# Patient Record
Sex: FEMALE | Race: WHITE | NOT HISPANIC OR LATINO | Employment: OTHER | URBAN - METROPOLITAN AREA
[De-identification: names, ages, dates, MRNs, and addresses within clinical notes are randomized per-mention and may not be internally consistent; named-entity substitution may affect disease eponyms.]

---

## 2017-01-17 ENCOUNTER — TELEPHONE (OUTPATIENT)
Dept: AUDIOLOGY | Facility: CLINIC | Age: 82
End: 2017-01-17

## 2017-01-17 NOTE — TELEPHONE ENCOUNTER
The patient's daughter brought her hearing aids to the [facility] on 1/17/17 for hearing aid services.  She requested both hearing aids and earmolds be cleaned.  This was done today and the  filters were replaced as well.  Both hearing aids were found to be functioning well.  She will return to the clinic as needed.

## 2017-01-25 ENCOUNTER — OFFICE VISIT (OUTPATIENT)
Dept: INTERNAL MEDICINE | Facility: CLINIC | Age: 82
End: 2017-01-25

## 2017-01-25 VITALS
BODY MASS INDEX: 29.85 KG/M2 | DIASTOLIC BLOOD PRESSURE: 72 MMHG | SYSTOLIC BLOOD PRESSURE: 142 MMHG | WEIGHT: 174 LBS | HEART RATE: 73 BPM

## 2017-01-25 DIAGNOSIS — E78.5 HYPERLIPIDEMIA, UNSPECIFIED HYPERLIPIDEMIA TYPE: ICD-10-CM

## 2017-01-25 DIAGNOSIS — M50.30 DEGENERATIVE DISC DISEASE, CERVICAL: ICD-10-CM

## 2017-01-25 DIAGNOSIS — E03.4 HYPOTHYROIDISM DUE TO ACQUIRED ATROPHY OF THYROID: ICD-10-CM

## 2017-01-25 DIAGNOSIS — R10.13 ABDOMINAL PAIN, EPIGASTRIC: ICD-10-CM

## 2017-01-25 DIAGNOSIS — M25.511 PAIN IN JOINT OF RIGHT SHOULDER: ICD-10-CM

## 2017-01-25 DIAGNOSIS — I10 ESSENTIAL HYPERTENSION, BENIGN: Primary | ICD-10-CM

## 2017-01-25 DIAGNOSIS — M54.12 CERVICAL RADICULOPATHY: ICD-10-CM

## 2017-01-25 LAB — TSH SERPL DL<=0.005 MIU/L-ACNC: 2.87 MU/L (ref 0.4–4)

## 2017-01-25 RX ORDER — LEVOTHYROXINE SODIUM 25 UG/1
25 TABLET ORAL DAILY
Qty: 90 TABLET | Refills: 3 | Status: SHIPPED | OUTPATIENT
Start: 2017-01-25 | End: 2018-02-06

## 2017-01-25 RX ORDER — HYDROCHLOROTHIAZIDE 25 MG/1
25 TABLET ORAL DAILY
Qty: 90 TABLET | Refills: 3 | Status: SHIPPED | OUTPATIENT
Start: 2017-01-25 | End: 2018-01-31

## 2017-01-25 RX ORDER — GABAPENTIN 300 MG/1
300 CAPSULE ORAL 2 TIMES DAILY
Qty: 180 CAPSULE | Refills: 1 | Status: SHIPPED | OUTPATIENT
Start: 2017-01-25 | End: 2017-10-10

## 2017-01-25 RX ORDER — ATORVASTATIN CALCIUM 10 MG/1
10 TABLET, FILM COATED ORAL DAILY
Qty: 90 TABLET | Refills: 3 | Status: SHIPPED | OUTPATIENT
Start: 2017-01-25 | End: 2018-03-01

## 2017-01-25 ASSESSMENT — PAIN SCALES - GENERAL: PAINLEVEL: NO PAIN (0)

## 2017-01-25 NOTE — PROGRESS NOTES
HPI:   Azeb Torres is a 89 year old year old female presents today for followup. She was seen in the emergency room in November for a brief period of confusion.  In the emergency room, she was evaluated by the stroke team and underwent MRI imaging.  This was unremarkable.  She was felt not to be having a stroke.  Her daughter says the confusion cleared up while in the emergency room, and this was thought to likely be toxic metabolic in nature.  Since then, she has been feeling good and has not had any repeat episodes.  She continues to live with her adult daughter in the community.  She does note continued pain in the left buttock, right where she sits on the bone there.  It only bothers her if she is sitting in certain positions or on certain surfaces.  It is most bothersome when she is in the car, but most of the time it does not bother her.  It does not hurt if she is lying down, and she does not report that it is hurting her today sitting on the exam room chair.  She first brought this to my attention back in June, and x-rays of the pelvis and hips revealed no fractures or other concerning lesions.      She tells me that her blood pressures at home when she checks them are usually in the 130s.  She and her daughter have not been very consistent in checking it recently.  Her blood pressure was high in the emergency room in November.  On initial reading today, it was high with a systolic of 177, but on repeat, it did come down to 142.  She denies any new neurologic complaints, any chest pain, shortness of breath, cough, wheeze, abdominal pain, fevers, chills, weight changes.           ASSESSMENT/PLAN: 1.  A 89-year-old woman living independently in the community with her daughter here for recheck.  Generally, she is doing well.  An episode in November of altered mental status was short-lived and has not recurred.   2. Benign essential hypertension.  Blood pressure is a little above target, but given her age, I am  not enthusiastic about adding another medication at this point, particularly since she tells me her blood pressures at home have been normal.  I have asked her daughter to check her blood pressure a couple times a week and report back to me those readings.  If she is consistently under 140 systolic, I would not make any changes.   3.  Hypothyroidism, on low-dose levothyroxine.  Due for a TSH today.   4.  She has persistent neck pain and right shoulder pain.  She is known to have degenerative disk disease in the cervical spine and degenerative changes in her right shoulder.  In the past, physical therapy has been helpful, and she has asked me to refer her back for that.  She also uses Neurontin for some neuropathic-type pain in her right leg after a stroke many years ago, and we will refill that today.   5.  Hyperlipidemia.  Atorvastatin refilled today.   6.  She has had epigastric discomfort in the past that responded to the use of omeprazole.  This was refilled today.   7.  She has a history of breast cancer treated with no evidence of disease recurrence.  We have now elected to discontinue breast cancer screening due to her age.   8.  Follow up with me in 3 months or sooner as needed.           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)     PSVT (paroxysmal supraventricular tachycardia) (H)     Cerebral infarction (H)     Homonymous hemianopia     Pain in joint, shoulder region     Pain in joint, lower leg     Hyperlipidemia     Hypothyroidism     Tricuspid regurgitation     Pulmonary artery hypertension (H)     Rosacea     Inflamed seborrheic keratosis     Dermatitis, seborrheic     Pseudophakia of both eyes     Nonexudative senile macular degeneration of retina     Asteroid hyalosis of right eye     Chronic angle-closure glaucoma     Dizziness of  unknown cause     Vertigo     Hypothyroidism due to acquired atrophy of thyroid     Cervical radiculopathy     Chronic right shoulder pain       Current Outpatient Prescriptions   Medication Sig Dispense Refill     hydrochlorothiazide (HYDRODIURIL) 25 MG tablet TAKE ONE TABLET BY MOUTH EVERY DAY 90 tablet 1     atorvastatin (LIPITOR) 10 MG tablet TAKE ONE TABLET BY MOUTH EVERY DAY 90 tablet 0     aspirin-dipyridamole (AGGRENOX)  MG per 12 hr capsule Take 1 capsule by mouth 2 times daily 180 capsule 3     gabapentin (NEURONTIN) 300 MG capsule Take 1 capsule (300 mg) by mouth 2 times daily 180 capsule 1     levothyroxine (SYNTHROID, LEVOTHROID) 25 MCG tablet Take 1 tablet (25 mcg) by mouth daily 90 tablet 3     omeprazole (PRILOSEC) 20 MG capsule Take 1 capsule (20 mg) by mouth daily 90 capsule 3     Multiple Vitamins-Minerals (PRESERVISION AREDS 2 PO) Take by mouth daily       ondansetron (ZOFRAN ODT) 4 MG disintegrating tablet Take 1 tablet (4 mg) by mouth every 8 hours as needed for nausea 30 tablet 1     meclizine (ANTIVERT) 12.5 MG tablet Take 1 tablet (12.5 mg) by mouth 4 times daily as needed for dizziness 30 tablet 0     traMADol (ULTRAM) 50 MG tablet Take 1 tablet (50 mg) by mouth nightly as needed for moderate pain 28 tablet 1     latanoprost (XALATAN) 0.005 % ophthalmic solution Place 1 drop into both eyes At Bedtime 1 Bottle 11     dorzolamide-timolol (COSOPT) 2-0.5 % ophthalmic solution Place 1 drop into both eyes 2 times daily 1 Bottle 11     ketoconazole (NIZORAL) 2 % shampoo Shampoo 1-2 times a week as needed for rash. 120 mL 1     aspirin 81 MG tablet Take 1 tablet by mouth daily.       acetaminophen (TYLENOL) 500 MG tablet Take 2 tablets by mouth 2 times daily.       cholecalciferol (VITAMIN D) 1000 UNIT tablet Take 1 tablet by mouth daily.           ROS:    Constitutional: no fevers, chill   Cardiovascular: no chest pain, palpitations  Respiratory: no dyspnea, cough, shortness of breath or  wheezing   GI: no nausea, vomiting, diarrhea, no abdominal pain   Musculoskeletal: no edema       PHYSICAL EXAM:   /79 mmHg  Pulse 73  Wt 78.926 kg (174 lb)   Wt Readings from Last 1 Encounters:   01/25/17 78.926 kg (174 lb)     /72 mmHg  Pulse 73  Wt 78.926 kg (174 lb)    Constitutional: no distress, comfortable, pleasant    Cardiovascular: regular rate and rhythm, normal S1 and S2, no murmurs, rubs or gallops  Respiratory: clear to auscultation, no wheezes or crackles, normal breath sounds   Musculoskeletal:  no edema   Some limitation of ROM of neck. Strength 5/5 UE throughout; DTRS 2+ at biceps.      Lilia Flores MD

## 2017-01-25 NOTE — NURSING NOTE
Chief Complaint   Patient presents with     Recheck Medication     Pt is here to recheck her medications.      Araceli Starks LPN January 25, 2017 9:18 AM

## 2017-01-25 NOTE — MR AVS SNAPSHOT
After Visit Summary   1/25/2017    Azeb Torres    MRN: [id number]           Patient Information     Date Of Birth          4/3/1925        Visit Information        Provider Department      1/25/2017 9:25 AM Lilia Flores MD [facility]        Today's Diagnoses     Essential hypertension, benign    -  1     Hypothyroidism due to acquired atrophy of thyroid         Cervical radiculopathy         Pain in joint of right shoulder         Degenerative disc disease, cervical         Hyperlipidemia, unspecified hyperlipidemia type         Abdominal pain, epigastric           Care Instructions    Primary Care Center Medication Refill Request Information:  * Please contact your pharmacy regarding ANY request for medication refills.  ** [facility] Prescription Fax = [phone]  * Please allow 3 business days for routine medication refills.  * Please allow 5 business days for controlled substance medication refills.     Primary Care Center Test Result notification information:  *You will be notified with in 7-10 days of your appointment day regarding the results of your test.  If you are on MyChart you will be notified as soon as the provider has reviewed the results and signed off on them.    Please schedule the following appointments:  Physical Therapy [phone] (4th Floor [facility])   Primary Care Center [phone] (4th [facility])           Follow-ups after your visit        Additional Services     PHYSICAL THERAPY REFERRAL       *This therapy referral will be filtered to a centralized scheduling office at [facility] and the patient will receive a call to schedule an appointment at a [place] location most convenient for them. *     [facility] provides Physical Therapy evaluation and treatment and many specialty services across the [place] system.  If requesting a specialty program, please choose from the list below.    If you have  not heard from the scheduling office within 2 business days, please call [phone] for all locations, with the exception of [place], please call [phone].  Treatment: Evaluation & Treatment  Special Instructions/Modalities: None  Special Programs: None    Please be aware that coverage of these services is subject to the terms and limitations of your health insurance plan.  Call member services at your health plan with any benefit or coverage questions.      **Note to Provider:  If you are referring outside of [facility] for the therapy appointment, please list the name of the location in the  special instructions  above, print the referral and give to the patient to schedule the appointment.                  Follow-up notes from your care team     Return in about 3 months (around 4/25/2017).      Your next 10 appointments already scheduled     Jan 25, 2017 10:15 AM   LAB with [facility] ([facility])    [address] [place] [id number] [phone]           Patient must bring picture ID.  Patient should be prepared to give a urine specimen  Please do not eat 10-12 hours before your appointment if you are coming in fasting for labs on lipids, cholesterol, or glucose (sugar).  Pregnant women should follow their Care Team instructions. Water with medications is okay. Do not drink coffee or other fluids.   If you have concerns about taking  your medications, please ask at office or if scheduling via [employer], send a message by clicking on Secure Messaging, Message Your Care Team.              Future tests that were ordered for you today     Open Future Orders        Priority Expected Expires Ordered    TSH with free T4 reflex Routine  1/25/2018 1/25/2017            Who to contact     Please call your clinic at [phone] to:    Ask questions about your health    Make or cancel appointments    Discuss your medicines    Learn about your test  results    Speak to your doctor   If you have compliments or concerns about an experience at your clinic, or if you wish to file a complaint, please contact [facility] Patient Relations at [phone] or email us at [email or web address]         Additional Information About Your Visit        [employer] Information     [employer] gives you secure access to your electronic health record. If you see a primary care provider, you can also send messages to your care team and make appointments. If you have questions, please call your primary care clinic.  If you do not have a primary care provider, please call [phone] and they will assist you.      [employer] is an electronic gateway that provides easy, online access to your medical records. With [employer] Photo, you can request a clinic appointment, read your test results, renew a prescription or communicate with your care team.     To access your existing account, please contact your [facility] or call [phone] for assistance.        Care EveryWhere ID     This is your Care EveryWhere ID. This could be used by other organizations to access your [place] medical records  [id number]        Your Vitals Were     Pulse                   73            Blood Pressure from Last 3 Encounters:   01/25/17 142/72   11/05/16 172/87   10/25/16 157/90    Weight from Last 3 Encounters:   01/25/17 78.926 kg (174 lb)   11/05/16 78.954 kg (174 lb 1 oz)   10/25/16 77.701 kg (171 lb 4.8 oz)              We Performed the Following     PHYSICAL THERAPY REFERRAL          Today's Medication Changes          These changes are accurate as of: 1/25/17  9:51 AM.  If you have any questions, ask your nurse or doctor.               These medicines have changed or have updated prescriptions.        Dose/Directions    atorvastatin 10 MG tablet   Commonly known as:  LIPITOR   This may have changed:  See the new instructions.   Used for:   Hyperlipidemia, unspecified hyperlipidemia type   Changed by:  Lilia Flores MD        Dose:  10 mg   Take 1 tablet (10 mg) by mouth daily   Quantity:  90 tablet   Refills:  3       hydrochlorothiazide 25 MG tablet   Commonly known as:  HYDRODIURIL   This may have changed:  See the new instructions.   Used for:  Hyperlipidemia, unspecified hyperlipidemia type, Essential hypertension, benign   Changed by:  Lilia Flores MD        Dose:  25 mg   Take 1 tablet (25 mg) by mouth daily   Quantity:  90 tablet   Refills:  3            Where to get your medicines      These medications were sent to [place], [place] - [address]  [address], [facility] [address]    Hours:  TRANSPLANT PHONE NUMBER [phone] Phone:  [phone]    - atorvastatin 10 MG tablet  - gabapentin 300 MG capsule  - hydrochlorothiazide 25 MG tablet  - levothyroxine 25 MCG tablet  - omeprazole 20 MG CR capsule             Primary Care Provider Office Phone # Fax #    Lilia Flores [phone]        [facility] [address] [place] [address] [address]  [facility] [address]        Thank you!     Thank you for choosing [facility]  for your care. Our goal is always to provide you with excellent care. Hearing back from our patients is one way we can continue to improve our services. Please take a few minutes to complete the written survey that you may receive in the mail after your visit with us. Thank you!             Your Updated Medication List - Protect others around you: Learn how to safely use, store and throw away your medicines at www.disposemymeds.org.          This list is accurate as of: 1/25/17  9:51 AM.  Always use your most recent med list.                   Brand Name Dispense Instructions for use    aspirin 81 MG tablet      Take 1 tablet by mouth daily.       aspirin-dipyridamole  MG per 12 hr capsule    AGGRENOX    180 capsule     Take 1 capsule by mouth 2 times daily       atorvastatin 10 MG tablet    LIPITOR    90 tablet    Take 1 tablet (10 mg) by mouth daily       cholecalciferol 1000 UNIT tablet    vitamin D     Take 1 tablet by mouth daily.       dorzolamide-timolol 2-0.5 % ophthalmic solution    COSOPT    1 Bottle    Place 1 drop into both eyes 2 times daily       gabapentin 300 MG capsule    NEURONTIN    180 capsule    Take 1 capsule (300 mg) by mouth 2 times daily       hydrochlorothiazide 25 MG tablet    HYDRODIURIL    90 tablet    Take 1 tablet (25 mg) by mouth daily       ketoconazole 2 % shampoo    NIZORAL    120 mL    Shampoo 1-2 times a week as needed for rash.       latanoprost 0.005 % ophthalmic solution    XALATAN    1 Bottle    Place 1 drop into both eyes At Bedtime       levothyroxine 25 MCG tablet    SYNTHROID/LEVOTHROID    90 tablet    Take 1 tablet (25 mcg) by mouth daily       meclizine 12.5 MG tablet    ANTIVERT    30 tablet    Take 1 tablet (12.5 mg) by mouth 4 times daily as needed for dizziness       omeprazole 20 MG CR capsule    priLOSEC    90 capsule    Take 1 capsule (20 mg) by mouth daily       ondansetron 4 MG ODT tab    ZOFRAN ODT    30 tablet    Take 1 tablet (4 mg) by mouth every 8 hours as needed for nausea       PRESERVISION AREDS 2 PO      Take by mouth daily       traMADol 50 MG tablet    ULTRAM    28 tablet    Take 1 tablet (50 mg) by mouth nightly as needed for moderate pain       TYLENOL 500 MG tablet   Generic drug:  acetaminophen      Take 2 tablets by mouth 2 times daily.

## 2017-01-25 NOTE — PATIENT INSTRUCTIONS
Primary Care Center Medication Refill Request Information:  * Please contact your pharmacy regarding ANY request for medication refills.  ** [facility] Prescription Fax = [phone]  * Please allow 3 business days for routine medication refills.  * Please allow 5 business days for controlled substance medication refills.     Primary Care Center Test Result notification information:  *You will be notified with in 7-10 days of your appointment day regarding the results of your test.  If you are on MyChart you will be notified as soon as the provider has reviewed the results and signed off on them.    Please schedule the following appointments:  Physical Therapy [phone] (4th Floor [facility] Building)   Primary Care Center [phone] (4th Floor [facility] Building)

## 2017-02-16 DIAGNOSIS — R11.0 NAUSEA: ICD-10-CM

## 2017-02-17 RX ORDER — ONDANSETRON 4 MG/1
4 TABLET, ORALLY DISINTEGRATING ORAL EVERY 8 HOURS PRN
Qty: 30 TABLET | Refills: 1 | Status: SHIPPED | OUTPATIENT
Start: 2017-02-17 | End: 2018-02-12

## 2017-02-17 NOTE — TELEPHONE ENCOUNTER
ondansetron (ZOFRAN-ODT) 4 MG ODT tab      Last Written Prescription Date:  11-11-15  Last Fill Quantity: 30,   # refills: 1  Last Office Visit : 1-25-17  Future Office visit:  none    Kathleen M Doege RN

## 2017-02-21 ENCOUNTER — TELEPHONE (OUTPATIENT)
Dept: AUDIOLOGY | Facility: CLINIC | Age: 82
End: 2017-02-21

## 2017-02-21 NOTE — TELEPHONE ENCOUNTER
Azeb Torres's daughter brought her hearing aids to the [facility] on 2/21/17.  She requested they be cleaned.  Both hearing aids and earmolds were cleaned and the  filters were replaced bilaterally.  Both devices were found to be functioning normally.

## 2017-02-28 ENCOUNTER — HOSPITAL ENCOUNTER (OUTPATIENT)
Dept: PHYSICAL THERAPY | Facility: CLINIC | Age: 82
Setting detail: THERAPIES SERIES
End: 2017-02-28
Attending: INTERNAL MEDICINE
Payer: MEDICARE

## 2017-02-28 PROCEDURE — 97110 THERAPEUTIC EXERCISES: CPT | Mod: GP | Performed by: PHYSICAL THERAPIST

## 2017-02-28 PROCEDURE — G8978 MOBILITY CURRENT STATUS: HCPCS | Mod: GP,CK | Performed by: PHYSICAL THERAPIST

## 2017-02-28 PROCEDURE — 97530 THERAPEUTIC ACTIVITIES: CPT | Mod: GP | Performed by: PHYSICAL THERAPIST

## 2017-02-28 PROCEDURE — 40000185 ZZHC STATISTIC PT OUTPT VISIT: Performed by: PHYSICAL THERAPIST

## 2017-02-28 PROCEDURE — G8979 MOBILITY GOAL STATUS: HCPCS | Mod: GP,CJ | Performed by: PHYSICAL THERAPIST

## 2017-02-28 PROCEDURE — 97161 PT EVAL LOW COMPLEX 20 MIN: CPT | Mod: GP | Performed by: PHYSICAL THERAPIST

## 2017-02-28 NOTE — PROGRESS NOTES
[facility]        OUTPATIENT PHYSICAL THERAPY FUNCTIONAL EVALUATION  PLAN OF TREATMENT FOR OUTPATIENT REHABILITATION  (COMPLETE FOR INITIAL CLAIMS ONLY)  Patient's Last Name, First Name, M.I.  YOB: 1925  Azeb Torres     Provider's Name   [facility]   Medical Record No.  [id number]     Start of Care Date:  02/28/17   Onset Date:   1/25/17   Type:     _X__PT   ____OT  ____SLP Medical Diagnosis:   Cervical radiculopathy, pain R shoulder, cervical DDD     PT Diagnosis:  pain L leg affecting gait,sitting in car etc Visits from SOC:  1                              __________________________________________________________________________________  Plan of Treatment/Functional Goals:  balance training, gait training, manual therapy, strengthening, stretching, ROM     Ultrasound     GOALS  pain  pt to demo/verbalize 2-3 ways to manage her L hip/leg pain  05/28/17    HEP  pt to be indep w 2-4 HEP exer to manage her L hip pain.  05/28/17    gait speed  25 ft walk to improve to 10 sec or less for safer community ambulation in busy areas.  05/28/17           Therapy Frequency:  other (see comments)   Predicted Duration of Therapy Intervention:  up to 8x in 90 days    Mena Medrano, PT                                    I CERTIFY THE NEED FOR THESE SERVICES FURNISHED UNDER        THIS PLAN OF TREATMENT AND WHILE UNDER MY CARE     (Physician co-signature of this document indicates review and certification of the therapy plan).                Certification Date From:    2/28/17  Certification Date To:   5/28/17    Referring Provider:  Lilia Flores     Initial Assessment  See Epic Evaluation- Start of Care Date: 02/28/17

## 2017-03-01 ENCOUNTER — HOSPITAL ENCOUNTER (OUTPATIENT)
Dept: PHYSICAL THERAPY | Facility: CLINIC | Age: 82
Setting detail: THERAPIES SERIES
End: 2017-03-01
Attending: INTERNAL MEDICINE
Payer: MEDICARE

## 2017-03-01 PROCEDURE — 40000185 ZZHC STATISTIC PT OUTPT VISIT: Performed by: PHYSICAL THERAPIST

## 2017-03-01 PROCEDURE — 97535 SELF CARE MNGMENT TRAINING: CPT | Mod: GP | Performed by: PHYSICAL THERAPIST

## 2017-03-01 PROCEDURE — 97140 MANUAL THERAPY 1/> REGIONS: CPT | Mod: GP | Performed by: PHYSICAL THERAPIST

## 2017-03-01 PROCEDURE — 97110 THERAPEUTIC EXERCISES: CPT | Mod: GP | Performed by: PHYSICAL THERAPIST

## 2017-03-01 NOTE — IP AVS SNAPSHOT
MRN:[id number]                      After Visit Summary   3/1/2017    Azeb Torres    MRN: [id number]           Visit Information        Provider Department      3/1/2017 11:00 AM Lilia Mack, PT [facility] [place], Physical Therapy - Outpatient        Your next 10 appointments already scheduled     Mar 15, 2017 11:00 AM CDT   Treatment 45 with Mena Medrano, PT   [facility] [place], Physical Therapy - Outpatient ([facility])    [address]  [place] [address]   [phone]                Further instructions from your care team       Home Instructions for pain relief from your piriformis pain:    -avoid sitting on hard surfaces    -use gel ice pack for pain relief as needed    -sit on tennis ball placed in your tender spot for 20-60 seconds.     -do the attached stretch gently    MyChart Information     [employer] gives you secure access to your electronic health record. If you see a primary care provider, you can also send messages to your care team and make appointments. If you have questions, please call your primary care clinic.  If you do not have a primary care provider, please call [phone] and they will assist you.        Care EveryWhere ID     This is your Care EveryWhere ID. This could be used by other organizations to access your [place] medical records  [id number]

## 2017-03-01 NOTE — DISCHARGE INSTRUCTIONS
Home Instructions for pain relief from your piriformis pain:    -avoid sitting on hard surfaces    -use gel ice pack for pain relief as needed    -sit on tennis ball placed in your tender spot for 20-60 seconds.     -do the attached stretch gently

## 2017-04-04 NOTE — ADDENDUM NOTE
Encounter addended by: Lilia Mack PT on: 4/4/2017  8:55 AM<BR>     Actions taken: Flowsheet accepted

## 2017-04-27 ENCOUNTER — OFFICE VISIT (OUTPATIENT)
Dept: OPHTHALMOLOGY | Facility: CLINIC | Age: 82
End: 2017-04-27
Attending: OPHTHALMOLOGY
Payer: MEDICARE

## 2017-04-27 DIAGNOSIS — H35.3190 NONEXUDATIVE SENILE MACULAR DEGENERATION OF RETINA: ICD-10-CM

## 2017-04-27 DIAGNOSIS — H43.21 ASTEROID HYALOSIS OF RIGHT EYE: ICD-10-CM

## 2017-04-27 DIAGNOSIS — Z96.1 PSEUDOPHAKIA OF BOTH EYES: ICD-10-CM

## 2017-04-27 DIAGNOSIS — H40.2233 CHRONIC ANGLE-CLOSURE GLAUCOMA OF BOTH EYES, SEVERE STAGE: Primary | ICD-10-CM

## 2017-04-27 PROCEDURE — 92083 EXTENDED VISUAL FIELD XM: CPT | Mod: ZF | Performed by: OPHTHALMOLOGY

## 2017-04-27 PROCEDURE — 99214 OFFICE O/P EST MOD 30 MIN: CPT | Mod: ZF

## 2017-04-27 PROCEDURE — 92020 GONIOSCOPY: CPT | Mod: 59,ZF | Performed by: OPHTHALMOLOGY

## 2017-04-27 PROCEDURE — 92015 DETERMINE REFRACTIVE STATE: CPT | Mod: GY,ZF

## 2017-04-27 PROCEDURE — 92133 CPTRZD OPH DX IMG PST SGM ON: CPT | Mod: ZF | Performed by: OPHTHALMOLOGY

## 2017-04-27 ASSESSMENT — VISUAL ACUITY
OD_CC: 20/60-1
OD_PH_CC: 20/40-2
OS_CC: 20/50-2
METHOD: SNELLEN - LINEAR
CORRECTION_TYPE: GLASSES

## 2017-04-27 ASSESSMENT — CONF VISUAL FIELD
OS_INFERIOR_NASAL_RESTRICTION: 1
OD_SUPERIOR_TEMPORAL_RESTRICTION: 1
OS_SUPERIOR_NASAL_RESTRICTION: 1
OD_INFERIOR_NASAL_RESTRICTION: 3
OS_INFERIOR_TEMPORAL_RESTRICTION: 3
OD_INFERIOR_TEMPORAL_RESTRICTION: 1
OS_SUPERIOR_TEMPORAL_RESTRICTION: 3
OD_SUPERIOR_NASAL_RESTRICTION: 3

## 2017-04-27 ASSESSMENT — EXTERNAL EXAM - LEFT EYE: OS_EXAM: NORMAL

## 2017-04-27 ASSESSMENT — PACHYMETRY
OS_CT(UM): 563
OD_CT(UM): 601

## 2017-04-27 ASSESSMENT — REFRACTION_WEARINGRX
OS_AXIS: 174
OD_AXIS: 153
OS_CYLINDER: +0.25
OS_SPHERE: -1.75
OD_CYLINDER: +1.00
SPECS_TYPE: SVL
OD_SPHERE: -2.25

## 2017-04-27 ASSESSMENT — TONOMETRY
IOP_METHOD: APPLANATION
OD_IOP_MMHG: 12
OS_IOP_MMHG: 16
OS_IOP_MMHG: 14
IOP_METHOD: TONOPEN
OD_IOP_MMHG: 14

## 2017-04-27 ASSESSMENT — SLIT LAMP EXAM - LIDS: COMMENTS: DERMATOCHALASIS

## 2017-04-27 ASSESSMENT — CUP TO DISC RATIO
OS_RATIO: 0.8
OD_RATIO: 0.6

## 2017-04-27 ASSESSMENT — GONIOSCOPY
OD_INFERIOR: CLOSED
OD_SUPERIOR: CLOSED
OD_NASAL: CLOSED
OD_TEMPORAL: CLOSED
OS_TEMPORAL: CLOSED
OS_INFERIOR: CLOSED
OS_NASAL: CLOSED
OS_SUPERIOR: CLOSED

## 2017-04-27 ASSESSMENT — REFRACTION_MANIFEST
OD_AXIS: 163
OS_SPHERE: -2.50
OS_AXIS: 165
OD_CYLINDER: +1.75
OS_CYLINDER: +1.25
OD_SPHERE: -2.50
OS_ADD: +2.75
OD_ADD: +2.75

## 2017-04-27 ASSESSMENT — EXTERNAL EXAM - RIGHT EYE: OD_EXAM: NORMAL

## 2017-04-27 NOTE — MR AVS SNAPSHOT
After Visit Summary   4/27/2017    Azeb Torres    MRN: [id number]           Patient Information     Date Of Birth          4/3/1925        Visit Information        Provider Department      4/27/2017 9:30 AM Alejandrina Faria MD [facility]        Today's Diagnoses     Chronic angle-closure glaucoma of both eyes, severe stage    -  1    Asteroid hyalosis of right eye        Nonexudative senile macular degeneration of retina        Pseudophakia of both eyes          Care Instructions    Patient will continue on Cosopt (Timolol/Dorzolamide) which is a blue top drop 2x/day (12 hours apart) in both eyes and Latanoprost which is a teal top drop at bedtime in both eyes.  Patient will return to clinic in 8-12 months with repeat visual field test, dilated eye exam, and OCT with RNFL analysis.  Patient will also follow up with Evelina Bacon for low vision evaluation and start on eye vitamins.              Follow-ups after your visit        Additional Services     OCCUPATIONAL THERAPY REFERRAL       Low Vision Referral to Evelina Bacon                  Follow-up notes from your care team     Return 8-12 months with repeat visual field test, dilated eye exam, and OCT with RNFL analysis and Evelina Nazario.      Your next 10 appointments already scheduled     Jun 29, 2017  9:30 AM CDT   Programmable Hearing Aid Check with Kylie Downey   [facility] ([facility] [place])    [address] [place] [id number] [phone]            Sep 06, 2017 10:25 AM CDT   (Arrive by 10:10 AM)   Return Visit with Lilia Flores MD   [facility] ([facility] [place])    [address] [place] [id number] [phone]              Who to contact     Please call your clinic at [phone] to:    Ask questions about your health    Make or cancel appointments    Discuss your medicines    Learn about your test results    Speak to  your doctor   If you have compliments or concerns about an experience at your clinic, or if you wish to file a complaint, please contact [facility] Patient Relations at [phone] or email us at [email or web address]         Additional Information About Your Visit        MyChart Information     [employer] gives you secure access to your electronic health record. If you see a primary care provider, you can also send messages to your care team and make appointments. If you have questions, please call your primary care clinic.  If you do not have a primary care provider, please call [phone] and they will assist you.      [employer] is an electronic gateway that provides easy, online access to your medical records. With [employer], you can request a clinic appointment, read your test results, renew a prescription or communicate with your care team.     To access your existing account, please contact your [facility] or call [phone] for assistance.        Care EveryWhere ID     This is your Care EveryWhere ID. This could be used by other organizations to access your [facility] medical records  [id number]         Blood Pressure from Last 3 Encounters:   05/26/17 124/66   04/28/17 141/66   01/25/17 142/72    Weight from Last 3 Encounters:   04/28/17 79.1 kg (174 lb 6.4 oz)   01/25/17 78.9 kg (174 lb)   11/05/16 79 kg (174 lb 1 oz)              We Performed the Following     GONIOSCOPY     OCCUPATIONAL THERAPY REFERRAL     OCT Optic Nerve RNFL Spectralis OU (both eyes)     OVF 24-2 Dynamic OU     Pachymetry OU (both eyes)        Primary Care Provider Office Phone # Fax #    Lilia Flores [phone]        [facility] [address] [facility] [address]  [facility] [address]        Equal Access to Services     KENDRA FLORES AH: Ora Grady, reji hopson, jese de la torre. So  [facility] [phone].    ATENCIÓN: Si kathleen vora, tiene a johnson disposición servicios gratuitos de asistencia lingüística. Megan valentine [phone].    We comply with applicable federal civil rights laws and [place] laws. We do not discriminate on the basis of race, color, national origin, age, disability sex, sexual orientation or gender identity.            Thank you!     Thank you for choosing [facility]  for your care. Our goal is always to provide you with excellent care. Hearing back from our patients is one way we can continue to improve our services. Please take a few minutes to complete the written survey that you may receive in the mail after your visit with us. Thank you!             Your Updated Medication List - Protect others around you: Learn how to safely use, store and throw away your medicines at www.disposemymeds.org.          This list is accurate as of: 4/27/17 11:59 PM.  Always use your most recent med list.                   Brand Name Dispense Instructions for use Diagnosis    aspirin 81 MG tablet      Take 1 tablet by mouth daily.        aspirin-dipyridamole  MG per 12 hr capsule    AGGRENOX    180 capsule    Take 1 capsule by mouth 2 times daily    Cerebrovascular disease       atorvastatin 10 MG tablet    LIPITOR    90 tablet    Take 1 tablet (10 mg) by mouth daily    Hyperlipidemia, unspecified hyperlipidemia type       cholecalciferol 1000 UNIT tablet    vitamin D     Take 1 tablet by mouth daily.        gabapentin 300 MG capsule    NEURONTIN    180 capsule    Take 1 capsule (300 mg) by mouth 2 times daily    Degenerative disc disease, cervical       hydrochlorothiazide 25 MG tablet    HYDRODIURIL    90 tablet    Take 1 tablet (25 mg) by mouth daily    Hyperlipidemia, unspecified hyperlipidemia type, Essential hypertension, benign       ketoconazole 2 % shampoo    NIZORAL    120 mL    Shampoo 1-2 times a week as needed for rash.    Seborrheic dermatitis       levothyroxine 25 MCG tablet     SYNTHROID/LEVOTHROID    90 tablet    Take 1 tablet (25 mcg) by mouth daily    Hypothyroidism due to acquired atrophy of thyroid       meclizine 12.5 MG tablet    ANTIVERT    30 tablet    Take 1 tablet (12.5 mg) by mouth 4 times daily as needed for dizziness        omeprazole 20 MG CR capsule    priLOSEC    90 capsule    Take 1 capsule (20 mg) by mouth daily    Abdominal pain, epigastric       ondansetron 4 MG ODT tab    ZOFRAN-ODT    30 tablet    Place 1 tablet (4 mg) under the tongue every 8 hours as needed for nausea    Nausea       PRESERVISION AREDS 2 PO      Take by mouth daily        traMADol 50 MG tablet    ULTRAM    28 tablet    Take 1 tablet (50 mg) by mouth nightly as needed for moderate pain    Cervicalgia       TYLENOL 500 MG tablet   Generic drug:  acetaminophen      Take 2 tablets by mouth 2 times daily.

## 2017-04-27 NOTE — NURSING NOTE
Chief Complaints and History of Present Illnesses   Patient presents with     Angle Closure Glaucoma Evaluation     HPI    Affected eye(s):  Both   Symptoms:     Blurred vision   No difficulty with reading   No floaters   No flashes         Do you have eye pain now?:  No      Comments:  Per pt last eye check was 6 mo ago. Unsure what clinic or the doctor's name. Distance [facility] blurry but near [facility] seems good. Using 2 different drops, one is tid and the other is qhs. Unsure what these are or top colors.   Millie BURGESS April 27, 2017 9:40 AM

## 2017-04-27 NOTE — PROGRESS NOTES
1)CACG OS>>OD -- s/p Trab OU -- K pachy: 601/563   Tmax: 50 per patient (teens on Tx per EPIC)    HVF:OD:Right HH and OS:Central island       CDR: 0.7/0.9   HRT/OCT: Mod RNFL thinning      FHX of Glc:  None    Gonio:  Closed     Intolerant to: None    Asthma/COPD:No, on topical BB   Steroid Use: Yes, injections    Kidney Stones: None    Sulfa Allergy:  None    IOP targets: Teens -- IOP good   2)PCIOL OU   3)NNVAMD  4)H/O CHRPE OD  5)H/O CVA -- Right HH  -- Head CT/CTA done 11/16  6)Asteroid Hyalosis OD    Patient will continue on Cosopt (Timolol/Dorzolamide) which is a blue top drop 2x/day (12 hours apart) in both eyes and Latanoprost which is a teal top drop at bedtime in both eyes.  Patient will return to clinic in 8-12 months with repeat visual field test, dilated eye exam, and OCT with RNFL analysis.  Patient will also follow up with Evelina Bacon for low vision evaluation and start on eye vitamins.        Resident Note (Please Follow Final Note Above)  S: Last appointment 4/2015 with Dr. Wilson. Has been following elsewhere.   O: IOP 14/16  Visual Field 24-2 4/27/2017  OD: right homonymous hemianopia  OS: right homonymous hemianopia, severe constriction/central island remaining MD -25 from -23  A/P:   CACG- visual field stable, IOP low teens  NNVAMD- consider AREDs vitamins  Myopia with Astig- update glasses    Attending Physician Attestation:  Complete documentation of historical and exam elements from today's encounter can be found in the full encounter summary report (not reduplicated in this progress note). I personally obtained the chief complaint(s) and history of present illness.  I confirmed and edited as necessary the review of systems, past medical/surgical history, family history, social history, and examination findings as documented by others; and I examined the patient myself. I personally reviewed the relevant tests, images, and reports as documented above. I formulated and edited as necessary the  assessment and plan and discussed the findings and management plan with the patient and family.  - Alejandrina Faria MD

## 2017-04-28 ENCOUNTER — OFFICE VISIT (OUTPATIENT)
Dept: INTERNAL MEDICINE | Facility: CLINIC | Age: 82
End: 2017-04-28

## 2017-04-28 VITALS
BODY MASS INDEX: 29.94 KG/M2 | HEART RATE: 80 BPM | RESPIRATION RATE: 18 BRPM | WEIGHT: 174.4 LBS | DIASTOLIC BLOOD PRESSURE: 66 MMHG | SYSTOLIC BLOOD PRESSURE: 141 MMHG

## 2017-04-28 DIAGNOSIS — M25.511 CHRONIC RIGHT SHOULDER PAIN: ICD-10-CM

## 2017-04-28 DIAGNOSIS — I10 ESSENTIAL HYPERTENSION, BENIGN: ICD-10-CM

## 2017-04-28 DIAGNOSIS — G89.29 CHRONIC RIGHT SHOULDER PAIN: ICD-10-CM

## 2017-04-28 DIAGNOSIS — M54.12 CERVICAL RADICULOPATHY: Primary | ICD-10-CM

## 2017-04-28 ASSESSMENT — ENCOUNTER SYMPTOMS
ABDOMINAL PAIN: 0
TINGLING: 1
NUMBNESS: 1
ARTHRALGIAS: 0
FEVER: 0
FATIGUE: 0
JOINT SWELLING: 0

## 2017-04-28 ASSESSMENT — PAIN SCALES - GENERAL: PAINLEVEL: NO PAIN (0)

## 2017-04-28 NOTE — PATIENT INSTRUCTIONS
[facility] Medication Refill Request Information:  * Please contact your pharmacy regarding ANY request for medication refills.  ** [facility] Prescription Fax = [phone]  * Please allow 3 business days for routine medication refills.  * Please allow 5 business days for controlled substance medication refills.     [facility] Test Result notification information:  *You will be notified with in 7-10 days of your appointment day regarding the results of your test.  If you are on MyChart you will be notified as soon as the provider has reviewed the results and signed off on them.    [facility] [phone]     MRI Screening Tool    Does the patient have any metals in their body? Yes. Patient had two hip replacement.  Is the patient claustrophobic? no  Does the patient need sedation? no  Is the patient able to transport themself to a table? Yes but would like assistance, patient can get light headed.

## 2017-04-28 NOTE — PROGRESS NOTES
HPI:       Azeb Torres is a 89 year old female with a PMH of HTN, CVA, and breast cancer who presents for follow up of concern(s) listed below:    R Shoulder pain and numbness  Patient has been experiencing some pain start in her R neck and progresses to hands. It has started many years ago and has worsened in the past 3 months ago. Points to lateral aspect of proximal humerus as being the most sore spot. The pain disturbs her mainly during sleep and as she just wakes up from sleep. It forces her to sleeps on her left side instead. Also mentions numbness and tingling in all five fingers. She has had a cortisone shot a couple years ago and has found it helpful, it was for the similar concern as today. Has not done any PT for her shoulder.   Current meds: She is taking her gabapentin 300mg bid, Tylenol 500mg bid, and aspirin 81mg, but has not taken her Tramadol prn.     Benign Essential HTN  Last blood pressure measured at home SBP ~140. Does not take blood pressure often and is dependent on relatives for measurement. Also mentions feeling dizziness when waking up.  Today's BP is 154/84 and repeat was 141/66.  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)     PSVT (paroxysmal supraventricular tachycardia) (H)     Cerebral infarction (H)     Homonymous hemianopia     Pain in joint, shoulder region     Pain in joint, lower leg     Hyperlipidemia     Hypothyroidism     Tricuspid regurgitation     Pulmonary artery hypertension (H)     Rosacea     Inflamed seborrheic keratosis     Dermatitis, seborrheic     Pseudophakia of both eyes     Nonexudative senile macular degeneration of retina     Asteroid hyalosis of right eye     Chronic angle-closure glaucoma     Dizziness of unknown cause     Vertigo      Hypothyroidism due to acquired atrophy of thyroid     Cervical radiculopathy     Chronic right shoulder pain       Current Outpatient Prescriptions   Medication Sig Dispense Refill     ondansetron (ZOFRAN-ODT) 4 MG ODT tab Place 1 tablet (4 mg) under the tongue every 8 hours as needed for nausea 30 tablet 1     atorvastatin (LIPITOR) 10 MG tablet Take 1 tablet (10 mg) by mouth daily 90 tablet 3     gabapentin (NEURONTIN) 300 MG capsule Take 1 capsule (300 mg) by mouth 2 times daily 180 capsule 1     omeprazole (PRILOSEC) 20 MG CR capsule Take 1 capsule (20 mg) by mouth daily 90 capsule 3     levothyroxine (SYNTHROID/LEVOTHROID) 25 MCG tablet Take 1 tablet (25 mcg) by mouth daily 90 tablet 3     hydrochlorothiazide (HYDRODIURIL) 25 MG tablet Take 1 tablet (25 mg) by mouth daily 90 tablet 3     aspirin-dipyridamole (AGGRENOX)  MG per 12 hr capsule Take 1 capsule by mouth 2 times daily 180 capsule 3     Multiple Vitamins-Minerals (PRESERVISION AREDS 2 PO) Take by mouth daily       meclizine (ANTIVERT) 12.5 MG tablet Take 1 tablet (12.5 mg) by mouth 4 times daily as needed for dizziness 30 tablet 0     traMADol (ULTRAM) 50 MG tablet Take 1 tablet (50 mg) by mouth nightly as needed for moderate pain 28 tablet 1     latanoprost (XALATAN) 0.005 % ophthalmic solution Place 1 drop into both eyes At Bedtime 1 Bottle 11     dorzolamide-timolol (COSOPT) 2-0.5 % ophthalmic solution Place 1 drop into both eyes 2 times daily 1 Bottle 11     ketoconazole (NIZORAL) 2 % shampoo Shampoo 1-2 times a week as needed for rash. 120 mL 1     aspirin 81 MG tablet Take 1 tablet by mouth daily.       acetaminophen (TYLENOL) 500 MG tablet Take 2 tablets by mouth 2 times daily.       cholecalciferol (VITAMIN D) 1000 UNIT tablet Take 1 tablet by mouth daily.         Past Surgical History:   Procedure Laterality Date     CATARACT IOL, RT/LT       glaucoma laser[       HIP SURGERY       HYSTERECTOMY           Social History     Social History      Marital status:      Spouse name: N/A     Number of children: N/A     Years of education: N/A     Occupational History     Not on file.     Social History Main Topics     Smoking status: Never Smoker     Smokeless tobacco: Never Used     Alcohol use No     Drug use: No     Sexual activity: No     Other Topics Concern     Not on file     Social History Narrative      No Known Allergies    No results found for this or any previous visit (from the past 24 hour(s)).         Review of Systems:     Review of Systems     Constitutional:  Negative for fever and fatigue.   Gastrointestinal:  Negative for abdominal pain.   Musculoskeletal:  Negative for joint swelling and arthralgias.   Skin:  Negative for rash.   Neurological:  Positive for tingling and numbness.   - See HPI         Physical Exam:   /84 (BP Location: Right arm, Patient Position: Chair, Cuff Size: Adult Regular)  Pulse 80  Resp 18  Wt 79.1 kg (174 lb 6.4 oz)  Breastfeeding? No  BMI 29.94 kg/m2  General: elderly overweight  woman sitting comfortably in her chair  Respiratory: CTA b/l in all lung fields, normal respiratory effort  Cardio: RRR, normal S1 and S2, no murmurs appreciated, no peripheral edema, distal pulses are strong and equal b/l  MSK: slight limited ROM in both arms in flexion and extension, 5/5 strength with internal and external rotation, arm flexion and extension, and abduction; negative empty can test, slight or negative Neer test for impingement, no arthralgias, no TTP on spinal processes, no paraspinal tenderness  Neuro: A&Ox3, CN III-XII grossly intact, reflexes are 2+ b/l, gait is slow and shuffled with minimal arm swings  Skin: warm and well perfused extremities  Psych: normal affect    *Repeat BP: 141/66         Labs and Imaging:     Reviewed.         Assessment and Plan:     1. R shoulder pain and numbness  Describes worsening symptoms of radiating numbness and tingling as well as shoulder pain during  nighttime. Seems most consistent with one or two main diagnoses including: Cervical impingement syndrome and Subacromial bursitis.  Other less likely differential diagnoses to include: osteoarthritis of shoulder, adhesive capsulitis, and rotator cuff tear.  -Imaging: Shoulder X-ray and Cervical MRI w/o contrast  -Plan is to for patient to take an extra tablet of Tylenol and if pain persists to take 0.5-1.0 tablet of Tramadol before sleep.  -Consider PT for shoulder.    2. Benign Essential HTN  Patient complains of dizziness so hesitant to change treatment. Plan to continue current HCTZ regiment as usual. Initial /84. Repeat BP was 141/66. Dizziness has been intermittent issue chronically; has been seen in [facility], previous CNS imaging negative as recently as 11/2016.    3. Health maintenance/ future labs  Plan for Lipid panel and for patient to bring in BP device in 4 weeks follow-up.    Options for treatment and follow-up care were reviewed with the patient and/or guardian. Azeb THERESA Torres and/or guardian engaged in the decision making process and verbalized understanding of the options discussed and agreed with the final plan.    Lilia Flores MD    This note was scribed by Rey Amato, 3rd year medical student, for attending physician Dr. Flores.    The medical student acted as scribe and the encounter documented above was completely performed by myself.  Supervising Doctor Lilia Flores

## 2017-04-28 NOTE — MR AVS SNAPSHOT
After Visit Summary   4/28/2017    Azeb Torres    MRN: [id number]           Patient Information     Date Of Birth          4/3/1925        Visit Information        Provider Department      4/28/2017 10:00 AM Lilia Flores MD [facility]        Today's Diagnoses     Cervical radiculopathy    -  1    Chronic right shoulder pain        Essential hypertension, benign          Care Instructions    Primary Care Center Medication Refill Request Information:  * Please contact your pharmacy regarding ANY request for medication refills.  ** [facility] Prescription Fax = [phone]  * Please allow 3 business days for routine medication refills.  * Please allow 5 business days for controlled substance medication refills.     Primary Care Center Test Result notification information:  *You will be notified with in 7-10 days of your appointment day regarding the results of your test.  If you are on MyChart you will be notified as soon as the provider has reviewed the results and signed off on them.    [facility] [phone]     MRI Screening Tool    Does the patient have any metals in their body? Yes. Patient had two hip replacement.  Is the patient claustrophobic? no  Does the patient need sedation? no  Is the patient able to transport themself to a table? Yes but would like assistance, patient can get light headed.            Follow-ups after your visit        Follow-up notes from your care team     Return in about 4 weeks (around 5/26/2017).      Your next 10 appointments already scheduled     Apr 28, 2017 11:25 AM CDT   XR SHOULDER RIGHT 2 VIEWS with [email or web address]   [facility] ([facility])    [address] [address] [phone]           Please bring a list of your current medicines to your exam. (Include vitamins, minerals and over-thecounter medicines.) Leave your valuables at home.  Tell your doctor if there is  a chance you may be pregnant.  You do not need to do anything special for this exam.            Apr 30, 2017  8:30 AM CDT   (Arrive by 8:15 AM)   MR CERVICAL SPINE W/O CONTRAST with [id number]   [facility] ([facility])    [address] [address] [phone]           Take your medicines as usual, unless your doctor tells you not to. Bring a list of your current medicines to your exam (including vitamins, minerals and over-the-counter drugs). Also bring the results of similar scans you may have had.  Please remove any body piercings and hair extensions before you arrive.  Follow your doctor s orders. If you do not, we may have to postpone your exam.  You will not have contrast for this exam. You do not need to do anything special to prepare.  The MRI machine uses a strong magnet. Please wear clothes without metal (snaps, zippers). A sweatsuit works well, or we may give you a hospital gown.   **IMPORTANT** THE INSTRUCTIONS BELOW ARE ONLY FOR THOSE PATIENTS WHO HAVE BEEN TOLD THEY WILL RECEIVE SEDATION OR GENERAL ANESTHESIA DURING THEIR MRI PROCEDURE:  IF YOU WILL RECEIVE SEDATION (take medicine to help you relax during your exam):   You must get the medicine from your doctor before you arrive. Bring the medicine to the exam. Do not take it at home.   Arrive one hour early. Bring someone who can take you home after the test. Your medicine will make you sleepy. After the exam, you may not drive, take a bus or take a taxi by yourself.   No eating 8 hours before your exam. You may have clear liquids up until 4 hours before your exam. (Clear liquids include water, clear tea, black coffee and fruit juice without pulp.)  IF YOU WILL RECEIVE ANESTHESIA (be asleep for your exam):   Arrive 1 1/2 hours early. Bring someone who can take you home after the test. You may not drive, take a bus or take a taxi by yourself.   No eating 8 hours before your exam.  You may have clear liquids up until 4 hours before your exam. (Clear liquids include water, clear tea, black coffee and fruit juice without pulp.)   You will spend four to five hours in the recovery room.  Please call the Imaging Department at your exam site with any questions.            May 26, 2017  9:30 AM CDT   (Arrive by 9:15 AM)   Return Visit with Lilia Flores MD   [facility] ([facility] [place])    [address] [facility]  [address] [facility] [address] [phone]              Future tests that were ordered for you today     Open Future Orders        Priority Expected Expires Ordered    XR Shoulder Right 2 Views Routine 4/28/2017 4/28/2018 4/28/2017    MRI Cervical Spine w/o Contrast Routine  4/28/2018 4/28/2017            Who to contact     Please call your clinic at [phone] to:    Ask questions about your health    Make or cancel appointments    Discuss your medicines    Learn about your test results    Speak to your doctor   If you have compliments or concerns about an experience at your clinic, or if you wish to file a complaint, please contact [facility] Patient Relations at [phone] or email us at [email or web address]         Additional Information About Your Visit        [employer] Information     [employer] gives you secure access to your electronic health record. If you see a primary care provider, you can also send messages to your care team and make appointments. If you have questions, please call your primary care clinic.  If you do not have a primary care provider, please call [phone] and they will assist you.      [employer] is an electronic gateway that provides easy, online access to your medical records. With [employer], you can request a clinic appointment, read your test results, renew a prescription or communicate with your care team.     To access your existing account, please contact your [place]  [facility] or call [phone] for assistance.        Care EveryWhere ID     This is your Care EveryWhere ID. This could be used by other organizations to access your [place] medical records  [id number]        Your Vitals Were     Pulse Respirations Breastfeeding? BMI (Body Mass Index)          80 18 No 29.94 kg/m2         Blood Pressure from Last 3 Encounters:   04/28/17 141/66   01/25/17 142/72   11/05/16 172/87    Weight from Last 3 Encounters:   04/28/17 79.1 kg (174 lb 6.4 oz)   01/25/17 78.9 kg (174 lb)   11/05/16 79 kg (174 lb 1 oz)               Primary Care Provider Office Phone # Fax #    Lilia Flores [phone]        [facility]ANS [address] [place] [address] [address]  [facility] [address]        Thank you!     Thank you for choosing [facility]  for your care. Our goal is always to provide you with excellent care. Hearing back from our patients is one way we can continue to improve our services. Please take a few minutes to complete the written survey that you may receive in the mail after your visit with us. Thank you!             Your Updated Medication List - Protect others around you: Learn how to safely use, store and throw away your medicines at www.disposemymeds.org.          This list is accurate as of: 4/28/17 11:19 AM.  Always use your most recent med list.                   Brand Name Dispense Instructions for use    aspirin 81 MG tablet      Take 1 tablet by mouth daily.       aspirin-dipyridamole  MG per 12 hr capsule    AGGRENOX    180 capsule    Take 1 capsule by mouth 2 times daily       atorvastatin 10 MG tablet    LIPITOR    90 tablet    Take 1 tablet (10 mg) by mouth daily       cholecalciferol 1000 UNIT tablet    vitamin D     Take 1 tablet by mouth daily.       dorzolamide-timolol 2-0.5 % ophthalmic solution    COSOPT    1 Bottle    Place 1 drop into both eyes 2 times daily       gabapentin 300 MG capsule    NEURONTIN    180  capsule    Take 1 capsule (300 mg) by mouth 2 times daily       hydrochlorothiazide 25 MG tablet    HYDRODIURIL    90 tablet    Take 1 tablet (25 mg) by mouth daily       ketoconazole 2 % shampoo    NIZORAL    120 mL    Shampoo 1-2 times a week as needed for rash.       latanoprost 0.005 % ophthalmic solution    XALATAN    1 Bottle    Place 1 drop into both eyes At Bedtime       levothyroxine 25 MCG tablet    SYNTHROID/LEVOTHROID    90 tablet    Take 1 tablet (25 mcg) by mouth daily       meclizine 12.5 MG tablet    ANTIVERT    30 tablet    Take 1 tablet (12.5 mg) by mouth 4 times daily as needed for dizziness       omeprazole 20 MG CR capsule    priLOSEC    90 capsule    Take 1 capsule (20 mg) by mouth daily       ondansetron 4 MG ODT tab    ZOFRAN-ODT    30 tablet    Place 1 tablet (4 mg) under the tongue every 8 hours as needed for nausea       PRESERVISION AREDS 2 PO      Take by mouth daily       traMADol 50 MG tablet    ULTRAM    28 tablet    Take 1 tablet (50 mg) by mouth nightly as needed for moderate pain       TYLENOL 500 MG tablet   Generic drug:  acetaminophen      Take 2 tablets by mouth 2 times daily.

## 2017-04-28 NOTE — NURSING NOTE
Chief Complaint   Patient presents with     Hypertension     Here for follow up and BP check     Khris Engle CMA at 10:07 AM on 4/28/2017

## 2017-05-10 ENCOUNTER — TELEPHONE (OUTPATIENT)
Dept: INTERNAL MEDICINE | Facility: CLINIC | Age: 82
End: 2017-05-10

## 2017-05-10 NOTE — TELEPHONE ENCOUNTER
Called to discuss xray and MRI report. Has right sided shoudler pain and numbness in right hand if sleeps on right side - resolves when she changes position, does not bother her much at all during day. 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 We discussed options, includign EMG NCS with goal to see if candidate for surgery, PT, Sports medicine referral - given that she is managing sx for now, she opts for observation for now. Will let me know if sx worsen or chagne.l

## 2017-05-26 ENCOUNTER — OFFICE VISIT (OUTPATIENT)
Dept: INTERNAL MEDICINE | Facility: CLINIC | Age: 82
End: 2017-05-26

## 2017-05-26 VITALS
HEART RATE: 70 BPM | SYSTOLIC BLOOD PRESSURE: 124 MMHG | OXYGEN SATURATION: 98 % | RESPIRATION RATE: 16 BRPM | DIASTOLIC BLOOD PRESSURE: 66 MMHG

## 2017-05-26 DIAGNOSIS — R20.2 NUMBNESS AND TINGLING IN RIGHT HAND: Primary | ICD-10-CM

## 2017-05-26 DIAGNOSIS — I10 ESSENTIAL HYPERTENSION, BENIGN: ICD-10-CM

## 2017-05-26 DIAGNOSIS — R20.0 NUMBNESS AND TINGLING IN RIGHT HAND: Primary | ICD-10-CM

## 2017-05-26 DIAGNOSIS — E78.5 HYPERLIPIDEMIA, UNSPECIFIED HYPERLIPIDEMIA TYPE: ICD-10-CM

## 2017-05-26 LAB
CHOLEST SERPL-MCNC: 138 MG/DL
HDLC SERPL-MCNC: 68 MG/DL
LDLC SERPL CALC-MCNC: 48 MG/DL
NONHDLC SERPL-MCNC: 70 MG/DL
TRIGL SERPL-MCNC: 113 MG/DL

## 2017-05-26 ASSESSMENT — PAIN SCALES - GENERAL: PAINLEVEL: NO PAIN (0)

## 2017-05-26 NOTE — PATIENT INSTRUCTIONS
[facility] Medication Refill Request Information:  * Please contact your pharmacy regarding ANY request for medication refills.  ** [facility] Prescription Fax = [phone]  * Please allow 3 business days for routine medication refills.  * Please allow 5 business days for controlled substance medication refills.     [facility] Test Result notification information:  *You will be notified with in 7-10 days of your appointment day regarding the results of your test.  If you are on MyChart you will be notified as soon as the provider has reviewed the results and signed off on them.    [facility] [phone] ( follow up with Dr. Flores in 3 months)

## 2017-05-26 NOTE — MR AVS SNAPSHOT
After Visit Summary   5/26/2017    Azeb Torres    MRN: [id number]           Patient Information     Date Of Birth          4/3/1925        Visit Information        Provider Department      5/26/2017 9:30 AM Lilia Flores MD [facility]        Today's Diagnoses     Numbness and tingling in right hand    -  1    Essential hypertension, benign        Hyperlipidemia, unspecified hyperlipidemia type          Care Instructions    Primary Care Center Medication Refill Request Information:  * Please contact your pharmacy regarding ANY request for medication refills.  ** [facility] Prescription Fax = [phone]  * Please allow 3 business days for routine medication refills.  * Please allow 5 business days for controlled substance medication refills.     Primary Care Center Test Result notification information:  *You will be notified with in 7-10 days of your appointment day regarding the results of your test.  If you are on MyChart you will be notified as soon as the provider has reviewed the results and signed off on them.    Primary Care Center [phone] ( follow up with Dr. Flores in 3 months)            Follow-ups after your visit        Follow-up notes from your care team     Return in about 3 months (around 8/26/2017).      Your next 10 appointments already scheduled     May 26, 2017 10:45 AM CDT   LAB with  LAB   [facility] ([facility] [place])    [address] [place] [address] [phone]           Patient must bring picture ID.  Patient should be prepared to give a urine specimen  Please do not eat 10-12 hours before your appointment if you are coming in fasting for labs on lipids, cholesterol, or glucose (sugar).  Pregnant women should follow their Care Team instructions. Water with medications is okay. Do not drink coffee or other fluids.   If you have concerns about taking  your medications, please ask at office or if scheduling  via [employer], send a message by clicking on Secure Messaging, Message Your Care Team.              Who to contact     Please call your clinic at [phone] to:    Ask questions about your health    Make or cancel appointments    Discuss your medicines    Learn about your test results    Speak to your doctor   If you have compliments or concerns about an experience at your clinic, or if you wish to file a complaint, please contact [facility] Patient Relations at [phone] or email us at [email or web address]         Additional Information About Your Visit        [employer] Information     [employer] gives you secure access to your electronic health record. If you see a primary care provider, you can also send messages to your care team and make appointments. If you have questions, please call your primary care clinic.  If you do not have a primary care provider, please call [phone] and they will assist you.      [employer] is an electronic gateway that provides easy, online access to your medical records. With [employer], you can request a clinic appointment, read your test results, renew a prescription or communicate with your care team.     To access your existing account, please contact your [facility] or call [phone] for assistance.        Care EveryWhere ID     This is your Care EveryWhere ID. This could be used by other organizations to access your [place] medical records  [id number]        Your Vitals Were     Pulse Respirations Pulse Oximetry Breastfeeding?          70 16 98% No         Blood Pressure from Last 3 Encounters:   05/26/17 163/81   04/28/17 141/66   01/25/17 142/72    Weight from Last 3 Encounters:   04/28/17 79.1 kg (174 lb 6.4 oz)   01/25/17 78.9 kg (174 lb)   11/05/16 79 kg (174 lb 1 oz)              We Performed the Following     Lipid Profile          Today's Medication Changes          These changes are accurate as of:  5/26/17 10:03 AM.  If you have any questions, ask your nurse or doctor.               Start taking these medicines.        Dose/Directions    order for DME   Used for:  Numbness and tingling in right hand   Started by:  Lilia Flores MD        Right wrist splint, use at night   Quantity:  1 Units   Refills:  0            Where to get your medicines      Some of these will need a paper prescription and others can be bought over the counter.  Ask your nurse if you have questions.     Bring a paper prescription for each of these medications     order for DME                Primary Care Provider Office Phone # Fax #    Lilia Flores [phone]        [facility] [address] [facility] [address]  [facility] [address]        Thank you!     Thank you for choosing [facility]  for your care. Our goal is always to provide you with excellent care. Hearing back from our patients is one way we can continue to improve our services. Please take a few minutes to complete the written survey that you may receive in the mail after your visit with us. Thank you!             Your Updated Medication List - Protect others around you: Learn how to safely use, store and throw away your medicines at www.disposemymeds.org.          This list is accurate as of: 5/26/17 10:03 AM.  Always use your most recent med list.                   Brand Name Dispense Instructions for use    aspirin 81 MG tablet      Take 1 tablet by mouth daily.       aspirin-dipyridamole  MG per 12 hr capsule    AGGRENOX    180 capsule    Take 1 capsule by mouth 2 times daily       atorvastatin 10 MG tablet    LIPITOR    90 tablet    Take 1 tablet (10 mg) by mouth daily       cholecalciferol 1000 UNIT tablet    vitamin D     Take 1 tablet by mouth daily.       dorzolamide-timolol 2-0.5 % ophthalmic solution    COSOPT    1 Bottle    Place 1 drop into both eyes 2 times daily       gabapentin 300 MG capsule    NEURONTIN    180  capsule    Take 1 capsule (300 mg) by mouth 2 times daily       hydrochlorothiazide 25 MG tablet    HYDRODIURIL    90 tablet    Take 1 tablet (25 mg) by mouth daily       ketoconazole 2 % shampoo    NIZORAL    120 mL    Shampoo 1-2 times a week as needed for rash.       latanoprost 0.005 % ophthalmic solution    XALATAN    1 Bottle    Place 1 drop into both eyes At Bedtime       levothyroxine 25 MCG tablet    SYNTHROID/LEVOTHROID    90 tablet    Take 1 tablet (25 mcg) by mouth daily       meclizine 12.5 MG tablet    ANTIVERT    30 tablet    Take 1 tablet (12.5 mg) by mouth 4 times daily as needed for dizziness       omeprazole 20 MG CR capsule    priLOSEC    90 capsule    Take 1 capsule (20 mg) by mouth daily       ondansetron 4 MG ODT tab    ZOFRAN-ODT    30 tablet    Place 1 tablet (4 mg) under the tongue every 8 hours as needed for nausea       order for DME     1 Units    Right wrist splint, use at night       PRESERVISION AREDS 2 PO      Take by mouth daily       traMADol 50 MG tablet    ULTRAM    28 tablet    Take 1 tablet (50 mg) by mouth nightly as needed for moderate pain       TYLENOL 500 MG tablet   Generic drug:  acetaminophen      Take 2 tablets by mouth 2 times daily.

## 2017-05-26 NOTE — PROGRESS NOTES
HPI:   Azeb Torres is a 89 year old female presents today for followup.    She is here to follow up on her MRI of the neck as well as her blood pressure.  She says that she has been doing about the same since I last saw her.  She continues to have right hand tingling and pain that wake her up at night.  She gets up and moves around a little bit, and it goes away, but it does disrupt her sleep some.  It is not really bothersome during the day.  She also gets some right shoulder pain when she lies on her right side, again only at night.  Recent MRI of the cervical spine did reveal significant degenerative joint disease and degenerative disk disease; in particular, she had severe foraminal narrowing on the right side at C4-C5.  Other than this, she is also here to follow up on her blood pressure; it had been elevated in the clinic at the last visit.  She says at home she usually gets readings in the 120s systolic, maybe low 130s.  She gets occasional dizziness and has otherwise been feeling well.  No chest pain, shortness of breath or palpitations, no falls since I last saw her.  She has some neck discomfort.       ASSESSMENT/PLAN:   ASSESSMENT AND PLAN:   1.  Right numbness, tingling and pain in the hands, particularly at night.  This could represent radiculopathy, but also I wonder about carpal tunnel syndrome.  The most severe findings in the cervical spine did not correlate with symptoms in her hand.  I am going to go ahead and have her do a trial of a wrist splint for carpal tunnel syndrome to see if that is helpful.  Certainly, since her symptoms are not bothersome during the day, she says she would not be at all interested in any surgical intervention on her neck, and I think that is certainly reasonable given her advanced age.     2.  Benign essential hypertension.  Blood pressure when I rechecked it was 136/70.  We then used her home blood pressure cuff, and it reduced it at 125/72.  I do think she has a  component of white-coat hypertension given that her initial blood pressure was systolic 163.  Her blood pressure cuff at home appears to register a little bit low, but the reading that I obtained is certainly well within target given her age, and we will make no changes in her blood pressure medications.   3.  Other medical problems are stable.  I have asked her to follow up with me in 3 months to reassess her response to the wrist splint or sooner if things get worse or she has new symptoms.           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)     PSVT (paroxysmal supraventricular tachycardia) (H)     Cerebral infarction (H)     Homonymous hemianopia     Pain in joint, shoulder region     Pain in joint, lower leg     Hyperlipidemia     Hypothyroidism     Tricuspid regurgitation     Pulmonary artery hypertension (H)     Rosacea     Inflamed seborrheic keratosis     Dermatitis, seborrheic     Pseudophakia of both eyes     Nonexudative senile macular degeneration of retina     Asteroid hyalosis of right eye     Chronic angle-closure glaucoma     Dizziness of unknown cause     Vertigo     Hypothyroidism due to acquired atrophy of thyroid     Cervical radiculopathy     Chronic right shoulder pain       Current Outpatient Prescriptions   Medication Sig Dispense Refill     ondansetron (ZOFRAN-ODT) 4 MG ODT tab Place 1 tablet (4 mg) under the tongue every 8 hours as needed for nausea 30 tablet 1     atorvastatin (LIPITOR) 10 MG tablet Take 1 tablet (10 mg) by mouth daily 90 tablet 3     gabapentin (NEURONTIN) 300 MG capsule Take 1 capsule (300 mg) by mouth 2 times daily 180 capsule 1     omeprazole (PRILOSEC) 20 MG CR capsule Take 1 capsule (20 mg) by mouth daily 90 capsule 3     levothyroxine (SYNTHROID/LEVOTHROID) 25 MCG tablet Take 1 tablet (25 mcg) by mouth  daily 90 tablet 3     hydrochlorothiazide (HYDRODIURIL) 25 MG tablet Take 1 tablet (25 mg) by mouth daily 90 tablet 3     aspirin-dipyridamole (AGGRENOX)  MG per 12 hr capsule Take 1 capsule by mouth 2 times daily 180 capsule 3     Multiple Vitamins-Minerals (PRESERVISION AREDS 2 PO) Take by mouth daily       meclizine (ANTIVERT) 12.5 MG tablet Take 1 tablet (12.5 mg) by mouth 4 times daily as needed for dizziness 30 tablet 0     traMADol (ULTRAM) 50 MG tablet Take 1 tablet (50 mg) by mouth nightly as needed for moderate pain 28 tablet 1     latanoprost (XALATAN) 0.005 % ophthalmic solution Place 1 drop into both eyes At Bedtime 1 Bottle 11     dorzolamide-timolol (COSOPT) 2-0.5 % ophthalmic solution Place 1 drop into both eyes 2 times daily 1 Bottle 11     ketoconazole (NIZORAL) 2 % shampoo Shampoo 1-2 times a week as needed for rash. 120 mL 1     aspirin 81 MG tablet Take 1 tablet by mouth daily.       acetaminophen (TYLENOL) 500 MG tablet Take 2 tablets by mouth 2 times daily.       cholecalciferol (VITAMIN D) 1000 UNIT tablet Take 1 tablet by mouth daily.           ROS:    Constitutional: no fevers, chill   Cardiovascular: no chest pain, palpitations  Respiratory: no dyspnea, cough, shortness of breath or wheezing   GI: no nausea, vomiting, diarrhea, no abdominal pain   Musculoskeletal: no edema       PHYSICAL EXAM:   /81  Pulse 70  Resp 16  SpO2 98%  Breastfeeding? No   Wt Readings from Last 1 Encounters:   04/28/17 79.1 kg (174 lb 6.4 oz)       Constitutional: no distress, comfortable, pleasant    Cardiovascular: regular rate and rhythm, normal S1 and S2, no murmurs, rubs or gallops  Respiratory: clear to auscultation, no wheezes or crackles, normal breath sounds   Musculoskeletal:  no edema   DTRS at 2+ at biceps bilaterally. Strength is 5/5 UE throughout,  strength is good. Negative Tinels's and Phalen's sign      Lilia Flores MD

## 2017-05-26 NOTE — NURSING NOTE
Chief Complaint   Patient presents with     Results     Patient is here to follow up on MRI results.      Lilly Rascon LPN at 9:23 AM on 5/26/2017.

## 2017-06-08 DIAGNOSIS — H40.2233 CHRONIC ANGLE-CLOSURE GLAUCOMA OF BOTH EYES, SEVERE STAGE: Primary | ICD-10-CM

## 2017-06-08 RX ORDER — LATANOPROST 50 UG/ML
1 SOLUTION/ DROPS OPHTHALMIC AT BEDTIME
Qty: 1 BOTTLE | Refills: 11 | Status: SHIPPED | OUTPATIENT
Start: 2017-06-08 | End: 2018-06-22

## 2017-06-08 RX ORDER — DORZOLAMIDE HYDROCHLORIDE AND TIMOLOL MALEATE 20; 5 MG/ML; MG/ML
1 SOLUTION/ DROPS OPHTHALMIC 2 TIMES DAILY
Qty: 1 BOTTLE | Refills: 11 | Status: SHIPPED | OUTPATIENT
Start: 2017-06-08 | End: 2018-06-22

## 2017-06-08 NOTE — TELEPHONE ENCOUNTER
Rx's latanoprost/cosopt sent to [facility] pharmacy per request 6-8-17  Jerome Ramirez RN 4:06 PM 06/08/17

## 2017-06-19 ENCOUNTER — TELEPHONE (OUTPATIENT)
Dept: AUDIOLOGY | Facility: CLINIC | Age: 82
End: 2017-06-19

## 2017-06-19 NOTE — TELEPHONE ENCOUNTER
Azeb Torres's daughter brought her hearing aids to the [facility] on 6/19/17 to be cleaned and checked.  Both  filters were replaced and the hearing aids and earmolds were cleaned.  The right side  retention sleeve came out of the earmold and needed to be glued back in place.  Both hearing aids were found to be functioning well.  Azeb's daughter indicated she may schedule her for an appointment with her audiologist as she doesn't feel she's been hearing well lately.

## 2017-06-29 ENCOUNTER — OFFICE VISIT (OUTPATIENT)
Dept: AUDIOLOGY | Facility: CLINIC | Age: 82
End: 2017-06-29

## 2017-06-29 DIAGNOSIS — H90.3 SENSORY HEARING LOSS, BILATERAL: Primary | ICD-10-CM

## 2017-06-29 NOTE — MR AVS SNAPSHOT
After Visit Summary   6/29/2017    Azeb Torres    MRN: [id number]           Patient Information     Date Of Birth          4/3/1925        Visit Information        Provider Department      6/29/2017 9:30 AM Amina Ramirez AuD [facility] Audiology        Today's Diagnoses     Sensory hearing loss, bilateral    -  1       Follow-ups after your visit        Your next 10 appointments already scheduled     Sep 06, 2017 10:25 AM CDT   (Arrive by 10:10 AM)   Return Visit with Lilia Flores MD   [facility] ([facility])    [address] [place] [id number] [phone]              Who to contact     Please call your clinic at [phone] to:    Ask questions about your health    Make or cancel appointments    Discuss your medicines    Learn about your test results    Speak to your doctor   If you have compliments or concerns about an experience at your clinic, or if you wish to file a complaint, please contact [facility] Patient Relations at [phone] or email us at [email or web address]         Additional Information About Your Visit        MyChart Information     [employer] gives you secure access to your electronic health record. If you see a primary care provider, you can also send messages to your care team and make appointments. If you have questions, please call your primary care clinic.  If you do not have a primary care provider, please call [phone] and they will assist you.      [employer] is an electronic gateway that provides easy, online access to your medical records. With [employer], you can request a clinic appointment, read your test results, renew a prescription or communicate with your care team.     To access your existing account, please contact your [facility] or call [phone] for assistance.        Care EveryWhere ID     This is your Care  EveryWhere ID. This could be used by other organizations to access your [place] medical records  [id number]         Blood Pressure from Last 3 Encounters:   05/26/17 124/66   04/28/17 141/66   01/25/17 142/72    Weight from Last 3 Encounters:   04/28/17 79.1 kg (174 lb 6.4 oz)   01/25/17 78.9 kg (174 lb)   11/05/16 79 kg (174 lb 1 oz)              We Performed the Following     Hearing Aid Check, Binaural (11638)        Primary Care Provider Office Phone # Fax #    Lilia Flores [phone]        PHYSICIANS [address] [facility] [address]  [facility] [address]        Equal Access to Services     KENDRA FLORES : Hadii aad ku hadasho Soomaali, waaxda luqadaha, qaybta kaalmada adeegyada, waxay idiin hayalexa galvan. So [facility] [phone].    ATENCIÓN: Si habla español, tiene a johnson disposición servicios gratuitos de asistencia lingüística. [facility] [phone].    We comply with applicable federal civil rights laws and [place] laws. We do not discriminate on the basis of race, color, national origin, age, disability sex, sexual orientation or gender identity.            Thank you!     Thank you for choosing [facility]  for your care. Our goal is always to provide you with excellent care. Hearing back from our patients is one way we can continue to improve our services. Please take a few minutes to complete the written survey that you may receive in the mail after your visit with us. Thank you!             Your Updated Medication List - Protect others around you: Learn how to safely use, store and throw away your medicines at www.disposemymeds.org.          This list is accurate as of: 6/29/17  1:39 PM.  Always use your most recent med list.                   Brand Name Dispense Instructions for use Diagnosis    aspirin 81 MG tablet      Take 1 tablet by mouth daily.        aspirin-dipyridamole  MG per 12 hr capsule    AGGRENOX    180 capsule    Take 1 capsule by mouth 2  times daily    Cerebrovascular disease       atorvastatin 10 MG tablet    LIPITOR    90 tablet    Take 1 tablet (10 mg) by mouth daily    Hyperlipidemia, unspecified hyperlipidemia type       cholecalciferol 1000 UNIT tablet    vitamin D     Take 1 tablet by mouth daily.        dorzolamide-timolol 2-0.5 % ophthalmic solution    COSOPT    1 Bottle    Place 1 drop into both eyes 2 times daily    Chronic angle-closure glaucoma of both eyes, severe stage       gabapentin 300 MG capsule    NEURONTIN    180 capsule    Take 1 capsule (300 mg) by mouth 2 times daily    Degenerative disc disease, cervical       hydrochlorothiazide 25 MG tablet    HYDRODIURIL    90 tablet    Take 1 tablet (25 mg) by mouth daily    Hyperlipidemia, unspecified hyperlipidemia type, Essential hypertension, benign       ketoconazole 2 % shampoo    NIZORAL    120 mL    Shampoo 1-2 times a week as needed for rash.    Seborrheic dermatitis       latanoprost 0.005 % ophthalmic solution    XALATAN    1 Bottle    Place 1 drop into both eyes At Bedtime    Chronic angle-closure glaucoma of both eyes, severe stage       levothyroxine 25 MCG tablet    SYNTHROID/LEVOTHROID    90 tablet    Take 1 tablet (25 mcg) by mouth daily    Hypothyroidism due to acquired atrophy of thyroid       meclizine 12.5 MG tablet    ANTIVERT    30 tablet    Take 1 tablet (12.5 mg) by mouth 4 times daily as needed for dizziness        omeprazole 20 MG CR capsule    priLOSEC    90 capsule    Take 1 capsule (20 mg) by mouth daily    Abdominal pain, epigastric       ondansetron 4 MG ODT tab    ZOFRAN-ODT    30 tablet    Place 1 tablet (4 mg) under the tongue every 8 hours as needed for nausea    Nausea       order for DME     1 Units    Right wrist splint, use at night    Numbness and tingling in right hand       PRESERVISION AREDS 2 PO      Take by mouth daily        traMADol 50 MG tablet    ULTRAM    28 tablet    Take 1 tablet (50 mg) by mouth nightly as needed for moderate pain     Cervicalgia       TYLENOL 500 MG tablet   Generic drug:  acetaminophen      Take 2 tablets by mouth 2 times daily.

## 2017-06-29 NOTE — PROGRESS NOTES
AUDIOLOGY REPORT    BACKGROUND INFORMATION: Azeb Torres was seen in Audiology at the [facility] on 6/29/2017 for follow-up.  The patient has been seen previously in this clinic 6/10/2015 and results revealed mild sloping to moderately severe sensorineural hearing loss bilaterally for which the patient utilizes binaural hearing amplification with the most upgraded set fit 8/2011.      TEST RESULTS AND PROCEDURES: The patient reports that she is not hearing well with the left device and that it will not fit properly in her ear. Otoscopy revealed impacted cerumen for the left ear and so this was removed with wire-loop lighting in clinic today. The hearing aids were deep-cleaned and assessed using electroacoustic analysis and found to be functioning well. The patient reports good fit, volume and sound quality after the cleaning and does not prefer adjustments at this time.     SUMMARY AND RECOMMENDATIONS: A hearing aid check was performed today. The patient will return as needed or at least every 4-6 months for cleaning and assessment of hearing aid.  Please call this clinic with questions regarding today s visit.    Rica Murphy.  Licensed Audiologist  [place] #[id number]

## 2017-08-29 ENCOUNTER — TELEPHONE (OUTPATIENT)
Dept: AUDIOLOGY | Facility: CLINIC | Age: 82
End: 2017-08-29

## 2017-08-29 NOTE — TELEPHONE ENCOUNTER
Azeb Torres's daughter brought her hearing aids to the [facility] on 8/29/17 for servicing.  Both hearing aids and earmolds were cleaned and the  filters were replaced.  Both devices were found to be functioning well.  She will return to the clinic for hearing aid services as needed.

## 2017-08-30 ENCOUNTER — OFFICE VISIT (OUTPATIENT)
Dept: INTERNAL MEDICINE | Facility: CLINIC | Age: 82
End: 2017-08-30

## 2017-08-30 VITALS
BODY MASS INDEX: 30.04 KG/M2 | WEIGHT: 175 LBS | RESPIRATION RATE: 18 BRPM | DIASTOLIC BLOOD PRESSURE: 58 MMHG | SYSTOLIC BLOOD PRESSURE: 140 MMHG | HEART RATE: 64 BPM | OXYGEN SATURATION: 98 %

## 2017-08-30 DIAGNOSIS — M54.12 CERVICAL RADICULOPATHY: ICD-10-CM

## 2017-08-30 DIAGNOSIS — I10 ESSENTIAL HYPERTENSION, BENIGN: Primary | ICD-10-CM

## 2017-08-30 DIAGNOSIS — R20.0 NUMBNESS AND TINGLING IN RIGHT HAND: ICD-10-CM

## 2017-08-30 DIAGNOSIS — E87.1 HYPONATREMIA: ICD-10-CM

## 2017-08-30 DIAGNOSIS — R20.2 NUMBNESS AND TINGLING IN RIGHT HAND: ICD-10-CM

## 2017-08-30 LAB
ANION GAP SERPL CALCULATED.3IONS-SCNC: 7 MMOL/L (ref 3–14)
BUN SERPL-MCNC: 18 MG/DL (ref 7–30)
CALCIUM SERPL-MCNC: 9.6 MG/DL (ref 8.5–10.1)
CHLORIDE SERPL-SCNC: 96 MMOL/L (ref 94–109)
CO2 SERPL-SCNC: 28 MMOL/L (ref 20–32)
CREAT SERPL-MCNC: 0.91 MG/DL (ref 0.52–1.04)
GFR SERPL CREATININE-BSD FRML MDRD: 58 ML/MIN/1.7M2
GLUCOSE SERPL-MCNC: 92 MG/DL (ref 70–99)
POTASSIUM SERPL-SCNC: 3.6 MMOL/L (ref 3.4–5.3)
SODIUM SERPL-SCNC: 132 MMOL/L (ref 133–144)

## 2017-08-30 ASSESSMENT — PAIN SCALES - GENERAL: PAINLEVEL: NO PAIN (0)

## 2017-08-30 NOTE — PATIENT INSTRUCTIONS
[facility] Medication Refill Request Information:  * Please contact your pharmacy regarding ANY request for medication refills.  ** [facility] Prescription Fax = [phone]  * Please allow 3 business days for routine medication refills.  * Please allow 5 business days for controlled substance medication refills.     [facility] Test Result notification information:  *You will be notified with in 7-10 days of your appointment day regarding the results of your test.  If you are on MyChart you will be notified as soon as the provider has reviewed the results and signed off on them.    [facility] [place] [phone]     Followup in three months with one of these providers:  Dr. Evelina Underwood    Neurology [phone] (3rd Floor [facility])

## 2017-08-30 NOTE — Clinical Note
Can you call her and let  Her know that her sodium was a little low; likely from the HCTZ. I would like to recheck this next week to make sure not lower -likely of no concern. I have placed order and she can come in for a lab appt only some day next week. Thanks

## 2017-08-30 NOTE — PROGRESS NOTES
HPI:   Azeb Torres is a 89 year old  female presents today for followup. She is accompanied by her daughter. She continues to live independently in the community. Today she complains of worsening right arm and hand pain with some numbness in her hand. This primarily occurs at night when she is lying on her right side and is better with she gets into a certain position of her shoulder. At her last visit I had her try a wrist splint  In case some of her hand symptoms were related to carpal tunnel syndrome. She is not sure that this helped at all. She is known to have advanced cervical spine disease but does not complain of very much neck pain in general at the present time. She is known to have degenerative disk disease in the cervical spine and degenerative changes in her right shoulder.  In the past, physical therapy has been helpful.   She also uses Neurontin for some neuropathic-type pain in her right leg after a stroke many years ago.    Her blood pressures at home have generally been doing well; she describes blood pressure 135/75 this morning at home. Today her initial blood pressure was elevated at 160 systolic but on repeat when I took it it was 140 systolic.    She notes some right knee pain that has been bothering her the last week. She's had no trauma to the area that she knows of.    She had an episode of altered mental status in November that was short-lived and resolved. It has not recurred, evaluation was negative at that time.Sshe is on thyroxine for hypothyroidism. She is also on atorvastatin for hyperlipidemia. She had a history of breast cancer but has been without evidence of disease recurrence and we have elected to discontinue screening due to her age.      ASSESSMENT/PLAN: #1 benign essential hypertension readings at home generally are normal. On recheck today her blood pressure was systolic 140. Given her age I'm not going to make any changes to her medications. We'll check a BMP on her  diuretic.   #2 persistent complaints of hand and arm pain and numbness in the setting of known advanced cervical spine disease. In light of her advancee age I have been attempting to avoid evaluation that might lead to surgical intervention but given her persistent symptoms I'm going to refer her for EMG nerve conduction study to assess for cervical radiculopathy. If this is identified and found to be significant might need to consider intervention.   #3 hypothyroidism on levothyroxine with TSH at target   #4 hyperlipidemia on atorvastatin   # 5 health care maintenance is up-to-date   #6 follow up in 3 months with new provider or sooner as needed. I will be in touch with results of EMG once available.    Addendum: BMP with sodium low at 132. Possibly related to HCTZ, although has always been normal in the past and she has been on this medication longstanding. Will plan to recheck BMP in a week.      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)     PSVT (paroxysmal supraventricular tachycardia) (H)     Cerebral infarction (H)     Homonymous hemianopia     Pain in joint, shoulder region     Pain in joint, lower leg     Hyperlipidemia     Hypothyroidism     Tricuspid regurgitation     Pulmonary artery hypertension (H)     Rosacea     Inflamed seborrheic keratosis     Dermatitis, seborrheic     Pseudophakia of both eyes     Nonexudative senile macular degeneration of retina     Asteroid hyalosis of right eye     Chronic angle-closure glaucoma     Dizziness of unknown cause     Vertigo     Hypothyroidism due to acquired atrophy of thyroid     Cervical radiculopathy     Chronic right shoulder pain       Current Outpatient Prescriptions   Medication Sig Dispense Refill     dorzolamide-timolol (COSOPT) 2-0.5 % ophthalmic solution Place 1 drop into both eyes 2 times  daily 1 Bottle 11     latanoprost (XALATAN) 0.005 % ophthalmic solution Place 1 drop into both eyes At Bedtime 1 Bottle 11     order for DME Right wrist splint, use at night 1 Units 0     ondansetron (ZOFRAN-ODT) 4 MG ODT tab Place 1 tablet (4 mg) under the tongue every 8 hours as needed for nausea 30 tablet 1     atorvastatin (LIPITOR) 10 MG tablet Take 1 tablet (10 mg) by mouth daily 90 tablet 3     gabapentin (NEURONTIN) 300 MG capsule Take 1 capsule (300 mg) by mouth 2 times daily 180 capsule 1     omeprazole (PRILOSEC) 20 MG CR capsule Take 1 capsule (20 mg) by mouth daily 90 capsule 3     levothyroxine (SYNTHROID/LEVOTHROID) 25 MCG tablet Take 1 tablet (25 mcg) by mouth daily 90 tablet 3     hydrochlorothiazide (HYDRODIURIL) 25 MG tablet Take 1 tablet (25 mg) by mouth daily 90 tablet 3     aspirin-dipyridamole (AGGRENOX)  MG per 12 hr capsule Take 1 capsule by mouth 2 times daily 180 capsule 3     Multiple Vitamins-Minerals (PRESERVISION AREDS 2 PO) Take by mouth daily       meclizine (ANTIVERT) 12.5 MG tablet Take 1 tablet (12.5 mg) by mouth 4 times daily as needed for dizziness 30 tablet 0     traMADol (ULTRAM) 50 MG tablet Take 1 tablet (50 mg) by mouth nightly as needed for moderate pain 28 tablet 1     ketoconazole (NIZORAL) 2 % shampoo Shampoo 1-2 times a week as needed for rash. 120 mL 1     aspirin 81 MG tablet Take 1 tablet by mouth daily.       acetaminophen (TYLENOL) 500 MG tablet Take 2 tablets by mouth 2 times daily.       cholecalciferol (VITAMIN D) 1000 UNIT tablet Take 1 tablet by mouth daily.           ROS:    Constitutional: no fevers, chill   Cardiovascular: no chest pain, palpitations  Respiratory: no dyspnea, cough, shortness of breath or wheezing   GI: no nausea, vomiting, diarrhea, no abdominal pain   Musculoskeletal: no edema       PHYSICAL EXAM:   /72  Pulse 64  Resp 18  Wt 79.4 kg (175 lb)  SpO2 98%  Breastfeeding? No  BMI 30.04 kg/m2   Wt Readings from Last 1  Encounters:   08/30/17 79.4 kg (175 lb)       Constitutional: no distress, comfortable, pleasant    Cardiovascular: regular rate and rhythm, normal S1 and S2, no murmurs, rubs or gallops  Respiratory: clear to auscultation, no wheezes or crackles, normal breath sounds   Musculoskeletal:  no edema       Lilia Flores MD

## 2017-09-06 DIAGNOSIS — E87.1 HYPONATREMIA: ICD-10-CM

## 2017-09-06 LAB
ANION GAP SERPL CALCULATED.3IONS-SCNC: 5 MMOL/L (ref 3–14)
BUN SERPL-MCNC: 20 MG/DL (ref 7–30)
CALCIUM SERPL-MCNC: 9.6 MG/DL (ref 8.5–10.1)
CHLORIDE SERPL-SCNC: 100 MMOL/L (ref 94–109)
CO2 SERPL-SCNC: 29 MMOL/L (ref 20–32)
CREAT SERPL-MCNC: 0.87 MG/DL (ref 0.52–1.04)
GFR SERPL CREATININE-BSD FRML MDRD: 61 ML/MIN/1.7M2
GLUCOSE SERPL-MCNC: 100 MG/DL (ref 70–99)
POTASSIUM SERPL-SCNC: 4.4 MMOL/L (ref 3.4–5.3)
SODIUM SERPL-SCNC: 134 MMOL/L (ref 133–144)

## 2017-09-06 NOTE — PROGRESS NOTES
Spoke with D[place], discussed the test results and the new order for the re-peat lab draw, verbalize understanding.  Lia Regalado RN

## 2017-09-15 DIAGNOSIS — I67.9 CEREBROVASCULAR DISEASE: ICD-10-CM

## 2017-09-15 RX ORDER — ASPIRIN AND DIPYRIDAMOLE 25; 200 MG/1; MG/1
1 CAPSULE, EXTENDED RELEASE ORAL 2 TIMES DAILY
Qty: 180 CAPSULE | Refills: 3 | Status: SHIPPED | OUTPATIENT
Start: 2017-09-15 | End: 2018-10-22

## 2017-10-04 ENCOUNTER — OFFICE VISIT (OUTPATIENT)
Dept: NEUROLOGY | Facility: CLINIC | Age: 82
End: 2017-10-04

## 2017-10-04 DIAGNOSIS — G56.03 BILATERAL CARPAL TUNNEL SYNDROME: Primary | ICD-10-CM

## 2017-10-04 DIAGNOSIS — R20.2 NUMBNESS AND TINGLING IN RIGHT HAND: ICD-10-CM

## 2017-10-04 DIAGNOSIS — R20.0 NUMBNESS AND TINGLING IN RIGHT HAND: ICD-10-CM

## 2017-10-04 DIAGNOSIS — M54.12 CERVICAL RADICULOPATHY: ICD-10-CM

## 2017-10-04 NOTE — PROGRESS NOTES
[facility]  Electrodiagnostic Laboratory    Nerve Conduction & EMG Report          Patient: Azeb Torres YOB: 1925  Patient ID: [id number] Age: 89 Years 6 Months  Gender: Female        History & Examination:  89 year old woman with pain and numbness in right hand and arm. Also reports neck pain. Eval for focal neuropathy vs radiculopathy.     Techniques: Motor and sensory conduction studies were done with surface recording electrodes. EMG was done with a concentric needle electrode.     Results:  Nerve conduction studies:  1. Bilateral median-D2 sensory responses are absent.   2. Right radial and ulnar-D5 sensory responses are normal.   3. Right median-APB motor response shows severely prolonged distal latency, normal amplitude, and normal CV in the forearm.   4. Left median-APB motor response shows severely prolonged distal latency, moderately reduced amplitude, and mildly slow CV in the forearm.   5. Bilateral median lumbrical - ulnar interossei latencies are prolonged.     Needle EM. Fibrillation potentials and positive sharp waves were seen in the right APB muscle.  2. Large amplitude and/or long duration motor unit potentials (MUP) were seen in the right EIP, FDI, PT, and APB muscles.   3. Rapidly firing MUPs with reduced recruitment were seen in the right APB muscle.     Interpretation:  This is an abnormal study. 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     Clinical correlation is recommended.     Jaziel Weiss MD  Department of Neurology            Sensory NCS      Nerve / Sites Rec. Site Onset Peak NP Amp Ref. PP Amp Dist Zack Ref. Temp     ms ms  V  V  V cm  m/s m/s  C   R MEDIAN - Dig II Anti      Wrist Dig II NR NR NR 10.0 NR 14 NR 48.0 31.2   L MEDIAN - Dig II Anti      Wrist Dig II NR NR NR 10.0 NR 14 NR 48.0 28.7   R ULNAR - Dig V Anti      Wrist Dig V 2.34 3.18 12.6 8.0 30.6 12.5 53.3 48.0 31.5   R RADIAL - Snuff      Forearm Snuff 1.88 2.45 26.9 15.0 38.9 12.5 66.7 48.0 32.5       Motor NCS      Nerve / Sites Rec. Site Lat Ref. Amp Ref. Rel Amp Dist Zack Ref. Dur. Area Temp.     ms ms mV mV % cm m/s m/s ms %  C   R MEDIAN - APB      Wrist APB 7.92 4.40 6.5 5.0 100 8   7.34 100 32.5      Elbow APB 12.08  6.5  100 20 48.0 48.0 7.76 97 32.2   L MEDIAN - APB      Wrist APB 9.01 4.40 2.2 5.0 100 8   10.10 100 28.8      Elbow APB 14.11  1.9  86 22 43.1 48.0 14.74 91.5    R ULNAR - ADM      Wrist ADM 2.81 3.50 9.3 5.0 100 8   6.09 100 32.4      B.Elbow ADM 6.46  8.9  95 20 54.9 48.0 6.30 95.9 32.5      A.Elbow ADM 8.02  8.1  86.3 9 57.6 48.0 6.25 87.8 32.5   R MEDIAN - II Lumb      Median II Lumb 8.75  0.9  100 10   7.29 100 31.6      Ulnar Palm Int 2.92  2.9  336 10   5.99 242 31.5   L MEDIAN - II Lumb      Median II Lumb 7.08  0.9  100 10   8.75 100 29.3      Ulnar Palm Int 3.33  1.6  175 10   7.86 155 29.4       F  Wave      Nerve Min F Lat Max F Lat Mean FLat Temp.    ms ms ms  C   R ULNAR 26.51 29.11 27.50 32.6       EMG Summary Table     Spontaneous MUAP Recruitment    IA Fib Fasc H.F. Amp Dur. PPP Pattern   R. DELTOID Normal None None None N N None Normal   R. BICEPS Normal None None None N N None Normal   R. TRICEPS Normal None None None N N None Normal   R. PRON TERES Normal None None None 1+ 1+ None Normal   R. FIRST D INTEROSS Normal None None None 2+ 1+ None Normal   R. ABD POLL BREVIS Normal None None None 2+ 2+ None Mild Reduced   R. EXT INDICIS Normal None None None 2+ 1+ None Normal

## 2017-10-04 NOTE — MR AVS SNAPSHOT
After Visit Summary   10/4/2017    Azeb Torres    MRN: [id number]           Patient Information     Date Of Birth          4/3/1925        Visit Information        Provider Department      10/4/2017 3:00 PM Jaziel Weiss MD [facility]        Today's Diagnoses     Bilateral carpal tunnel syndrome    -  1    Numbness and tingling in right hand        Cervical radiculopathy           Follow-ups after your visit        Your next 10 appointments already scheduled     Oct 18, 2017 10:55 AM CDT   (Arrive by 10:40 AM)   Return Visit with Lilia Flores MD   [facility] ([facility])    [address]  4th [address]  [facility] [id number] [phone]            Dec 12, 2017  9:30 AM CST   VISUAL FIELD with [facility] VISUAL FIELD   Eye Clinic ([facility])    [address]  [address]  [address] [address]  [facility] [address]   [phone]            Dec 12, 2017 10:00 AM CST   RETURN GLAUCOMA with Alejandrina Faria MD   Eye Clinic ([facility])    [address]  [address]  [address] [address]  [facility] [address]   [phone]            Jan 08, 2018  9:30 AM CST   (Arrive by 9:15 AM)   RE-ESTABLISH NEW with Evelina Can MD   [facility] ([facility])    [address]  4th Floor  [facility] [id number] [phone]              Who to contact     Please call your clinic at [phone] to:    Ask questions about your health    Make or cancel appointments    Discuss your medicines    Learn about your test results    Speak to your doctor   If you have compliments or concerns about an experience at your clinic, or if you wish to file a complaint, please contact [facility] Patient Relations at [phone] or email us at [email or web address]         Additional Information About Your Visit        Khris  Information     [employer] gives you secure access to your electronic health record. If you see a primary care provider, you can also send messages to your care team and make appointments. If you have questions, please call your primary care clinic.  If you do not have a primary care provider, please call [phone] and they will assist you.      [employer] is an electronic gateway that provides easy, online access to your medical records. With [employer], you can request a clinic appointment, read your test results, renew a prescription or communicate with your care team.     To access your existing account, please contact your [facility] or call [phone] for assistance.        Care EveryWhere ID     This is your Care EveryWhere ID. This could be used by other organizations to access your [facility] medical records  [id number]         Blood Pressure from Last 3 Encounters:   08/30/17 140/58   05/26/17 124/66   04/28/17 141/66    Weight from Last 3 Encounters:   08/30/17 79.4 kg (175 lb)   04/28/17 79.1 kg (174 lb 6.4 oz)   01/25/17 78.9 kg (174 lb)              We Performed the Following     EMG     HC NCS MOTOR W OR W/O F-WAVE, 9 OR 10     HC NEEDLE EMG EA EXTREMTY W/PARASPINAL AREA COMPLETE        Primary Care Provider Office Phone # Fax #    Lilia Jyoti Flores [phone]       [address] [place] [address] [address] [place] [address]        Equal Access to Services     KENDRA FLORES : Hadii aad ku brooklynno Sojeanne, waaxda luqadaha, qaybta kaalmada javedyada, waxbrian janay galvan. So [facility] [phone].    ATENCIÓN: Si habla español, tiene a johnson disposición servicios gratuitos de asistencia lingüística. Llame al [phone].    We comply with applicable federal civil rights laws and [place] laws. We do not discriminate on the basis of race, color, national origin, age, disability, sex, sexual orientation, or gender identity.            Thank you!     Thank  you for choosing [facility]  for your care. Our goal is always to provide you with excellent care. Hearing back from our patients is one way we can continue to improve our services. Please take a few minutes to complete the written survey that you may receive in the mail after your visit with us. Thank you!             Your Updated Medication List - Protect others around you: Learn how to safely use, store and throw away your medicines at www.disposemymeds.org.          This list is accurate as of: 10/4/17  4:38 PM.  Always use your most recent med list.                   Brand Name Dispense Instructions for use Diagnosis    aspirin 81 MG tablet      Take 1 tablet by mouth daily.        aspirin-dipyridamole  MG per 12 hr capsule    AGGRENOX    180 capsule    Take 1 capsule by mouth 2 times daily    Cerebrovascular disease       atorvastatin 10 MG tablet    LIPITOR    90 tablet    Take 1 tablet (10 mg) by mouth daily    Hyperlipidemia, unspecified hyperlipidemia type       cholecalciferol 1000 UNIT tablet    vitamin D     Take 1 tablet by mouth daily.        dorzolamide-timolol 2-0.5 % ophthalmic solution    COSOPT    1 Bottle    Place 1 drop into both eyes 2 times daily    Chronic angle-closure glaucoma of both eyes, severe stage       gabapentin 300 MG capsule    NEURONTIN    180 capsule    Take 1 capsule (300 mg) by mouth 2 times daily    Degenerative disc disease, cervical       hydrochlorothiazide 25 MG tablet    HYDRODIURIL    90 tablet    Take 1 tablet (25 mg) by mouth daily    Hyperlipidemia, unspecified hyperlipidemia type, Essential hypertension, benign       ketoconazole 2 % shampoo    NIZORAL    120 mL    Shampoo 1-2 times a week as needed for rash.    Seborrheic dermatitis       latanoprost 0.005 % ophthalmic solution    XALATAN    1 Bottle    Place 1 drop into both eyes At Bedtime    Chronic angle-closure glaucoma of both eyes, severe stage       levothyroxine 25 MCG tablet     SYNTHROID/LEVOTHROID    90 tablet    Take 1 tablet (25 mcg) by mouth daily    Hypothyroidism due to acquired atrophy of thyroid       meclizine 12.5 MG tablet    ANTIVERT    30 tablet    Take 1 tablet (12.5 mg) by mouth 4 times daily as needed for dizziness        omeprazole 20 MG CR capsule    priLOSEC    90 capsule    Take 1 capsule (20 mg) by mouth daily    Abdominal pain, epigastric       ondansetron 4 MG ODT tab    ZOFRAN-ODT    30 tablet    Place 1 tablet (4 mg) under the tongue every 8 hours as needed for nausea    Nausea       order for DME     1 Units    Right wrist splint, use at night    Numbness and tingling in right hand       PRESERVISION AREDS 2 PO      Take by mouth daily        traMADol 50 MG tablet    ULTRAM    28 tablet    Take 1 tablet (50 mg) by mouth nightly as needed for moderate pain    Cervicalgia       TYLENOL 500 MG tablet   Generic drug:  acetaminophen      Take 2 tablets by mouth 2 times daily.

## 2017-10-04 NOTE — LETTER
10/4/2017       RE: Azeb Torres  [address]  [place] [address]     Dear Colleague,    Thank you for referring your patient, Azeb Torres, to the [facility] at [facility]. Please see a copy of my visit note below.        [facility]  Electrodiagnostic Laboratory    Nerve Conduction & EMG Report          Patient: Azeb Torres YOB: 1925  Patient ID: [id number] Age: 89 Years 6 Months  Gender: Female        History & Examination:  89 year old woman with pain and numbness in right hand and arm. Also reports neck pain. Eval for focal neuropathy vs radiculopathy.     Techniques: Motor and sensory conduction studies were done with surface recording electrodes. EMG was done with a concentric needle electrode.     Results:  Nerve conduction studies:  1. Bilateral median-D2 sensory responses are absent.   2. Right radial and ulnar-D5 sensory responses are normal.   3. Right median-APB motor response shows severely prolonged distal latency, normal amplitude, and normal CV in the forearm.   4. Left median-APB motor response shows severely prolonged distal latency, moderately reduced amplitude, and mildly slow CV in the forearm.   5. Bilateral median lumbrical - ulnar interossei latencies are prolonged.     Needle EM. Fibrillation potentials and positive sharp waves were seen in the right APB muscle.  2. Large amplitude and/or long duration motor unit potentials (MUP) were seen in the right EIP, FDI, PT, and APB muscles.   3. Rapidly firing MUPs with reduced recruitment were seen in the right APB muscle.     Interpretation:  This is an abnormal study. 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     Clinical correlation is recommended.     Jaziel Weiss MD  Department of Neurology            Sensory NCS      Nerve / Sites Rec. Site Onset Peak NP Amp Ref. PP Amp Dist Zack Ref. Temp     ms ms  V  V  V cm m/s m/s  C   R MEDIAN - Dig II Anti      Wrist Dig II NR NR NR 10.0 NR 14 NR 48.0 31.2   L MEDIAN - Dig II Anti      Wrist Dig II NR NR NR 10.0 NR 14 NR 48.0 28.7   R ULNAR - Dig V Anti      Wrist Dig V 2.34 3.18 12.6 8.0 30.6 12.5 53.3 48.0 31.5   R RADIAL - Snuff      Forearm Snuff 1.88 2.45 26.9 15.0 38.9 12.5 66.7 48.0 32.5       Motor NCS      Nerve / Sites Rec. Site Lat Ref. Amp Ref. Rel Amp Dist Zack Ref. Dur. Area Temp.     ms ms mV mV % cm m/s m/s ms %  C   R MEDIAN - APB      Wrist APB 7.92 4.40 6.5 5.0 100 8   7.34 100 32.5      Elbow APB 12.08  6.5  100 20 48.0 48.0 7.76 97 32.2   L MEDIAN - APB      Wrist APB 9.01 4.40 2.2 5.0 100 8   10.10 100 28.8      Elbow APB 14.11  1.9  86 22 43.1 48.0 14.74 91.5    R ULNAR - ADM      Wrist ADM 2.81 3.50 9.3 5.0 100 8   6.09 100 32.4      B.Elbow ADM 6.46  8.9  95 20 54.9 48.0 6.30 95.9 32.5      A.Elbow ADM 8.02  8.1  86.3 9 57.6 48.0 6.25 87.8 32.5   R MEDIAN - II Lumb      Median II Lumb 8.75  0.9  100 10   7.29 100 31.6      Ulnar Palm Int 2.92  2.9  336 10   5.99 242 31.5   L MEDIAN - II Lumb      Median II Lumb 7.08  0.9  100 10   8.75 100 29.3      Ulnar Palm Int 3.33  1.6  175 10   7.86 155 29.4       F  Wave      Nerve Min F Lat Max F Lat Mean FLat Temp.    ms ms ms  C   R ULNAR 26.51 29.11 27.50 32.6       EMG Summary Table     Spontaneous MUAP Recruitment    IA Fib Fasc H.F. Amp Dur. PPP Pattern   R. DELTOID Normal None None None N N None Normal   R. BICEPS Normal None None None N N None Normal   R. TRICEPS Normal None None None N N None Normal   R. PRON TERES Normal None None None 1+ 1+ None Normal   R. FIRST D INTEROSS Normal None None None 2+ 1+ None Normal   R. ABD POLL BREVIS  Normal None None None 2+ 2+ None Mild Reduced   R. EXT INDICIS Normal None None None 2+ 1+ None Normal                                Again, thank you for allowing me to participate in the care of your patient.      Sincerely,    Jaziel Weiss MD

## 2017-10-09 ENCOUNTER — MYC MEDICAL ADVICE (OUTPATIENT)
Dept: INTERNAL MEDICINE | Facility: CLINIC | Age: 82
End: 2017-10-09

## 2017-10-09 DIAGNOSIS — G56.03 BILATERAL CARPAL TUNNEL SYNDROME: Primary | ICD-10-CM

## 2017-10-10 DIAGNOSIS — M50.30 DEGENERATIVE DISC DISEASE, CERVICAL: ICD-10-CM

## 2017-10-10 NOTE — NURSING NOTE
Chief Complaint   Patient presents with     Medication Follow-up     Patient is here to follow up on medication.      Lilly Rascon LPN at 10:53 AM on 8/30/2017.     done

## 2017-10-11 ENCOUNTER — MYC MEDICAL ADVICE (OUTPATIENT)
Dept: INTERNAL MEDICINE | Facility: CLINIC | Age: 82
End: 2017-10-11

## 2017-10-11 DIAGNOSIS — G56.00 CARPAL TUNNEL SYNDROME: Primary | ICD-10-CM

## 2017-10-13 RX ORDER — GABAPENTIN 300 MG/1
300 CAPSULE ORAL 2 TIMES DAILY
Qty: 180 CAPSULE | Refills: 2 | Status: SHIPPED | OUTPATIENT
Start: 2017-10-13 | End: 2017-10-18

## 2017-10-13 NOTE — TELEPHONE ENCOUNTER
gabapentin       Last Written Prescription Date:  1/25/17  Last Fill Quantity: 180,   # refills: 1  Last Office Visit : 8/30/17  Future Office visit:  10/18/17

## 2017-10-16 ENCOUNTER — THERAPY VISIT (OUTPATIENT)
Dept: OCCUPATIONAL THERAPY | Facility: CLINIC | Age: 82
End: 2017-10-16
Payer: MEDICARE

## 2017-10-16 DIAGNOSIS — M79.641 HAND PAIN, RIGHT: Primary | ICD-10-CM

## 2017-10-16 PROCEDURE — 29125 APPL SHORT ARM SPLINT STATIC: CPT | Mod: GO | Performed by: OCCUPATIONAL THERAPIST

## 2017-10-16 PROCEDURE — 97165 OT EVAL LOW COMPLEX 30 MIN: CPT | Mod: GO | Performed by: OCCUPATIONAL THERAPIST

## 2017-10-16 PROCEDURE — G8985 CARRY GOAL STATUS: HCPCS | Mod: GO | Performed by: OCCUPATIONAL THERAPIST

## 2017-10-16 PROCEDURE — G8984 CARRY CURRENT STATUS: HCPCS | Mod: GO | Performed by: OCCUPATIONAL THERAPIST

## 2017-10-16 PROCEDURE — 97110 THERAPEUTIC EXERCISES: CPT | Mod: GO | Performed by: OCCUPATIONAL THERAPIST

## 2017-10-16 NOTE — PROGRESS NOTES
Hand Therapy Initial Evaluation  Current Date:  10/16/2017    Subjective:  Azeb Torres is a 89 year old R hand dominant female.    Diagnosis: Carpal Tunnel Syndrome  DOI: 7/16/17, about three months ago    Patient reports symptoms of pain and numbness of the R hand and wrist which occurred due to Unsure, but notices it most at night. Since onset symptoms are gradually getting worse. Special tests:  EMG - positive for CTS.  Previous treatment: OTC wrist brace. General health as reported by patient is good.  Pertinent medical history includes: osteoarthritis, cancer, high blood pressure, stroke, pain at rest/night.  Medical allergies: none.  Surgical history: cancer: not specified, orthopedic: Hips.  Medication history: pain, high blood pressure.    Occupational Profile Information:  Current occupation is Retired  Currently not working due to pt is retired  Prior functional level:  no limitations with wrist pain  Barriers include:none  Mobility: Ambulates with aid of rolling walker  Leisure activities/hobbies: crossword puzzles, reading,  and tasks around the home. Pt has stopped reading because hand pain makes it difficult to hold a book.    Upper Extremity Functional Index Score:  SCORE:   Column Totals: /80: 51   (A lower score indicates greater disability.)      Objective:  Sensation:  Pt reports that usually she has numbness in her middle finger, especially at the tip. Numbness and tingling wake her up at night, and pain is more prominent than paresthesias.    Pain Level Report: On scale 0-10/10  Date 10/16/2017    side R    Overall 7/10    At Rest 7/10    With Activity 7/10      Primary Report: location and description  Date 10/16/2017    Side R    Location Hand, dorsal long finger     Radiation None    Pain Quality Feels like pressure    Frequency Constant    Duration Night is worse    Exacerbated by  Lifting, gripping, twisting, pinching, pulling    Relieved by Rest in supported position    Progression since  onset Gradually worse      Range of Motion Wrist AROM (PROM):  Date 10/16/2017 10/16/2017   Side R L   Ext 64 65   Flex 60 57   UD 18 25   RD 11 11     Special Tests:  Date 10/16/2017    Side R    Phalens -    Tinels at CT +    Tinels at Pronator +    Median Nerve ULTT ~25%    Paresthesias Frequent numbness in long finger      STRENGTH: (Measured in pounds, pain scale 0-10/10)    Date 10/16/2017        Trials Left Right Left Right Left Right Left Right Left Right Left Right   1 27 25             2 25 27             3 23 27             Avg 25 26             Pain  6/10               3 Point Pinch  Date 10/16/2017        Trials Left Right Left Right Left Right Left Right Left Right Left Right   1 8 5             2 6 4             3 6 5             Avg 7 5             Pain  4/10               Lateral Pinch  Date 10/16/2017        Trials Left Right Left Right Left Right Left Right Left Right Left Right   1 8 8             2 9 4             3 8 6             Avg 8 6             Pain  4/10               Assessment:  Patient presents with symptoms consistent with diagnosis R Carpal Tunnel Syndrome,  with conservative intervention.     Patient's limitations or Problem List includes:  Pain, Parathesias of the median nerve distribution, Decreased ROM/motion, Weakness, Decreased  and Tightness in musculature of the right wrist, hand and long finger which interferes with the patient's ability to perform Self Care Tasks (eating), Sleep Patterns, Recreational Activities and Household Chores as compared to previous level of function.    Rehab Potential:  Excellent - Return to full activity, no limitations    Patient will benefit from skilled Occupational Therapy to increase ROM, flexibility,  strength and forearm strength and decrease pain and parathesias to return to previous activity level and resume normal daily tasks and to reach their rehab potential.    Barriers to Learning:  No barrier    Communication Issues:   Patient appears to be able to clearly communicate and understand verbal and written communication and follow directions correctly.    Assessment of Occupational Performance:  3-5 Performance Deficits  Identified Performance Deficits: health management and maintenance, home establishment and management, meal preparation and cleanup, sleep and leisure activities      Clinical Decision Making (Complexity): Low complexity    Treatment Explanation:  The following has been discussed with the patient:    RX ordered/plan of care  Anticipated outcomes  Possible risks and side effects    P: Frequency:  1 X week, once daily  Duration:  for 6 weeks    Treatment Plan:     Therapeutic Exercise: Tendon Gliding ex, blocking  Neuromuscular Techniques: Median nerve glides both active and passive (in the clinic)  Manual Techniques:, Myofascial release of the forearm extensors and flexors, wrist mobilization techniques  Orthosis:  Wrist in netural orthosis for night wear.    Home Program:   Exercise: AROM of fingers and wrist using Tendon gliding techniques and blocking, Median nerve glides.   Orthosis: Static orthosis and Forearm based orthosis Wrist in neutral orthosis at night.    Activity: Avoid activities that exacerbate symptoms in the median nerve.  Avoid prolonged flexion or extension of the wrist.    Discharge Plan:    Achieve all LTG.  Independent in home treatment program.  Reach maximal therapeutic benefit.    Next Visit:  Adjust splint if needed  MFR  Nerve gliding

## 2017-10-16 NOTE — LETTER
DEPARTMENT OF HEALTH AND HUMAN SERVICES  CENTERS FOR MEDICARE & MEDICAID SERVICES    PLAN/UPDATED PLAN OF PROGRESS FOR OUTPATIENT REHABILITATION    PATIENTS NAME:  Azeb Torres     : 4/3/1925    PROVIDER NUMBER:  [id number]    [facility]:  [id number]     PROVIDER NAME: [facility]    MEDICAL RECORD NUMBER: [id number]     START OF CARE DATE:    SOC Date: 10/16/17     TYPE:  OT    PRIMARY/TREATMENT DIAGNOSIS: (Pertinent Medical Diagnosis)  Hand pain, right    VISITS FROM START OF CARE:  Rxs Used: 1     Hand Therapy Initial Evaluation    Current Date:  10/16/2017  Diagnosis: Carpal Tunnel Syndrome  DOI: 17, about three months ago    Subjective:  Azeb Torres is a 89 year old R hand dominant female.  Patient reports symptoms of pain and numbness of the R hand and wrist which occurred due to Unsure, but notices it most at night. Since onset symptoms are gradually getting worse. Special tests:  EMG - positive for CTS.  Previous treatment: OTC wrist brace. General health as reported by patient is good.  Pertinent medical history includes: osteoarthritis, cancer, high blood pressure, stroke, pain at rest/night.  Medical allergies: none.  Surgical history: cancer: not specified, orthopedic: Hips.  Medication history: pain, high blood pressure.    Occupational Profile Information:  Current occupation is Retired  Currently not working due to pt is retired  Prior functional level:  no limitations with wrist pain  Barriers include:none  Mobility: Ambulates with aid of rolling walker  Leisure activities/hobbies: crossword puzzles, reading,  and tasks around the home. Pt has stopped reading because hand pain makes it difficult to hold a book.    Upper Extremity Functional Index Score:  SCORE:   Column Totals: /80: 51   (A lower score indicates greater disability.)  PATIENTS NAME:  Azeb Torres     Objective:  Sensation:  Pt reports that usually she has numbness in her middle finger, especially at the tip. Numbness and  tingling wake her up at night, and pain is more prominent than paresthesias.    Pain Level Report: On scale 0-10/10  Date 10/16/2017    side R    Overall 7/10    At Rest 7/10    With Activity 7/10      Primary Report: location and description  Date 10/16/2017    Side R    Location Hand, dorsal long finger     Radiation None    Pain Quality Feels like pressure    Frequency Constant    Duration Night is worse    Exacerbated by  Lifting, gripping, twisting, pinching, pulling    Relieved by Rest in supported position    Progression since onset Gradually worse      Range of Motion Wrist AROM (PROM):  Date 10/16/2017 10/16/2017   Side R L   Ext 64 65   Flex 60 57   UD 18 25   RD 11 11     Special Tests:  Date 10/16/2017    Side R    Phalens -    Tinels at CT +    Tinels at Pronator +    Median Nerve ULTT ~25%    Paresthesias Frequent numbness in long finger          PATIENTS NAME:  Azeb Torres     STRENGTH: (Measured in pounds, pain scale 0-10/1)      Date 10/16/2017        Trials Left Right Left Right Left Right Left Right Left Right Left Right   1 27 25             2 25 27             3 23 27             Avg 25 26             Pain  6/10               3 Point Pinch  Date 10/16/2017        Trials Left Right Left Right Left Right Left Right Left Right Left Right   1 8 5             2 6 4             3 6 5             Avg 7 5             Pain  4/10               Lateral Pinch  Date 10/16/2017        Trials Left Right Left Right Left Right Left Right Left Right Left Right   1 8 8             2 9 4             3 8 6             Avg 8 6             Pain  4/10               Assessment:  Patient presents with symptoms consistent with diagnosis R Carpal Tunnel Syndrome,  with conservative intervention.     Patient's limitations or Problem List includes:  Pain, Parathesias of the median nerve distribution, Decreased ROM/motion, Weakness, Decreased  and Tightness in musculature of the right wrist, hand and long finger which  interferes with the patient's ability to perform Self Care Tasks (eating), Sleep Patterns, Recreational Activities and Household Chores as compared to previous level of function.    Rehab Potential:  Excellent - Return to full activity, no limitations    Patient will benefit from skilled Occupational Therapy to increase ROM, flexibility,  strength and forearm strength and decrease pain and parathesias to return to previous activity level and resume normal daily tasks and to reach their rehab potential.    Barriers to Learning:  No barrier    PATIENTS NAME:  Azeb Torres     Communication Issues:  Patient appears to be able to clearly communicate and understand verbal and written communication and follow directions correctly.    Assessment of Occupational Performance:  3-5 Performance Deficits  Identified Performance Deficits: health management and maintenance, home establishment and management, meal preparation and cleanup, sleep and leisure activities      Clinical Decision Making (Complexity): Low complexity    Treatment Explanation:  The following has been discussed with the patient:    RX ordered/plan of care  Anticipated outcomes  Possible risks and side effects    P: Frequency:  1 X week, once daily  Duration:  for 6 weeks    Treatment Plan:     Therapeutic Exercise: Tendon Gliding ex, blocking  Neuromuscular Techniques: Median nerve glides both active and passive (in the clinic)  Manual Techniques:, Myofascial release of the forearm extensors and flexors, wrist mobilization techniques  Orthosis:  Wrist in netural orthosis for night wear.    Home Program:   Exercise: AROM of fingers and wrist using Tendon gliding techniques and blocking, Median nerve glides.   Orthosis: Static orthosis and Forearm based orthosis Wrist in neutral orthosis at night.    Activity: Avoid activities that exacerbate symptoms in the median nerve.  Avoid prolonged flexion or extension of the wrist.    Discharge Plan:    Achieve all  "LTG.  Independent in home treatment program.  Reach maximal therapeutic benefit.    Next Visit:  Adjust splint if needed  MFR  Nerve gliding    Caregiver Signature/Credentials ______________________________ Date ________       Treating Provider: JACKELYN Estrada/THERESA    I have reviewed and certified the need for these services and plan of treatment while under my care.        PHYSICIAN'S SIGNATURE:   _________________________________________  Date___________    Lilia Flores MD      PATIENTS NAME:  Azeb Torres     Certification period: Beginning of Cert date period: 10/16/17 End of Cert period date: 01/13/18     Functional Level Progress Report: Please see attached \"Goal Flow sheet for Functional level.\"    ___X_____ Continue Services or       ________ DC Services                Service dates: SOC Date: 10/16/17  to present                                                                     "

## 2017-10-16 NOTE — MR AVS SNAPSHOT
After Visit Summary   10/16/2017    Azeb Torres    MRN: [id number]           Patient Information     Date Of Birth          4/3/1925        Visit Information        Provider Department      10/16/2017 1:00 PM Janis Crowell OT  [facility]        Today's Diagnoses     Hand pain, right    -  1       Follow-ups after your visit        Your next 10 appointments already scheduled     Oct 18, 2017 10:55 AM CDT   (Arrive by 10:40 AM)   Return Visit with Lilia Flores MD   [facility]    [address] [place] [address] [phone]            Oct 24, 2017 12:30 PM CDT   SHERIF Hand with Donna Marshall OT   [facility] ([facility])    [address] [place] [address] [phone]            Oct 31, 2017 10:00 AM CDT   SHERIF Hand with Donna Marshall OT   [facility] ([facility])    [address] [place] [address] [phone]            Dec 12, 2017  9:30 AM CST   VISUAL FIELD with [facility] EYE VISUAL FIELD   Eye Clinic ([facility])    [address]  [address]  [facility] [address]   [phone]            Dec 12, 2017 10:00 AM CST   RETURN GLAUCOMA with Alejandrina Faria MD   [facility] Clinic ([facility])    [address]  [address] [facility]  [address] Clin [address]  [facility] [address]   [phone]            Dec 14, 2017  9:10 AM CST   (Arrive by 8:55 AM)   NEW HAND with Dipti Zhang MD   [facility] ([facility])    [address] [place] [address] [phone]            Jan 08, 2018  9:30 AM CST   (Arrive by 9:15 AM)   RE-ESTABLISH NEW with Evelina Can MD   [facility] ([facility])    [address] [place] [address]  Street [address]  4th Floor  [facility] [address] [phone]              Who to contact     If you have questions or need follow up information about today's clinic visit or your schedule please contact  [facility] directly at [phone].  Normal or non-critical lab and imaging results will be communicated to you by MyChart, letter or phone within 4 business days after the clinic has received the results. If you do not hear from us within 7 days, please contact the clinic through NextGxDXhart or phone. If you have a critical or abnormal lab result, we will notify you by phone as soon as possible.  Submit refill requests through Mempile or call your pharmacy and they will forward the refill request to us. Please allow 3 business days for your refill to be completed.          Additional Information About Your Visit        NextGxDXhart Information     Mempile gives you secure access to your electronic health record. If you see a primary care provider, you can also send messages to your care team and make appointments. If you have questions, please call your primary care clinic.  If you do not have a primary care provider, please call [phone] and they will assist you.        Care EveryWhere ID     This is your Care EveryWhere ID. This could be used by other organizations to access your [place] medical records  [id number]         Blood Pressure from Last 3 Encounters:   08/30/17 140/58   05/26/17 124/66   04/28/17 141/66    Weight from Last 3 Encounters:   08/30/17 79.4 kg (175 lb)   04/28/17 79.1 kg (174 lb 6.4 oz)   01/25/17 78.9 kg (174 lb)              We Performed the Following     APPLY SHORT ARM SPLINT STATIC     HC OT EVAL, LOW COMPLEXITY     SHERIF CERT REPORT     THERAPEUTIC EXERCISES        Primary Care Provider Office Phone # Fax #    Lilia Flores [phone]       [address] [place] [address] 741  [facility] [address]        Equal Access to Services     KENDRA FLORES AH: Ora garcia  mary jo Grady, wakayleenda luedinsonadaha, qaybta kamathew alvarado, jese miguelin hayaan moirachristiane derekari ladianesilvestre mandy. So [facility] [phone].    ATENCIÓN: Si brittonla diony, tiene a johnson disposición servicios gratuitos de asistencia lingüística. Megan al [phone].    We comply with applicable federal civil rights laws and [place] laws. We do not discriminate on the basis of race, color, national origin, age, disability, sex, sexual orientation, or gender identity.            Thank you!     Thank you for choosing [facility]  for your care. Our goal is always to provide you with excellent care. Hearing back from our patients is one way we can continue to improve our services. Please take a few minutes to complete the written survey that you may receive in the mail after your visit with us. Thank you!             Your Updated Medication List - Protect others around you: Learn how to safely use, store and throw away your medicines at www.disposemymeds.org.          This list is accurate as of: 10/16/17  2:50 PM.  Always use your most recent med list.                   Brand Name Dispense Instructions for use Diagnosis    aspirin 81 MG tablet      Take 1 tablet by mouth daily.        aspirin-dipyridamole  MG per 12 hr capsule    AGGRENOX    180 capsule    Take 1 capsule by mouth 2 times daily    Cerebrovascular disease       atorvastatin 10 MG tablet    LIPITOR    90 tablet    Take 1 tablet (10 mg) by mouth daily    Hyperlipidemia, unspecified hyperlipidemia type       cholecalciferol 1000 UNIT tablet    vitamin D     Take 1 tablet by mouth daily.        dorzolamide-timolol 2-0.5 % ophthalmic solution    COSOPT    1 Bottle    Place 1 drop into both eyes 2 times daily    Chronic angle-closure glaucoma of both eyes, severe stage       gabapentin 300 MG capsule    NEURONTIN    180 capsule    Take 1 capsule (300 mg) by mouth 2 times daily    Degenerative disc disease, cervical       hydrochlorothiazide 25 MG tablet     HYDRODIURIL    90 tablet    Take 1 tablet (25 mg) by mouth daily    Hyperlipidemia, unspecified hyperlipidemia type, Essential hypertension, benign       ketoconazole 2 % shampoo    NIZORAL    120 mL    Shampoo 1-2 times a week as needed for rash.    Seborrheic dermatitis       latanoprost 0.005 % ophthalmic solution    XALATAN    1 Bottle    Place 1 drop into both eyes At Bedtime    Chronic angle-closure glaucoma of both eyes, severe stage       levothyroxine 25 MCG tablet    SYNTHROID/LEVOTHROID    90 tablet    Take 1 tablet (25 mcg) by mouth daily    Hypothyroidism due to acquired atrophy of thyroid       meclizine 12.5 MG tablet    ANTIVERT    30 tablet    Take 1 tablet (12.5 mg) by mouth 4 times daily as needed for dizziness        omeprazole 20 MG CR capsule    priLOSEC    90 capsule    Take 1 capsule (20 mg) by mouth daily    Abdominal pain, epigastric       ondansetron 4 MG ODT tab    ZOFRAN-ODT    30 tablet    Place 1 tablet (4 mg) under the tongue every 8 hours as needed for nausea    Nausea       order for DME     1 Units    Right wrist splint, use at night    Numbness and tingling in right hand       PRESERVISION AREDS 2 PO      Take by mouth daily        traMADol 50 MG tablet    ULTRAM    28 tablet    Take 1 tablet (50 mg) by mouth nightly as needed for moderate pain    Cervicalgia       TYLENOL 500 MG tablet   Generic drug:  acetaminophen      Take 2 tablets by mouth 2 times daily.

## 2017-10-18 ENCOUNTER — OFFICE VISIT (OUTPATIENT)
Dept: INTERNAL MEDICINE | Facility: CLINIC | Age: 82
End: 2017-10-18

## 2017-10-18 VITALS — HEART RATE: 76 BPM | DIASTOLIC BLOOD PRESSURE: 84 MMHG | RESPIRATION RATE: 16 BRPM | SYSTOLIC BLOOD PRESSURE: 134 MMHG

## 2017-10-18 DIAGNOSIS — C50.912 MALIGNANT NEOPLASM OF LEFT BREAST IN FEMALE, ESTROGEN RECEPTOR POSITIVE, UNSPECIFIED SITE OF BREAST (H): ICD-10-CM

## 2017-10-18 DIAGNOSIS — M50.30 DEGENERATIVE DISC DISEASE, CERVICAL: ICD-10-CM

## 2017-10-18 DIAGNOSIS — G56.03 BILATERAL CARPAL TUNNEL SYNDROME: Primary | ICD-10-CM

## 2017-10-18 DIAGNOSIS — Z23 NEED FOR PROPHYLACTIC VACCINATION AND INOCULATION AGAINST INFLUENZA: ICD-10-CM

## 2017-10-18 DIAGNOSIS — E03.4 HYPOTHYROIDISM DUE TO ACQUIRED ATROPHY OF THYROID: ICD-10-CM

## 2017-10-18 DIAGNOSIS — I10 ESSENTIAL HYPERTENSION, BENIGN: ICD-10-CM

## 2017-10-18 DIAGNOSIS — Z17.0 MALIGNANT NEOPLASM OF LEFT BREAST IN FEMALE, ESTROGEN RECEPTOR POSITIVE, UNSPECIFIED SITE OF BREAST (H): ICD-10-CM

## 2017-10-18 RX ORDER — GABAPENTIN 300 MG/1
300 CAPSULE ORAL 3 TIMES DAILY
Qty: 180 CAPSULE | Refills: 1 | Status: SHIPPED | OUTPATIENT
Start: 2017-10-18 | End: 2018-05-03

## 2017-10-18 ASSESSMENT — PAIN SCALES - GENERAL: PAINLEVEL: NO PAIN (0)

## 2017-10-18 NOTE — PATIENT INSTRUCTIONS
[facility]: [phone]     [facility] Medication Refill Request Information:  * Please contact your pharmacy regarding ANY request for medication refills.  ** [facility] Prescription Fax = [phone]  * Please allow 3 business days for routine medication refills.  * Please allow 5 business days for controlled substance medication refills.     [facility] Test Result notification information:  *You will be notified with in 7-10 days of your appointment day regarding the results of your test.  If you are on MyChart you will be notified as soon as the provider has reviewed the results and signed off on them.      Keep appointment with Dr. Can on January 8, 2018

## 2017-10-18 NOTE — NURSING NOTE
"Injectable Influenza Immunization Documentation    1.  Has the patient received the information for the injectable influenza vaccine? YES     2. Is the patient 6 months of age or older? YES     3. Does the patient have any of the following contraindications?         Severe allergy to eggs? No     Severe allergic reaction to previous influenza vaccines? No   Severe allergy to latex? No       History of Guillain-[place] syndrome? No     Currently have a temperature greater than 100.4F? No        4.  Severely egg allergic patients should have flu vaccine eligibility assessed by an MD, RN, or pharmacist, and those who received flu vaccine should be observed for 15 min by an MD, RN, Pharmacist, Medical Technician, or member of clinic staff.\": YES    5. Latex-allergic patients should be given latex-free influenza vaccine Yes. Please reference the Vaccine latex table to determine if your clinic s product is latex-containing.       Administered Influenza Fluzone High Dose (see Immunizations in Chart Review). Patient tolerated well.        Khris Engle CMA at 11:47 AM on 10/18/2017         "

## 2017-10-18 NOTE — NURSING NOTE
Chief Complaint   Patient presents with     Results     patient states she is here for a EMG      WINSTON IRVING at 10:50 AM on 10/18/2017.

## 2017-10-18 NOTE — MR AVS SNAPSHOT
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  ** [facility] Prescription Fax = [phone]  * Please allow 3 business days for routine medication refills.  * Please allow 5 business days for controlled substance medication refills.     Primary Care Center Test Result notification information:  *You will be notified with in 7-10 days of your appointment day regarding the results of your test.  If you are on MyChart you will be notified as soon as the provider has reviewed the results and signed off on them.      Keep appointment with Dr. Can on January 8, 2018          Follow-ups after your visit        Follow-up notes from your care team     Return in about 3 months (around 1/18/2018).      Your next 10 appointments already scheduled     Oct 24, 2017 12:30 PM MOIZT   SHERIF Hand with Donna Marshall OT   [facility] ([facility])    [address] [place] [address]   [phone]            Oct 31, 2017 10:00 AM TAMERA GORMAN Hand with Donna Marshall OT   [facility] ([facility])    [address] [place] [address]  Floor  [facility] [address]   [phone]            Dec 12, 2017  9:30 AM CST   VISUAL FIELD with [facility] EYE VISUAL FIELD   Eye Clinic ([facility])    [address]  [address] [facility]  9th Fl Clin 9a  [facility] [address]   [phone]            Dec 12, 2017 10:00 AM CST   RETURN GLAUCOMA with Alejandrina Faria MD   [facility] Clinic ([facility])    [address]  [address] [facility]  9th Fl Clin 9a  [facility] [address]   [phone]            Dec 14, 2017  9:10 AM CST   (Arrive by 8:55 AM)   NEW HAND with Dipti Zhang MD   [facility] ([facility])    [address] [place] [address]  4th [facility] [address]   [phone]            Jan 08, 2018  9:30 AM CST   (Arrive by 9:15 AM)   RE-ESTABLISH NEW with Evelina Can MD   [facility] ([facility])    [address] [place] [address] [phone]              Who to contact     Please call your clinic at [phone] to:    Ask questions about your health    Make or cancel appointments    Discuss your medicines    Learn about your test results    Speak to your doctor   If you have compliments or concerns about an experience at your clinic, or if you wish to file a complaint, please contact [facility] Patient Relations at [phone] or email us at [email or web address]         Additional Information About Your Visit        NeuroTronikhart Information     [employer] gives you secure access to your electronic health record. If you see a primary care provider, you can also send messages to your care team and make appointments. If you have questions, please call your primary care clinic.  If you do not have a primary care provider, please call [phone] and they will assist you.      Position[employer] is an electronic gateway that provides easy, online access to your medical  records. With [facility], you can request a clinic appointment, read your test results, renew a prescription or communicate with your care team.     To access your existing account, please contact your [facility] or call [phone] for assistance.        Care EveryWhere ID     This is your Care EveryWhere ID. This could be used by other organizations to access your [place] medical records  [id number]        Your Vitals Were     Pulse Respirations                76 16           Blood Pressure from Last 3 Encounters:   10/18/17 134/84   08/30/17 140/58   05/26/17 124/66    Weight from Last 3 Encounters:   08/30/17 79.4 kg (175 lb)   04/28/17 79.1 kg (174 lb 6.4 oz)   01/25/17 78.9 kg (174 lb)              We Performed the Following     FLU VACCINE, INCREASED ANTIGEN, PRESV FREE, AGE 65+ [[address]]          Today's Medication Changes          These changes are accurate as of: 10/18/17 11:40 AM.  If you have any questions, ask your nurse or doctor.               These medicines have changed or have updated prescriptions.        Dose/Directions    gabapentin 300 MG capsule   Commonly known as:  NEURONTIN   This may have changed:  when to take this   Used for:  Degenerative disc disease, cervical   Changed by:  Lilia Flores MD        Dose:  300 mg   Take 1 capsule (300 mg) by mouth 3 times daily   Quantity:  180 capsule   Refills:  1            Where to get your medicines      These medications were sent to [facility] [place], [place] - [address] [facility] [address]  [address] [facility] [address], [place] [id number]    Hours:  TRANSPLANT PHONE NUMBER [phone] Phone:  [phone]     gabapentin 300 MG capsule                Primary Care Provider Office Phone # Fax #    Lilia Flores [phone]       [address] [place] [address] 741  [facility] [address]        Equal Access to Services     KENDRA FLORES AH: reji Salgado  emiliana nicolle jessicajese condon ah. So [facility] [phone].    ATENCIÓN: Si kathleen vora, tiene a johnson disposición servicios gratuitos de asistencia lingüística. Megan al [phone].    We comply with applicable federal civil rights laws and [place] laws. We do not discriminate on the basis of race, color, national origin, age, disability, sex, sexual orientation, or gender identity.            Thank you!     Thank you for choosing [facility]  for your care. Our goal is always to provide you with excellent care. Hearing back from our patients is one way we can continue to improve our services. Please take a few minutes to complete the written survey that you may receive in the mail after your visit with us. Thank you!             Your Updated Medication List - Protect others around you: Learn how to safely use, store and throw away your medicines at www.disposemymeds.org.          This list is accurate as of: 10/18/17 11:40 AM.  Always use your most recent med list.                   Brand Name Dispense Instructions for use Diagnosis    aspirin 81 MG tablet      Take 1 tablet by mouth daily.        aspirin-dipyridamole  MG per 12 hr capsule    AGGRENOX    180 capsule    Take 1 capsule by mouth 2 times daily    Cerebrovascular disease       atorvastatin 10 MG tablet    LIPITOR    90 tablet    Take 1 tablet (10 mg) by mouth daily    Hyperlipidemia, unspecified hyperlipidemia type       cholecalciferol 1000 UNIT tablet    vitamin D     Take 1 tablet by mouth daily.        dorzolamide-timolol 2-0.5 % ophthalmic solution    COSOPT    1 Bottle    Place 1 drop into both eyes 2 times daily    Chronic angle-closure glaucoma of both eyes, severe stage       gabapentin 300 MG capsule    NEURONTIN    180 capsule    Take 1 capsule (300 mg) by mouth 3 times daily    Degenerative disc disease, cervical       hydrochlorothiazide 25 MG tablet    HYDRODIURIL    90  tablet    Take 1 tablet (25 mg) by mouth daily    Hyperlipidemia, unspecified hyperlipidemia type, Essential hypertension, benign       ketoconazole 2 % shampoo    NIZORAL    120 mL    Shampoo 1-2 times a week as needed for rash.    Seborrheic dermatitis       latanoprost 0.005 % ophthalmic solution    XALATAN    1 Bottle    Place 1 drop into both eyes At Bedtime    Chronic angle-closure glaucoma of both eyes, severe stage       levothyroxine 25 MCG tablet    SYNTHROID/LEVOTHROID    90 tablet    Take 1 tablet (25 mcg) by mouth daily    Hypothyroidism due to acquired atrophy of thyroid       meclizine 12.5 MG tablet    ANTIVERT    30 tablet    Take 1 tablet (12.5 mg) by mouth 4 times daily as needed for dizziness        omeprazole 20 MG CR capsule    priLOSEC    90 capsule    Take 1 capsule (20 mg) by mouth daily    Abdominal pain, epigastric       ondansetron 4 MG ODT tab    ZOFRAN-ODT    30 tablet    Place 1 tablet (4 mg) under the tongue every 8 hours as needed for nausea    Nausea       order for DME     1 Units    Right wrist splint, use at night    Numbness and tingling in right hand       PRESERVISION AREDS 2 PO      Take by mouth daily        TYLENOL 500 MG tablet   Generic drug:  acetaminophen      Take 2 tablets by mouth 2 times daily.

## 2017-10-18 NOTE — PROGRESS NOTES
HPI:   Azeb Torres is a 89 year old female presents today for followup. Since her last visit she had EMG nerve conduction study which confirmed severe bilateral total tunnel syndrome. She is now in wrist splints and seen in hand therapy. She has an appointment coming up with hand orthopedics for repeat next for consideration of surgical intervention. Her pain is particularly bad in her right hand and she has numbness in both hands. Other than this she reports she surely been doing well. She does have some chronic neck pain and is known to have degenerative disc and degenerative arthritis in the cervical spine.      ASSESSMENT/PLAN:   #1 bilateral carpal tunnel syndrome treatment as in history of present illness. We'll await hand surgery and may benefit from surgical intervention.   #2 benign essential hypertension adequately controlled on her present regimen   #3 hypothyroidism on levothyroxin with TSH at target   #4 health care maintenance she is due for a flu shot which we will give her today   #5 history of rest cancer in 2005 she underwent lumpectomy and sentinel lymph node biopsy this was a ER positive tumor she was treated with adjuvant radiation and then a 5 year course of Arimidex which was completed in 2011. She's had no evidence of disease recurrence   #6 many years ago she underwent hip replacement and had a CVA as a perioperative complication. For this she is remained on aspirin and Aggrenox. She has status post bilateral hip replacements.  #7 inches a remote history of a hysterectomy in the mid 1980s for apparently a low-grade endometrial cancer.   #8 she has follow-up arranged with Dr. Pamella Wang for primary care. She lives with her daughter Lake in the community.    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)     PSVT (paroxysmal  supraventricular tachycardia) (H)     Cerebral infarction (H)     Homonymous hemianopia     Pain in joint, shoulder region     Pain in joint, lower leg     Hyperlipidemia     Hypothyroidism     Tricuspid regurgitation     Pulmonary artery hypertension     Rosacea     Inflamed seborrheic keratosis     Dermatitis, seborrheic     Pseudophakia of both eyes     Nonexudative senile macular degeneration of retina     Asteroid hyalosis of right eye     Chronic angle-closure glaucoma     Dizziness of unknown cause     Vertigo     Hypothyroidism due to acquired atrophy of thyroid     Cervical radiculopathy     Chronic right shoulder pain     Hand pain, right     Past Surgical History:   Procedure Laterality Date     CATARACT IOL, RT/LT       glaucoma laser[       HIP SURGERY      s/p bilateral hip replacements     HYSTERECTOMY  1985    low grade endometrial cancer       Current Outpatient Prescriptions   Medication Sig Dispense Refill     gabapentin (NEURONTIN) 300 MG capsule Take 1 capsule (300 mg) by mouth 2 times daily 180 capsule 2     aspirin-dipyridamole (AGGRENOX)  MG per 12 hr capsule Take 1 capsule by mouth 2 times daily 180 capsule 3     dorzolamide-timolol (COSOPT) 2-0.5 % ophthalmic solution Place 1 drop into both eyes 2 times daily 1 Bottle 11     latanoprost (XALATAN) 0.005 % ophthalmic solution Place 1 drop into both eyes At Bedtime 1 Bottle 11     order for DME Right wrist splint, use at night 1 Units 0     ondansetron (ZOFRAN-ODT) 4 MG ODT tab Place 1 tablet (4 mg) under the tongue every 8 hours as needed for nausea 30 tablet 1     atorvastatin (LIPITOR) 10 MG tablet Take 1 tablet (10 mg) by mouth daily 90 tablet 3     omeprazole (PRILOSEC) 20 MG CR capsule Take 1 capsule (20 mg) by mouth daily 90 capsule 3     levothyroxine (SYNTHROID/LEVOTHROID) 25 MCG tablet Take 1 tablet (25 mcg) by mouth daily 90 tablet 3     hydrochlorothiazide (HYDRODIURIL) 25 MG tablet Take 1 tablet (25 mg) by mouth daily 90  tablet 3     Multiple Vitamins-Minerals (PRESERVISION AREDS 2 PO) Take by mouth daily       meclizine (ANTIVERT) 12.5 MG tablet Take 1 tablet (12.5 mg) by mouth 4 times daily as needed for dizziness 30 tablet 0     traMADol (ULTRAM) 50 MG tablet Take 1 tablet (50 mg) by mouth nightly as needed for moderate pain 28 tablet 1     ketoconazole (NIZORAL) 2 % shampoo Shampoo 1-2 times a week as needed for rash. 120 mL 1     aspirin 81 MG tablet Take 1 tablet by mouth daily.       acetaminophen (TYLENOL) 500 MG tablet Take 2 tablets by mouth 2 times daily.       cholecalciferol (VITAMIN D) 1000 UNIT tablet Take 1 tablet by mouth daily.           ROS:    Constitutional: no fevers, chill   Cardiovascular: no chest pain, palpitations  Respiratory: no dyspnea, cough, shortness of breath or wheezing   GI: no nausea, vomiting, diarrhea, no abdominal pain   Musculoskeletal: no edema       PHYSICAL EXAM:   /84  Pulse 76  Resp 16   Wt Readings from Last 1 Encounters:   08/30/17 79.4 kg (175 lb)       Constitutional: no distress, comfortable, pleasant    Cardiovascular: regular rate and rhythm, normal S1 and S2, no murmurs, rubs or gallops  Respiratory: clear to auscultation, no wheezes or crackles, normal breath sounds   Musculoskeletal:  no edema       Lilia Flores MD

## 2017-10-24 ENCOUNTER — THERAPY VISIT (OUTPATIENT)
Dept: OCCUPATIONAL THERAPY | Facility: CLINIC | Age: 82
End: 2017-10-24
Payer: MEDICARE

## 2017-10-24 DIAGNOSIS — M79.641 HAND PAIN, RIGHT: ICD-10-CM

## 2017-10-24 PROCEDURE — 97140 MANUAL THERAPY 1/> REGIONS: CPT | Mod: GO | Performed by: OCCUPATIONAL THERAPIST

## 2017-10-24 PROCEDURE — 97112 NEUROMUSCULAR REEDUCATION: CPT | Mod: GO | Performed by: OCCUPATIONAL THERAPIST

## 2017-10-26 ENCOUNTER — TELEPHONE (OUTPATIENT)
Dept: AUDIOLOGY | Facility: CLINIC | Age: 82
End: 2017-10-26

## 2017-10-26 NOTE — TELEPHONE ENCOUNTER
Azeb Torres's daughter brought her hearing aids to the [facility] on 10/26/17 to be cleaned.  The hearing aids and earmolds were cleaned and the  filters were replaced.  The right earmold was noted to be broken.  Azeb's family will contact me if they would like to have a new earmold made from the scan on file at [facility].  Her daughter, Barbi, was advised that the cost would be $80.00.

## 2017-10-31 ENCOUNTER — THERAPY VISIT (OUTPATIENT)
Dept: OCCUPATIONAL THERAPY | Facility: CLINIC | Age: 82
End: 2017-10-31
Payer: MEDICARE

## 2017-10-31 DIAGNOSIS — M79.641 HAND PAIN, RIGHT: ICD-10-CM

## 2017-10-31 PROCEDURE — 29125 APPL SHORT ARM SPLINT STATIC: CPT | Mod: GO | Performed by: OCCUPATIONAL THERAPIST

## 2017-10-31 PROCEDURE — 97110 THERAPEUTIC EXERCISES: CPT | Mod: GO | Performed by: OCCUPATIONAL THERAPIST

## 2017-10-31 NOTE — MR AVS SNAPSHOT
After Visit Summary   10/31/2017    Azeb Torres    MRN: [id number]           Patient Information     Date Of Birth          4/3/1925        Visit Information        Provider Department      10/31/2017 10:00 AM Donna Marshall OT [facility] Hand Therapy        Today's Diagnoses     Hand pain, right           Follow-ups after your visit        Your next 10 appointments already scheduled     Nov 08, 2017  9:45 AM CST   (Arrive by 9:30 AM)   NEW HAND with Patrick Rivera MD   [facility] Orthopaedic Clinic ([facility] [place])    [address]  [address] [facility] [id number] [phone]            Dec 12, 2017  9:30 AM CST   VISUAL FIELD with [facility] EYE VISUAL FIELD   Eye Clinic ([facility])    [address]  [address] [place]  [address] [address]  [facility] [address]   [phone]            Dec 12, 2017 10:00 AM CST   RETURN GLAUCOMA with Alejandrina Faria MD   Eye Clinic ([facility])    [address]  [address] [address] [place]  [address] [address]  [facility] [address]   [phone]            Jan 08, 2018  9:30 AM CST   (Arrive by 9:15 AM)   RE-ESTABLISH NEW with Evelina Can MD   [facility] Primary Care Clinic ([facility] [place])    [address] [place] [id number] [phone]              Who to contact     If you have questions or need follow up information about today's clinic visit or your schedule please contact [facility] directly at [phone].  Normal or non-critical lab and imaging results will be communicated to you by MyChart, letter or phone within 4 business days after the clinic has received the results. If you do not hear from us within 7 days, please contact the clinic through MyChart or phone. If you have a critical or abnormal lab result, we will notify you by phone as soon as possible.  Submit refill requests through [place] ([facility]) or call your pharmacy and they  will forward the refill request to us. Please allow 3 business days for your refill to be completed.          Additional Information About Your Visit        [employer] Information     [employer] gives you secure access to your electronic health record. If you see a primary care provider, you can also send messages to your care team and make appointments. If you have questions, please call your primary care clinic.  If you do not have a primary care provider, please call [phone] and they will assist you.        Care EveryWhere ID     This is your Care EveryWhere ID. This could be used by other organizations to access your [facility] medical records  [id number]         Blood Pressure from Last 3 Encounters:   10/18/17 134/84   08/30/17 140/58   05/26/17 124/66    Weight from Last 3 Encounters:   08/30/17 79.4 kg (175 lb)   04/28/17 79.1 kg (174 lb 6.4 oz)   01/25/17 78.9 kg (174 lb)              We Performed the Following     APPLY SHORT ARM SPLINT STATIC     THERAPEUTIC EXERCISES        Primary Care Provider Office Phone # Fax #    Lilia Flores [phone]       [address] [place] [address]  [facility] [address]        Equal Access to Services     KENDRA FLORES : Hadii buddy overtono Sojeanne, waaxda luqadaha, qaybta kaalmada adeegyada, jese galvan. So [facility] [phone].    ATENCIÓN: Si habla español, tiene a johnson disposición servicios gratuitos de asistencia lingüística. Llame al [phone].    We comply with applicable federal civil rights laws and [place] laws. We do not discriminate on the basis of race, color, national origin, age, disability, sex, sexual orientation, or gender identity.            Thank you!     Thank you for choosing [facility]  for your care. Our goal is always to provide you with excellent care. Hearing back from our patients is one way we can continue to improve our services. Please take a few minutes to complete the written survey  that you may receive in the mail after your visit with us. Thank you!             Your Updated Medication List - Protect others around you: Learn how to safely use, store and throw away your medicines at www.disposemymeds.org.          This list is accurate as of: 10/31/17 11:57 AM.  Always use your most recent med list.                   Brand Name Dispense Instructions for use Diagnosis    aspirin 81 MG tablet      Take 1 tablet by mouth daily.        aspirin-dipyridamole  MG per 12 hr capsule    AGGRENOX    180 capsule    Take 1 capsule by mouth 2 times daily    Cerebrovascular disease       atorvastatin 10 MG tablet    LIPITOR    90 tablet    Take 1 tablet (10 mg) by mouth daily    Hyperlipidemia, unspecified hyperlipidemia type       cholecalciferol 1000 UNIT tablet    vitamin D3     Take 1 tablet by mouth daily.        dorzolamide-timolol 2-0.5 % ophthalmic solution    COSOPT    1 Bottle    Place 1 drop into both eyes 2 times daily    Chronic angle-closure glaucoma of both eyes, severe stage       gabapentin 300 MG capsule    NEURONTIN    180 capsule    Take 1 capsule (300 mg) by mouth 3 times daily    Degenerative disc disease, cervical       hydrochlorothiazide 25 MG tablet    HYDRODIURIL    90 tablet    Take 1 tablet (25 mg) by mouth daily    Hyperlipidemia, unspecified hyperlipidemia type, Essential hypertension, benign       ketoconazole 2 % shampoo    NIZORAL    120 mL    Shampoo 1-2 times a week as needed for rash.    Seborrheic dermatitis       latanoprost 0.005 % ophthalmic solution    XALATAN    1 Bottle    Place 1 drop into both eyes At Bedtime    Chronic angle-closure glaucoma of both eyes, severe stage       levothyroxine 25 MCG tablet    SYNTHROID/LEVOTHROID    90 tablet    Take 1 tablet (25 mcg) by mouth daily    Hypothyroidism due to acquired atrophy of thyroid       meclizine 12.5 MG tablet    ANTIVERT    30 tablet    Take 1 tablet (12.5 mg) by mouth 4 times daily as needed for dizziness         omeprazole 20 MG CR capsule    priLOSEC    90 capsule    Take 1 capsule (20 mg) by mouth daily    Abdominal pain, epigastric       ondansetron 4 MG ODT tab    ZOFRAN-ODT    30 tablet    Place 1 tablet (4 mg) under the tongue every 8 hours as needed for nausea    Nausea       order for DME     1 Units    Right wrist splint, use at night    Numbness and tingling in right hand       PRESERVISION AREDS 2 PO      Take by mouth daily        TYLENOL 500 MG tablet   Generic drug:  acetaminophen      Take 2 tablets by mouth 2 times daily.

## 2017-11-01 ENCOUNTER — PRE VISIT (OUTPATIENT)
Dept: ORTHOPEDICS | Facility: CLINIC | Age: 82
End: 2017-11-01

## 2017-11-01 ENCOUNTER — OFFICE VISIT (OUTPATIENT)
Dept: ORTHOPEDICS | Facility: CLINIC | Age: 82
End: 2017-11-01

## 2017-11-01 VITALS — HEIGHT: 63 IN | BODY MASS INDEX: 31.18 KG/M2 | WEIGHT: 176 LBS

## 2017-11-01 DIAGNOSIS — G56.01 CARPAL TUNNEL SYNDROME OF RIGHT WRIST: Primary | ICD-10-CM

## 2017-11-01 DIAGNOSIS — G56.03 BILATERAL CARPAL TUNNEL SYNDROME: Primary | ICD-10-CM

## 2017-11-01 DIAGNOSIS — G56.03 BILATERAL CARPAL TUNNEL SYNDROME: ICD-10-CM

## 2017-11-01 NOTE — LETTER
11/1/2017       RE: Azeb Torres  [address]  [place] [address]     Dear Colleague,    Thank you for referring your patient, Azeb Torres, to the [facility] ORTHOP[facility] at [facility]. Please see a copy of my visit note below.    Orthopaedic Surgery Clinic H&P    Date of Service: 11/01/17    Chief Complaint: R>L carpal tunnel syndrome, and DJD in bilateral PIP/DIP joints, and R thumb CMC joint.    History of Present Illness:  Pt is a RHD 89 year old female, w h/o CVA, presenting with b/l hand numbness in a median n distribution, and diffuse hand soreness.  Has previous EMG which shows severe bilateral median neuropathy (10/4/17).  Does have known cervical spondylosis, but no previous neck surgeries.  Reports that the numbness in her fingers has continued for several years, getting worse.  Uses a night splint with some relief.  Also reports the pain/ache has worsened in the past several months.  Pain is constant throughout the day, not limited to activities. It wakes her up at night. It is difficult for her to localize but she feels it is worse in her fingers. She says it is a dull, constant pain.    Has never had injections or other interventions.    Past Medical History:  Past Medical History:   Diagnosis Date     Arthritis      Chronic angle-closure glaucoma      Hypertension      Left homonymous hemianopsia      Pulmonary artery hypertension 4/24/2013    Noted on echo. Mild-moderate. Moderate TR     Tricuspid regurgitation 4/24/2013       Past Surgical History:  Past Surgical History:   Procedure Laterality Date     CATARACT IOL, RT/LT       glaucoma laser[       HIP SURGERY      s/p bilateral hip replacements     HYSTERECTOMY  1985    low grade endometrial cancer       Medications:  Current Outpatient Prescriptions   Medication Sig Dispense Refill     gabapentin (NEURONTIN) 300 MG capsule Take 1 capsule (300 mg) by mouth 3 times daily 180 capsule 1      aspirin-dipyridamole (AGGRENOX)  MG per 12 hr capsule Take 1 capsule by mouth 2 times daily 180 capsule 3     dorzolamide-timolol (COSOPT) 2-0.5 % ophthalmic solution Place 1 drop into both eyes 2 times daily 1 Bottle 11     latanoprost (XALATAN) 0.005 % ophthalmic solution Place 1 drop into both eyes At Bedtime 1 Bottle 11     order for DME Right wrist splint, use at night 1 Units 0     ondansetron (ZOFRAN-ODT) 4 MG ODT tab Place 1 tablet (4 mg) under the tongue every 8 hours as needed for nausea 30 tablet 1     atorvastatin (LIPITOR) 10 MG tablet Take 1 tablet (10 mg) by mouth daily 90 tablet 3     omeprazole (PRILOSEC) 20 MG CR capsule Take 1 capsule (20 mg) by mouth daily 90 capsule 3     levothyroxine (SYNTHROID/LEVOTHROID) 25 MCG tablet Take 1 tablet (25 mcg) by mouth daily 90 tablet 3     hydrochlorothiazide (HYDRODIURIL) 25 MG tablet Take 1 tablet (25 mg) by mouth daily 90 tablet 3     Multiple Vitamins-Minerals (PRESERVISION AREDS 2 PO) Take by mouth daily       meclizine (ANTIVERT) 12.5 MG tablet Take 1 tablet (12.5 mg) by mouth 4 times daily as needed for dizziness 30 tablet 0     ketoconazole (NIZORAL) 2 % shampoo Shampoo 1-2 times a week as needed for rash. 120 mL 1     aspirin 81 MG tablet Take 1 tablet by mouth daily.       acetaminophen (TYLENOL) 500 MG tablet Take 2 tablets by mouth 2 times daily.       cholecalciferol (VITAMIN D) 1000 UNIT tablet Take 1 tablet by mouth daily.         Allergies:   No Known Allergies    Social History:  retired  Social History     Social History     Marital status:      Spouse name: N/A     Number of children: N/A     Years of education: N/A     Occupational History     Not on file.     Social History Main Topics     Smoking status: Never Smoker     Smokeless tobacco: Never Used     Alcohol use No     Drug use: No     Sexual activity: No     Other Topics Concern     Not on file     Social History Narrative         Family History:  No family history on  "file.  Negative for blood clots, bleeding disorders or anesthesia related complications.    Review of Systems:  14 point review of systems is reviewed and is available below.    Physical Exam:  Ht 1.6 m (5' 3\")  Wt 79.8 kg (176 lb)  BMI 31.18 kg/m2  A&O, NAD  HEENT: NCAT, EOMI  Neuro: CN II-XII grossly intact  Neck: Full AROM without difficulty  Resp: equal and nonlabored  CV: RRR  Negative b/l spurling.  Extremities:  RUE:   Moderate thenar atrophy, mild deformity at all joints.     Moderate paresthesias in the median n, not elsewhere.  Mildly positive phalen's test, negative tinel/compression test.     No pain/paresthesia at cubital tunnel   Fires interossei, EPL (thumbs up), FDP (a-o-k) 5/5.  Strong thumb opposition.    Painful directly over thumb CMC.  K[place]ji 9.  Pain with adduction and extension tests.  Crepitance but no pain with CMC grind.    Painful to palpation along all PIP/DIP joints.  nontender to MCP's.   All fingers wwp, radial pulse 2+    LLE:   Milder thenar atrophy, mild deformity at all joints.     Moderate paresthesias in the median n, not elsewhere.  Negative phalen/tinel/compression test.     No pain/paresthesia at cubital tunnel   Fires interossei, EPL (thumbs up), FDP (a-o-k) 5/5.  Strong thumb opposition.    Non-painful directly over thumb CMC.  Kapandji 9.  No pain with adduction and extension tests.  No pain/crepitus with thumb CMC grind.    Painful to palpation along all PIP/DIP joints.  nontender to MCP's.   All fingers wwp, radial pulse 2+      EMG 10/14/17: 1) A severe right-sided median neuropathy at the wrist (e.g., carpal tunnel syndrome)  2) A very severe left-sided median neuropathy at the wrist (e.g., carpal tunnel syndrome)  3) Chronic/remote cervical radiculopathy affecting the right C8, T1 > C7 nerve roots without evidence of a recent or active nerve root injury.          Imaging:  XR demonstrates diffuse DJD throughout PIP/DIP joints, CMC arthritis " bilaterally.    Assessment:  Pt is a RHD 89 year old female, with the followin) EMG-proven bilateral CTS.  2) R thumb CMC arthritis, mildly symptomatic.  3) PIP/DIP diffuse DJD in all fingers    Plan:  Offered injection vs CTR - patient prefers R CTR.      Discussed that the primary indication for CTR is prevention of progression of carpal tunnel syndrome.  No guarantee that it will improve pain or sensation.      Discussed with staff, Dr. Rivera.    Jayce Sauceda MD  PGY-4, Orthopaedic Surgery  [phone]    I, Patrick Rivera, saw and evaluated this patient with the resident and agree with the resident s findings and plan of care as documented in the resident s note. In brief, this is a 89-year-old woman with bilateral severe carpal tunnel syndrome on EMG presenting with diminished sensation in the bilateral hands and chronic hand pain which has recently become increasingly severe. X-rays show diffuse degenerative changes in the hands. She also has a history of cervical spondylosis which may be contributing to her sensory changes. I discussed with patient that as her sensory loss and pain are multifactorial, the response she will get to a carpal tunnel release is somewhat unpredictable. Surgery should prevent the progression of her carpal tunnel syndrome symptoms. However, it will not reverse her sensory loss or muscle atrophy. She understands that older patients may have a less favorable response to carpal tunnel release than their younger counterparts. She also understands that it is not clear to me that her pain is due to carpal tunnel syndrome rather than her arthritis and that if it is due to her arthritis a carpal tunnel release will not help. I offered her a carpal tunnel injection to better assess to what extent a carpal tunnel release may be helpful. She declined this. I discussed the risks of surgery including  But not limited to infection, bleeding, damage to nerves, vessels, or other nearby  structures, pillar pain, stiffness, wound healing problems, scarring and pain that is not improved or that even may get worse. She expressed understanding and wishes to proceed with an open carpal tunnel release for the right wrist. Informed consent was obtained. My office will contact her to schedule surgery. This will be done under local only anesthesia. I would like her to stop her aggrenox before surgery if possible and so will see her primary care doctor to discuss whether this is safe.         Again, thank you for allowing me to participate in the care of your patient.      Sincerely,    Patrick Rivera MD

## 2017-11-01 NOTE — MR AVS SNAPSHOT
After Visit Summary   11/1/2017    Azeb Torres    MRN: [id number]           Patient Information     Date Of Birth          4/3/1925        Visit Information        Provider Department      11/1/2017 10:45 AM Patrick Rivera MD [facility]        Today's Diagnoses     Carpal tunnel syndrome of right wrist    -  1    Bilateral carpal tunnel syndrome           Follow-ups after your visit        Your next 10 appointments already scheduled     Dec 12, 2017  9:30 AM CST   VISUAL FIELD with [facility] EYE VISUAL FIELD   Eye Clinic ([facility])    [address]  [address]   [facility] [address] [place] [address]   [phone]            Dec 12, 2017 10:00 AM CST   RETURN GLAUCOMA with Alejandrina Faria MD   Eye Clinic ([facility])    [address]  [address]   [facility] [address] [place] [address]   [phone]            Jan 08, 2018  9:30 AM CST   (Arrive by 9:15 AM)   RE-ESTABLISH NEW with Evelina Can MD   [facility] ([facility])    [address] [facility]  [address] [facility] [address] [phone]              Who to contact     Please call your clinic at [phone] to:    Ask questions about your health    Make or cancel appointments    Discuss your medicines    Learn about your test results    Speak to your doctor   If you have compliments or concerns about an experience at your clinic, or if you wish to file a complaint, please contact [facility] Patient Relations at [phone] or email us at [email or web address]         Additional Information About Your Visit        MyChart Information     [employer] gives you secure access to your electronic health record. If you see a primary care provider, you can also send messages to your care team and make appointments. If you have questions, please call your primary care clinic.  If you do not  "have a primary care provider, please call [phone] and they will assist you.      [employer] is an electronic gateway that provides easy, online access to your medical records. With [employer], you can request a clinic appointment, read your test results, renew a prescription or communicate with your care team.     To access your existing account, please contact your [facility] or call [phone] for assistance.        Care EveryWhere ID     This is your Care EveryWhere ID. This could be used by other organizations to access your [facility] medical records  [id number]        Your Vitals Were     Height BMI (Body Mass Index)                1.6 m (5' 3\") 31.18 kg/m2           Blood Pressure from Last 3 Encounters:   10/18/17 134/84   08/30/17 140/58   05/26/17 124/66    Weight from Last 3 Encounters:   11/01/17 79.8 kg (176 lb)   08/30/17 79.4 kg (175 lb)   04/28/17 79.1 kg (174 lb 6.4 oz)              We Performed the Following     Samia-Operative Worksheet (Hand)        Primary Care Provider    None Specified       No primary provider on file.        Equal Access to Services     [facility] Center[facility] : Hadabdifatah Grady, reji hopson, nicolle alvarado, jese veliz . So [facility] [phone].    ATENCIÓN: Si habla español, tiene a johnson disposición servicios gratuitos de asistencia lingüística. Llame al [phone].    We comply with applicable federal civil rights laws and [place] laws. We do not discriminate on the basis of race, color, national origin, age, disability, sex, sexual orientation, or gender identity.            Thank you!     Thank you for choosing [facility]  for your care. Our goal is always to provide you with excellent care. Hearing back from our patients is one way we can continue to improve our services. Please take a few minutes to complete the written survey that you may receive in the mail after your visit " with us. Thank you!             Your Updated Medication List - Protect others around you: Learn how to safely use, store and throw away your medicines at www.disposemymeds.org.          This list is accurate as of: 11/1/17  5:09 PM.  Always use your most recent med list.                   Brand Name Dispense Instructions for use Diagnosis    aspirin 81 MG tablet      Take 1 tablet by mouth daily.        aspirin-dipyridamole  MG per 12 hr capsule    AGGRENOX    180 capsule    Take 1 capsule by mouth 2 times daily    Cerebrovascular disease       atorvastatin 10 MG tablet    LIPITOR    90 tablet    Take 1 tablet (10 mg) by mouth daily    Hyperlipidemia, unspecified hyperlipidemia type       cholecalciferol 1000 UNIT tablet    vitamin D3     Take 1 tablet by mouth daily.        dorzolamide-timolol 2-0.5 % ophthalmic solution    COSOPT    1 Bottle    Place 1 drop into both eyes 2 times daily    Chronic angle-closure glaucoma of both eyes, severe stage       gabapentin 300 MG capsule    NEURONTIN    180 capsule    Take 1 capsule (300 mg) by mouth 3 times daily    Degenerative disc disease, cervical       hydrochlorothiazide 25 MG tablet    HYDRODIURIL    90 tablet    Take 1 tablet (25 mg) by mouth daily    Hyperlipidemia, unspecified hyperlipidemia type, Essential hypertension, benign       ketoconazole 2 % shampoo    NIZORAL    120 mL    Shampoo 1-2 times a week as needed for rash.    Seborrheic dermatitis       latanoprost 0.005 % ophthalmic solution    XALATAN    1 Bottle    Place 1 drop into both eyes At Bedtime    Chronic angle-closure glaucoma of both eyes, severe stage       levothyroxine 25 MCG tablet    SYNTHROID/LEVOTHROID    90 tablet    Take 1 tablet (25 mcg) by mouth daily    Hypothyroidism due to acquired atrophy of thyroid       meclizine 12.5 MG tablet    ANTIVERT    30 tablet    Take 1 tablet (12.5 mg) by mouth 4 times daily as needed for dizziness        omeprazole 20 MG CR capsule    priLOSEC     90 capsule    Take 1 capsule (20 mg) by mouth daily    Abdominal pain, epigastric       ondansetron 4 MG ODT tab    ZOFRAN-ODT    30 tablet    Place 1 tablet (4 mg) under the tongue every 8 hours as needed for nausea    Nausea       order for DME     1 Units    Right wrist splint, use at night    Numbness and tingling in right hand       PRESERVISION AREDS 2 PO      Take by mouth daily        TYLENOL 500 MG tablet   Generic drug:  acetaminophen      Take 2 tablets by mouth 2 times daily.

## 2017-11-01 NOTE — PROGRESS NOTES
Orthopaedic Surgery Clinic H&P    Date of Service: 11/01/17    Chief Complaint: R>L carpal tunnel syndrome, and DJD in bilateral PIP/DIP joints, and R thumb CMC joint.    History of Present Illness:  Pt is a RHD 89 year old female, w h/o CVA, presenting with b/l hand numbness in a median n distribution, and diffuse hand soreness.  Has previous EMG which shows severe bilateral median neuropathy (10/4/17).  Does have known cervical spondylosis, but no previous neck surgeries.  Reports that the numbness in her fingers has continued for several years, getting worse.  Uses a night splint with some relief.  Also reports the pain/ache has worsened in the past several months.  Pain is constant throughout the day, not limited to activities. It wakes her up at night. It is difficult for her to localize but she feels it is worse in her fingers. She says it is a dull, constant pain.    Has never had injections or other interventions.    Past Medical History:  Past Medical History:   Diagnosis Date     Arthritis      Chronic angle-closure glaucoma      Hypertension      Left homonymous hemianopsia      Pulmonary artery hypertension 4/24/2013    Noted on echo. Mild-moderate. Moderate TR     Tricuspid regurgitation 4/24/2013       Past Surgical History:  Past Surgical History:   Procedure Laterality Date     CATARACT IOL, RT/LT       glaucoma laser[       HIP SURGERY      s/p bilateral hip replacements     HYSTERECTOMY  1985    low grade endometrial cancer       Medications:  Current Outpatient Prescriptions   Medication Sig Dispense Refill     gabapentin (NEURONTIN) 300 MG capsule Take 1 capsule (300 mg) by mouth 3 times daily 180 capsule 1     aspirin-dipyridamole (AGGRENOX)  MG per 12 hr capsule Take 1 capsule by mouth 2 times daily 180 capsule 3     dorzolamide-timolol (COSOPT) 2-0.5 % ophthalmic solution Place 1 drop into both eyes 2 times daily 1 Bottle 11     latanoprost (XALATAN) 0.005 % ophthalmic solution Place 1  "drop into both eyes At Bedtime 1 Bottle 11     order for DME Right wrist splint, use at night 1 Units 0     ondansetron (ZOFRAN-ODT) 4 MG ODT tab Place 1 tablet (4 mg) under the tongue every 8 hours as needed for nausea 30 tablet 1     atorvastatin (LIPITOR) 10 MG tablet Take 1 tablet (10 mg) by mouth daily 90 tablet 3     omeprazole (PRILOSEC) 20 MG CR capsule Take 1 capsule (20 mg) by mouth daily 90 capsule 3     levothyroxine (SYNTHROID/LEVOTHROID) 25 MCG tablet Take 1 tablet (25 mcg) by mouth daily 90 tablet 3     hydrochlorothiazide (HYDRODIURIL) 25 MG tablet Take 1 tablet (25 mg) by mouth daily 90 tablet 3     Multiple Vitamins-Minerals (PRESERVISION AREDS 2 PO) Take by mouth daily       meclizine (ANTIVERT) 12.5 MG tablet Take 1 tablet (12.5 mg) by mouth 4 times daily as needed for dizziness 30 tablet 0     ketoconazole (NIZORAL) 2 % shampoo Shampoo 1-2 times a week as needed for rash. 120 mL 1     aspirin 81 MG tablet Take 1 tablet by mouth daily.       acetaminophen (TYLENOL) 500 MG tablet Take 2 tablets by mouth 2 times daily.       cholecalciferol (VITAMIN D) 1000 UNIT tablet Take 1 tablet by mouth daily.         Allergies:   No Known Allergies    Social History:  retired  Social History     Social History     Marital status:      Spouse name: N/A     Number of children: N/A     Years of education: N/A     Occupational History     Not on file.     Social History Main Topics     Smoking status: Never Smoker     Smokeless tobacco: Never Used     Alcohol use No     Drug use: No     Sexual activity: No     Other Topics Concern     Not on file     Social History Narrative         Family History:  No family history on file.  Negative for blood clots, bleeding disorders or anesthesia related complications.    Review of Systems:  14 point review of systems is reviewed and is available below.    Physical Exam:  Ht 1.6 m (5' 3\")  Wt 79.8 kg (176 lb)  BMI 31.18 kg/m2  A&O, NAD  HEENT: NCAT, EOMI  Neuro: CN " II-XII grossly intact  Neck: Full AROM without difficulty  Resp: equal and nonlabored  CV: RRR  Negative b/l spurling.  Extremities:  RUE:   Moderate thenar atrophy, mild deformity at all joints.     Moderate paresthesias in the median n, not elsewhere.  Mildly positive phalen's test, negative tinel/compression test.     No pain/paresthesia at cubital tunnel   Fires interossei, EPL (thumbs up), FDP (a-o-k) 5/5.  Strong thumb opposition.    Painful directly over thumb CMC.  Kapandji 9.  Pain with adduction and extension tests.  Crepitance but no pain with CMC grind.    Painful to palpation along all PIP/DIP joints.  nontender to MCP's.   All fingers wwp, radial pulse 2+    LLE:   Milder thenar atrophy, mild deformity at all joints.     Moderate paresthesias in the median n, not elsewhere.  Negative phalen/tinel/compression test.     No pain/paresthesia at cubital tunnel   Fires interossei, EPL (thumbs up), FDP (a-o-k) 5/5.  Strong thumb opposition.    Non-painful directly over thumb CMC.  Kapandji 9.  No pain with adduction and extension tests.  No pain/crepitus with thumb CMC grind.    Painful to palpation along all PIP/DIP joints.  nontender to MCP's.   All fingers wwp, radial pulse 2+      EMG 10/14/17: 1) A severe right-sided median neuropathy at the wrist (e.g., carpal tunnel syndrome)  2) A very severe left-sided median neuropathy at the wrist (e.g., carpal tunnel syndrome)  3) Chronic/remote cervical radiculopathy affecting the right C8, T1 > C7 nerve roots without evidence of a recent or active nerve root injury.          Imaging:  XR demonstrates diffuse DJD throughout PIP/DIP joints, CMC arthritis bilaterally.    Assessment:  Pt is a RHD 89 year old female, with the followin) EMG-proven bilateral CTS.  2) R thumb CMC arthritis, mildly symptomatic.  3) PIP/DIP diffuse DJD in all fingers    Plan:  Offered injection vs CTR - patient prefers R CTR.      Discussed that the primary indication for CTR is  prevention of progression of carpal tunnel syndrome.  No guarantee that it will improve pain or sensation.      Discussed with staff, Dr. Rivera.    Jayce Sauceda MD  PGY-4, Orthopaedic Surgery  [phone]    I, Patrick Rivera, saw and evaluated this patient with the resident and agree with the resident s findings and plan of care as documented in the resident s note. In brief, this is a 89-year-old woman with bilateral severe carpal tunnel syndrome on EMG presenting with diminished sensation in the bilateral hands and chronic hand pain which has recently become increasingly severe. X-rays show diffuse degenerative changes in the hands. She also has a history of cervical spondylosis which may be contributing to her sensory changes. I discussed with patient that as her sensory loss and pain are multifactorial, the response she will get to a carpal tunnel release is somewhat unpredictable. Surgery should prevent the progression of her carpal tunnel syndrome symptoms. However, it will not reverse her sensory loss or muscle atrophy. She understands that older patients may have a less favorable response to carpal tunnel release than their younger counterparts. She also understands that it is not clear to me that her pain is due to carpal tunnel syndrome rather than her arthritis and that if it is due to her arthritis a carpal tunnel release will not help. I offered her a carpal tunnel injection to better assess to what extent a carpal tunnel release may be helpful. She declined this. I discussed the risks of surgery including  But not limited to infection, bleeding, damage to nerves, vessels, or other nearby structures, pillar pain, stiffness, wound healing problems, scarring and pain that is not improved or that even may get worse. She expressed understanding and wishes to proceed with an open carpal tunnel release for the right wrist. Informed consent was obtained. My office will contact her to schedule surgery.  This will be done under local only anesthesia. I would like her to stop her aggrenox before surgery if possible and so will see her primary care doctor to discuss whether this is safe.         Answers for HPI/ROS submitted by the patient on 11/1/2017   General Symptoms: No  Skin Symptoms: No  HENT Symptoms: No  EYE SYMPTOMS: No  HEART SYMPTOMS: No  LUNG SYMPTOMS: No  INTESTINAL SYMPTOMS: No  URINARY SYMPTOMS: No  GYNECOLOGIC SYMPTOMS: No  BREAST SYMPTOMS: No  SKELETAL SYMPTOMS: No  BLOOD SYMPTOMS: No  NERVOUS SYSTEM SYMPTOMS: No  MENTAL HEALTH SYMPTOMS: No

## 2017-11-01 NOTE — NURSING NOTE
"Reason For Visit:   Chief Complaint   Patient presents with     Consult     B/l carpal tunnel syndrome       Primary MD: Lilia Flores  Ref. MD:     ?  No    Age: 89 year old    Occupation Retired  Currently working? No.  Work status?  Retired.  Date of injury: NA    Smoker: No  Request smoking cessation information: No      Ht 1.6 m (5' 3\")  Wt 79.8 kg (176 lb)  BMI 31.18 kg/m2        Hand Dominance Evaluation  Hand Dominance: Right      force  R hand  level 1 force: 9.072 kg (20 lb)  R hand  level 3 force: 13.6 kg (30 lb)  R hand  level 5 force: 9.072 kg (20 lb)  L hand   level 1 force: 13.6 kg (30 lb)  L hand  level 3 force: 13.6 kg (30 lb)  L hand  level 5 force: 9.072 kg (20 lb)    QuickDASH Assessment  Quick[employer] Main 11/1/2017   1.Open a tight or new jar. Moderate difficulty   2. Do heavy household chores (e.g., wash walls, floors) Unable to answer   3. Carry a shopping bag or briefcase. Moderate difficulty   4. Wash your back. Unable to answer   5. Use a knife to cut food. Moderate difficulty   6. Recreational activities in which you take some force or impact through your arm, shoulder or hand (e.g., golf, hammering, tennis, etc.). Unable to answer   7. During the past week, to what extent has your arm, shoulder or hand problem interfered with your normal social activities with family, friends, neighbours or groups? Quite a bit   8. During the past week, were you limited in your work or other regular daily activities as a result of your arm, shoulder or hand problem? Unable to answer          No Known Allergies    Chelsy Rayo ATC      "

## 2017-11-02 ENCOUNTER — TELEPHONE (OUTPATIENT)
Dept: ORTHOPEDICS | Facility: CLINIC | Age: 82
End: 2017-11-02

## 2017-11-03 ENCOUNTER — DOCUMENTATION ONLY (OUTPATIENT)
Dept: ORTHOPEDICS | Facility: CLINIC | Age: 82
End: 2017-11-03

## 2017-11-03 NOTE — PROGRESS NOTES
Teaching Flowsheet   Relevant Diagnosis: Right carpal tunnel syndrome  Teaching Topic: Right open carpal tunnel     Person(s) involved in teaching:   Patient and Daughter     Motivation Level:  Asks Questions: Yes  Eager to Learn: Yes  Cooperative: Yes  Receptive (willing/able to accept information): Yes  Any cultural factors/Holiness beliefs that may influence understanding or compliance? No       Patient and family demonstrates understanding of the following:  Reason for the appointment, diagnosis and treatment plan: Yes  Knowledge of proper use of medications and conditions for which they are ordered (with special attention to potential side effects or drug interactions): Yes  Which situations necessitate calling provider and whom to contact: Yes       Teaching Concerns Addressed:   Patient is on Aggrenox for a previous CVA. PCP was contacted for recommendations on holding this medication.   Patient and family did not have any other concerns. The procedure will be done with local only. History and physical is documented in the 11/1/17 visit by Dr. Rivera.      Proper use and care of  (medical equip, care aids, etc.): NA  Nutritional needs and diet plan: Yes  Pain management techniques: Yes  Wound Care: Yes  How and/when to access community resources: Yes     Instructional Materials Used/Given: Pre-operative teaching packet and anti-bacterial soap.      Time spent with patient: 15 minutes.

## 2017-11-06 PROBLEM — M79.641 HAND PAIN, RIGHT: Status: RESOLVED | Noted: 2017-10-16 | Resolved: 2017-11-06

## 2017-11-07 ENCOUNTER — TELEPHONE (OUTPATIENT)
Dept: ORTHOPEDICS | Facility: CLINIC | Age: 82
End: 2017-11-07

## 2017-11-07 NOTE — PROGRESS NOTES
Discharge Summary - Hand Therapy    Patient did not return to therapy.  Going to have CTR.    D/C from hand therapy.

## 2017-11-07 NOTE — TELEPHONE ENCOUNTER
Several calls made to patient and daughter regarding surgery and her medication Aggrenox. Dr. Can was consulted to give recommendations with regard to holding/continuing the medication Aggrenox.   She recommends: If aspirin is contraindicated for this procedure, I would recommend stopping the aggrenox for 5-7 days.  Otherwise, if bleeding risk is low, would recommend she continue on it.   Dr. Rivera states patient can continue the medication.  A message was left for the patient and her daughter Lake with this information and a phone number to call for questions.

## 2017-11-08 ENCOUNTER — OFFICE VISIT (OUTPATIENT)
Dept: SURGERY | Facility: CLINIC | Age: 82
End: 2017-11-08

## 2017-11-08 ENCOUNTER — TELEPHONE (OUTPATIENT)
Dept: ORTHOPEDICS | Facility: CLINIC | Age: 82
End: 2017-11-08

## 2017-11-08 ENCOUNTER — ALLIED HEALTH/NURSE VISIT (OUTPATIENT)
Dept: SURGERY | Facility: CLINIC | Age: 82
End: 2017-11-08

## 2017-11-08 VITALS
HEART RATE: 79 BPM | HEIGHT: 63 IN | RESPIRATION RATE: 18 BRPM | DIASTOLIC BLOOD PRESSURE: 79 MMHG | BODY MASS INDEX: 31.18 KG/M2 | WEIGHT: 176 LBS | TEMPERATURE: 97.5 F | OXYGEN SATURATION: 99 % | SYSTOLIC BLOOD PRESSURE: 154 MMHG

## 2017-11-08 DIAGNOSIS — Z01.818 PRE-OP EXAMINATION: Primary | ICD-10-CM

## 2017-11-08 DIAGNOSIS — Z98.890 S/P CARPAL TUNNEL RELEASE: ICD-10-CM

## 2017-11-08 NOTE — PATIENT INSTRUCTIONS
Preparing for Your Surgery      Name:  Azeb Torres   MRN:  [id number]   :  4/3/1925   Today's Date:  2017     Arriving for surgery:  Surgery date:  17  Arrival time:  06:30 a.m.  Please come to:     [facility]  [address] [place] [id number]     Parking is available in front of the [facility] building from 5:30AM to 8:00PM.  -  Proceed to the 5th floor to check into the Ambulatory Surgery Center.              >> There will be patient concierges on the 1st and 5th floor, for assistance or an escort, if you would like.              >> Please call [phone] with any questions.    What can I eat or drink?  -  You may have solid food or milk products until 8 hours prior to your surgery  midnight  -  You may have water, apple juice or 7up/Sprite until 2 hours prior to your surgery  06:00 a.m.    Which medicines can I take?  Please stop aspirin last dose is , and last dose of Aggrenox is   -  Do NOT take these medications in the morning, the day of surgery:  Hydrochlorathiazide    -  Please take these medications the day of surgery:     Gabapentin, Eye drops, Prilosec, Levothyroxine, Tylenol, zofran, meclizine as needed    How do I prepare myself?  -  Take two showers: one the night before surgery; and one the morning of surgery.         Use Scrubcare or Hibiclens to wash from neck down.  You may use your own shampoo and conditioner. No other hair products.   -  Do NOT use lotion, powder, deodorant, or antiperspirant the day of your surgery.  -  Do NOT wear any makeup, fingernail polish or jewelry.  --Do not bring your own medications to the hospital.  -  Bring your ID and insurance card.    Questions or Concerns:  If you have questions or concerns, please call the  Preoperative Assessment Center, Monday-Friday 7AM-7PM:  [phone]

## 2017-11-08 NOTE — TELEPHONE ENCOUNTER
Azeb is scheduled for right carpal tunnel release on 11/14/2017. After consulting with Dr. Can (primary care), the patient does not need to hold her aggrenox pre-operatively.   Spoke to Azeb today to inform her that she should continue to take the aggrenox, but stop the baby aspirin before surgery.   She verbalized understanding.

## 2017-11-08 NOTE — MR AVS SNAPSHOT
After Visit Summary   2017    Azeb Torres    MRN: [id number]           Patient Information     Date Of Birth          4/3/1925        Visit Information        Provider Department      2017 9:30 AM Rn, [facility]        Care Instructions    Preparing for Your Surgery      Name:  Azeb Torres   MRN:  [id number]   :  4/3/1925   Today's Date:  2017     Arriving for surgery:  Surgery date:  17  Arrival time:  08:00 a.m.  Please come to:     [facility]  [address] [place] [id number]     Parking is available in front of the [facility] building from 5:30AM to 8:00PM.  -  Proceed to the 5th floor to check into the Ambulatory Surgery Center.              >> There will be patient concierges on the 1st and 5th floor, for assistance or an escort, if you would like.              >> Please call [phone] with any questions.    What can I eat or drink?  -  You may have solid food or milk products until 8 hours prior to your surgery  midnight  -  You may have water, apple juice or 7up/Sprite until 2 hours prior to your surgery  06:00 a.m.    Which medicines can I take?  Please stop aspirin last dose is , and last dose of Aggrenox is   -  Do NOT take these medications in the morning, the day of surgery:  Hydrochlorathiazide    -  Please take these medications the day of surgery:     Gabapentin, Eye drops, Prilosec, Levothyroxine, Tylenol, zofran, meclizine as needed    How do I prepare myself?  -  Take two showers: one the night before surgery; and one the morning of surgery.         Use Scrubcare or Hibiclens to wash from neck down.  You may use your own shampoo and conditioner. No other hair products.   -  Do NOT use lotion, powder, deodorant, or antiperspirant the day of your surgery.  -  Do NOT wear any makeup, fingernail polish or jewelry.  --Do not bring your own medications to the  [facility].  -  Bring your ID and insurance card.    Questions or Concerns:  If you have questions or concerns, please call the  Preoperative Assessment Center, Monday-Friday 7AM-7PM:  [phone]                    Follow-ups after your visit        Your next 10 appointments already scheduled     Nov 08, 2017 10:10 AM CST   (Arrive by 9:55 AM)   PAC Anesthesia Consult with  Pac Anesthesiologist   [facility] ([facility])    [address] [place] [address]  [address] [facility] [id number] [phone]            Nov 08, 2017 10:30 AM CST   LAB with  LAB   [facility] Lab ([facility])    [address] [place] [address]  1st [facility] [id number] [phone]           Please do not eat 10-12 hours before your appointment if you are coming in fasting for labs on lipids, cholesterol, or glucose (sugar). This does not apply to pregnant women. Water, hot tea and black coffee (with nothing added) are okay. Do not drink other fluids, diet soda or chew gum.            Nov 14, 2017   Procedure with Patrick Rivera MD   [facility] ([facility])    [address] [place] [address]  5th [facility] [id number] [phone]           Located in the Clinics and Surgery Center at [address] [place] [address].   parking is very convenient and highly recommended.  is a $6 flat rate fee.  Both  and self parkers should enter the main arrival plaza from [facility]; parking attendants will direct you based on your parking preference.            Nov 29, 2017  3:15 PM CST   (Arrive by 3:00 PM)   POST-OP HAND with Patrick Rivera MD   [facility] ([facility])    [address] [place] [address]  4th [facility] [address] [phone]            Dec 12, 2017  9:30 AM CST   VISUAL FIELD with [facility] EYE VISUAL FIELD   Eye Clinic ([facility])    [address]  [address]  [address] Clin [address]a  [facility] [address]   [phone]            Dec 12, 2017 10:00 AM CST   RETURN GLAUCOMA with Alejandrina Faria MD   Eye Clinic ([facility])    [address]  [address]  [address] Clin [address]a  [facility] [address]   [phone]            Jan 08, 2018  9:30 AM CST   (Arrive by 9:15 AM)   RE-ESTABLISH NEW with Evelina Can MD   [facility] ([facility])    [address]  4th Floor  [facility] [id number] [phone]              Who to contact     Please call your clinic at [phone] to:    Ask questions about your health    Make or cancel appointments    Discuss your medicines    Learn about your test results    Speak to your doctor   If you have compliments or concerns about an experience at your clinic, or if you wish to file a complaint, please contact [facility] Patient Relations at [phone] or email us at [email or web address]         Additional Information About Your Visit        [employer] Information     [employer] gives you secure access to your electronic health record. If you see a primary care provider, you can also send messages to your care team and make appointments. If you have questions, please call your primary care clinic.  If you do not have a primary care provider, please call [phone] and they will assist you.      [employer] is an electronic gateway that provides easy, online access to your medical records. With [employer], you can request a clinic appointment, read your test results, renew a prescription or communicate with your care team.     To access your existing account, please contact your [facility] Clinic or call [phone] for assistance.        Care EveryWhere ID     This is your Care EveryWhere ID. This could be used by other organizations to access your  [facility] medical records  [id number]         Blood Pressure from Last 3 Encounters:   11/08/17 154/79   10/18/17 134/84   08/30/17 140/58    Weight from Last 3 Encounters:   11/08/17 79.8 kg (176 lb)   11/01/17 79.8 kg (176 lb)   08/30/17 79.4 kg (175 lb)              Today, you had the following     No orders found for display         Today's Medication Changes          These changes are accurate as of: 11/8/17  9:38 AM.  If you have any questions, ask your nurse or doctor.               These medicines have changed or have updated prescriptions.        Dose/Directions    atorvastatin 10 MG tablet   Commonly known as:  LIPITOR   This may have changed:  when to take this   Used for:  Hyperlipidemia, unspecified hyperlipidemia type        Dose:  10 mg   Take 1 tablet (10 mg) by mouth daily   Quantity:  90 tablet   Refills:  3       hydrochlorothiazide 25 MG tablet   Commonly known as:  HYDRODIURIL   This may have changed:  when to take this   Used for:  Hyperlipidemia, unspecified hyperlipidemia type, Essential hypertension, benign        Dose:  25 mg   Take 1 tablet (25 mg) by mouth daily   Quantity:  90 tablet   Refills:  3       levothyroxine 25 MCG tablet   Commonly known as:  SYNTHROID/LEVOTHROID   This may have changed:  when to take this   Used for:  Hypothyroidism due to acquired atrophy of thyroid        Dose:  25 mcg   Take 1 tablet (25 mcg) by mouth daily   Quantity:  90 tablet   Refills:  3       omeprazole 20 MG CR capsule   Commonly known as:  priLOSEC   This may have changed:  when to take this   Used for:  Abdominal pain, epigastric        Dose:  20 mg   Take 1 capsule (20 mg) by mouth daily   Quantity:  90 capsule   Refills:  3                Primary Care Provider    None Specified       No primary provider on file.        Equal Access to Services     [facility] MARK AH: reji Salgado, jese de la torre. So [facility]  [phone].    ATENCIÓN: Si kathleen vora, tiene a johnson disposición servicios gratuitos de asistencia lingüística. Megan valentine [phone].    We comply with applicable federal civil rights laws and [place] laws. We do not discriminate on the basis of race, color, national origin, age, disability, sex, sexual orientation, or gender identity.            Thank you!     Thank you for choosing [facility]  for your care. Our goal is always to provide you with excellent care. Hearing back from our patients is one way we can continue to improve our services. Please take a few minutes to complete the written survey that you may receive in the mail after your visit with us. Thank you!             Your Updated Medication List - Protect others around you: Learn how to safely use, store and throw away your medicines at www.disposemymeds.org.          This list is accurate as of: 11/8/17  9:38 AM.  Always use your most recent med list.                   Brand Name Dispense Instructions for use Diagnosis    aspirin 81 MG tablet      Take 1 tablet by mouth every evening        aspirin-dipyridamole  MG per 12 hr capsule    AGGRENOX    180 capsule    Take 1 capsule by mouth 2 times daily    Cerebrovascular disease       atorvastatin 10 MG tablet    LIPITOR    90 tablet    Take 1 tablet (10 mg) by mouth daily    Hyperlipidemia, unspecified hyperlipidemia type       cholecalciferol 1000 UNIT tablet    vitamin D3     Take 1 tablet by mouth every morning        dorzolamide-timolol 2-0.5 % ophthalmic solution    COSOPT    1 Bottle    Place 1 drop into both eyes 2 times daily    Chronic angle-closure glaucoma of both eyes, severe stage       gabapentin 300 MG capsule    NEURONTIN    180 capsule    Take 1 capsule (300 mg) by mouth 3 times daily    Degenerative disc disease, cervical       hydrochlorothiazide 25 MG tablet    HYDRODIURIL    90 tablet    Take 1 tablet (25 mg) by mouth daily     Hyperlipidemia, unspecified hyperlipidemia type, Essential hypertension, benign       latanoprost 0.005 % ophthalmic solution    XALATAN    1 Bottle    Place 1 drop into both eyes At Bedtime    Chronic angle-closure glaucoma of both eyes, severe stage       levothyroxine 25 MCG tablet    SYNTHROID/LEVOTHROID    90 tablet    Take 1 tablet (25 mcg) by mouth daily    Hypothyroidism due to acquired atrophy of thyroid       meclizine 12.5 MG tablet    ANTIVERT    30 tablet    Take 1 tablet (12.5 mg) by mouth 4 times daily as needed for dizziness        omeprazole 20 MG CR capsule    priLOSEC    90 capsule    Take 1 capsule (20 mg) by mouth daily    Abdominal pain, epigastric       ondansetron 4 MG ODT tab    ZOFRAN-ODT    30 tablet    Place 1 tablet (4 mg) under the tongue every 8 hours as needed for nausea    Nausea       order for DME     1 Units    Right wrist splint, use at night    Numbness and tingling in right hand       PRESERVISION AREDS 2 PO      Take by mouth every evening        TYLENOL 500 MG tablet   Generic drug:  acetaminophen      Take 2 tablets by mouth 2 times daily.

## 2017-11-08 NOTE — OR NURSING
Patient age 89.  ASC  Guidelines less than age 80.  Called Merari Castle RN manager.  She verify's patient is appropriate for ASC.

## 2017-11-09 LAB — INTERPRETATION ECG - MUSE: NORMAL

## 2017-11-14 ENCOUNTER — HOSPITAL ENCOUNTER (OUTPATIENT)
Facility: AMBULATORY SURGERY CENTER | Age: 82
End: 2017-11-14
Attending: ORTHOPAEDIC SURGERY

## 2017-11-14 ENCOUNTER — SURGERY (OUTPATIENT)
Age: 82
End: 2017-11-14

## 2017-11-14 VITALS
TEMPERATURE: 98 F | BODY MASS INDEX: 31.18 KG/M2 | HEART RATE: 76 BPM | DIASTOLIC BLOOD PRESSURE: 77 MMHG | WEIGHT: 176 LBS | SYSTOLIC BLOOD PRESSURE: 129 MMHG | OXYGEN SATURATION: 95 % | HEIGHT: 63 IN | RESPIRATION RATE: 16 BRPM

## 2017-11-14 DIAGNOSIS — G56.01 RIGHT CARPAL TUNNEL SYNDROME: Primary | ICD-10-CM

## 2017-11-14 RX ORDER — LIDOCAINE HYDROCHLORIDE AND EPINEPHRINE 10; 10 MG/ML; UG/ML
20 INJECTION, SOLUTION INFILTRATION; PERINEURAL ONCE
Status: DISCONTINUED | OUTPATIENT
Start: 2017-11-14 | End: 2017-11-15 | Stop reason: HOSPADM

## 2017-11-14 RX ORDER — HYDROCODONE BITARTRATE AND ACETAMINOPHEN 5; 325 MG/1; MG/1
1 TABLET ORAL EVERY 4 HOURS PRN
Qty: 10 TABLET | Refills: 0 | Status: SHIPPED | OUTPATIENT
Start: 2017-11-14 | End: 2018-01-08

## 2017-11-14 NOTE — IP AVS SNAPSHOT
[facility]    [address] [place] [address]    Phone:  [phone]    Fax:  [phone]                                       After Visit Summary   11/14/2017    Azeb Torres    MRN: [id number]           After Visit Summary Signature Page     I have received my discharge instructions, and my questions have been answered. I have discussed any challenges I see with this plan with the nurse or doctor.    ..........................................................................................................................................  Patient/Patient Representative Signature      ..........................................................................................................................................  Patient Representative Print Name and Relationship to Patient    ..................................................               ................................................  Date                                            Time    ..........................................................................................................................................  Reviewed by Signature/Title    ...................................................              ..............................................  Date                                                            Time

## 2017-11-14 NOTE — DISCHARGE INSTRUCTIONS
Instructions for after your surgery    Leave your dressing on and keep it dry. Do not remove it or get it wet.     Keep your operative arm elevated as much as possible. This will help with pain and swelling.     You may use your arm for gentle day-to-day activities such as brushing teeth, combing hair, and eating. Do not use it to lift anything heavier than the weight of a coffee cup.     You will have a follow-up appointment scheduled with Dr. Rivera or Magi. If you do not know when this appointment is, please call Dr. Rivera's office to find out.     Call Dr. Rivera's office if you experience any of the following:   Fevers, chills, increasing wound drainage, pain that is not controlled with the medications you have been prescribing, swelling that is not controlled with elevation, problems with your dressing, or any other questions or concerns.             [facility]  Home Care Following Your Procedure  Call a doctor if you have signs of infection (fever, growing tenderness at the surgery site, a large amount of drainage or bleeding, severe pain, foul-smelling drainage, redness, swelling).  Your doctor is:  Dr. Patrick Rivera, Orthopaedics [phone]                                    Or dial [phone] and ask for the resident on call for:  Orthopaedics  For emergency care, call the:  [facility]: [phone] (TTY for hearing impaired: [phone])

## 2017-11-14 NOTE — IP AVS SNAPSHOT
MRN:[id number]                      After Visit Summary   11/14/2017    Azeb Torres    MRN: [id number]           Thank you!     Thank you for choosing [facility] for your care. Our goal is always to provide you with excellent care. Hearing back from our patients is one way we can continue to improve our services. Please take a few minutes to complete the written survey that you may receive in the mail after you visit with us. Thank you!        Patient Information     Date Of Birth          4/3/1925        About your hospital stay     You were admitted on:  November 14, 2017 You last received care in the:  [facility]    You were discharged on:  November 14, 2017       Who to Call     For medical emergencies, please call 911.  For non-urgent questions about your medical care, please call your primary care provider or clinic, None  For questions related to your surgery, please call your surgery clinic        Attending Provider     Provider Patrick Lundy MD Orthopedics       Primary Care Provider    None Specified      Your next 10 appointments already scheduled     Nov 29, 2017  3:15 PM CST   (Arrive by 3:00 PM)   POST-OP HAND with Patrick Rivera MD   [facility] ([facility])    [address]  4th Floor  [facility] [address]   [phone]            Dec 12, 2017  9:30 AM CST   [facility] E[facility] VISUAL FIELD   Eye Clinic ([facility])    [facility]  [address] [place] [id number]   [phone]            Dec 12, 2017 10:00 AM CST   RETURN GLAUCOMA with Alejandrina Faria MD   Eye Clinic ([facility])    [facility]  [address] [place] [id number]   [phone]            Jan 08, 2018  9:30 AM CST   (Arrive by 9:15 AM)   RE-ESTABLISH NEW with Evelina Can MD   [facility]  Clinic ([facility])    [address]  [address]th [facility] [address] [phone]              Further instructions from your care team       Instructions for after your surgery    Leave your dressing on and keep it dry. Do not remove it or get it wet.     Keep your operative arm elevated as much as possible. This will help with pain and swelling.     You may use your arm for gentle day-to-day activities such as brushing teeth, combing hair, and eating. Do not use it to lift anything heavier than the weight of a coffee cup.     You will have a follow-up appointment scheduled with Dr. Rivera or Magi. If you do not know when this appointment is, please call Dr. Rivera's office to find out.     Call Dr. Rivera's office if you experience any of the following:   Fevers, chills, increasing wound drainage, pain that is not controlled with the medications you have been prescribing, swelling that is not controlled with elevation, problems with your dressing, or any other questions or concerns.             [facility]  Home Care Following Your Procedure  Call a doctor if you have signs of infection (fever, growing tenderness at the surgery site, a large amount of drainage or bleeding, severe pain, foul-smelling drainage, redness, swelling).  Your doctor is:  Dr. Patrick Rivera, Orthopaedics [phone]                                    Or dial [phone] and ask for the resident on call for:  Orthopaedics  For emergency care, call the:  [facility]: [phone] (TTY for hearing impaired: [phone])                  Pending Results     No orders found from 11/12/2017 to 11/15/2017.            Admission Information     Date & Time Provider Department Dept. Phone    11/14/2017 Patrick Rivera MD [facility] [phone]      Your Vitals Were     Blood Pressure Pulse Temperature Respirations Height Weight     "137/75 76 97.5  F (36.4  C) (Oral) 16 1.6 m (5' 2.99\") 79.8 kg (176 lb)    Pulse Oximetry BMI (Body Mass Index)                98% 31.18 kg/m2          [employer] Information     [employer] gives you secure access to your electronic health record. If you see a primary care provider, you can also send messages to your care team and make appointments. If you have questions, please call your primary care clinic.  If you do not have a primary care provider, please call [phone] and they will assist you.      [employer] is an electronic gateway that provides easy, online access to your medical records. With [employer], you can request a clinic appointment, read your test results, renew a prescription or communicate with your care team.     To access your existing account, please contact your [facility] or call [phone] for assistance.        Care EveryWhere ID     This is your Care EveryWhere ID. This could be used by other organizations to access your [place] medical records  [id number]        Equal Access to Services     KENDRA FLORES AH: Hadii buddy overtono Sojeanne, waaxda luqadaha, qaybta kaalmacheikh adequynh, jese veliz . So [facility] [phone].    ATENCIÓN: Si habla español, tiene a johnson disposición servicios gratuitos de asistencia lingüística. Llame al [phone].    We comply with applicable federal civil rights laws and [place] laws. We do not discriminate on the basis of race, color, national origin, age, disability, sex, sexual orientation, or gender identity.               Review of your medicines      UNREVIEWED medicines. Ask your doctor about these medicines        Dose / Directions    aspirin 81 MG tablet        Dose:  1 tablet   Take 1 tablet by mouth every evening   Refills:  0       aspirin-dipyridamole  MG per 12 hr capsule   Commonly known as:  AGGRENOX   Used for:  Cerebrovascular disease        Dose:  1 capsule   Take 1 capsule by " mouth 2 times daily   Quantity:  180 capsule   Refills:  3       atorvastatin 10 MG tablet   Commonly known as:  LIPITOR   Used for:  Hyperlipidemia, unspecified hyperlipidemia type        Dose:  10 mg   Take 1 tablet (10 mg) by mouth daily   Quantity:  90 tablet   Refills:  3       cholecalciferol 1000 UNIT tablet   Commonly known as:  vitamin D3        Dose:  1 tablet   Take 1 tablet by mouth every morning   Refills:  0       dorzolamide-timolol 2-0.5 % ophthalmic solution   Commonly known as:  COSOPT   Used for:  Chronic angle-closure glaucoma of both eyes, severe stage        Dose:  1 drop   Place 1 drop into both eyes 2 times daily   Quantity:  1 Bottle   Refills:  11       gabapentin 300 MG capsule   Commonly known as:  NEURONTIN   Used for:  Degenerative disc disease, cervical        Dose:  300 mg   Take 1 capsule (300 mg) by mouth 3 times daily   Quantity:  180 capsule   Refills:  1       hydrochlorothiazide 25 MG tablet   Commonly known as:  HYDRODIURIL   Used for:  Hyperlipidemia, unspecified hyperlipidemia type, Essential hypertension, benign        Dose:  25 mg   Take 1 tablet (25 mg) by mouth daily   Quantity:  90 tablet   Refills:  3       latanoprost 0.005 % ophthalmic solution   Commonly known as:  XALATAN   Used for:  Chronic angle-closure glaucoma of both eyes, severe stage        Dose:  1 drop   Place 1 drop into both eyes At Bedtime   Quantity:  1 Bottle   Refills:  11       levothyroxine 25 MCG tablet   Commonly known as:  SYNTHROID/LEVOTHROID   Used for:  Hypothyroidism due to acquired atrophy of thyroid        Dose:  25 mcg   Take 1 tablet (25 mcg) by mouth daily   Quantity:  90 tablet   Refills:  3       meclizine 12.5 MG tablet   Commonly known as:  ANTIVERT        Dose:  12.5 mg   Take 1 tablet (12.5 mg) by mouth 4 times daily as needed for dizziness   Quantity:  30 tablet   Refills:  0       omeprazole 20 MG CR capsule   Commonly known as:  priLOSEC   Used for:  Abdominal pain, epigastric         Dose:  20 mg   Take 1 capsule (20 mg) by mouth daily   Quantity:  90 capsule   Refills:  3       ondansetron 4 MG ODT tab   Commonly known as:  ZOFRAN-ODT   Used for:  Nausea        Dose:  4 mg   Place 1 tablet (4 mg) under the tongue every 8 hours as needed for nausea   Quantity:  30 tablet   Refills:  1       TYLENOL 500 MG tablet   Generic drug:  acetaminophen        Dose:  2 tablet   Take 2 tablets by mouth 2 times daily.   Refills:  0         START taking        Dose / Directions    HYDROcodone-acetaminophen 5-325 MG per tablet   Commonly known as:  NORCO   Used for:  Right carpal tunnel syndrome        Dose:  1 tablet   Take 1 tablet by mouth every 4 hours as needed for pain   Quantity:  10 tablet   Refills:  0         CONTINUE these medicines which have NOT CHANGED        Dose / Directions    order for DME   Used for:  Numbness and tingling in right hand        Right wrist splint, use at night   Quantity:  1 Units   Refills:  0            Where to get your medicines      Some of these will need a paper prescription and others can be bought over the counter. Ask your nurse if you have questions.     Bring a paper prescription for each of these medications     HYDROcodone-acetaminophen 5-325 MG per tablet                Protect others around you: Learn how to safely use, store and throw away your medicines at www.disposemymeds.org.             Medication List: This is a list of all your medications and when to take them. Check marks below indicate your daily home schedule. Keep this list as a reference.      Medications           Morning Afternoon Evening Bedtime As Needed    aspirin 81 MG tablet   Take 1 tablet by mouth every evening                                aspirin-dipyridamole  MG per 12 hr capsule   Commonly known as:  AGGRENOX   Take 1 capsule by mouth 2 times daily                                atorvastatin 10 MG tablet   Commonly known as:  LIPITOR   Take 1 tablet (10 mg) by mouth  daily                                cholecalciferol 1000 UNIT tablet   Commonly known as:  vitamin D3   Take 1 tablet by mouth every morning                                dorzolamide-timolol 2-0.5 % ophthalmic solution   Commonly known as:  COSOPT   Place 1 drop into both eyes 2 times daily                                gabapentin 300 MG capsule   Commonly known as:  NEURONTIN   Take 1 capsule (300 mg) by mouth 3 times daily                                hydrochlorothiazide 25 MG tablet   Commonly known as:  HYDRODIURIL   Take 1 tablet (25 mg) by mouth daily                                HYDROcodone-acetaminophen 5-325 MG per tablet   Commonly known as:  NORCO   Take 1 tablet by mouth every 4 hours as needed for pain                                latanoprost 0.005 % ophthalmic solution   Commonly known as:  XALATAN   Place 1 drop into both eyes At Bedtime                                levothyroxine 25 MCG tablet   Commonly known as:  SYNTHROID/LEVOTHROID   Take 1 tablet (25 mcg) by mouth daily                                meclizine 12.5 MG tablet   Commonly known as:  ANTIVERT   Take 1 tablet (12.5 mg) by mouth 4 times daily as needed for dizziness                                omeprazole 20 MG CR capsule   Commonly known as:  priLOSEC   Take 1 capsule (20 mg) by mouth daily                                ondansetron 4 MG ODT tab   Commonly known as:  ZOFRAN-ODT   Place 1 tablet (4 mg) under the tongue every 8 hours as needed for nausea                                order for DME   Right wrist splint, use at night                                TYLENOL 500 MG tablet   Take 2 tablets by mouth 2 times daily.   Generic drug:  acetaminophen

## 2017-11-14 NOTE — OR NURSING
Lidocaine with epinephrine 1:100,000 injected into right wrist by Dr. Rivera prior to carpal tunnel surgery. Time-out performed.

## 2017-11-14 NOTE — OP NOTE
PREOPERATIVE DIAGNOSIS:  Right carpal tunnel syndrome.      POSTOPERATIVE DIAGNOSIS:  Right carpal tunnel syndrome.      PROCEDURE:  Right open carpal tunnel release.      ATTENDING SURGEON:  Patrick Rivera MD      ASSISTANT:  None.      ANESTHESIA:  Local anesthesia only consisting of 20 mL of 1% lidocaine with epinephrine 1:100,000      TOURNIQUET TIME:  No tourniquet used     ESTIMATED BLOOD LOSS:  1 mL.      SPECIMENS:  None.      DRAINS:  None.      IMPLANTS:  None.      COMPLICATIONS:  None apparent.      BRIEF HISTORY:  Azeb Torres is a 89 year old-year-old  female who presented with a history of numbness and tingling in a median nerve distribution.  She has undergone a trial of splinting but has had persistence of symptoms.  I had a discussion with the patient regarding open carpal tunnel release.  I described the procedure, post-operative protocol and expected outcomes.  We discussed the risks, benefits and alternatives to surgery.  Risks discussed include bleeding, infection, nerve or vessel damage, wound healing problems, persistent pain, persistent numbness, and the possibility for further surgery.  After a full discussion of risks, benefits, and alternatives to surgery, the patient elected to proceed and informed consent was obtained.    INTRAOPERATIVE FINDINGS:  The transverse carpal ligament was thickened.  The median nerve was hyperemic.      DESCRIPTION OF PROCEDURE:  The patient was identified in the preoperative holding area.  The informed consent was reviewed. The operative site was identified and marked. A total of 20 mL of 1% lidocaine with epinephrine in a 1:100,000 ratio was injected for anesthesia and to obviate the need for a tourniquet. 10 mL was injected at the proximal wrist crease subcutanously and then below the fascia and 10 mL was injected in line with the surgical incision.  The patient was brought to the operating room and positioned with the operative extremity over a hand table.  No preoperative prophylactic antibiotics were given. The operative arm was then prepped and draped in a standard sterile fashion.  A timeout was performed, verifying the correct patient, procedure to be performed, and operative site.  A longitudinal incision was made in line  with the radial aspect of the ring finger from the distal wrist flexion crease to Ferrell's line.  This was taken down sharply to the level of the palmar fascia.  Hemostasis was maintained with a bipolar throughout the procedure. The palmar fascia was incised in line with the skin incision.  The transverse fibers of the transverse carpal ligament were identified and released from their ulnar attachment in both a proximal and distal direction.  Distally, the release was completed until the palmar fat was present.  Proximally, the tenotomy scissors were used to bluntly dissect above and below the transverse carpal ligament and it was released under direct visualization into the distal forearm.  Palpation and visualization confirmed complete release of the ligament.  The carpal tunnel was then inspected with findings as listed above. The wound was thoroughly irrigated with normal saline.  The skin was closed with interrupted horizontal mattress 4-0 nylon sutures.  A soft sterile dressing was applied of xeroform, 4x4 gauze, and kerlex and then overwrapped with coban applied in a non-constrictive fashion. The patient tolerated the procedure well and there were no immediate complications.  She was brought to the recovery room in stable condition.  All sponge and needle counts were correct at the end of the case.      POSTOPERATIVE PLAN:  The patient will be discharged to home today with oral pain medications.  The patient will elevate and ice.  The dressing should remain clean, dry, and intact. Finger range of motion is encouraged.  I will plan to see the patient in 10-14 days for a wound check and suture removal.

## 2017-11-16 ENCOUNTER — TELEPHONE (OUTPATIENT)
Dept: ORTHOPEDICS | Facility: CLINIC | Age: 82
End: 2017-11-16

## 2017-11-16 DIAGNOSIS — G56.01 CARPAL TUNNEL SYNDROME OF RIGHT WRIST: Primary | ICD-10-CM

## 2017-11-16 RX ORDER — HYDROCODONE BITARTRATE AND ACETAMINOPHEN 5; 325 MG/1; MG/1
1 TABLET ORAL EVERY 4 HOURS PRN
Qty: 10 TABLET | Refills: 0 | Status: SHIPPED | OUTPATIENT
Start: 2017-11-16 | End: 2018-01-08

## 2017-11-16 NOTE — TELEPHONE ENCOUNTER
Patient's daughter called this am about increase in pain, please see note dated 11/16 at 1208. Azeb Fraser's granddaughter will  the prescription from [facility] today.

## 2017-11-16 NOTE — TELEPHONE ENCOUNTER
[facility]:  Nursing Note  SITUATION                                                      Azeb Torres is a 89 year old female whose daughter, Lake,  called stating Azeb is having more pain today.    BACKGROUND                                                      Patient is s/p Right open carpal tunnel release  on 11/14/2017.    Date of last clinic appointment: on 11/14/2017 (surgery) with Dr. Rivera    Does patient have a future appointment scheduled?  Yes -  next appointment is on: 11/29/2017        ASSESSMENT    Lake, patient's daughter calls today stating that her mother is having more pain today than she was yesterday. She has taken the vicodin with relief, but she was concerned that something may be wrong.   Azeb has been elevating the right hand, but not using ice. She is able to move and feel her fingers. She rates the pain around a 5/0-10 scale, it kept her awake last night. Explained that when the local anesthesia wears completely off that there can be more pain. Also explained that she could also give tylenol, but they need to limit the tylenol to twice per day due to the amount of tylenol in the vicodin.  She states that they may run out of vicodin by tomorrow and would be willing to come and  a prescription. She will call back if it is needed.   She verbalized understanding of above.     PLAN                                                      Nursing Interventions: see above  RECOMMENDED DISPOSITION: Follow up as scheduled  Will comply with recommendation: Yes    Patient/family can be reached at [phone]: .  Okay to leave a detailed message at this number?  Yes    If further questions/concerns or if symptoms do not improve, worsen or new symptoms develop, patient/family are advised to call [phone], option #3 to speak with a triage nurse, as soon as possible.    Magi Valentino

## 2017-11-17 ENCOUNTER — TELEPHONE (OUTPATIENT)
Dept: AUDIOLOGY | Facility: CLINIC | Age: 82
End: 2017-11-17

## 2017-11-17 NOTE — TELEPHONE ENCOUNTER
Azeb Torres's daughter, Evelina Resendez, brought her hearing aids to the [facility] for servicing on 11/17/17.  A new right earmold and  was attached to the right hearing aid and it was found to be working well.  The left hearing aid and earmold were cleaned and also found to be working well.  Azeb will be billed $80.00 for the new right earmold and will return to the clinic as needed.

## 2017-11-22 ENCOUNTER — OFFICE VISIT (OUTPATIENT)
Dept: AUDIOLOGY | Facility: CLINIC | Age: 82
End: 2017-11-22

## 2017-11-22 ENCOUNTER — OFFICE VISIT (OUTPATIENT)
Dept: ORTHOPEDICS | Facility: CLINIC | Age: 82
End: 2017-11-22

## 2017-11-22 DIAGNOSIS — H90.3 SENSORY HEARING LOSS, BILATERAL: Primary | ICD-10-CM

## 2017-11-22 DIAGNOSIS — G56.01 CARPAL TUNNEL SYNDROME OF RIGHT WRIST: Primary | ICD-10-CM

## 2017-11-22 ASSESSMENT — ENCOUNTER SYMPTOMS
FEVER: 0
NIGHT SWEATS: 0
HALLUCINATIONS: 0
DECREASED APPETITE: 0
WEIGHT LOSS: 0
ALTERED TEMPERATURE REGULATION: 0
FATIGUE: 0
POLYPHAGIA: 0
INCREASED ENERGY: 0
CHILLS: 0
WEIGHT GAIN: 0
POLYDIPSIA: 0

## 2017-11-22 NOTE — MR AVS SNAPSHOT
After Visit Summary   11/22/2017    Azeb Torres    MRN: [id number]           Patient Information     Date Of Birth          4/3/1925        Visit Information        Provider Department      11/22/2017 10:00 AM Nurse, Diandra U [facility]        Today's Diagnoses     Carpal tunnel syndrome of right wrist    -  1       Follow-ups after your visit        Follow-up notes from your care team     Return in about 7 days (around 11/29/2017).      Your next 10 appointments already scheduled     Nov 29, 2017  3:15 PM CST   (Arrive by 3:00 PM)   POST-OP HAND with Patrick Rivera MD   [facility] ([facility])    [address]  [address] [facility] [address] [phone]            Dec 12, 2017  9:30 AM CST   VISUAL FIELD with [facility] EYE VISUAL FIELD   Eye Clinic ([facility])    [address]  [address]  [address] [address] [facility] [address]  [facility] [address]   [phone]            Dec 12, 2017 10:00 AM CST   RETURN GLAUCOMA with Alejandrina Faria MD   Eye Clinic ([facility])    [address]  [address]  [address] [address] [facility] [address]  [facility] [address]   [phone]            Jan 08, 2018  9:30 AM CST   (Arrive by 9:15 AM)   RE-ESTABLISH NEW with Evelina Can MD   [facility] ([facility])    [address]  [address] [facility] [id number] [phone]              Who to contact     Please call your clinic at [phone] to:    Ask questions about your health    Make or cancel appointments    Discuss your medicines    Learn about your test results    Speak to your doctor   If you have compliments or concerns about an experience at your clinic, or if you wish to file a complaint, please contact [facility] Patient Relations at [phone] or email us at [email or web address]         Additional Information  About Your Visit        BPe[employer]har[employer]rainlike Information     [employer] gives you secure access to your electronic health record. If you see a primary care provider, you can also send messages to your care team and make appointments. If you have questions, please call your primary care clinic.  If you do not have a primary care provider, please call [phone] and they will assist you.      [employer] is an electronic gateway that provides easy, online access to your medical records. With [employer], you can request a clinic appointment, read your test results, renew a prescription or communicate with your care team.     To access your existing account, please contact your [facility] or call [phone] for assistance.        Care EveryWhere ID     This is your Care EveryWhere ID. This could be used by other organizations to access your [place] medical records  [id number]         Blood Pressure from Last 3 Encounters:   11/14/17 129/77   11/08/17 154/79   10/18/17 134/84    Weight from Last 3 Encounters:   11/14/17 79.8 kg (176 lb)   11/08/17 79.8 kg (176 lb)   11/01/17 79.8 kg (176 lb)              Today, you had the following     No orders found for display         Today's Medication Changes          These changes are accurate as of: 11/22/17 10:46 AM.  If you have any questions, ask your nurse or doctor.               These medicines have changed or have updated prescriptions.        Dose/Directions    atorvastatin 10 MG tablet   Commonly known as:  LIPITOR   This may have changed:  when to take this   Used for:  Hyperlipidemia, unspecified hyperlipidemia type        Dose:  10 mg   Take 1 tablet (10 mg) by mouth daily   Quantity:  90 tablet   Refills:  3       hydrochlorothiazide 25 MG tablet   Commonly known as:  HYDRODIURIL   This may have changed:  when to take this   Used for:  Hyperlipidemia, unspecified hyperlipidemia type, Essential hypertension, benign        Dose:  25 mg   Take 1 tablet  (25 mg) by mouth daily   Quantity:  90 tablet   Refills:  3       levothyroxine 25 MCG tablet   Commonly known as:  SYNTHROID/LEVOTHROID   This may have changed:  when to take this   Used for:  Hypothyroidism due to acquired atrophy of thyroid        Dose:  25 mcg   Take 1 tablet (25 mcg) by mouth daily   Quantity:  90 tablet   Refills:  3       omeprazole 20 MG CR capsule   Commonly known as:  priLOSEC   This may have changed:  when to take this   Used for:  Abdominal pain, epigastric        Dose:  20 mg   Take 1 capsule (20 mg) by mouth daily   Quantity:  90 capsule   Refills:  3                Primary Care Provider    None Specified       No primary provider on file.        Equal Access to Services     [facility]: Ora Grady, reji hopson, nicolle alvarado, jese veliz . So [facility] [phone].    ATENCIÓN: Si habla español, tiene a johnson disposición servicios gratuitos de asistencia lingüística. Llame al [phone].    We comply with applicable federal civil rights laws and [place] laws. We do not discriminate on the basis of race, color, national origin, age, disability, sex, sexual orientation, or gender identity.            Thank you!     Thank you for choosing [facility]  for your care. Our goal is always to provide you with excellent care. Hearing back from our patients is one way we can continue to improve our services. Please take a few minutes to complete the written survey that you may receive in the mail after your visit with us. Thank you!             Your Updated Medication List - Protect others around you: Learn how to safely use, store and throw away your medicines at www.disposemymeds.org.          This list is accurate as of: 11/22/17 10:46 AM.  Always use your most recent med list.                   Brand Name Dispense Instructions for use Diagnosis    aspirin 81 MG tablet      Take 1 tablet by mouth every evening         aspirin-dipyridamole  MG per 12 hr capsule    AGGRENOX    180 capsule    Take 1 capsule by mouth 2 times daily    Cerebrovascular disease       atorvastatin 10 MG tablet    LIPITOR    90 tablet    Take 1 tablet (10 mg) by mouth daily    Hyperlipidemia, unspecified hyperlipidemia type       cholecalciferol 1000 UNIT tablet    vitamin D3     Take 1 tablet by mouth every morning        dorzolamide-timolol 2-0.5 % ophthalmic solution    COSOPT    1 Bottle    Place 1 drop into both eyes 2 times daily    Chronic angle-closure glaucoma of both eyes, severe stage       gabapentin 300 MG capsule    NEURONTIN    180 capsule    Take 1 capsule (300 mg) by mouth 3 times daily    Degenerative disc disease, cervical       hydrochlorothiazide 25 MG tablet    HYDRODIURIL    90 tablet    Take 1 tablet (25 mg) by mouth daily    Hyperlipidemia, unspecified hyperlipidemia type, Essential hypertension, benign       * HYDROcodone-acetaminophen 5-325 MG per tablet    NORCO    10 tablet    Take 1 tablet by mouth every 4 hours as needed for pain    Right carpal tunnel syndrome       * HYDROcodone-acetaminophen 5-325 MG per tablet    NORCO    10 tablet    Take 1 tablet by mouth every 4 hours as needed for moderate to severe pain    Carpal tunnel syndrome of right wrist       latanoprost 0.005 % ophthalmic solution    XALATAN    1 Bottle    Place 1 drop into both eyes At Bedtime    Chronic angle-closure glaucoma of both eyes, severe stage       levothyroxine 25 MCG tablet    SYNTHROID/LEVOTHROID    90 tablet    Take 1 tablet (25 mcg) by mouth daily    Hypothyroidism due to acquired atrophy of thyroid       meclizine 12.5 MG tablet    ANTIVERT    30 tablet    Take 1 tablet (12.5 mg) by mouth 4 times daily as needed for dizziness        omeprazole 20 MG CR capsule    priLOSEC    90 capsule    Take 1 capsule (20 mg) by mouth daily    Abdominal pain, epigastric       ondansetron 4 MG ODT tab    ZOFRAN-ODT    30 tablet    Place 1 tablet (4  mg) under the tongue every 8 hours as needed for nausea    Nausea       order for DME     1 Units    Right wrist splint, use at night    Numbness and tingling in right hand       TYLENOL 500 MG tablet   Generic drug:  acetaminophen      Take 2 tablets by mouth 2 times daily.        * Notice:  This list has 2 medication(s) that are the same as other medications prescribed for you. Read the directions carefully, and ask your doctor or other care provider to review them with you.

## 2017-11-28 ENCOUNTER — TELEPHONE (OUTPATIENT)
Dept: INTERNAL MEDICINE | Facility: CLINIC | Age: 82
End: 2017-11-28

## 2017-11-28 DIAGNOSIS — H90.3 BILATERAL SENSORINEURAL HEARING LOSS: Primary | ICD-10-CM

## 2017-11-28 NOTE — TELEPHONE ENCOUNTER
----- Message from Shelby Adame sent at 11/28/2017  2:31 PM CST -----  Regarding: [facility] pt--req orders  Contact: [phone]  Good afternoon,    Pt is going to be seen for a hearing test in audiology, wondering if Pamella would be willing to put in orders? Pt used to see Sandra, is establishing with [facility] in early January. Pt is seeing audiology on 12/6. Please advise.    Thank you!    Shelby    Please DO NOT send this message and/or reply back to sender. Call Center Representatives DO NOT respond to messages.

## 2017-11-29 ENCOUNTER — OFFICE VISIT (OUTPATIENT)
Dept: ORTHOPEDICS | Facility: CLINIC | Age: 82
End: 2017-11-29

## 2017-11-29 DIAGNOSIS — G56.01 CARPAL TUNNEL SYNDROME OF RIGHT WRIST: Primary | ICD-10-CM

## 2017-11-29 NOTE — PROGRESS NOTES
Date of Service: Nov 29, 2017    Surgery:  Right open carpal tunnel release on 11/14/17    Interval events: She was seen in clinic for nurse visit for wound check 11/22 due to increased pain. She reports that she is now feeling better although she is still having occasional shooting pain into her fingers. However, Her pain is much better than it was before surgery. Her numbness is somewhat better as well. She is sleeping much better. She has no fevers or chills.    Review of Systems: A 14-point review of systems was obtained on intake reviewed. It is included at the bottom of this note.     Physical examination:    Well-developed, well-nourished and in no acute distress.  Alert and oriented to surroundings.  On examination of the right upper extremity, sutures are in place. Sensation is intact in median, radial, and ulnar nerves should lesions. Incision is clean, dry, intact and healing well. There is no erythema or drainage. Thumb opposition is intact.        Assessment: Progressing appropriately status post the above surgery    Plan:   I discussed with Azeb that the shooting pain she is having can happen after carpal tunnel release when the nerve has been severely compressed and is recovering. Her sutures will be removed today. She can wash the hand with soap and water daily and pat it dry. She should refrain from heavy lifting for the next 4 weeks. I will see her back in clinic in 4 weeks for recheck.  Answers for HPI/ROS submitted by the patient on 11/29/2017   General Symptoms: No  Skin Symptoms: No  HENT Symptoms: No  EYE SYMPTOMS: No  HEART SYMPTOMS: No  LUNG SYMPTOMS: No  INTESTINAL SYMPTOMS: No  URINARY SYMPTOMS: No  GYNECOLOGIC SYMPTOMS: No  BREAST SYMPTOMS: No  SKELETAL SYMPTOMS: No  BLOOD SYMPTOMS: No  NERVOUS SYSTEM SYMPTOMS: No  MENTAL HEALTH SYMPTOMS: No

## 2017-11-29 NOTE — MR AVS SNAPSHOT
After Visit Summary   11/29/2017    Azeb Torres    MRN: [id number]           Patient Information     Date Of Birth          4/3/1925        Visit Information        Provider Department      11/29/2017 3:15 PM Patrick Rivera MD [facility]        Today's Diagnoses     Carpal tunnel syndrome of right wrist    -  1       Follow-ups after your visit        Your next 10 appointments already scheduled     Dec 06, 2017 10:00 AM CST   (Arrive by 9:45 AM)   UU AUD Hearing Evaluation/Hearing Aid Check with Kylie Pond   [facility] ([facility])    [address] [place] [address] [phone]           Please see your medical professional for ear cleaning prior to this appointment if you believe wax buildup may be an issue. All patients are required to have a physician's order stating the medical reason for the hearing test. Your doctor can send an electronic order, use their own form or we have provided a form (called Physician's Order for Audiology Services). It states that there is a medical reason for your exam. Without an order you may need to be rescheduled until the order can be obtained.            Jan 08, 2018  9:00 AM CST   (Arrive by 8:45 AM)   RETURN HAND with Patrick Rivera MD   [facility] ([facility])    [address] [place] [address] [phone]            Jan 08, 2018  9:30 AM CST   (Arrive by 9:15 AM)   RE-ESTABLISH NEW with Evelina Can MD   [facility] ([facility])    [address] [place] [address] [phone]            Jan 11, 2018  9:30 AM CST   VISUAL FIELD with [facility] EYE VISUAL FIELD   Eye Clinic ([facility])    [address]  [address]  [facility] [address]   [phone]            Jan 11,  2018 10:00 AM CST   RETURN GLAUCOMA with Alejandrina Faria MD   Eye Clinic ([facility])    [address]  [address]  [facility] [address] [phone]              Who to contact     Please call your clinic at [phone] to:    Ask questions about your health    Make or cancel appointments    Discuss your medicines    Learn about your test results    Speak to your doctor   If you have compliments or concerns about an experience at your clinic, or if you wish to file a complaint, please contact [facility] Patient Relations at [phone] or email us at [email or web address]         Additional Information About Your Visit        Sport[employer]UCES Information     [employer] gives you secure access to your electronic health record. If you see a primary care provider, you can also send messages to your care team and make appointments. If you have questions, please call your primary care clinic.  If you do not have a primary care provider, please call [phone] and they will assist you.      [employer] is an electronic gateway that provides easy, online access to your medical records. With [employer], you can request a clinic appointment, read your test results, renew a prescription or communicate with your care team.     To access your existing account, please contact your [facility] or call [phone] for assistance.        Care EveryWhere ID     This is your Care EveryWhere ID. This could be used by other organizations to access your [place] medical records  [id number]         Blood Pressure from Last 3 Encounters:   11/14/17 129/77   11/08/17 154/79   10/18/17 134/84    Weight from Last 3 Encounters:   11/14/17 79.8 kg (176 lb)   11/08/17 79.8 kg (176 lb)   11/01/17 79.8 kg (176 lb)              Today, you had the following     No orders found for display         Today's Medication Changes          These  changes are accurate as of: 11/29/17  3:57 PM.  If you have any questions, ask your nurse or doctor.               These medicines have changed or have updated prescriptions.        Dose/Directions    atorvastatin 10 MG tablet   Commonly known as:  LIPITOR   This may have changed:  when to take this   Used for:  Hyperlipidemia, unspecified hyperlipidemia type        Dose:  10 mg   Take 1 tablet (10 mg) by mouth daily   Quantity:  90 tablet   Refills:  3       hydrochlorothiazide 25 MG tablet   Commonly known as:  HYDRODIURIL   This may have changed:  when to take this   Used for:  Hyperlipidemia, unspecified hyperlipidemia type, Essential hypertension, benign        Dose:  25 mg   Take 1 tablet (25 mg) by mouth daily   Quantity:  90 tablet   Refills:  3       levothyroxine 25 MCG tablet   Commonly known as:  SYNTHROID/LEVOTHROID   This may have changed:  when to take this   Used for:  Hypothyroidism due to acquired atrophy of thyroid        Dose:  25 mcg   Take 1 tablet (25 mcg) by mouth daily   Quantity:  90 tablet   Refills:  3       omeprazole 20 MG CR capsule   Commonly known as:  priLOSEC   This may have changed:  when to take this   Used for:  Abdominal pain, epigastric        Dose:  20 mg   Take 1 capsule (20 mg) by mouth daily   Quantity:  90 capsule   Refills:  3                Primary Care Provider Office Phone # Fax #    AustinChasity Can [phone]       [address] [place] [address] [address] [place] [phone]        Equal Access to Services     [facility]: Hadii buddy Grady, waaxda luqadaha, qaybta kaalmada adeegyada, jese galvan. So [facility] [phone].    ATENCIÓN: Si habla español, tiene a johnson disposición servicios gratuitos de asistencia lingüística. Llame al [phone].    We comply with applicable federal civil rights laws and [place] laws. We do not discriminate on the basis of race, color, national origin, age, disability, sex, sexual  orientation, or gender identity.            Thank you!     Thank you for choosing [facility]  for your care. Our goal is always to provide you with excellent care. Hearing back from our patients is one way we can continue to improve our services. Please take a few minutes to complete the written survey that you may receive in the mail after your visit with us. Thank you!             Your Updated Medication List - Protect others around you: Learn how to safely use, store and throw away your medicines at www.disposemymeds.org.          This list is accurate as of: 11/29/17  3:57 PM.  Always use your most recent med list.                   Brand Name Dispense Instructions for use Diagnosis    aspirin 81 MG tablet      Take 1 tablet by mouth every evening        aspirin-dipyridamole  MG per 12 hr capsule    AGGRENOX    180 capsule    Take 1 capsule by mouth 2 times daily    Cerebrovascular disease       atorvastatin 10 MG tablet    LIPITOR    90 tablet    Take 1 tablet (10 mg) by mouth daily    Hyperlipidemia, unspecified hyperlipidemia type       cholecalciferol 1000 UNIT tablet    vitamin D3     Take 1 tablet by mouth every morning        dorzolamide-timolol 2-0.5 % ophthalmic solution    COSOPT    1 Bottle    Place 1 drop into both eyes 2 times daily    Chronic angle-closure glaucoma of both eyes, severe stage       gabapentin 300 MG capsule    NEURONTIN    180 capsule    Take 1 capsule (300 mg) by mouth 3 times daily    Degenerative disc disease, cervical       hydrochlorothiazide 25 MG tablet    HYDRODIURIL    90 tablet    Take 1 tablet (25 mg) by mouth daily    Hyperlipidemia, unspecified hyperlipidemia type, Essential hypertension, benign       * HYDROcodone-acetaminophen 5-325 MG per tablet    NORCO    10 tablet    Take 1 tablet by mouth every 4 hours as needed for pain    Right carpal tunnel syndrome       * HYDROcodone-acetaminophen 5-325 MG per tablet    NORCO    10 tablet    Take 1  tablet by mouth every 4 hours as needed for moderate to severe pain    Carpal tunnel syndrome of right wrist       latanoprost 0.005 % ophthalmic solution    XALATAN    1 Bottle    Place 1 drop into both eyes At Bedtime    Chronic angle-closure glaucoma of both eyes, severe stage       levothyroxine 25 MCG tablet    SYNTHROID/LEVOTHROID    90 tablet    Take 1 tablet (25 mcg) by mouth daily    Hypothyroidism due to acquired atrophy of thyroid       meclizine 12.5 MG tablet    ANTIVERT    30 tablet    Take 1 tablet (12.5 mg) by mouth 4 times daily as needed for dizziness        omeprazole 20 MG CR capsule    priLOSEC    90 capsule    Take 1 capsule (20 mg) by mouth daily    Abdominal pain, epigastric       ondansetron 4 MG ODT tab    ZOFRAN-ODT    30 tablet    Place 1 tablet (4 mg) under the tongue every 8 hours as needed for nausea    Nausea       order for DME     1 Units    Right wrist splint, use at night    Numbness and tingling in right hand       TYLENOL 500 MG tablet   Generic drug:  acetaminophen      Take 2 tablets by mouth 2 times daily.        * Notice:  This list has 2 medication(s) that are the same as other medications prescribed for you. Read the directions carefully, and ask your doctor or other care provider to review them with you.

## 2017-11-29 NOTE — NURSING NOTE
Reason For Visit:   Chief Complaint   Patient presents with     Surgical Followup     post right hand carpal tunnel release        Primary MD: Evelina Can  Ref. MD: Petey    Age: 89 year old    ?  No      There were no vitals taken for this visit.                      QuickDASH Assessment  QuickDA Main 11/29/2017   1.Open a tight or new jar. Unable   2. Do heavy household chores (e.g., wash walls, floors) Unable   3. Carry a shopping bag or briefcase. Mild difficulty   4. Wash your back. Unable to answer   5. Use a knife to cut food. Unable   6. Recreational activities in which you take some force or impact through your arm, shoulder or hand (e.g., golf, hammering, tennis, etc.). Unable to answer   7. During the past week, to what extent has your arm, shoulder or hand problem interfered with your normal social activities with family, friends, neighbours or groups? Slightly   8. During the past week, were you limited in your work or other regular daily activities as a result of your arm, shoulder or hand problem? Very limited   9. Arm, shoulder or hand pain. Moderate   10.Tingling (pins and needles) in your arm,shoulder or hand. Extreme   11. During the past week, how much difficulty have you had sleeping because of the pain in your arm, shoulder or hand? ([place] number) Moderate difficulty   Quickdash Ability Score 52.27          Current Outpatient Prescriptions   Medication Sig Dispense Refill     HYDROcodone-acetaminophen (NORCO) 5-325 MG per tablet Take 1 tablet by mouth every 4 hours as needed for moderate to severe pain 10 tablet 0     HYDROcodone-acetaminophen (NORCO) 5-325 MG per tablet Take 1 tablet by mouth every 4 hours as needed for pain (Patient not taking: Reported on 11/22/2017) 10 tablet 0     gabapentin (NEURONTIN) 300 MG capsule Take 1 capsule (300 mg) by mouth 3 times daily 180 capsule 1     aspirin-dipyridamole (AGGRENOX)  MG per 12 hr capsule Take 1 capsule by mouth 2  times daily 180 capsule 3     dorzolamide-timolol (COSOPT) 2-0.5 % ophthalmic solution Place 1 drop into both eyes 2 times daily 1 Bottle 11     latanoprost (XALATAN) 0.005 % ophthalmic solution Place 1 drop into both eyes At Bedtime 1 Bottle 11     order for DME Right wrist splint, use at night 1 Units 0     ondansetron (ZOFRAN-ODT) 4 MG ODT tab Place 1 tablet (4 mg) under the tongue every 8 hours as needed for nausea 30 tablet 1     atorvastatin (LIPITOR) 10 MG tablet Take 1 tablet (10 mg) by mouth daily (Patient taking differently: Take 10 mg by mouth every evening ) 90 tablet 3     omeprazole (PRILOSEC) 20 MG CR capsule Take 1 capsule (20 mg) by mouth daily (Patient taking differently: Take 20 mg by mouth every morning ) 90 capsule 3     levothyroxine (SYNTHROID/LEVOTHROID) 25 MCG tablet Take 1 tablet (25 mcg) by mouth daily (Patient taking differently: Take 25 mcg by mouth every morning ) 90 tablet 3     hydrochlorothiazide (HYDRODIURIL) 25 MG tablet Take 1 tablet (25 mg) by mouth daily (Patient taking differently: Take 25 mg by mouth every morning ) 90 tablet 3     meclizine (ANTIVERT) 12.5 MG tablet Take 1 tablet (12.5 mg) by mouth 4 times daily as needed for dizziness 30 tablet 0     aspirin 81 MG tablet Take 1 tablet by mouth every evening        acetaminophen (TYLENOL) 500 MG tablet Take 2 tablets by mouth 2 times daily.       cholecalciferol (VITAMIN D) 1000 UNIT tablet Take 1 tablet by mouth every morning          No Known Allergies    Guera Neville CMA

## 2017-11-29 NOTE — LETTER
11/29/2017       RE: Azeb Torres  [address]  [place] [address]     Dear Colleague,    Thank you for referring your patient, Azeb Torres, to the [facility] ORTHOP[facility]IC at [facility]. Please see a copy of my visit note below.    Date of Service: Nov 29, 2017    Surgery:  Right open carpal tunnel release on 11/14/17    Interval events: She was seen in clinic for nurse visit for wound check 11/22 due to increased pain. She reports that she is now feeling better although she is still having occasional shooting pain into her fingers. However, Her pain is much better than it was before surgery. Her numbness is somewhat better as well. She is sleeping much better. She has no fevers or chills.    Review of Systems: A 14-point review of systems was obtained on intake reviewed. It is included at the bottom of this note.     Physical examination:    Well-developed, well-nourished and in no acute distress.  Alert and oriented to surroundings.  On examination of the right upper extremity, sutures are in place. Sensation is intact in median, radial, and ulnar nerves should lesions. Incision is clean, dry, intact and healing well. There is no erythema or drainage. Thumb opposition is intact.        Assessment: Progressing appropriately status post the above surgery    Plan:   I discussed with Azeb that the shooting pain she is having can happen after carpal tunnel release when the nerve has been severely compressed and is recovering. Her sutures will be removed today. She can wash the hand with soap and water daily and pat it dry. She should refrain from heavy lifting for the next 4 weeks. I will see her back in clinic in 4 weeks for recheck.      Again, thank you for allowing me to participate in the care of your patient.      Sincerely,    Patrick Rivera MD

## 2017-11-30 NOTE — NURSING NOTE
All sutures were removed w/o difficulty from the right carpal tunnel incision. Steri-strips were applied, a light gauze dressing was placed, splint put back on right hand.  Instructed not to soak the hand for 2 more weeks.  Call for questions or concerns.

## 2017-12-06 ENCOUNTER — TELEPHONE (OUTPATIENT)
Dept: AUDIOLOGY | Facility: CLINIC | Age: 82
End: 2017-12-06

## 2018-01-01 ASSESSMENT — ACTIVITIES OF DAILY LIVING (ADL)
ARE_THERE_FIREARMS_IN_YOUR_HOME?: N
ARE_THERE_CARBON_MONOXIDE_DETECTORS_IN_YOUR_HOME?: Y
DO_MEMBERS_OF_YOUR_HOUSEHOLD_WEAR_SEAT_BELTS?: Y
DO_MEMBERS_OF_YOUR_HOUSEHOLD_USE_SAFETY_HELMETS?: Y
ARE_THERE_SMOKE_DETECTORS_IN_YOUR_HOME?: Y
DO_MEMBERS_OF_YOUR_HOUSEHOLD_USE_SUNSCREEN?: Y

## 2018-01-01 ASSESSMENT — ENCOUNTER SYMPTOMS
LOSS OF CONSCIOUSNESS: 0
SPEECH CHANGE: 0
MEMORY LOSS: 0
LOSS OF CONSCIOUSNESS: 0
TREMORS: 0
TINGLING: 0
DIZZINESS: 0
WEAKNESS: 1
WEAKNESS: 1
TREMORS: 0
SPEECH CHANGE: 0
DISTURBANCES IN COORDINATION: 0
NUMBNESS: 1
SEIZURES: 0
DIZZINESS: 0
HEADACHES: 0
MEMORY LOSS: 0
TINGLING: 0
PARALYSIS: 0
PARALYSIS: 0
NUMBNESS: 1
SEIZURES: 0
DISTURBANCES IN COORDINATION: 0
HEADACHES: 0

## 2018-01-03 ENCOUNTER — OFFICE VISIT (OUTPATIENT)
Dept: ORTHOPEDICS | Facility: CLINIC | Age: 83
End: 2018-01-03
Payer: MEDICARE

## 2018-01-03 VITALS — BODY MASS INDEX: 32.39 KG/M2 | HEIGHT: 62 IN | WEIGHT: 176 LBS

## 2018-01-03 DIAGNOSIS — G56.03 BILATERAL CARPAL TUNNEL SYNDROME: Primary | ICD-10-CM

## 2018-01-03 NOTE — NURSING NOTE
"Reason For Visit:   Chief Complaint   Patient presents with     Surgical Followup     pt states she is having some stiffness in her fingers.       Primary MD: Evelina Can  Ref. MD: Self    Age: 89 year old    Date of surgery: 11/14/2017  Type of surgery: right open carpal tunnel release        Ht 1.575 m (5' 2\")  Wt 79.8 kg (176 lb)  BMI 32.19 kg/m2      Pain Assessment  Patient Currently in Pain: No    Hand Dominance Evaluation  Hand Dominance: Right          QuickDASH Assessment  QuickDASH Main 1/1/2018   1.Open a tight or new jar. Moderate difficulty   2. Do heavy household chores (e.g., wash walls, floors) Unable to answer   3. Carry a shopping bag or briefcase. No difficulty   4. Wash your back. No difficulty   5. Use a knife to cut food. Mild difficulty   6. Recreational activities in which you take some force or impact through your arm, shoulder or hand (e.g., golf, hammering, tennis, etc.). Unable to answer   7. During the past week, to what extent has your arm, shoulder or hand problem interfered with your normal social activities with family, friends, neighbours or groups? Quite a bit   8. During the past week, were you limited in your work or other regular daily activities as a result of your arm, shoulder or hand problem? Moderately limited   9. Arm, shoulder or hand pain. Moderate   10.Tingling (pins and needles) in your arm,shoulder or hand. None   11. During the past week, how much difficulty have you had sleeping because of the pain in your arm, shoulder or hand? ([place] number) Unable to answer   Quickdash Ability Score 15.9          No Known Allergies    Torie Alexandra CMA  "

## 2018-01-03 NOTE — LETTER
1/3/2018       RE: Azeb Torres  [address]  [place] [address]     Dear Colleague,    Thank you for referring your patient, Azeb Torres, to the [facility] at [facility]. Please see a copy of my visit note below.    Azeb comes to see me back in follow-up today. She is status post right open carpal tunnel release on November 14, 2017. She reports that she has been feeling better than she was before surgery. She continues to have the feeling that she is not sure if it is numbness or pain in the index and middle finger. This occurs when she flexes and extends her fingers and seems to localize to the DIP joint area of the index and middle. She is able to sleep much better than she was before surgery. Overall, she feels that the tingling in her hand has improved, as has the global hand pain that she had previously. Her left hand is not really bothering her. She is still sleeping down stairs at her house because of difficulty with stair rail. She feels that her hand is too weak to use it safely.    On examination of the right upper extremity, incision is well-healed. There is no erythema, exudate, or dehiscence. Sensation intact in median, radial, ulnar nerve distributions. Thumb opposition strength is intact.  She does have focal tenderness around the index and middle finger DIP joints. There is no tenderness around the PIP or MP joints.    X-rays were reviewed from November of this year and demonstrate diffuse arthritic changes including arthrosis about the index and middle finger DIP joints.    Progressing appropriately status post the above procedure. I think some of her residual discomfort may be due to her diffuse arthritic changes. She would like to go back to hand therapy to get some more exercises to work on her hand strength, and I think this is very reasonable.  I will see her back to see if she is doing in 6 weeks' time. Of note, she does  have known left carpal tunnel syndrome but is asymptomatic and does not want to do anything about at this time.        Again, thank you for allowing me to participate in the care of your patient.      Sincerely,    Patrick Rivera MD

## 2018-01-03 NOTE — MR AVS SNAPSHOT
After Visit Summary   1/3/2018    Azeb Torres    MRN: [id number]           Patient Information     Date Of Birth          4/3/1925        Visit Information        Provider Department      1/3/2018 2:15 PM Patrick Rivera MD [facility]        Today's Diagnoses     Bilateral carpal tunnel syndrome    -  1       Follow-ups after your visit        Additional Services     HAND THERAPY       Hand feels weak. Feels she cannot use handrail. Would like some more things to do for her home exercise program.                  Your next 10 appointments already scheduled     Jan 08, 2018  8:00 AM CST   (Arrive by 7:45 AM)   SHERIF Hand with Neli Denton OT   [facility] Hand [facility] ([facility])    [address]  [address] [facility] [address] [phone]            Jan 08, 2018  9:30 AM CST   (Arrive by 9:15 AM)   RE-ESTABLISH NEW with Evelina Can MD   [facility] ([facility])    [address] [place] [address]  [address]th [facility] [address] [phone]            Jan 11, 2018  9:30 AM CST   VISUAL FIELD with [facility] EYE VISUAL FIELD   Eye Clinic ([facility])    [address]  [address]  [address]  [facility] [address]   [phone]            Jan 11, 2018 10:00 AM CST   RETURN GLAUCOMA with Alejandrina Faria MD   Eye Clinic ([facility])    [address]  [address]  [address]  [facility] [address]   [phone]            Jan 17, 2018  1:00 PM CST   SHERIF Hand with Neli Denton OT   [facility] ([facility])    [address]  [address] [facility] [id number] [phone]              Who to contact     Please call your clinic at [phone] to:    Ask questions about your health    Make or cancel appointments    Discuss your medicines    Learn about your test results    Speak to your doctor  "  If you have compliments or concerns about an experience at your clinic, or if you wish to file a complaint, please contact [facility] Patient Relations at [phone] or email us at [email or web address]         Additional Information About Your Visit        MyChart Information     [facility] gives you secure access to your electronic health record. If you see a primary care provider, you can also send messages to your care team and make appointments. If you have questions, please call your primary care clinic.  If you do not have a primary care provider, please call [phone] and they will assist you.      [employer] is an electronic gateway that provides easy, online access to your medical records. With [employer], you can request a clinic appointment, read your test results, renew a prescription or communicate with your care team.     To access your existing account, please contact your [facility] Clinic or call [phone] for assistance.        Care EveryWhere ID     This is your Care EveryWhere ID. This could be used by other organizations to access your [facility] medical records  [id number]        Your Vitals Were     Height BMI (Body Mass Index)                1.575 m (5' 2\") 32.19 kg/m2           Blood Pressure from Last 3 Encounters:   11/14/17 129/77   11/08/17 154/79   10/18/17 134/84    Weight from Last 3 Encounters:   01/03/18 79.8 kg (176 lb)   11/14/17 79.8 kg (176 lb)   11/08/17 79.8 kg (176 lb)              We Performed the Following     HAND THERAPY          Today's Medication Changes          These changes are accurate as of: 1/3/18  5:33 PM.  If you have any questions, ask your nurse or doctor.               These medicines have changed or have updated prescriptions.        Dose/Directions    atorvastatin 10 MG tablet   Commonly known as:  LIPITOR   This may have changed:  when to take this   Used for:  Hyperlipidemia, unspecified " hyperlipidemia type        Dose:  10 mg   Take 1 tablet (10 mg) by mouth daily   Quantity:  90 tablet   Refills:  3       hydrochlorothiazide 25 MG tablet   Commonly known as:  HYDRODIURIL   This may have changed:  when to take this   Used for:  Hyperlipidemia, unspecified hyperlipidemia type, Essential hypertension, benign        Dose:  25 mg   Take 1 tablet (25 mg) by mouth daily   Quantity:  90 tablet   Refills:  3       levothyroxine 25 MCG tablet   Commonly known as:  SYNTHROID/LEVOTHROID   This may have changed:  when to take this   Used for:  Hypothyroidism due to acquired atrophy of thyroid        Dose:  25 mcg   Take 1 tablet (25 mcg) by mouth daily   Quantity:  90 tablet   Refills:  3       omeprazole 20 MG CR capsule   Commonly known as:  priLOSEC   This may have changed:  when to take this   Used for:  Abdominal pain, epigastric        Dose:  20 mg   Take 1 capsule (20 mg) by mouth daily   Quantity:  90 capsule   Refills:  3                Primary Care Provider Office Phone # Fax #    D[place]Ch[place]ity Can [phone]       [address] [place] [address] [address] [place] [id number]        Equal Access to Services     FRANKY [facility] Hospital[facility]AD[facility] : Hadii aad ku hadasho Soomaali, waaxda luqadaha, qaybta kaalmada adeegyacheikh, jese veliz . So [facility] [phone].    ATENCIÓN: Si habla español, tiene a johnson disposición servicios gratuitos de asistencia lingüística. Llame al [phone].    We comply with applicable federal civil rights laws and [place] laws. We do not discriminate on the basis of race, color, national origin, age, disability, sex, sexual orientation, or gender identity.            Thank you!     Thank you for choosing [facility] ORTHOP[facility] CLINIC  for your care. Our goal is always to provide you with excellent care. Hearing back from our patients is one way we can continue to improve our services. Please take a few minutes to complete the written survey that you may receive in  the mail after your visit with us. Thank you!             Your Updated Medication List - Protect others around you: Learn how to safely use, store and throw away your medicines at www.disposemymeds.org.          This list is accurate as of: 1/3/18  5:33 PM.  Always use your most recent med list.                   Brand Name Dispense Instructions for use Diagnosis    aspirin 81 MG tablet      Take 1 tablet by mouth every evening        aspirin-dipyridamole  MG per 12 hr capsule    AGGRENOX    180 capsule    Take 1 capsule by mouth 2 times daily    Cerebrovascular disease       atorvastatin 10 MG tablet    LIPITOR    90 tablet    Take 1 tablet (10 mg) by mouth daily    Hyperlipidemia, unspecified hyperlipidemia type       cholecalciferol 1000 UNIT tablet    vitamin D3     Take 1 tablet by mouth every morning        dorzolamide-timolol 2-0.5 % ophthalmic solution    COSOPT    1 Bottle    Place 1 drop into both eyes 2 times daily    Chronic angle-closure glaucoma of both eyes, severe stage       gabapentin 300 MG capsule    NEURONTIN    180 capsule    Take 1 capsule (300 mg) by mouth 3 times daily    Degenerative disc disease, cervical       hydrochlorothiazide 25 MG tablet    HYDRODIURIL    90 tablet    Take 1 tablet (25 mg) by mouth daily    Hyperlipidemia, unspecified hyperlipidemia type, Essential hypertension, benign       * HYDROcodone-acetaminophen 5-325 MG per tablet    NORCO    10 tablet    Take 1 tablet by mouth every 4 hours as needed for pain    Right carpal tunnel syndrome       * HYDROcodone-acetaminophen 5-325 MG per tablet    NORCO    10 tablet    Take 1 tablet by mouth every 4 hours as needed for moderate to severe pain    Carpal tunnel syndrome of right wrist       latanoprost 0.005 % ophthalmic solution    XALATAN    1 Bottle    Place 1 drop into both eyes At Bedtime    Chronic angle-closure glaucoma of both eyes, severe stage       levothyroxine 25 MCG tablet    SYNTHROID/LEVOTHROID    90  tablet    Take 1 tablet (25 mcg) by mouth daily    Hypothyroidism due to acquired atrophy of thyroid       meclizine 12.5 MG tablet    ANTIVERT    30 tablet    Take 1 tablet (12.5 mg) by mouth 4 times daily as needed for dizziness        omeprazole 20 MG CR capsule    priLOSEC    90 capsule    Take 1 capsule (20 mg) by mouth daily    Abdominal pain, epigastric       ondansetron 4 MG ODT tab    ZOFRAN-ODT    30 tablet    Place 1 tablet (4 mg) under the tongue every 8 hours as needed for nausea    Nausea       order for DME     1 Units    Right wrist splint, use at night    Numbness and tingling in right hand       TYLENOL 500 MG tablet   Generic drug:  acetaminophen      Take 2 tablets by mouth 2 times daily.        * Notice:  This list has 2 medication(s) that are the same as other medications prescribed for you. Read the directions carefully, and ask your doctor or other care provider to review them with you.

## 2018-01-03 NOTE — PROGRESS NOTES
Azeb comes to see me back in follow-up today. She is status post right open carpal tunnel release on November 14, 2017. She reports that she has been feeling better than she was before surgery. She continues to have the feeling that she is not sure if it is numbness or pain in the index and middle finger. This occurs when she flexes and extends her fingers and seems to localize to the DIP joint area of the index and middle. She is able to sleep much better than she was before surgery. Overall, she feels that the tingling in her hand has improved, as has the global hand pain that she had previously. Her left hand is not really bothering her. She is still sleeping down stairs at her house because of difficulty with stair rail. She feels that her hand is too weak to use it safely.    On examination of the right upper extremity, incision is well-healed. There is no erythema, exudate, or dehiscence. Sensation intact in median, radial, ulnar nerve distributions. Thumb opposition strength is intact.  She does have focal tenderness around the index and middle finger DIP joints. There is no tenderness around the PIP or MP joints.    X-rays were reviewed from November of this year and demonstrate diffuse arthritic changes including arthrosis about the index and middle finger DIP joints.    Progressing appropriately status post the above procedure. I think some of her residual discomfort may be due to her diffuse arthritic changes. She would like to go back to hand therapy to get some more exercises to work on her hand strength, and I think this is very reasonable.  I will see her back to see if she is doing in 6 weeks' time. Of note, she does have known left carpal tunnel syndrome but is asymptomatic and does not want to do anything about at this time.

## 2018-01-06 ENCOUNTER — TELEPHONE (OUTPATIENT)
Dept: PEDIATRICS | Facility: CLINIC | Age: 83
End: 2018-01-06

## 2018-01-06 ENCOUNTER — TELEPHONE (OUTPATIENT)
Dept: INTERNAL MEDICINE | Facility: CLINIC | Age: 83
End: 2018-01-06

## 2018-01-06 ENCOUNTER — NURSE TRIAGE (OUTPATIENT)
Dept: NURSING | Facility: CLINIC | Age: 83
End: 2018-01-06

## 2018-01-06 DIAGNOSIS — J10.1 INFLUENZA B: Primary | ICD-10-CM

## 2018-01-06 RX ORDER — OSELTAMIVIR PHOSPHATE 75 MG/1
75 CAPSULE ORAL DAILY
Qty: 10 CAPSULE | Refills: 0 | Status: SHIPPED | OUTPATIENT
Start: 2018-01-06 | End: 2018-01-08 | Stop reason: DRUGHIGH

## 2018-01-06 NOTE — TELEPHONE ENCOUNTER
[place] calling with patient reporting known influenza B exposure to niece. Patient gave verbal authorization to discuss medical information with caller.  Stating they would like a Tamiflu prescription for patient based on her age.   Denies any current symptoms.   Paged Dr Willett through [facility] Smart Web at 1205 pm to call Mine at . Dr Willett returned call.   Approved Tamiflu 75 mg QD x 10 days.   [place] notified.    Mine Hay RN  [facility]

## 2018-01-06 NOTE — TELEPHONE ENCOUNTER
[facility] [place] calling with patient reporting known influenza B exposure to niece. Patient gave verbal authorization to discuss medical information with caller.  Stating they would like a Tamiflu prescription for patient based on her age.   Denies any current symptoms.   Paged Dr Willett through [facility] Smart Web at 1205 pm to call Mine at . Dr Willett returned call.   Approved Tamiflu 75 mg QD x 10 days.   [place] notified.    Mine Hay RN  [facility]

## 2018-01-07 NOTE — TELEPHONE ENCOUNTER
Page from Mine with [facility]IT for questions on family's request for INFLUENZA prophylaxis due to exposure to influenza B this week.  Patient vera snot have any concerning symptoms.  Orders given for prophylaxis dose of tamiflu.  Message forwarded to PCP for FYI.

## 2018-01-08 ENCOUNTER — THERAPY VISIT (OUTPATIENT)
Dept: OCCUPATIONAL THERAPY | Facility: CLINIC | Age: 83
End: 2018-01-08
Payer: MEDICARE

## 2018-01-08 ENCOUNTER — OFFICE VISIT (OUTPATIENT)
Dept: INTERNAL MEDICINE | Facility: CLINIC | Age: 83
End: 2018-01-08
Payer: MEDICARE

## 2018-01-08 VITALS
OXYGEN SATURATION: 98 % | WEIGHT: 175.3 LBS | DIASTOLIC BLOOD PRESSURE: 76 MMHG | SYSTOLIC BLOOD PRESSURE: 127 MMHG | BODY MASS INDEX: 32.06 KG/M2 | HEART RATE: 69 BPM

## 2018-01-08 DIAGNOSIS — G56.01 CARPAL TUNNEL SYNDROME OF RIGHT WRIST: ICD-10-CM

## 2018-01-08 DIAGNOSIS — M79.644 PAIN OF FINGER OF RIGHT HAND: ICD-10-CM

## 2018-01-08 DIAGNOSIS — M79.644 THUMB PAIN, RIGHT: ICD-10-CM

## 2018-01-08 DIAGNOSIS — C50.912 MALIGNANT NEOPLASM OF LEFT BREAST IN FEMALE, ESTROGEN RECEPTOR POSITIVE, UNSPECIFIED SITE OF BREAST (H): ICD-10-CM

## 2018-01-08 DIAGNOSIS — Z17.0 MALIGNANT NEOPLASM OF LEFT BREAST IN FEMALE, ESTROGEN RECEPTOR POSITIVE, UNSPECIFIED SITE OF BREAST (H): ICD-10-CM

## 2018-01-08 DIAGNOSIS — R29.898 RIGHT HAND WEAKNESS: Primary | ICD-10-CM

## 2018-01-08 DIAGNOSIS — I10 ESSENTIAL HYPERTENSION, BENIGN: Primary | ICD-10-CM

## 2018-01-08 PROCEDURE — G8984 CARRY CURRENT STATUS: HCPCS | Mod: GO | Performed by: OCCUPATIONAL THERAPIST

## 2018-01-08 PROCEDURE — G8985 CARRY GOAL STATUS: HCPCS | Mod: GO | Performed by: OCCUPATIONAL THERAPIST

## 2018-01-08 PROCEDURE — 97110 THERAPEUTIC EXERCISES: CPT | Mod: GO | Performed by: OCCUPATIONAL THERAPIST

## 2018-01-08 PROCEDURE — 97165 OT EVAL LOW COMPLEX 30 MIN: CPT | Mod: GO | Performed by: OCCUPATIONAL THERAPIST

## 2018-01-08 RX ORDER — OSELTAMIVIR PHOSPHATE 30 MG/1
30 CAPSULE ORAL DAILY
Qty: 10 CAPSULE | Refills: 0 | Status: SHIPPED | OUTPATIENT
Start: 2018-01-08 | End: 2018-04-02

## 2018-01-08 ASSESSMENT — PAIN SCALES - GENERAL: PAINLEVEL: NO PAIN (0)

## 2018-01-08 NOTE — PATIENT INSTRUCTIONS
[facility]: [phone]     [facility] Medication Refill Request Information:  * Please contact your pharmacy regarding ANY request for medication refills.  ** [facility] Prescription Fax = [phone]  * Please allow 3 business days for routine medication refills.  * Please allow 5 business days for controlled substance medication refills.     [facility] Test Result notification information:  *You will be notified with in 7-10 days of your appointment day regarding the results of your test.  If you are on MyChart you will be notified as soon as the provider has reviewed the results and signed off on them.

## 2018-01-08 NOTE — PROGRESS NOTES
Rooming Note  Health Maintenance   Health Maintenance Due   Topic Date Due     URINE DRUG SCREEN Q1 YR  [birth year]03/1940     SYMONE QUESTIONNAIRE 1 YEAR  [birth year]/03/1943     PHQ-9 Q1YR  [birth year]03/1943     ADVANCE DIRECTIVE PLANNING Q5 YRS  04/03/1980    Health maintenance items discussed and ordered/forwarded to PCP.      Lilly Rascon LPN at 10:00 AM on 1/8/2018.

## 2018-01-08 NOTE — TELEPHONE ENCOUNTER
Refill is sent.  Page received later on 1/6/18 for dosing- 30mg once daily for 10 days, due to age.

## 2018-01-08 NOTE — PROGRESS NOTES
Hand Therapy Initial Evaluation  Current Date:  1/8/2018    Diagnosis:  Right  CTS  Date of onset:  7/2017  Date of hand therapy orders: 1/3/18  DOS:  11/14/18  Procedure:  R open CTR  Post:  2 months  Referring MD: Patrick Rivera MD  Next MD visit: none scheduled  Precautions:none noted      Subjective:  Azeb Torres is a 89 year old R hand dominant female.    Patient reports symptoms of pain and numbness of the R hand and wrist which occurred due to carpal tunnel syndrome. Since onset symptoms are gradually getting better; improving post surgery. Special tests:  EMG - positive for CTS.  Previous treatment: OTC wrist brace. General health as reported by patient is good.  Pertinent medical history includes: osteoarthritis, cancer, high blood pressure, stroke, pain at rest/night.  Medical allergies: none.  Surgical history: cancer: not specified, orthopedic: Hips.  Medication history: pain, high blood pressure.    Occupational Profile Information:  Current occupation is Retired  Prior functional level:  assistance with cleaning, cooking, driving  Barriers include:none  Mobility: Ambulates with aid of rolling walker  Leisure activities/hobbies: crossword puzzles, reading,  and tasks around the home.     Upper Extremity Functional Index Score:  Not fully completed. Pt notes she is having a little difficulty with dressing. She is not having problems using her rollator. She is not going upstairs as she is unsure how she would do with the railing and the landing FDC up.      Objective:  Sensation:  Pt reports that usually she has numbness in her middle finger, especially at the tip. She is sleeping much better at night. She does have persistent paresthesias to the thumb, IF and MF.     Pain Level Report: On scale 0-10/10  Date 1/8/2018        side R    Overall 2-3    At Rest 2-3    With Activity 2-3        Edema:  none of the  wrist and hand    Palpation:  []     Normal        [x]   Tender       [x]  Mild         [x]    Moderate []   Severe   Location: R thenars, especially FPB area         Range of Motion Wrist AROM (PROM):  Date 1/8/2018 1/8/2018     Side R L   Ext WFL WFL   Flex WFL WFL   UD WFL WFL   RD WFL WFL         STRENGTH: (Measured in pounds, pain scale 0-10/10)    Date 10/16/2017 1/8/2018         Trials Left Right Left Right Left Right Left Right Left Right Left Right   1 27 25 30 29+           2 25 27             3 23 27             Avg 25 26             Pain  6/10               3 Point Pinch  Date 10/16/2017 1/8/2018         Trials Left Right Left Right Left Right Left Right Left Right Left Right   1 8 5 8 5           2 6 4             3 6 5             Avg 7 5             Pain  4/10               Lateral Pinch  Date 10/16/2017 1/8/2018         Trials Left Right Left Right Left Right Left Right Left Right Left Right   1 8 8 11 9+           2 9 4             3 8 6             Avg 8 6             Pain  4/10                 Assessment:  Patient presents with symptoms consistent with diagnosis of carpal tunnel release and hand weakness,  with surgical  intervention.     Patient's limitations or Problem List includes:  Pain and Weakness of the right wrist, hand, thumb, index finger and long finger which interferes with the patient's ability to perform Self Care Tasks (dressing) and Household Chores as compared to previous level of function.    Rehab Potential:  Good - Return to full activity, some limitations    Patient will benefit from skilled Occupational Therapy to increase  strength and pinch strength and decrease pain to return to previous activity level and resume normal daily tasks and to reach their rehab potential.    Barriers to Learning:  Vision    Communication Issues:  Patient appears to be able to clearly communicate and understand verbal and written communication and follow directions correctly.    Chart Review: Brief history including review of medical and/or therapy records relating to the  presenting problem and Detailed history review with patient    Identified Performance Deficits: dressing, hygiene and grooming, home establishment and management and leisure activities    Assessment of Occupational Performance:  3-5 Performance Deficits    Clinical Decision Making (Complexity): Low complexity    Treatment Explanation:  The following has been discussed with the patient:  RX ordered/plan of care  Anticipated outcomes  Possible risks and side effects    Plan:  Frequency:  2 X a month, once daily  Duration:  for 2 months    Treatment Plan:   Modalities:  US, Fluidotherapy and Paraffin  Therapeutic Exercise:  AROM, PROM, Tendon Gliding, Blocking, Isotonics, Isometrics and Stabilization  Neuromuscular re-education:  Nerve Gliding and Desensitization  Manual Techniques:  Myofascial release  Orthotic Fabrication:  Static orthosis: as indicated   Self Care:  Self Care Tasks and Ergonomic Considerations  Discharge Plan:  Achieve all LTG.  Independent in home treatment program.  Reach maximal therapeutic benefit.    Home Exercise Program:  Putty exercises  Foam exercises     Next Visit:  F/u with hand strength, pain to thenars, finger sensitivity, using railing

## 2018-01-08 NOTE — LETTER
DEPARTMENT OF HEALTH AND HUMAN SERVICES  CENTERS FOR MEDICARE & MEDICAID SERVICES    PLAN/UPDATED PLAN OF PROGRESS FOR OUTPATIENT REHABILITATION    PATIENTS NAME:  Azeb Torres     : 4/3/1925    PROVIDER NUMBER:  [id number]    [id number]      PROVIDER NAME: [facility]    MEDICAL RECORD NUMBER: [id number]     START OF CARE DATE:    SOC Date: 18     TYPE:  OT    PRIMARY/TREATMENT DIAGNOSIS: (Pertinent Medical Diagnosis)     Pain of finger of right hand  Thumb pain, right  Right hand weakness  Carpal tunnel syndrome of right wrist    VISITS FROM START OF CARE:  Rxs Used: 1     Hand Therapy Initial Evaluation  Current Date:  2018    Diagnosis:  Right  CTS  Date of onset:  2017  Date of hand therapy orders: 1/3/18  DOS:  18  Procedure:  R open CTR  Post:  2 months  Referring MD: Patrick Rivera MD  Next MD visit: none scheduled  Precautions:none noted    Subjective:  Azeb Torres is a 89 year old R hand dominant female.    Patient reports symptoms of pain and numbness of the R hand and wrist which occurred due to carpal tunnel syndrome. Since onset symptoms are gradually getting better; improving post surgery. Special tests:  EMG - positive for CTS.  Previous treatment: OTC wrist brace. General health as reported by patient is good.  Pertinent medical history includes: osteoarthritis, cancer, high blood pressure, stroke, pain at rest/night.  Medical allergies: none.  Surgical history: cancer: not specified, orthopedic: Hips.  Medication history: pain, high blood pressure.        PATIENTS NAME:  Azeb Torres    Occupational Profile Information:  Current occupation is Retired  Prior functional level:  assistance with cleaning, cooking, driving  Barriers include:none  Mobility: Ambulates with aid of rolling walker  Leisure activities/hobbies: crossword puzzles, reading,  and tasks around the home.     Upper Extremity Functional Index Score:  Not fully completed. Pt notes she is having a  little difficulty with dressing. She is not having problems using her rollator. She is not going upstairs as she is unsure how she would do with the railing and the landing [facility] up.    Objective:  Sensation:  Pt reports that usually she has numbness in her middle finger, especially at the tip. She is sleeping much better at night. She does have persistent paresthesias to the thumb, IF and MF.     Pain Level Report: On scale 0-10/10  Date 1/8/2018        side R    Overall 2-3    At Rest 2-3    With Activity 2-3      Edema:  none of the  wrist and hand    Palpation:  []     Normal        [x]   Tender       [x]  Mild         [x]   Moderate []   Severe   Location: R thenars, especially FPB area     Range of Motion Wrist AROM (PROM):  Date 1/8/2018 1/8/2018     Side R L   Ext WFL WFL   Flex WFL WFL   UD WFL WFL   RD WFL WFL     STRENGTH: (Measured in pounds, pain scale 0-10/10)    Date 10/16/2017 1/8/2018         Trials Left Right Left Right Left Right Left Right Left Right Left Right   1 27 25 30 29+           2 25 27             3 23 27             Avg 25 26             Pain  6/10             PATIENTS NAME:  Azeb Torres    3 Point Pinch  Date 10/16/2017 1/8/2018         Trials Left Right Left Right Left Right Left Right Left Right Left Right   1 8 5 8 5           2 6 4             3 6 5             Avg 7 5             Pain  4/10               Lateral Pinch  Date 10/16/2017 1/8/2018         Trials Left Right Left Right Left Right Left Right Left Right Left Right   1 8 8 11 9+           2 9 4             3 8 6             Avg 8 6             Pain  4/10               Assessment:  Patient presents with symptoms consistent with diagnosis of carpal tunnel release and hand weakness,  with surgical  intervention.     Patient's limitations or Problem List includes:  Pain and Weakness of the right wrist, hand, thumb, index finger and long finger which interferes with the patient's ability to perform Self Care Tasks  (dressing) and Household Chores as compared to previous level of function.    Rehab Potential:  Good - Return to full activity, some limitations    Patient will benefit from skilled Occupational Therapy to increase  strength and pinch strength and decrease pain to return to previous activity level and resume normal daily tasks and to reach their rehab potential.    Barriers to Learning:  Vision    Communication Issues:  Patient appears to be able to clearly communicate and understand verbal and written communication and follow directions correctly.    Chart Review: Brief history including review of medical and/or therapy records relating to the presenting problem and Detailed history review with patient    Identified Performance Deficits: dressing, hygiene and grooming, home establishment and management and leisure activities    Assessment of Occupational Performance:  3-5 Performance Deficits    Clinical Decision Making (Complexity): Low complexity    Treatment Explanation:  The following has been discussed with the patient:  RX ordered/plan of care  PATIENTS NAME:  Azeb Torres    Anticipated outcomes  Possible risks and side effects    Plan:  Frequency:  2 X a month, once daily  Duration:  for 2 months    Treatment Plan:   Modalities:  US, Fluidotherapy and Paraffin  Therapeutic Exercise:  AROM, PROM, Tendon Gliding, Blocking, Isotonics, Isometrics and Stabilization  Neuromuscular re-education:  Nerve Gliding and Desensitization  Manual Techniques:  Myofascial release  Orthotic Fabrication:  Static orthosis: as indicated   Self Care:  Self Care Tasks and Ergonomic Considerations  Discharge Plan:  Achieve all LTG.  Independent in home treatment program.  Reach maximal therapeutic benefit.    Home Exercise Program:  Putty exercises  Foam exercises     Next Visit:  F/u with hand strength, pain to thenars, finger sensitivity, using railing        Caregiver Signature/Credentials ______________________________ Date  "________       Treating Provider: Neli Denton MS, OTR/L    I have reviewed and certified the need for these services and plan of treatment while under my care.        PHYSICIAN'S SIGNATURE:   _________________________________________  Date___________    Patrick Rivera MD    Certification period: Beginning of Cert date period: 01/08/18 End of Cert period date: 04/08/18     Functional Level Progress Report: Please see attached \"Goal Flow sheet for Functional level.\"    ___X_____ Continue Services or       ________ DC Services                Service dates: SOC Date: 01/08/18  to present                                                                     "

## 2018-01-08 NOTE — PROGRESS NOTES
[facility]  Primary Care Center   Evelina Can MD  1/8/2018     Chief Complaint:   Establish Care (Patient is here to re-establish a new PCP. )        History of Present Illness:   Azeb Torres is a 89 year old female with a history of breast and uterine cancer, CVA, hypertension, and tricuspid regurgitation. She presents with her daughter and granddaughter to establish care with a new primary care provider. The patient previously has seen Dr. Flores. She reports feeling great today. She reports being healthy, but does note monitoring her blood pressure. She does take blood pressure medication for her high blood pressure.     The patient recently had right-sided carpal tunnel surgery with some relief. She denies feeling continued pain, but notes that she does have some sore spots with slight numbness of the top of her fingers. She notes being able to move her fingers much better now. The patient's symptoms have been interfering with her daily activities slightly. Dr. Rivera mentioned that imaging of the patient's hand may have revealed the presents of arthritis in her hand joints. The patient does have a plastic brace to put on her hand during the evening, in addition to a sleeve. She has not been wearing this much in the last week or so. She does note improvement of her symptoms when not wearing her brace. Of note, the patient takes two 500 mg doses of Tylenol in the morning, afternoon, and evening when needed.     She mentions having a problem with her balance. She notes that she walks to the right. She denies recent falls and notes being very careful. She does use a walker to help ambulate at home. She denies joint aches and pains.     She denies problems eating or chewing, weight changes, difficulty breathing, chest pain, digestion or bowel movements changes, and numbness or tingling of the feet.     She denies the need of any medication refills.     Other Topics Discussed:  1. Tamiflu- preventative due to  recent flu ill contact   2. Breast cancer status- currently in remission  3. Stroke- lost right field of vision, not currently seeing neurologist, currently taking Aggrenox    A. Prior to surgeries, discontinued   4. Uterine cancer history with hysterectomy and bilateral ovary removal   5. Mammograms- no longer completing   6. Dentures- top and bottom    Review of Systems:   A full 10-pt Review of Systems was performed, verified and is negative except as documented in the HPI.  All health questionnaires were reviewed, verified and relevant information documented above.    Answers for HPI/ROS submitted by the patient on 1/1/2018   General Symptoms: No  Skin Symptoms: No  HENT Symptoms: No  EYE SYMPTOMS: No  HEART SYMPTOMS: No  LUNG SYMPTOMS: No  INTESTINAL SYMPTOMS: No  URINARY SYMPTOMS: No  GYNECOLOGIC SYMPTOMS: No  BREAST SYMPTOMS: No  SKELETAL SYMPTOMS: No  BLOOD SYMPTOMS: No  NERVOUS SYSTEM SYMPTOMS: Yes  MENTAL HEALTH SYMPTOMS: No  Trouble with coordination: No  Dizziness or trouble with balance: No  Fainting or black-out spells: No  Memory loss: No  Headache: No  Seizures: No  Speech problems: No  Tingling: No  Tremor: No  Weakness: Yes  Difficulty walking: No  Paralysis: No  Numbness: Yes    Active Medications:      oseltamivir (TAMIFLU) 75 MG capsule, Take 1 capsule (75 mg) by mouth daily, Disp: 10 capsule, Rfl: 0     gabapentin (NEURONTIN) 300 MG capsule, Take 1 capsule (300 mg) by mouth 3 times daily, Disp: 180 capsule, Rfl: 1     aspirin-dipyridamole (AGGRENOX)  MG per 12 hr capsule, Take 1 capsule by mouth 2 times daily, Disp: 180 capsule, Rfl: 3     dorzolamide-timolol (COSOPT) 2-0.5 % ophthalmic solution, Place 1 drop into both eyes 2 times daily, Disp: 1 Bottle, Rfl: 11     latanoprost (XALATAN) 0.005 % ophthalmic solution, Place 1 drop into both eyes At Bedtime, Disp: 1 Bottle, Rfl: 11     ondansetron (ZOFRAN-ODT) 4 MG ODT tab, Place 1 tablet (4 mg) under the tongue every 8 hours as needed for  nausea, Disp: 30 tablet, Rfl: 1     atorvastatin (LIPITOR) 10 MG tablet, Take 1 tablet (10 mg) by mouth daily (Patient taking differently: Take 10 mg by mouth every evening ), Disp: 90 tablet, Rfl: 3     omeprazole (PRILOSEC) 20 MG CR capsule, Take 1 capsule (20 mg) by mouth daily (Patient taking differently: Take 20 mg by mouth every morning ), Disp: 90 capsule, Rfl: 3     levothyroxine (SYNTHROID/LEVOTHROID) 25 MCG tablet, Take 1 tablet (25 mcg) by mouth daily (Patient taking differently: Take 25 mcg by mouth every morning ), Disp: 90 tablet, Rfl: 3     hydrochlorothiazide (HYDRODIURIL) 25 MG tablet, Take 1 tablet (25 mg) by mouth daily (Patient taking differently: Take 25 mg by mouth every morning ), Disp: 90 tablet, Rfl: 3     meclizine (ANTIVERT) 12.5 MG tablet, Take 1 tablet (12.5 mg) by mouth 4 times daily as needed for dizziness, Disp: 30 tablet, Rfl: 0     aspirin 81 MG tablet, Take 1 tablet by mouth every evening , Disp: , Rfl:      acetaminophen (TYLENOL) 500 MG tablet, Take 2 tablets by mouth 2 times daily., Disp: , Rfl:      cholecalciferol (VITAMIN D) 1000 UNIT tablet, Take 1 tablet by mouth every morning , Disp: , Rfl:       Allergies:   Review of patient's allergies indicates no known allergies.      Past Medical History:  Arthritis  Chronic angle-closure glaucoma  CVA (cerebral vascular accident)   Degenerative joint disease  Hypertension  Left homonymous hemianopsia  Pulmonary artery hypertension  Tricuspid regurgitation  Breast cancer   Uterine cancer      Past Surgical History:  Hip surgery  Hysterectomy  Cataract IOL, rt/lt  Glaucoma laser  Release carpal tunnel   Pelvis/hip joint surgery unlisted   Stomach surgery procedure unlisted   Left lumpectomy     Family History:   CAD (Father)      Social History:   The patient is  with two children, a nonsmoker, and does not consume alcohol. She is currently retired. The patient likes to read in her free time.      Physical Exam:   /76   Pulse 69  Wt 79.5 kg (175 lb 4.8 oz)  SpO2 98%  Breastfeeding? No  BMI 32.06 kg/m2      Constitutional: Alert, oriented, pleasant, no acute distress  Head: Normocephalic, atraumatic  Eyes: Extra-ocular movements intact, pupils equally round and reactive bilaterally, no scleral icterus  ENT: Oropharynx clear, moist mucus membranes, dentures noted  Cardiovascular: Regular rate and rhythm, no murmurs, rubs or gallops, peripheral pulses full/symmetric  Respiratory: Good air movement bilaterally, lungs clear, no wheezes/rales/rhonchi  Musculoskeletal: Unsteady gait and uses a cane for ambulation. No edema, normal muscle tone  Neurologic: Alert and oriented, cranial nerves 2-12 intact.  Psychiatric: normal mentation, affect and mood    Assessment and Plan:   1. Hypertension   Controlled and well managed per patient. Continue to take current medications.     2. S/p carpal tunnel surgery   Patient experienced relief of pain though has residual numbness of the fingers. Patient should use plastic brace and sleeve on a PRN basis and discontinue use if experiences improvement of symptoms. Patient should consider the use of topical relief, antiinflammatory medications, Tiger balm, steroid injections, and Tylenol on a PRN basis.      3. Mammogram  Patient has a history of breath cancer. Last mammogram was about ten years ago. Follow up is no longer indicated.       Follow-up: Return to clinic in six months.         Scribe Disclosure:  I, Michelle Brand, am serving as a scribe to document services personally performed by Evelina Can MD at this visit, based upon the provider's statements to me. All documentation has been reviewed by the aforementioned provider prior to being entered into the official medical record.     Portions of this medical record were completed by a scribe. UPON MY REVIEW AND AUTHENTICATION BY ELECTRONIC SIGNATURE, this confirms (a) I performed the applicable clinical services, and (b) the  record is accurate.   Evelina Can MD  Internal Medicine    25 min spent face to face, of which >50% time spent on counseling/coordinating care exclusive of any procedure time

## 2018-01-08 NOTE — MR AVS SNAPSHOT
After Visit Summary   1/8/2018    Azeb Torres    MRN: [id number]           Patient Information     Date Of Birth          4/3/1925        Visit Information        Provider Department      1/8/2018 8:00 AM Neli Denton OT [facility]        Today's Diagnoses     Right hand weakness    -  1    Pain of finger of right hand        Thumb pain, right        Carpal tunnel syndrome of right wrist           Follow-ups after your visit        Your next 10 appointments already scheduled     Jan 11, 2018  9:30 AM CST   VISUAL FIELD with [facility] EYE VISUAL FIELD   Eye Clinic ([facility])    [address]  [address]  [address] [address]  [facility] [address]   [phone]            Jan 11, 2018 10:00 AM CST   RETURN GLAUCOMA with Alejandrina Faria MD   Eye Clinic ([facility])    [address]  [address]  [address] [address]  [facility] [address]   [phone]            Jan 17, 2018  1:00 PM CST   SHERIF Hand with BLAYNE [facility] ([facility])    [address] [place] [address] [phone]            Jul 12, 2018  9:25 AM CDT   (Arrive by 9:10 AM)   Return Visit with Evelina Can MD   [facility] ([facility])    [address] [place] [address] [phone]              Who to contact     If you have questions or need follow up information about today's clinic visit or your schedule please contact [facility] directly at [phone].  Normal or non-critical lab and imaging results will be communicated to you by MyChart, letter or phone within 4 business days after the clinic has received the results. If you do not hear from us within 7 days, please contact the clinic through MyChart or phone. If you have a critical or abnormal lab result, we will notify you by phone as soon as  possible.  Submit refill requests through [employer] or call your pharmacy and they will forward the refill request to us. Please allow 3 business days for your refill to be completed.          Additional Information About Your Visit        cdream networkharBelle [place] [place] Information     Victrio gives you secure access to your electronic health record. If you see a primary care provider, you can also send messages to your care team and make appointments. If you have questions, please call your primary care clinic.  If you do not have a primary care provider, please call [phone] and they will assist you.        Care EveryWhere ID     This is your Care EveryWhere ID. This could be used by other organizations to access your [place] medical records  [id number]         Blood Pressure from Last 3 Encounters:   01/08/18 127/76   11/14/17 129/77   11/08/17 154/79    Weight from Last 3 Encounters:   01/08/18 79.5 kg (175 lb 4.8 oz)   01/03/18 79.8 kg (176 lb)   11/14/17 79.8 kg (176 lb)              We Performed the Following     HC OT EVAL, LOW COMPLEXITY     SHERIF CERT REPORT     THERAPEUTIC EXERCISES          Today's Medication Changes          These changes are accurate as of: 1/8/18  9:41 PM.  If you have any questions, ask your nurse or doctor.               These medicines have changed or have updated prescriptions.        Dose/Directions    atorvastatin 10 MG tablet   Commonly known as:  LIPITOR   This may have changed:  when to take this   Used for:  Hyperlipidemia, unspecified hyperlipidemia type        Dose:  10 mg   Take 1 tablet (10 mg) by mouth daily   Quantity:  90 tablet   Refills:  3       hydrochlorothiazide 25 MG tablet   Commonly known as:  HYDRODIURIL   This may have changed:  when to take this   Used for:  Hyperlipidemia, unspecified hyperlipidemia type, Essential hypertension, benign        Dose:  25 mg   Take 1 tablet (25 mg) by mouth daily   Quantity:  90 tablet   Refills:  3       levothyroxine 25 MCG tablet   Commonly  known as:  SYNTHROID/LEVOTHROID   This may have changed:  when to take this   Used for:  Hypothyroidism due to acquired atrophy of thyroid        Dose:  25 mcg   Take 1 tablet (25 mcg) by mouth daily   Quantity:  90 tablet   Refills:  3       omeprazole 20 MG CR capsule   Commonly known as:  priLOSEC   This may have changed:  when to take this   Used for:  Abdominal pain, epigastric        Dose:  20 mg   Take 1 capsule (20 mg) by mouth daily   Quantity:  90 capsule   Refills:  3         Stop taking these medicines if you haven't already. Please contact your care team if you have questions.     HYDROcodone-acetaminophen 5-325 MG per tablet   Commonly known as:  NORCO   Stopped by:  Evelina Can MD           order for DME   Stopped by:  Evelina Can MD                    Primary Care Provider Office Phone # Fax #    Evelina Can [phone]       [address] [place] [address] [address]  [facility] [address]        Equal Access to Services     [facility] : Hadii buddy overtono Sojeanne, waaxda luqadaha, qaybta kaalmada adeegyada, jese veliz . So [facility] [phone].    ATENCIÓN: Si habla español, tiene a johnson disposición servicios gratuitos de asistencia lingüística. Llame al [phone].    We comply with applicable federal civil rights laws and [place] laws. We do not discriminate on the basis of race, color, national origin, age, disability, sex, sexual orientation, or gender identity.            Thank you!     Thank you for choosing [facility]  for your care. Our goal is always to provide you with excellent care. Hearing back from our patients is one way we can continue to improve our services. Please take a few minutes to complete the written survey that you may receive in the mail after your visit with us. Thank you!             Your Updated Medication List - Protect others around you: Learn how to safely use, store and throw away your medicines  at www.disposemymeds.org.          This list is accurate as of: 1/8/18  9:41 PM.  Always use your most recent med list.                   Brand Name Dispense Instructions for use Diagnosis    aspirin 81 MG tablet      Take 1 tablet by mouth every evening        aspirin-dipyridamole  MG per 12 hr capsule    AGGRENOX    180 capsule    Take 1 capsule by mouth 2 times daily    Cerebrovascular disease       atorvastatin 10 MG tablet    LIPITOR    90 tablet    Take 1 tablet (10 mg) by mouth daily    Hyperlipidemia, unspecified hyperlipidemia type       cholecalciferol 1000 UNIT tablet    vitamin D3     Take 1 tablet by mouth every morning        dorzolamide-timolol 2-0.5 % ophthalmic solution    COSOPT    1 Bottle    Place 1 drop into both eyes 2 times daily    Chronic angle-closure glaucoma of both eyes, severe stage       gabapentin 300 MG capsule    NEURONTIN    180 capsule    Take 1 capsule (300 mg) by mouth 3 times daily    Degenerative disc disease, cervical       hydrochlorothiazide 25 MG tablet    HYDRODIURIL    90 tablet    Take 1 tablet (25 mg) by mouth daily    Hyperlipidemia, unspecified hyperlipidemia type, Essential hypertension, benign       latanoprost 0.005 % ophthalmic solution    XALATAN    1 Bottle    Place 1 drop into both eyes At Bedtime    Chronic angle-closure glaucoma of both eyes, severe stage       levothyroxine 25 MCG tablet    SYNTHROID/LEVOTHROID    90 tablet    Take 1 tablet (25 mcg) by mouth daily    Hypothyroidism due to acquired atrophy of thyroid       meclizine 12.5 MG tablet    ANTIVERT    30 tablet    Take 1 tablet (12.5 mg) by mouth 4 times daily as needed for dizziness        omeprazole 20 MG CR capsule    priLOSEC    90 capsule    Take 1 capsule (20 mg) by mouth daily    Abdominal pain, epigastric       ondansetron 4 MG ODT tab    ZOFRAN-ODT    30 tablet    Place 1 tablet (4 mg) under the tongue every 8 hours as needed for nausea    Nausea       oseltamivir 30 MG capsule     TAMIFLU    10 capsule    Take 1 capsule (30 mg) by mouth daily    Influenza B       TYLENOL 500 MG tablet   Generic drug:  acetaminophen      Take 2 tablets by mouth 2 times daily.

## 2018-01-08 NOTE — NURSING NOTE
Chief Complaint   Patient presents with     Establish Care     Patient is here to re-establish a new PCP.      Lilly Rascon LPN at 9:51 AM on 1/8/2018.

## 2018-01-08 NOTE — MR AVS SNAPSHOT
After Visit Summary   1/8/2018    Azeb Torres    MRN: [id number]           Patient Information     Date Of Birth          4/3/1925        Visit Information        Provider Department      1/8/2018 9:30 AM Evelina Can MD [facility]        Care Instructions    Primary Care Center: [phone]     [facility] Medication Refill Request Information:  * Please contact your pharmacy regarding ANY request for medication refills.  ** [facility] Prescription Fax = [phone]  * Please allow 3 business days for routine medication refills.  * Please allow 5 business days for controlled substance medication refills.     Primary Care Center Test Result notification information:  *You will be notified with in 7-10 days of your appointment day regarding the results of your test.  If you are on MyChart you will be notified as soon as the provider has reviewed the results and signed off on them.            Follow-ups after your visit        Follow-up notes from your care team     Return in about 6 months (around 7/8/2018) for Routine Visit.      Your next 10 appointments already scheduled     Jan 11, 2018  9:30 AM CST   VISUAL FIELD with [facility] EYE VISUAL FIELD   Eye Clinic ([facility])    [address]  [address] [place] [address]   [phone]            Jan 11, 2018 10:00 AM CST   RETURN GLAUCOMA with Alejandrina Faria MD   Eye Clinic ([facility])    [address]  [address] [place] [address]   [phone]            Jan 17, 2018  1:00 PM CST   SHERIF Hand with Neli Denton OT   [facility] ([facility])    [address] [place] [address]  [address] [facility] [address]   [phone]            Jul 12, 2018  9:25 AM CDT   (Arrive by 9:10 AM)   Return Visit with Evelina Can MD   [facility] ([facility])    [facility]  [address] [id number] [phone]              Who to contact     Please call your clinic at [phone] to:    Ask questions about your health    Make or cancel appointments    Discuss your medicines    Learn about your test results    Speak to your doctor   If you have compliments or concerns about an experience at your clinic, or if you wish to file a complaint, please contact [facility] Patient Relations at [phone] or email us at [email or web address]         Additional Information About Your Visit        [employer]EV[employer] Information     [employer] perfect gives you secure access to your electronic health record. If you see a primary care provider, you can also send messages to your care team and make appointments. If you have questions, please call your primary care clinic.  If you do not have a primary care provider, please call [phone] and they will assist you.      Dine perfect is an electronic gateway that provides easy, online access to your medical records. With [employer] perfect, you can request a clinic appointment, read your test results, renew a prescription or communicate with your care team.     To access your existing account, please contact your [facility] or call [phone] for assistance.        Care EveryWhere ID     This is your Care EveryWhere ID. This could be used by other organizations to access your [place] medical records  [id number]        Your Vitals Were     Pulse Pulse Oximetry Breastfeeding? BMI (Body Mass Index)          69 98% No 32.06 kg/m2         Blood Pressure from Last 3 Encounters:   01/08/18 127/76   11/14/17 129/77   11/08/17 154/79    Weight from Last 3 Encounters:   01/08/18 79.5 kg (175 lb 4.8 oz)   01/03/18 79.8 kg (176 lb)   11/14/17 79.8 kg (176 lb)              Today, you had the following     No orders found for display         Today's Medication Changes          These changes are  accurate as of: 1/8/18 10:30 AM.  If you have any questions, ask your nurse or doctor.               These medicines have changed or have updated prescriptions.        Dose/Directions    atorvastatin 10 MG tablet   Commonly known as:  LIPITOR   This may have changed:  when to take this   Used for:  Hyperlipidemia, unspecified hyperlipidemia type        Dose:  10 mg   Take 1 tablet (10 mg) by mouth daily   Quantity:  90 tablet   Refills:  3       hydrochlorothiazide 25 MG tablet   Commonly known as:  HYDRODIURIL   This may have changed:  when to take this   Used for:  Hyperlipidemia, unspecified hyperlipidemia type, Essential hypertension, benign        Dose:  25 mg   Take 1 tablet (25 mg) by mouth daily   Quantity:  90 tablet   Refills:  3       levothyroxine 25 MCG tablet   Commonly known as:  SYNTHROID/LEVOTHROID   This may have changed:  when to take this   Used for:  Hypothyroidism due to acquired atrophy of thyroid        Dose:  25 mcg   Take 1 tablet (25 mcg) by mouth daily   Quantity:  90 tablet   Refills:  3       omeprazole 20 MG CR capsule   Commonly known as:  priLOSEC   This may have changed:  when to take this   Used for:  Abdominal pain, epigastric        Dose:  20 mg   Take 1 capsule (20 mg) by mouth daily   Quantity:  90 capsule   Refills:  3         Stop taking these medicines if you haven't already. Please contact your care team if you have questions.     HYDROcodone-acetaminophen 5-325 MG per tablet   Commonly known as:  NORCO   Stopped by:  Evelina Can MD           order for DME   Stopped by:  Evelina Can MD                    Primary Care Provider Office Phone # Fax #    Evelina Can [phone]       [address] [place] [address] [address] [place] [id number]        Equal Access to Services     [facility] HospitalGLADYS : Ora Grady, reji hopson, qajese rowland. [facility] [phone].    ATENCIÓN: Si  kathleen vora, tiene a johnson disposición servicios gratuitos de asistencia lingüística. Megan valentine [phone].    We comply with applicable federal civil rights laws and [place] laws. We do not discriminate on the basis of race, color, national origin, age, disability, sex, sexual orientation, or gender identity.            Thank you!     Thank you for choosing [facility]IC  for your care. Our goal is always to provide you with excellent care. Hearing back from our patients is one way we can continue to improve our services. Please take a few minutes to complete the written survey that you may receive in the mail after your visit with us. Thank you!             Your Updated Medication List - Protect others around you: Learn how to safely use, store and throw away your medicines at www.disposemymeds.org.          This list is accurate as of: 1/8/18 10:30 AM.  Always use your most recent med list.                   Brand Name Dispense Instructions for use Diagnosis    aspirin 81 MG tablet      Take 1 tablet by mouth every evening        aspirin-dipyridamole  MG per 12 hr capsule    AGGRENOX    180 capsule    Take 1 capsule by mouth 2 times daily    Cerebrovascular disease       atorvastatin 10 MG tablet    LIPITOR    90 tablet    Take 1 tablet (10 mg) by mouth daily    Hyperlipidemia, unspecified hyperlipidemia type       cholecalciferol 1000 UNIT tablet    vitamin D3     Take 1 tablet by mouth every morning        dorzolamide-timolol 2-0.5 % ophthalmic solution    COSOPT    1 Bottle    Place 1 drop into both eyes 2 times daily    Chronic angle-closure glaucoma of both eyes, severe stage       gabapentin 300 MG capsule    NEURONTIN    180 capsule    Take 1 capsule (300 mg) by mouth 3 times daily    Degenerative disc disease, cervical       hydrochlorothiazide 25 MG tablet    HYDRODIURIL    90 tablet    Take 1 tablet (25 mg) by mouth daily    Hyperlipidemia, unspecified hyperlipidemia type,  Essential hypertension, benign       latanoprost 0.005 % ophthalmic solution    XALATAN    1 Bottle    Place 1 drop into both eyes At Bedtime    Chronic angle-closure glaucoma of both eyes, severe stage       levothyroxine 25 MCG tablet    SYNTHROID/LEVOTHROID    90 tablet    Take 1 tablet (25 mcg) by mouth daily    Hypothyroidism due to acquired atrophy of thyroid       meclizine 12.5 MG tablet    ANTIVERT    30 tablet    Take 1 tablet (12.5 mg) by mouth 4 times daily as needed for dizziness        omeprazole 20 MG CR capsule    priLOSEC    90 capsule    Take 1 capsule (20 mg) by mouth daily    Abdominal pain, epigastric       ondansetron 4 MG ODT tab    ZOFRAN-ODT    30 tablet    Place 1 tablet (4 mg) under the tongue every 8 hours as needed for nausea    Nausea       oseltamivir 75 MG capsule    TAMIFLU    10 capsule    Take 1 capsule (75 mg) by mouth daily    Influenza B       TYLENOL 500 MG tablet   Generic drug:  acetaminophen      Take 2 tablets by mouth 2 times daily.

## 2018-01-11 ENCOUNTER — OFFICE VISIT (OUTPATIENT)
Dept: OPHTHALMOLOGY | Facility: CLINIC | Age: 83
End: 2018-01-11
Attending: OPHTHALMOLOGY
Payer: MEDICARE

## 2018-01-11 DIAGNOSIS — H40.2233 CHRONIC ANGLE-CLOSURE GLAUCOMA OF BOTH EYES, SEVERE STAGE: Primary | ICD-10-CM

## 2018-01-11 DIAGNOSIS — Z96.1 PSEUDOPHAKIA OF BOTH EYES: ICD-10-CM

## 2018-01-11 DIAGNOSIS — H40.9 GLAUCOMA: ICD-10-CM

## 2018-01-11 DIAGNOSIS — H35.3190 NONEXUDATIVE SENILE MACULAR DEGENERATION OF RETINA: ICD-10-CM

## 2018-01-11 PROCEDURE — 92083 EXTENDED VISUAL FIELD XM: CPT | Mod: ZF | Performed by: OPHTHALMOLOGY

## 2018-01-11 PROCEDURE — 92133 CPTRZD OPH DX IMG PST SGM ON: CPT | Mod: ZF | Performed by: OPHTHALMOLOGY

## 2018-01-11 PROCEDURE — G0463 HOSPITAL OUTPT CLINIC VISIT: HCPCS | Mod: ZF

## 2018-01-11 ASSESSMENT — CONF VISUAL FIELD
OD_SUPERIOR_TEMPORAL_RESTRICTION: 1
OS_INFERIOR_TEMPORAL_RESTRICTION: 3
OD_INFERIOR_NASAL_RESTRICTION: 3
OS_SUPERIOR_TEMPORAL_RESTRICTION: 1
OS_SUPERIOR_NASAL_RESTRICTION: 1
OS_INFERIOR_NASAL_RESTRICTION: 1
OD_INFERIOR_TEMPORAL_RESTRICTION: 1

## 2018-01-11 ASSESSMENT — EXTERNAL EXAM - RIGHT EYE: OD_EXAM: NORMAL

## 2018-01-11 ASSESSMENT — VISUAL ACUITY
OS_PH_CC: 20/40-1
OS_CC+: -1
OD_CC+: -2
METHOD: SNELLEN - LINEAR
OD_CC: 20/30
OS_CC: 20/60

## 2018-01-11 ASSESSMENT — REFRACTION_WEARINGRX
OS_AXIS: 174
OD_SPHERE: -2.25
OD_AXIS: 153
SPECS_TYPE: SVL
OS_CYLINDER: +0.25
OD_CYLINDER: +1.00
OS_SPHERE: -1.75

## 2018-01-11 ASSESSMENT — TONOMETRY
OD_IOP_MMHG: 12
IOP_METHOD: APPLANATION
OS_IOP_MMHG: 12

## 2018-01-11 ASSESSMENT — SLIT LAMP EXAM - LIDS: COMMENTS: DERMATOCHALASIS

## 2018-01-11 ASSESSMENT — CUP TO DISC RATIO
OD_RATIO: 0.6
OS_RATIO: 0.8

## 2018-01-11 ASSESSMENT — EXTERNAL EXAM - LEFT EYE: OS_EXAM: NORMAL

## 2018-01-11 NOTE — PATIENT INSTRUCTIONS
Patient will continue on Cosopt (Timolol/Dorzolamide) which is a blue top drop 2x/day (12 hours apart) in both eyes and Latanoprost which is a teal top drop at bedtime in both eyes.  Patient will return to clinic in 8-12 months with repeat visual field test, dilated eye exam, and OCT with RNFL analysis.  Patient will also follow up with Evelina Bacon for low vision evaluation and continue on eye vitamins.

## 2018-01-11 NOTE — PROGRESS NOTES
1)CACG OS>>OD -- s/p Trab OU -- K pachy: 601/563   Tmax: 50 per patient (teens on Tx per EPIC)    HVF:OD:Right HH and OS:Central island       CDR: 0.7/0.9   HRT/OCT: Mod RNFL thinning      FHX of Glc:  None    Gonio:  Closed     Intolerant to: None    Asthma/COPD:No, on topical BB   Steroid Use: Yes, injections    Kidney Stones: None    Sulfa Allergy:  None    IOP targets: Teens -- IOP good   2)PCIOL OU   3)NNVAMD -- rec   4)H/O CHRPE OD  5)H/O CVA -- Right HH  -- Head CT/CTA done 11/16  6)Asteroid Hyalosis OD    Patient will continue on Cosopt (Timolol/Dorzolamide) which is a blue top drop 2x/day (12 hours apart) in both eyes and Latanoprost which is a teal top drop at bedtime in both eyes.  Patient will return to clinic in 8-12 months with repeat visual field test, dilated eye exam, and OCT with RNFL analysis.  Patient will also follow up with Evelina Bacon for low vision evaluation and continue on eye vitamins.        Resident Note (Please Follow Final Note Above)  S: Last appointment 4/2015 with Dr. Wilson. Has been following elsewhere.   O: IOP 14/16  Visual Field 24-2 4/27/2017  OD: right homonymous hemianopia  OS: right homonymous hemianopia, severe constriction/central island remaining MD -25 from -23  A/P:   CACG- visual field stable, IOP low teens  NNVAMD- consider AREDs vitamins  Myopia with Astig- update glasses    Attending Physician Attestation:  Complete documentation of historical and exam elements from today's encounter can be found in the full encounter summary report (not reduplicated in this progress note). I personally obtained the chief complaint(s) and history of present illness.  I confirmed and edited as necessary the review of systems, past medical/surgical history, family history, social history, and examination findings as documented by others; and I examined the patient myself. I personally reviewed the relevant tests, images, and reports as documented above. I formulated and edited as  necessary the assessment and plan and discussed the findings and management plan with the patient and family.  - Alejandrina Faria MD

## 2018-01-11 NOTE — NURSING NOTE
Chief Complaints and History of Present Illnesses   Patient presents with     Follow Up For     Chronic angle-closure glaucoma of both eyes, severe stage      HPI    Last Eye Exam:  4/27/17   Affected eye(s):  Both   Symptoms:        Unknown duration    Frequency:  Constant       Do you have eye pain now?:  No      Comments:  Azeb is here today for a follow up of Chronic angle-closure glaucoma of both eyes, severe stage   She feels her vision is about the same as at last visit.   She denies flashes or floaters.   She daughter helps her with her medications. Azeb lived with her daughter.     Radu Dale COT 9:55 AM January 11, 2018

## 2018-01-11 NOTE — MR AVS SNAPSHOT
After Visit Summary   1/11/2018    Azeb Torres    MRN: [id number]           Patient Information     Date Of Birth          4/3/1925        Visit Information        Provider Department      1/11/2018 10:00 AM Alejandrina Faria [facility]        Today's Diagnoses     Chronic angle-closure glaucoma of both eyes, severe stage    -  1    Glaucoma        Nonexudative senile macular degeneration of retina        Pseudophakia of both eyes          Care Instructions    Patient will continue on Cosopt (Timolol/Dorzolamide) which is a blue top drop 2x/day (12 hours apart) in both eyes and Latanoprost which is a teal top drop at bedtime in both eyes.  Patient will return to clinic in 8-12 months with repeat visual field test, dilated eye exam, and OCT with RNFL analysis.  Patient will also follow up with Evelina Bacon for low vision evaluation and continue on eye vitamins.            Follow-ups after your visit        Additional Services     OCCUPATIONAL THERAPY REFERRAL       Low Vision Referral to Evelina Bcaon                  Follow-up notes from your care team     Return 8-12 months with repeat visual field test, dilated eye exam, and OCT with RNFL analysis. .      Your next 10 appointments already scheduled     Jan 17, 2018  1:00 PM CST   SHERIF Hand with Neli Denton OT   [facility] ([facility])    [address]  [address] [facility] [address] [phone]            Jan 31, 2018  8:30 AM CST   Evaluation with Evelina Bacon OT   [facility], [place], Occupational Therapy, Vision - Outpatie ([facility] [place])    [address] [place] [address]  9th Floor, Clinic 9a  [facility] [address]   [phone]            Jul 12, 2018  9:25 AM CDT   (Arrive by 9:10 AM)   Return Visit with Evelina Can MD   [facility] ([facility])    [address]  [address] [facility]  [id number] [phone]              Who to contact     Please call your clinic at [phone] to:    Ask questions about your health    Make or cancel appointments    Discuss your medicines    Learn about your test results    Speak to your doctor   If you have compliments or concerns about an experience at your clinic, or if you wish to file a complaint, please contact [facility] Patient Relations at [phone] or email us at [email or web address]         Additional Information About Your Visit        [employer] Information     [employer] gives you secure access to your electronic health record. If you see a primary care provider, you can also send messages to your care team and make appointments. If you have questions, please call your primary care clinic.  If you do not have a primary care provider, please call [phone] and they will assist you.      [employer] is an electronic gateway that provides easy, online access to your medical records. With [employer], you can request a clinic appointment, read your test results, renew a prescription or communicate with your care team.     To access your existing account, please contact your [facility] or call [phone] for assistance.        Care EveryWhere ID     This is your Care EveryWhere ID. This could be used by other organizations to access your [place] medical records  [id number]         Blood Pressure from Last 3 Encounters:   01/08/18 127/76   11/14/17 129/77   11/08/17 154/79    Weight from Last 3 Encounters:   01/08/18 79.5 kg (175 lb 4.8 oz)   01/03/18 79.8 kg (176 lb)   11/14/17 79.8 kg (176 lb)              We Performed the Following     OCCUPATIONAL THERAPY REFERRAL     OCT Optic Nerve RNFL Spectralis OU (both eyes)     OVF 24-2 Dynamic OU          Today's Medication Changes          These changes are accurate as of: 1/11/18 12:14 PM.  If you have any questions, ask your nurse or  doctor.               These medicines have changed or have updated prescriptions.        Dose/Directions    atorvastatin 10 MG tablet   Commonly known as:  LIPITOR   This may have changed:  when to take this   Used for:  Hyperlipidemia, unspecified hyperlipidemia type        Dose:  10 mg   Take 1 tablet (10 mg) by mouth daily   Quantity:  90 tablet   Refills:  3       hydrochlorothiazide 25 MG tablet   Commonly known as:  HYDRODIURIL   This may have changed:  when to take this   Used for:  Hyperlipidemia, unspecified hyperlipidemia type, Essential hypertension, benign        Dose:  25 mg   Take 1 tablet (25 mg) by mouth daily   Quantity:  90 tablet   Refills:  3       levothyroxine 25 MCG tablet   Commonly known as:  SYNTHROID/LEVOTHROID   This may have changed:  when to take this   Used for:  Hypothyroidism due to acquired atrophy of thyroid        Dose:  25 mcg   Take 1 tablet (25 mcg) by mouth daily   Quantity:  90 tablet   Refills:  3       omeprazole 20 MG CR capsule   Commonly known as:  priLOSEC   This may have changed:  when to take this   Used for:  Abdominal pain, epigastric        Dose:  20 mg   Take 1 capsule (20 mg) by mouth daily   Quantity:  90 capsule   Refills:  3                Primary Care Provider Office Phone # Fax #    Evelina Can [phone]       [address] [place] [address]  [facility] [place] [address]        Equal Access to Services     KENDRA FLORES : Ora Grady, wakayleenda emiliana, qadiandrata kaalmada jenny, jese galvan. So [facility] [phone].    ATENCIÓN: Si habla español, tiene a johnson disposición servicios gratuitos de asistencia lingüística. Llame al [phone].    We comply with applicable federal civil rights laws and [place] laws. We do not discriminate on the basis of race, color, national origin, age, disability, sex, sexual orientation, or gender identity.            Thank you!     Thank you for choosing EYE CLINIC  for  your care. Our goal is always to provide you with excellent care. Hearing back from our patients is one way we can continue to improve our services. Please take a few minutes to complete the written survey that you may receive in the mail after your visit with us. Thank you!             Your Updated Medication List - Protect others around you: Learn how to safely use, store and throw away your medicines at www.disposemymeds.org.          This list is accurate as of: 1/11/18 12:14 PM.  Always use your most recent med list.                   Brand Name Dispense Instructions for use Diagnosis    aspirin 81 MG tablet      Take 1 tablet by mouth every evening        aspirin-dipyridamole  MG per 12 hr capsule    AGGRENOX    180 capsule    Take 1 capsule by mouth 2 times daily    Cerebrovascular disease       atorvastatin 10 MG tablet    LIPITOR    90 tablet    Take 1 tablet (10 mg) by mouth daily    Hyperlipidemia, unspecified hyperlipidemia type       cholecalciferol 1000 UNIT tablet    vitamin D3     Take 1 tablet by mouth every morning        dorzolamide-timolol 2-0.5 % ophthalmic solution    COSOPT    1 Bottle    Place 1 drop into both eyes 2 times daily    Chronic angle-closure glaucoma of both eyes, severe stage       gabapentin 300 MG capsule    NEURONTIN    180 capsule    Take 1 capsule (300 mg) by mouth 3 times daily    Degenerative disc disease, cervical       hydrochlorothiazide 25 MG tablet    HYDRODIURIL    90 tablet    Take 1 tablet (25 mg) by mouth daily    Hyperlipidemia, unspecified hyperlipidemia type, Essential hypertension, benign       latanoprost 0.005 % ophthalmic solution    XALATAN    1 Bottle    Place 1 drop into both eyes At Bedtime    Chronic angle-closure glaucoma of both eyes, severe stage       levothyroxine 25 MCG tablet    SYNTHROID/LEVOTHROID    90 tablet    Take 1 tablet (25 mcg) by mouth daily    Hypothyroidism due to acquired atrophy of thyroid       meclizine 12.5 MG tablet     ANTIVERT    30 tablet    Take 1 tablet (12.5 mg) by mouth 4 times daily as needed for dizziness        omeprazole 20 MG CR capsule    priLOSEC    90 capsule    Take 1 capsule (20 mg) by mouth daily    Abdominal pain, epigastric       ondansetron 4 MG ODT tab    ZOFRAN-ODT    30 tablet    Place 1 tablet (4 mg) under the tongue every 8 hours as needed for nausea    Nausea       oseltamivir 30 MG capsule    TAMIFLU    10 capsule    Take 1 capsule (30 mg) by mouth daily    Influenza B       TYLENOL 500 MG tablet   Generic drug:  acetaminophen      Take 2 tablets by mouth 2 times daily.

## 2018-01-29 ENCOUNTER — OFFICE VISIT (OUTPATIENT)
Dept: INTERNAL MEDICINE | Facility: CLINIC | Age: 83
End: 2018-01-29
Payer: MEDICARE

## 2018-01-29 ENCOUNTER — RADIANT APPOINTMENT (OUTPATIENT)
Dept: GENERAL RADIOLOGY | Facility: CLINIC | Age: 83
End: 2018-01-29
Attending: INTERNAL MEDICINE
Payer: MEDICARE

## 2018-01-29 VITALS
SYSTOLIC BLOOD PRESSURE: 123 MMHG | OXYGEN SATURATION: 95 % | RESPIRATION RATE: 24 BRPM | WEIGHT: 169.1 LBS | HEART RATE: 80 BPM | DIASTOLIC BLOOD PRESSURE: 78 MMHG | BODY MASS INDEX: 30.93 KG/M2

## 2018-01-29 DIAGNOSIS — R06.00 DYSPNEA, UNSPECIFIED TYPE: ICD-10-CM

## 2018-01-29 DIAGNOSIS — R07.0 THROAT PAIN: Primary | ICD-10-CM

## 2018-01-29 DIAGNOSIS — R05.9 COUGH: ICD-10-CM

## 2018-01-29 DIAGNOSIS — E87.6 HYPOKALEMIA: ICD-10-CM

## 2018-01-29 DIAGNOSIS — R06.2 WHEEZING: ICD-10-CM

## 2018-01-29 LAB
ANION GAP SERPL CALCULATED.3IONS-SCNC: 8 MMOL/L (ref 3–14)
BUN SERPL-MCNC: 19 MG/DL (ref 7–30)
CALCIUM SERPL-MCNC: 9.5 MG/DL (ref 8.5–10.1)
CHLORIDE SERPL-SCNC: 100 MMOL/L (ref 94–109)
CO2 SERPL-SCNC: 25 MMOL/L (ref 20–32)
CREAT SERPL-MCNC: 0.92 MG/DL (ref 0.52–1.04)
ERYTHROCYTE [DISTWIDTH] IN BLOOD BY AUTOMATED COUNT: 13.2 % (ref 10–15)
FLUAV+FLUBV AG SPEC QL: NEGATIVE
FLUAV+FLUBV AG SPEC QL: NEGATIVE
GFR SERPL CREATININE-BSD FRML MDRD: 57 ML/MIN/1.7M2
GLUCOSE SERPL-MCNC: 113 MG/DL (ref 70–99)
HCT VFR BLD AUTO: 38.5 % (ref 35–47)
HGB BLD-MCNC: 13.1 G/DL (ref 11.7–15.7)
INTERNAL QC OK POCT: YES
MCH RBC QN AUTO: 31.1 PG (ref 26.5–33)
MCHC RBC AUTO-ENTMCNC: 34 G/DL (ref 31.5–36.5)
MCV RBC AUTO: 91 FL (ref 78–100)
PLATELET # BLD AUTO: 314 10E9/L (ref 150–450)
POTASSIUM SERPL-SCNC: 3 MMOL/L (ref 3.4–5.3)
RBC # BLD AUTO: 4.21 10E12/L (ref 3.8–5.2)
S PYO AG THROAT QL IA.RAPID: NORMAL
SODIUM SERPL-SCNC: 133 MMOL/L (ref 133–144)
SPECIMEN SOURCE: NORMAL
WBC # BLD AUTO: 9.9 10E9/L (ref 4–11)

## 2018-01-29 RX ORDER — POTASSIUM CHLORIDE 1500 MG/1
40 TABLET, EXTENDED RELEASE ORAL DAILY
Qty: 10 TABLET | Refills: 0 | Status: SHIPPED | OUTPATIENT
Start: 2018-01-29 | End: 2018-04-02

## 2018-01-29 RX ORDER — AZITHROMYCIN 250 MG/1
250 TABLET, FILM COATED ORAL DAILY
Qty: 6 TABLET | Refills: 0 | Status: SHIPPED | OUTPATIENT
Start: 2018-01-29 | End: 2018-04-02

## 2018-01-29 ASSESSMENT — PAIN SCALES - GENERAL: PAINLEVEL: NO PAIN (0)

## 2018-01-29 NOTE — MR AVS SNAPSHOT
After Visit Summary   1/29/2018    Azeb Torres    MRN: [id number]           Patient Information     Date Of Birth          4/3/1925        Visit Information        Provider Department      1/29/2018 10:00 AM Evelina Can MD [facility]        Today's Diagnoses     Throat pain    -  1    Dyspnea, unspecified type        Cough        Hypokalemia          Care Instructions    Primary Care Center: [phone]     Primary Care Center Medication Refill Request Information:  * Please contact your pharmacy regarding ANY request for medication refills.  ** [facility] Prescription Fax = [phone]  * Please allow 3 business days for routine medication refills.  * Please allow 5 business days for controlled substance medication refills.     Primary Care Center Test Result notification information:  *You will be notified with in 7-10 days of your appointment day regarding the results of your test.  If you are on MyChart you will be notified as soon as the provider has reviewed the results and signed off on them.      Get some nutritional supplements to take at home such as boost, ensure or carnation instant breakfast.  Take potassium as long as having diarrhea.  If diarrhea persists until Friday, please call the clinic.  Also take inhaler for breathing and antibiotic for lungs.  Call if you are not improving.          Follow-ups after your visit        Your next 10 appointments already scheduled     Feb 26, 2018 10:00 AM CST   Evaluation with Evelina Bacon OT   [facility], [place], Occupational Therapy, Vision - Outpatie ([facility] [place])    [address]  9th Floor, Clinic 9a  [facility] [address]   [phone]            Jul 12, 2018  9:25 AM CDT   (Arrive by 9:10 AM)   Return Visit with Evelina Can MD   [facility] ([facility])    [address]  4th Floor  [facility]  [id number] [phone]              Who to contact     Please call your clinic at [phone] to:    Ask questions about your health    Make or cancel appointments    Discuss your medicines    Learn about your test results    Speak to your doctor   If you have compliments or concerns about an experience at your clinic, or if you wish to file a complaint, please contact [facility] Patient Relations at [phone] or email us at [email or web address]         Additional Information About Your Visit        [employer]hart Information     [email or web address] gives you secure access to your electronic health record. If you see a primary care provider, you can also send messages to your care team and make appointments. If you have questions, please call your primary care clinic.  If you do not have a primary care provider, please call [phone] and they will assist you.      [email or web address] is an electronic gateway that provides easy, online access to your medical records. With [email or web address], you can request a clinic appointment, read your test results, renew a prescription or communicate with your care team.     To access your existing account, please contact your [facility] or call [phone] for assistance.        Care EveryWhere ID     This is your Care EveryWhere ID. This could be used by other organizations to access your [place] medical records  [id number]        Your Vitals Were     Pulse Respirations Pulse Oximetry BMI (Body Mass Index)          80 24 95% 30.93 kg/m2         Blood Pressure from Last 3 Encounters:   01/29/18 123/78   01/08/18 127/76   11/14/17 129/77    Weight from Last 3 Encounters:   01/29/18 76.7 kg (169 lb 1.6 oz)   01/08/18 79.5 kg (175 lb 4.8 oz)   01/03/18 79.8 kg (176 lb)              We Performed the Following     Influenza A/B antigen - Rapid Nasal Swab, Clinic Collect     Rapid strep screen POCT          Today's Medication Changes           These changes are accurate as of 1/29/18 12:55 PM.  If you have any questions, ask your nurse or doctor.               These medicines have changed or have updated prescriptions.        Dose/Directions    atorvastatin 10 MG tablet   Commonly known as:  LIPITOR   This may have changed:  when to take this   Used for:  Hyperlipidemia, unspecified hyperlipidemia type        Dose:  10 mg   Take 1 tablet (10 mg) by mouth daily   Quantity:  90 tablet   Refills:  3       hydrochlorothiazide 25 MG tablet   Commonly known as:  HYDRODIURIL   This may have changed:  when to take this   Used for:  Hyperlipidemia, unspecified hyperlipidemia type, Essential hypertension, benign        Dose:  25 mg   Take 1 tablet (25 mg) by mouth daily   Quantity:  90 tablet   Refills:  3       levothyroxine 25 MCG tablet   Commonly known as:  SYNTHROID/LEVOTHROID   This may have changed:  when to take this   Used for:  Hypothyroidism due to acquired atrophy of thyroid        Dose:  25 mcg   Take 1 tablet (25 mcg) by mouth daily   Quantity:  90 tablet   Refills:  3       omeprazole 20 MG CR capsule   Commonly known as:  priLOSEC   This may have changed:  when to take this   Used for:  Abdominal pain, epigastric        Dose:  20 mg   Take 1 capsule (20 mg) by mouth daily   Quantity:  90 capsule   Refills:  3                Primary Care Provider Office Phone # Fax #    AlmoChasity Can [phone]       [address] [place] [address] [address] [place] [address]        Equal Access to Services     KENDRA FLORES AH: Hadii buddy Grady, waaxda luqadaha, qaybta kaalmada adeegyada, jese galvan. So [facility] [phone].    ATENCIÓN: Si habla español, tiene a johnson disposición servicios gratuitos de asistencia lingüística. Llame al [phone].    We comply with applicable federal civil rights laws and [place] laws. We do not discriminate on the basis of race, color, national origin, age, disability, sex, sexual  orientation, or gender identity.            Thank you!     Thank you for choosing [facility]  for your care. Our goal is always to provide you with excellent care. Hearing back from our patients is one way we can continue to improve our services. Please take a few minutes to complete the written survey that you may receive in the mail after your visit with us. Thank you!             Your Updated Medication List - Protect others around you: Learn how to safely use, store and throw away your medicines at www.disposemymeds.org.          This list is accurate as of 1/29/18 12:55 PM.  Always use your most recent med list.                   Brand Name Dispense Instructions for use Diagnosis    aspirin 81 MG tablet      Take 1 tablet by mouth every evening        aspirin-dipyridamole  MG per 12 hr capsule    AGGRENOX    180 capsule    Take 1 capsule by mouth 2 times daily    Cerebrovascular disease       atorvastatin 10 MG tablet    LIPITOR    90 tablet    Take 1 tablet (10 mg) by mouth daily    Hyperlipidemia, unspecified hyperlipidemia type       cholecalciferol 1000 UNIT tablet    vitamin D3     Take 1 tablet by mouth every morning        dorzolamide-timolol 2-0.5 % ophthalmic solution    COSOPT    1 Bottle    Place 1 drop into both eyes 2 times daily    Chronic angle-closure glaucoma of both eyes, severe stage       gabapentin 300 MG capsule    NEURONTIN    180 capsule    Take 1 capsule (300 mg) by mouth 3 times daily    Degenerative disc disease, cervical       hydrochlorothiazide 25 MG tablet    HYDRODIURIL    90 tablet    Take 1 tablet (25 mg) by mouth daily    Hyperlipidemia, unspecified hyperlipidemia type, Essential hypertension, benign       latanoprost 0.005 % ophthalmic solution    XALATAN    1 Bottle    Place 1 drop into both eyes At Bedtime    Chronic angle-closure glaucoma of both eyes, severe stage       levothyroxine 25 MCG tablet    SYNTHROID/LEVOTHROID    90 tablet    Take 1  tablet (25 mcg) by mouth daily    Hypothyroidism due to acquired atrophy of thyroid       meclizine 12.5 MG tablet    ANTIVERT    30 tablet    Take 1 tablet (12.5 mg) by mouth 4 times daily as needed for dizziness        omeprazole 20 MG CR capsule    priLOSEC    90 capsule    Take 1 capsule (20 mg) by mouth daily    Abdominal pain, epigastric       ondansetron 4 MG ODT tab    ZOFRAN-ODT    30 tablet    Place 1 tablet (4 mg) under the tongue every 8 hours as needed for nausea    Nausea       oseltamivir 30 MG capsule    TAMIFLU    10 capsule    Take 1 capsule (30 mg) by mouth daily    Influenza B       TYLENOL 500 MG tablet   Generic drug:  acetaminophen      Take 2 tablets by mouth 2 times daily.

## 2018-01-29 NOTE — NURSING NOTE
"Chief Complaint   Patient presents with     Flu     \" flu in the last 3 weeks,coughing up yellow sputum   Fidelina Wilson LPN 10:08 AM on 1/29/2018    Rooming Note  Health Maintenance   Health Maintenance Due   Topic Date Due     URINE DRUG SCREEN Q1 YR  04/03/1940     SYMONE QUESTIONNAIRE 1 YEAR  04/03/1943     PHQ-9 Q1YR  04/03/1943     ADVANCE DIRECTIVE PLANNING Q5 YRS  04/03/1980    All health maintenance items discussed and pended.  Will do PHQ-9 and SYMONE at future appointment.Fidelina Wilson LPN 10:15 AM on 1/29/2018  "

## 2018-01-29 NOTE — PROGRESS NOTES
"[facility] [place]   Evelina Can MD  1/29/2018     Chief Complaint:   Flu (\" flu in the last 3 weeks,coughing up yellow sputum)       History of Present Illness:   Azeb Torres is a 89 year old female with a history of breast and uterine cancer, CVA, hypertension, and tricuspid regurgitation, who presents with her family for evaluation of flu-like symptoms with a cough and yellow sputum. About three weeks ago, the patient was exposed to an individual with the flu and started on Tamiflu that same day. About two weeks ago, the patient started to develop symptoms including a productive cough, yellow sputum production, and sore throat. She has also experienced some shortness of breath when walking. She notes experiencing slight improvement daily. She notes having labored breathing recently that is worst when laying down. She endorses having a decreased appetite but has been maintaining hydration.     For the last couple of days, the patient has also been passing soft, non-watery stools with accompanying nausea. She has been taking two doses of imodium daily with some relief.     The patient denies fevers, nasal congestions, sinus pain, ear pain, chest pain, pain with breathing, abdominal pain, muscle aches and pain, in addition to recent long car or plane trips.     Of note, the patient lives at home with one of her daughters. She has not been around little kids recently to avoid becoming more ill.      Review of Systems:   A full 10-pt Review of Systems was performed, verified and is negative except as documented in the HPI.  All health questionnaires were reviewed, verified and relevant information documented above.    Active Medications:      oseltamivir (TAMIFLU) 30 MG capsule, Take 1 capsule (30 mg) by mouth daily, Disp: 10 capsule, Rfl: 0     gabapentin (NEURONTIN) 300 MG capsule, Take 1 capsule (300 mg) by mouth 3 times daily, Disp: 180 capsule, Rfl: 1     aspirin-dipyridamole (AGGRENOX)  " MG per 12 hr capsule, Take 1 capsule by mouth 2 times daily, Disp: 180 capsule, Rfl: 3     dorzolamide-timolol (COSOPT) 2-0.5 % ophthalmic solution, Place 1 drop into both eyes 2 times daily, Disp: 1 Bottle, Rfl: 11     latanoprost (XALATAN) 0.005 % ophthalmic solution, Place 1 drop into both eyes At Bedtime, Disp: 1 Bottle, Rfl: 11     ondansetron (ZOFRAN-ODT) 4 MG ODT tab, Place 1 tablet (4 mg) under the tongue every 8 hours as needed for nausea, Disp: 30 tablet, Rfl: 1     atorvastatin (LIPITOR) 10 MG tablet, Take 1 tablet (10 mg) by mouth daily (Patient taking differently: Take 10 mg by mouth every evening ), Disp: 90 tablet, Rfl: 3     omeprazole (PRILOSEC) 20 MG CR capsule, Take 1 capsule (20 mg) by mouth daily (Patient taking differently: Take 20 mg by mouth every morning ), Disp: 90 capsule, Rfl: 3     levothyroxine (SYNTHROID/LEVOTHROID) 25 MCG tablet, Take 1 tablet (25 mcg) by mouth daily (Patient taking differently: Take 25 mcg by mouth every morning ), Disp: 90 tablet, Rfl: 3     hydrochlorothiazide (HYDRODIURIL) 25 MG tablet, Take 1 tablet (25 mg) by mouth daily (Patient taking differently: Take 25 mg by mouth every morning ), Disp: 90 tablet, Rfl: 3     meclizine (ANTIVERT) 12.5 MG tablet, Take 1 tablet (12.5 mg) by mouth 4 times daily as needed for dizziness, Disp: 30 tablet, Rfl: 0     aspirin 81 MG tablet, Take 1 tablet by mouth every evening , Disp: , Rfl:      acetaminophen (TYLENOL) 500 MG tablet, Take 2 tablets by mouth 2 times daily., Disp: , Rfl:      cholecalciferol (VITAMIN D) 1000 UNIT tablet, Take 1 tablet by mouth every morning , Disp: , Rfl:       Allergies:   Review of patient's allergies indicates no known allergies.      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   Essential hypertension   Left homonymous hemianopsia    Pulmonary artery hypertension   Tricuspid regurgitation   Glaucoma   PSVT (paroxysmal supraventricular tachycardia)   Pain in joint, shoulder and lower leg region   Hyperlipidemia   Hypothyroidism   Rosacea   Inflamed seborrheic keratosis, dermatitis   Pseudophakia of both eyes   Non-exudative senile macular degeneration of retina   Asteroid hyalosis of right eye  Dizziness of unknown cause   Vertigo   Carpal tunnel syndrome of right wrist      Past Surgical History:  Pelvis/hip joint surgery unlisted   Stomach surgery procedure unlisted   Cataract IOL, rt/lt   Glaucoma laser   Bilateral hip replacement   Hysterectomy due to low grade endometrial cancer   Breast lumpectomy  Release carpal tunnel procedure     Family History:   CAD (Father)      Social History:   The patient is , a nonsmoker, and does not consume alcohol.      Physical Exam:   /78 (BP Location: Right arm, Patient Position: Sitting, Cuff Size: Adult Regular)  Pulse 80  Resp 24  Wt 76.7 kg (169 lb 1.6 oz)  SpO2 95%  BMI 30.93 kg/m2      Constitutional: Alert, oriented, pleasant, mildly tachypneic  Head: Normocephalic, atraumatic  Eyes: Extra-ocular movements intact, no scleral icterus  ENT: Oropharynx clear, moist mucus membranes  Neck: Supple, no lymphadenopathy, no thyromegaly   Cardiovascular: Regular rate and rhythm, no murmurs, rubs or gallops, peripheral pulses full/symmetric  Respiratory: Inspiratory wheezes in the left anterior and posterior lung fields. No rales/rhonchi  GI: Abdomen soft, bowel sounds present, nondistended, nontender  Musculoskeletal: No edema, normal muscle tone, normal gait  Neurologic: Alert and oriented, cranial nerves 2-12 intact.  Psychiatric: normal mentation, affect and mood    Imaging:   XR Chest 2 Views:  Reading per radiology, no focal consolidation, prominent interstitial markings MARCIE and LLL    Laboratory:   Component      Latest Ref Rng & Units 1/29/2018   Sodium      133 - 144 mmol/L 133    Potassium      3.4 - 5.3 mmol/L 3.0 (L)   Chloride      94 - 109 mmol/L 100   Carbon Dioxide      20 - 32 mmol/L 25   Anion Gap      3 - 14 mmol/L 8   Glucose      70 - 99 mg/dL 113 (H)   Urea Nitrogen      7 - 30 mg/dL 19   Creatinine      0.52 - 1.04 mg/dL 0.92   GFR Estimate      >60 mL/min/1.7m2 57 (L)   GFR Estimate If Black      >60 mL/min/1.7m2 69   Calcium      8.5 - 10.1 mg/dL 9.5   WBC      4.0 - 11.0 10e9/L 9.9   RBC Count      3.8 - 5.2 10e12/L 4.21   Hemoglobin      11.7 - 15.7 g/dL 13.1   Hematocrit      35.0 - 47.0 % 38.5   MCV      78 - 100 fl 91   MCH      26.5 - 33.0 pg 31.1   MCHC      31.5 - 36.5 g/dL 34.0   RDW      10.0 - 15.0 % 13.2   Platelet Count      150 - 450 10e9/L 314   Influenza A/B Agn Specimen       Nasal   Influenza A      NEG:Negative Negative   Influenza B      NEG:Negative Negative   Rapid Strep A Screen      neg neg   Internal QC OK       Yes       Assessment and Plan:  1. Throat pain  Strep swab was taken as patient has had a sore throat with her flu-like symptoms for the last two weeks, though otherwise is low risk with 0/4 centor criteria.   - Rapid strep screen with reflex to culture; Future    2. Dyspnea, unspecified type  Patient was recently in contact with an individual with influenza and completed a course of Tamiflu. Inspiratory wheezes were noted on examination. Influenza swab and chest xray ordered to further assess the patient's ongoing cough and upper respiratory symptoms  - Influenza A/B antigen - Rapid Nasal Swab, Clinic Collect  - XR Chest 2 Views; Future    3. Cough  Routine laboratory and imaging orders placed to further assess for cause of underlying cough.   - Influenza A/B antigen - Rapid Nasal Swab, Clinic Collect  - XR Chest 2 Views; Future  - CBC with platelets; Future  - Basic metabolic panel; Future    -->After review of diagnostics, will have her take KCL for next 4-5 days while having diarrhea for hypokalemia.  Discussed nutritional supplements.   Will also prescribe azithromycin for possible atypical pneumonia along with inhaler for bronchospasm.  Patient advised to call clinic if no improvement in diarrhea or dyspnea by end of week.  Verbalized understanding and agreement with plan.    Follow-up: Prn        Scribe Disclosure:   I, Michelle Brand, am serving as a scribe to document services personally performed by Evelina Can MD at this visit, based upon the provider's statements to me. All documentation has been reviewed by the aforementioned provider prior to being entered into the official medical record.     Portions of this medical record were completed by a scribe. UPON MY REVIEW AND AUTHENTICATION BY ELECTRONIC SIGNATURE, this confirms (a) I performed the applicable clinical services, and (b) the record is accurate.   Evelina Can MD  Internal Medicine

## 2018-01-31 DIAGNOSIS — I10 ESSENTIAL HYPERTENSION, BENIGN: ICD-10-CM

## 2018-01-31 DIAGNOSIS — E78.5 HYPERLIPIDEMIA, UNSPECIFIED HYPERLIPIDEMIA TYPE: ICD-10-CM

## 2018-02-01 ENCOUNTER — TELEPHONE (OUTPATIENT)
Dept: INTERNAL MEDICINE | Facility: CLINIC | Age: 83
End: 2018-02-01

## 2018-02-01 DIAGNOSIS — R19.7 DIARRHEA OF PRESUMED INFECTIOUS ORIGIN: Primary | ICD-10-CM

## 2018-02-01 RX ORDER — HYDROCHLOROTHIAZIDE 25 MG/1
25 TABLET ORAL DAILY
Qty: 90 TABLET | Refills: 1 | Status: SHIPPED | OUTPATIENT
Start: 2018-02-01 | End: 2018-07-31

## 2018-02-01 NOTE — TELEPHONE ENCOUNTER
Patients daughter calling.  Cough is improving. Diarrhea, stomach cramps and nausea continue.  Patient is not eating but trying to increase fluids.  Denies temperature.  Has been using imodium at least 2 tablets per day with one day where 4 were taken.  Patient has stopped imodium because she feels it is not working.  Feeling very fatigued.  Perlita Bhat,  I placed orders for stool studies and electrolytes to evaluate infectious diarrhea.  I would like her to do these tests today or tomorrow.  I can add her on to clinic tomorrow at 12:45 or if she is really weak/sick, would recommend ER visit.  Thanks,  Evelina Can MD  Internal Medicine

## 2018-02-02 ENCOUNTER — OFFICE VISIT (OUTPATIENT)
Dept: INTERNAL MEDICINE | Facility: CLINIC | Age: 83
End: 2018-02-02
Payer: MEDICARE

## 2018-02-02 VITALS
DIASTOLIC BLOOD PRESSURE: 76 MMHG | HEART RATE: 74 BPM | WEIGHT: 168.9 LBS | BODY MASS INDEX: 30.89 KG/M2 | SYSTOLIC BLOOD PRESSURE: 133 MMHG

## 2018-02-02 DIAGNOSIS — R19.7 DIARRHEA OF PRESUMED INFECTIOUS ORIGIN: Primary | ICD-10-CM

## 2018-02-02 DIAGNOSIS — J20.9 ACUTE BRONCHITIS, UNSPECIFIED ORGANISM: ICD-10-CM

## 2018-02-02 DIAGNOSIS — R19.7 DIARRHEA OF PRESUMED INFECTIOUS ORIGIN: ICD-10-CM

## 2018-02-02 LAB
ANION GAP SERPL CALCULATED.3IONS-SCNC: 8 MMOL/L (ref 3–14)
BUN SERPL-MCNC: 23 MG/DL (ref 7–30)
CALCIUM SERPL-MCNC: 9.8 MG/DL (ref 8.5–10.1)
CHLORIDE SERPL-SCNC: 104 MMOL/L (ref 94–109)
CO2 SERPL-SCNC: 25 MMOL/L (ref 20–32)
CREAT SERPL-MCNC: 0.86 MG/DL (ref 0.52–1.04)
GFR SERPL CREATININE-BSD FRML MDRD: 61 ML/MIN/1.7M2
GLUCOSE SERPL-MCNC: 101 MG/DL (ref 70–99)
POTASSIUM SERPL-SCNC: 4.3 MMOL/L (ref 3.4–5.3)
SODIUM SERPL-SCNC: 137 MMOL/L (ref 133–144)

## 2018-02-02 ASSESSMENT — PAIN SCALES - GENERAL: PAINLEVEL: NO PAIN (0)

## 2018-02-02 NOTE — NURSING NOTE
Chief Complaint   Patient presents with     Diarrhea     Patient here for diarrhea.      Galileo Zeng CMA at 12:46 PM on 2/2/2018.

## 2018-02-02 NOTE — PROGRESS NOTES
[facility]  [facility]   Evelina Can MD  2/2/2018     Chief Complaint:   Diarrhea (Patient here for diarrhea.)       History of Present Illness:   Azeb Torres is a 89 year old female with a past medical history of breast and uterine cancer, CVA, hypertension, and tricuspid regurgitation who presents to clinic with her daughter for evaluation of evaluation. She was last seen in clinic on 1/29/2018 for flu-like symptoms. At that time, she was taking imodium twice daily for soft, non-watery stools with accompanying nausea, and was prescribed potassium tablets, azithromycin, and an inhaler for bronchitis with abnormalities on CXR as well as hypokalemia.     She has been doing her inhaler and taking cough medicine which seem to have helped.  She is also completed 5 days of KCL.    She reports diarrhea on/off for the last week. Stopped taking imodium yesterday morning and felt better. She was not able to get a stool sample. Diarrhea since before the antibiotics and had been taking imodium but was still having watery stools, so she stopped taking imodium. She is having BMs every 6 hours. She denies taking antibiotics prior to this week. She denies being . She denies pain, cramps, blood in stool, fevers. She has a good appetite is eating and drinking.     She started to feel sick in the middle of January. Prior to that, she was not taking antibiotics or out in public very often. She states that she still has a mild cough and has been taking her inhaler which seems to help.         Review of Systems:   A full 10-pt Review of Systems was performed, verified and is negative except as documented in the HPI.  All health questionnaires were reviewed, verified and relevant information documented above.    Active Medications:      hydrochlorothiazide (HYDRODIURIL) 25 MG tablet, Take 1 tablet (25 mg) by mouth daily, Disp: 90 tablet, Rfl: 1     Potassium Chloride ER 20 MEQ TBCR, Take 2 tablets (40 mEq) by mouth daily,  Disp: 10 tablet, Rfl: 0     azithromycin (ZITHROMAX) 250 MG tablet, Take 1 tablet (250 mg) by mouth daily Take 2 tablets on day one, followed by one daily until gone, Disp: 6 tablet, Rfl: 0     fluticasone-salmeterol (ADVAIR) 100-50 MCG/DOSE diskus inhaler, Inhale 1 puff into the lungs every 12 hours, Disp: 60 Inhaler, Rfl: 0     oseltamivir (TAMIFLU) 30 MG capsule, Take 1 capsule (30 mg) by mouth daily, Disp: 10 capsule, Rfl: 0     gabapentin (NEURONTIN) 300 MG capsule, Take 1 capsule (300 mg) by mouth 3 times daily, Disp: 180 capsule, Rfl: 1     aspirin-dipyridamole (AGGRENOX)  MG per 12 hr capsule, Take 1 capsule by mouth 2 times daily, Disp: 180 capsule, Rfl: 3     dorzolamide-timolol (COSOPT) 2-0.5 % ophthalmic solution, Place 1 drop into both eyes 2 times daily, Disp: 1 Bottle, Rfl: 11     latanoprost (XALATAN) 0.005 % ophthalmic solution, Place 1 drop into both eyes At Bedtime, Disp: 1 Bottle, Rfl: 11     ondansetron (ZOFRAN-ODT) 4 MG ODT tab, Place 1 tablet (4 mg) under the tongue every 8 hours as needed for nausea, Disp: 30 tablet, Rfl: 1     atorvastatin (LIPITOR) 10 MG tablet, Take 1 tablet (10 mg) by mouth daily (Patient taking differently: Take 10 mg by mouth every evening ), Disp: 90 tablet, Rfl: 3     omeprazole (PRILOSEC) 20 MG CR capsule, Take 1 capsule (20 mg) by mouth daily (Patient taking differently: Take 20 mg by mouth every morning ), Disp: 90 capsule, Rfl: 3     levothyroxine (SYNTHROID/LEVOTHROID) 25 MCG tablet, Take 1 tablet (25 mcg) by mouth daily (Patient taking differently: Take 25 mcg by mouth every morning ), Disp: 90 tablet, Rfl: 3     meclizine (ANTIVERT) 12.5 MG tablet, Take 1 tablet (12.5 mg) by mouth 4 times daily as needed for dizziness, Disp: 30 tablet, Rfl: 0     aspirin 81 MG tablet, Take 1 tablet by mouth every evening , Disp: , Rfl:      acetaminophen (TYLENOL) 500 MG tablet, Take 2 tablets by mouth 2 times daily., Disp: , Rfl:      cholecalciferol (VITAMIN D) 1000  UNIT tablet, Take 1 tablet by mouth every morning , Disp: , Rfl:       Allergies:   The patient reports no known allergies.      Past Medical History:  Arthritis  History of breast cancer  Chronic angle-closure glaucoma  Cerebral infarction  Degenerative joint disease  History of endometrial cancer  Hypertension  Left homonymous hemianopsia  Pulmonary artery hypertension, moderate  Tricuspid regurgitation  Degenerative disc disease, cervical  Paroxysmal supraventricular tachycardia  Hyperlipidemia  Hypothyroidism  Rosacea  Inflamed seborrheic keratosis  Pseudophakia of both eyes  Asteroid hyalosis of right eye  Vertigo  Carpal tunnel syndrome, right     Past Surgical History:  Cataract IOL  Glaucoma laser  Hip replacement, bilateral  Hysterectomy  Lumpectomy, left  Carpal tunnel release, right  Stomach surgery    Family History:   Father - CAD      Social History:   No history of tobacco use or alcohol consumption     Physical Exam:   /76  Pulse 74  Wt 76.6 kg (168 lb 14.4 oz)  Breastfeeding? No  BMI 30.89 kg/m2     Constitutional: Alert, oriented, pleasant, no acute distress, nontoxic appearing  Head: Normocephalic, atraumatic  Eyes: Extra-ocular movements intact, no scleral icterus  ENT: Oropharynx clear, moist mucus membranes, good dentition  Cardiovascular: Regular rate and rhythm, no murmurs, rubs or gallops, peripheral pulses full/symmetric  Respiratory: Good air movement bilaterally, lungs clear, no wheezes/rales/rhonchi  GI: Abdomen soft, bowel sounds normoactivet, nondistended, nontender, no organomegaly or masses, no rebound/guarding  Musculoskeletal: No edema, normal muscle tone, normal gait  Neurologic: Alert and oriented, cranial nerves 2-12 intact.  Psychiatric: normal mentation, affect and mood     LABS:  Last Basic Metabolic Panel:  Lab Results   Component Value Date     02/02/2018      Lab Results   Component Value Date    POTASSIUM 4.3 02/02/2018     Lab Results   Component Value  Date    CHLORIDE 104 02/02/2018     Lab Results   Component Value Date    JEAN-PIERRE 9.8 02/02/2018     Lab Results   Component Value Date    CO2 25 02/02/2018     Lab Results   Component Value Date    BUN 23 02/02/2018     Lab Results   Component Value Date    CR 0.86 02/02/2018     Lab Results   Component Value Date     02/02/2018       Assessment and Plan:    Diarrhea: presumed viral etiology. Would like to rule out c. Diff, though diarrhea preceded antibiotic use and patient denies other risk factors. Will monitor symptoms and send in stool samples if symptoms persist.  It is reassuring that she does not have alarm symptoms such as fevers, pain, or bleeding so advised to go home and stay hydrated and continue bland diet.  Avoid lomotil for now until infectious cause ruled uot.    Bronchitis, URI: Seems to be improved.  She denies dyspnea or cough.    Follow-up: prn        Scribe Disclosure:   I, Sandra Gray, am serving as a scribe to document services personally performed by Evelina Can MD at this visit, based upon the provider's statements to me. All documentation has been reviewed by the aforementioned provider prior to being entered into the official medical record.     Portions of this medical record were completed by a scribe. UPON MY REVIEW AND AUTHENTICATION BY ELECTRONIC SIGNATURE, this confirms (a) I performed the applicable clinical services, and (b) the record is accurate.   Evelina Can MD  Internal Medicine    25 min spent face to face, of which >50% time spent on counseling/coordinating care exclusive of any procedure time

## 2018-02-02 NOTE — PATIENT INSTRUCTIONS
[facility]: [phone]     [facility] Medication Refill Request Information:  * Please contact your pharmacy regarding ANY request for medication refills.  ** [facility] Prescription Fax = [phone]  * Please allow 3 business days for routine medication refills.  * Please allow 5 business days for controlled substance medication refills.     [facility] Test Result notification information:  *You will be notified with in 7-10 days of your appointment day regarding the results of your test.  If you are on MyChart you will be notified as soon as the provider has reviewed the results and signed off on them.      Uncertain Causes of Diarrhea (Adult)    Diarrhea is when stools are loose and watery. This can be caused by:    Viral infections    Bacterial infections    Food poisoning    Parasites    Irritable bowel syndrome (IBS)    Inflammatory bowel diseases such as ulcerative colitis, Crohn's disease, and celiac disease    Food intolerance, such as to lactose, the sugar found in milk and milk products    Reaction to medicines like antibiotics, laxatives, cancer drugs, and antacids  Along with diarrhea, you may also have:    Abdominal pain and cramping    Nausea and vomiting    Loss of bowel control    Fever and chills    Bloody stools  In some cases, antibiotics may help to treat diarrhea. You may have a stool sample test. This is done to see what is causing your diarrhea, and if antibiotics will help treat it. The results of a stool sample test may take up to 2 days. The healthcare provider may not give you antibiotics until he or she has the stool test results.  Diarrhea can cause dehydration. This is the loss of too much water and other fluids from the body. When this occurs, body fluid must be replaced. This can be done with oral rehydration solutions. Oral rehydration solutions are available at drugstores and grocery stores without a prescription.  Home care  Follow all instructions given by  your healthcare provider. Rest at home for the next 24 hours, or until you feel better. Avoid caffeine, tobacco, and alcohol. These can make diarrhea, cramping, and pain worse.  If taking medicines:    Don t take over-the-counter diarrhea or nausea medicines unless your healthcare provider tells you to.    You may use acetaminophen or NSAID medicines like ibuprofen or naproxen to reduce pain and fever. Don t use these if you have chronic liver or kidney disease, or ever had a stomach ulcer or gastrointestinal bleeding. Don't use NSAID medicines if you are already taking one for another condition (like arthritis) or are on daily aspirin therapy (such as for heart disease or after a stroke). Talk with your healthcare provider first.    If antibiotics were prescribed, be sure you take them until they are finished. Don t stop taking them even when you feel better. Antibiotics must be taken as a full course.  To prevent the spread of illness:    Remember that washing with soap and water and using alcohol-based  is the best way to prevent the spread of infection.    Clean the toilet after each use.    Wash your hands before eating.    Wash your hands before and after preparing food. Keep in mind that people with diarrhea or vomiting should not prepare food for others.    Wash your hands after using cutting boards, countertops, and knives that have been in contact with raw foods.    Wash and then peel fruits and vegetables.    Keep uncooked meats away from cooked and ready-to-eat foods.    Use a food thermometer when cooking. Cook poultry to at least 165 F (74 C). Cook ground meat (beef, veal, pork, lamb) to at least 160 F (71 C). Cook fresh beef, veal, lamb, and pork to at least 145 F (63 C).    Don t eat raw or undercooked eggs (poached or sabina side up), poultry, meat, or unpasteurized milk and juices.  Food and drinks  The main goal while treating vomiting or diarrhea is to prevent dehydration. This is done by  taking small amounts of liquids often.    Keep in mind that liquids are more important than food right now.    Drink only small amounts of liquids at a time.    Don t force yourself to eat, especially if you are having cramping, vomiting, or diarrhea. Don t eat large amounts at a time, even if you are hungry.    If you eat, avoid fatty, greasy, spicy, or fried foods.    Don t eat dairy foods or drink milk if you have diarrhea. These can make diarrhea worse.  During the first 24 hours you can try:    Oral rehydration solutions. Do not use sports drinks. They have too much sugar and not enough electrolytes.    Soft drinks without caffeine    Ginger ale    Water (plain or flavored)    Decaf tea or coffee    Clear broth, consommé, or bouillon    Gelatin, popsicles, or frozen fruit juice bars  The second 24 hours, if you are feeling better, you can add:    Hot cereal, plain toast, bread, rolls, or crackers    Plain noodles, rice, mashed potatoes, chicken noodle soup, or rice soup    Unsweetened canned fruit (no pineapple)    Bananas  As you recover:    Limit fat intake to less than 15 grams per day. Don t eat margarine, butter, oils, mayonnaise, sauces, gravies, fried foods, peanut butter, meat, poultry, or fish.    Limit fiber. Don t eat raw or cooked vegetables, fresh fruits except bananas, or bran cereals.    Limit caffeine and chocolate.    Limit dairy.    Don t use spices or seasonings except salt.    Go back to your normal diet over time, as you feel better and your symptoms improve.    If the symptoms come back, go back to a simple diet or clear liquids.  Follow-up care  Follow up with your healthcare provider, or as advised. If a stool sample was taken or cultures were done, call the healthcare provider for the results as instructed.

## 2018-02-02 NOTE — MR AVS SNAPSHOT
After Visit Summary   2/2/2018    Azeb Torres    MRN: [id number]           Patient Information     Date Of Birth          4/3/1925        Visit Information        Provider Department      2/2/2018 12:45 PM Evelina Can MD [facility]        Care Instructions    [facility]: [phone]     [facility] Medication Refill Request Information:  * Please contact your pharmacy regarding ANY request for medication refills.  ** [facility] Prescription Fax = [phone]  * Please allow 3 business days for routine medication refills.  * Please allow 5 business days for controlled substance medication refills.     [facility] Test Result notification information:  *You will be notified with in 7-10 days of your appointment day regarding the results of your test.  If you are on MyChart you will be notified as soon as the provider has reviewed the results and signed off on them.      Uncertain Causes of Diarrhea (Adult)    Diarrhea is when stools are loose and watery. This can be caused by:    Viral infections    Bacterial infections    Food poisoning    Parasites    Irritable bowel syndrome (IBS)    Inflammatory bowel diseases such as ulcerative colitis, Crohn's disease, and celiac disease    Food intolerance, such as to lactose, the sugar found in milk and milk products    Reaction to medicines like antibiotics, laxatives, cancer drugs, and antacids  Along with diarrhea, you may also have:    Abdominal pain and cramping    Nausea and vomiting    Loss of bowel control    Fever and chills    Bloody stools  In some cases, antibiotics may help to treat diarrhea. You may have a stool sample test. This is done to see what is causing your diarrhea, and if antibiotics will help treat it. The results of a stool sample test may take up to 2 days. The healthcare provider may not give you antibiotics until he or she has the stool test results.  Diarrhea can cause dehydration. This  is the loss of too much water and other fluids from the body. When this occurs, body fluid must be replaced. This can be done with oral rehydration solutions. Oral rehydration solutions are available at drugstores and grocery stores without a prescription.  Home care  Follow all instructions given by your healthcare provider. Rest at home for the next 24 hours, or until you feel better. Avoid caffeine, tobacco, and alcohol. These can make diarrhea, cramping, and pain worse.  If taking medicines:    Don t take over-the-counter diarrhea or nausea medicines unless your healthcare provider tells you to.    You may use acetaminophen or NSAID medicines like ibuprofen or naproxen to reduce pain and fever. Don t use these if you have chronic liver or kidney disease, or ever had a stomach ulcer or gastrointestinal bleeding. Don't use NSAID medicines if you are already taking one for another condition (like arthritis) or are on daily aspirin therapy (such as for heart disease or after a stroke). Talk with your healthcare provider first.    If antibiotics were prescribed, be sure you take them until they are finished. Don t stop taking them even when you feel better. Antibiotics must be taken as a full course.  To prevent the spread of illness:    Remember that washing with soap and water and using alcohol-based  is the best way to prevent the spread of infection.    Clean the toilet after each use.    Wash your hands before eating.    Wash your hands before and after preparing food. Keep in mind that people with diarrhea or vomiting should not prepare food for others.    Wash your hands after using cutting boards, countertops, and knives that have been in contact with raw foods.    Wash and then peel fruits and vegetables.    Keep uncooked meats away from cooked and ready-to-eat foods.    Use a food thermometer when cooking. Cook poultry to at least 165 F (74 C). Cook ground meat (beef, veal, pork, lamb) to at least  160 F (71 C). Cook fresh beef, veal, lamb, and pork to at least 145 F (63 C).    Don t eat raw or undercooked eggs (poached or sabina side up), poultry, meat, or unpasteurized milk and juices.  Food and drinks  The main goal while treating vomiting or diarrhea is to prevent dehydration. This is done by taking small amounts of liquids often.    Keep in mind that liquids are more important than food right now.    Drink only small amounts of liquids at a time.    Don t force yourself to eat, especially if you are having cramping, vomiting, or diarrhea. Don t eat large amounts at a time, even if you are hungry.    If you eat, avoid fatty, greasy, spicy, or fried foods.    Don t eat dairy foods or drink milk if you have diarrhea. These can make diarrhea worse.  During the first 24 hours you can try:    Oral rehydration solutions. Do not use sports drinks. They have too much sugar and not enough electrolytes.    Soft drinks without caffeine    Ginger ale    Water (plain or flavored)    Decaf tea or coffee    Clear broth, consommé, or bouillon    Gelatin, popsicles, or frozen fruit juice bars  The second 24 hours, if you are feeling better, you can add:    Hot cereal, plain toast, bread, rolls, or crackers    Plain noodles, rice, mashed potatoes, chicken noodle soup, or rice soup    Unsweetened canned fruit (no pineapple)    Bananas  As you recover:    Limit fat intake to less than 15 grams per day. Don t eat margarine, butter, oils, mayonnaise, sauces, gravies, fried foods, peanut butter, meat, poultry, or fish.    Limit fiber. Don t eat raw or cooked vegetables, fresh fruits except bananas, or bran cereals.    Limit caffeine and chocolate.    Limit dairy.    Don t use spices or seasonings except salt.    Go back to your normal diet over time, as you feel better and your symptoms improve.    If the symptoms come back, go back to a simple diet or clear liquids.  Follow-up care  Follow up with your healthcare provider, or as  advised. If a stool sample was taken or cultures were done, call the healthcare provider for the results as instructed.            Follow-ups after your visit        Your next 10 appointments already scheduled     Feb 26, 2018 10:00 AM CST   Evaluation with Evelina Bacon OT   [facility], [place], Occupational Therapy, Vision - Outpatie ([facility], [place])    [address]  9th Floor, Clinic [address]  [facility] [id number] [phone]            Jul 12, 2018  9:25 AM CDT   (Arrive by 9:10 AM)   Return Visit with Evelina Can MD   [facility] ([facility])    [address]  4th Floor  [facility] [address] [phone]              Future tests that were ordered for you today     Open Future Orders        Priority Expected Expires Ordered    Clostridium difficile toxin B PCR Routine 2/2/2018 2/8/2018 2/1/2018    Stool Enteric Bacteria and Virus Panel Routine 2/2/2018 2/8/2018 2/1/2018            Who to contact     Please call your clinic at [phone] to:    Ask questions about your health    Make or cancel appointments    Discuss your medicines    Learn about your test results    Speak to your doctor   If you have compliments or concerns about an experience at your clinic, or if you wish to file a complaint, please contact [facility] Patient Relations at [phone] or email us at [email or web address]         Additional Information About Your Visit        [employer] Information     [employer] gives you secure access to your electronic health record. If you see a primary care provider, you can also send messages to your care team and make appointments. If you have questions, please call your primary care clinic.  If you do not have a primary care provider, please call [phone] and they will assist you.      [employer] is an electronic gateway that provides easy, online  access to your medical records. With [employer], you can request a clinic appointment, read your test results, renew a prescription or communicate with your care team.     To access your existing account, please contact your [facility] or call [phone] for assistance.        Care EveryWhere ID     This is your Care EveryWhere ID. This could be used by other organizations to access your [place] medical records  [id number]        Your Vitals Were     Pulse Breastfeeding? BMI (Body Mass Index)             74 No 30.89 kg/m2          Blood Pressure from Last 3 Encounters:   02/02/18 133/76   01/29/18 123/78   01/08/18 127/76    Weight from Last 3 Encounters:   02/02/18 76.6 kg (168 lb 14.4 oz)   01/29/18 76.7 kg (169 lb 1.6 oz)   01/08/18 79.5 kg (175 lb 4.8 oz)              Today, you had the following     No orders found for display         Today's Medication Changes          These changes are accurate as of 2/2/18  1:18 PM.  If you have any questions, ask your nurse or doctor.               These medicines have changed or have updated prescriptions.        Dose/Directions    atorvastatin 10 MG tablet   Commonly known as:  LIPITOR   This may have changed:  when to take this   Used for:  Hyperlipidemia, unspecified hyperlipidemia type        Dose:  10 mg   Take 1 tablet (10 mg) by mouth daily   Quantity:  90 tablet   Refills:  3       levothyroxine 25 MCG tablet   Commonly known as:  SYNTHROID/LEVOTHROID   This may have changed:  when to take this   Used for:  Hypothyroidism due to acquired atrophy of thyroid        Dose:  25 mcg   Take 1 tablet (25 mcg) by mouth daily   Quantity:  90 tablet   Refills:  3       omeprazole 20 MG CR capsule   Commonly known as:  priLOSEC   This may have changed:  when to take this   Used for:  Abdominal pain, epigastric        Dose:  20 mg   Take 1 capsule (20 mg) by mouth daily   Quantity:  90 capsule   Refills:  3                Primary Care Provider  Office Phone # Fax #    Evelina Can [phone]       [address] [place] [address]  [facility] [address]        Equal Access to Services     KENDRA FLORES : Hadii aad ku hadazizaar Gayatrijeanne, wakayleenda luqtaryn, qaybta kamichaelleda jenny, jese limon moirachristiane reynakenna galvan. So [facility] [phone].    ATENCIÓN: Si habla español, tiene a johnson disposición servicios gratuitos de asistencia lingüística. Llame al [phone].    We comply with applicable federal civil rights laws and [place] laws. We do not discriminate on the basis of race, color, national origin, age, disability, sex, sexual orientation, or gender identity.            Thank you!     Thank you for choosing [facility]  for your care. Our goal is always to provide you with excellent care. Hearing back from our patients is one way we can continue to improve our services. Please take a few minutes to complete the written survey that you may receive in the mail after your visit with us. Thank you!             Your Updated Medication List - Protect others around you: Learn how to safely use, store and throw away your medicines at www.disposemymeds.org.          This list is accurate as of 2/2/18  1:18 PM.  Always use your most recent med list.                   Brand Name Dispense Instructions for use Diagnosis    aspirin 81 MG tablet      Take 1 tablet by mouth every evening        aspirin-dipyridamole  MG per 12 hr capsule    AGGRENOX    180 capsule    Take 1 capsule by mouth 2 times daily    Cerebrovascular disease       atorvastatin 10 MG tablet    LIPITOR    90 tablet    Take 1 tablet (10 mg) by mouth daily    Hyperlipidemia, unspecified hyperlipidemia type       azithromycin 250 MG tablet    ZITHROMAX    6 tablet    Take 1 tablet (250 mg) by mouth daily Take 2 tablets on day one, followed by one daily until gone    Cough       cholecalciferol 1000 UNIT tablet    vitamin D3     Take 1 tablet by mouth every  morning        dorzolamide-timolol 2-0.5 % ophthalmic solution    COSOPT    1 Bottle    Place 1 drop into both eyes 2 times daily    Chronic angle-closure glaucoma of both eyes, severe stage       fluticasone-salmeterol 100-50 MCG/DOSE diskus inhaler    ADVAIR    60 Inhaler    Inhale 1 puff into the lungs every 12 hours    Wheezing       gabapentin 300 MG capsule    NEURONTIN    180 capsule    Take 1 capsule (300 mg) by mouth 3 times daily    Degenerative disc disease, cervical       hydrochlorothiazide 25 MG tablet    HYDRODIURIL    90 tablet    Take 1 tablet (25 mg) by mouth daily    Hyperlipidemia, unspecified hyperlipidemia type, Essential hypertension, benign       latanoprost 0.005 % ophthalmic solution    XALATAN    1 Bottle    Place 1 drop into both eyes At Bedtime    Chronic angle-closure glaucoma of both eyes, severe stage       levothyroxine 25 MCG tablet    SYNTHROID/LEVOTHROID    90 tablet    Take 1 tablet (25 mcg) by mouth daily    Hypothyroidism due to acquired atrophy of thyroid       meclizine 12.5 MG tablet    ANTIVERT    30 tablet    Take 1 tablet (12.5 mg) by mouth 4 times daily as needed for dizziness        omeprazole 20 MG CR capsule    priLOSEC    90 capsule    Take 1 capsule (20 mg) by mouth daily    Abdominal pain, epigastric       ondansetron 4 MG ODT tab    ZOFRAN-ODT    30 tablet    Place 1 tablet (4 mg) under the tongue every 8 hours as needed for nausea    Nausea       oseltamivir 30 MG capsule    TAMIFLU    10 capsule    Take 1 capsule (30 mg) by mouth daily    Influenza B       Potassium Chloride ER 20 MEQ Tbcr     10 tablet    Take 2 tablets (40 mEq) by mouth daily    Hypokalemia       TYLENOL 500 MG tablet   Generic drug:  acetaminophen      Take 2 tablets by mouth 2 times daily.

## 2018-02-03 DIAGNOSIS — R19.7 DIARRHEA OF PRESUMED INFECTIOUS ORIGIN: ICD-10-CM

## 2018-02-03 LAB
C COLI+JEJUNI+LARI FUSA STL QL NAA+PROBE: NOT DETECTED
C DIFF TOX B STL QL: NEGATIVE
EC STX1 GENE STL QL NAA+PROBE: NOT DETECTED
EC STX2 GENE STL QL NAA+PROBE: NOT DETECTED
ENTERIC PATHOGEN COMMENT: NORMAL
NOROV GI+II ORF1-ORF2 JNC STL QL NAA+PR: NOT DETECTED
RVA NSP5 STL QL NAA+PROBE: NOT DETECTED
SALMONELLA SP RPOD STL QL NAA+PROBE: NOT DETECTED
SHIGELLA SP+EIEC IPAH STL QL NAA+PROBE: NOT DETECTED
SPECIMEN SOURCE: NORMAL
V CHOL+PARA RFBL+TRKH+TNAA STL QL NAA+PR: NOT DETECTED
Y ENTERO RECN STL QL NAA+PROBE: NOT DETECTED

## 2018-02-03 PROCEDURE — 87493 C DIFF AMPLIFIED PROBE: CPT | Performed by: INTERNAL MEDICINE

## 2018-02-03 PROCEDURE — 87506 IADNA-DNA/RNA PROBE TQ 6-11: CPT | Performed by: INTERNAL MEDICINE

## 2018-02-05 ENCOUNTER — TELEPHONE (OUTPATIENT)
Dept: AUDIOLOGY | Facility: CLINIC | Age: 83
End: 2018-02-05

## 2018-02-05 NOTE — TELEPHONE ENCOUNTER
Azeb Torres's daughter, Barbi, brought her hearing aids to the [facility] on 2/5/18 to be cleaned and checked.  Both hearing aids and earmolds were cleaned and the  filters were replaced.  The right hearing aid was found to be working normally but the left device was found to be intermittent.  It is being sent to the  for repair.  Barbi will be contacted to  the repaired device and pay the $350.00 repair charge.

## 2018-02-06 DIAGNOSIS — E03.4 HYPOTHYROIDISM DUE TO ACQUIRED ATROPHY OF THYROID: ICD-10-CM

## 2018-02-06 NOTE — TELEPHONE ENCOUNTER
levothyroxine (SYNTHROID/LEVOTHROID) 25 MCG tablet      Last Written Prescription Date:  1/25/17  Last Fill Quantity: 90,   # refills: 3  Last Office Visit : 2/2/18  Future Office visit:  7/12/18    Routing refill request to provider for review/approval because:  Med failed protocol-lab.      2/7/18 TSH ordered, my charge message to inform patient to come in for lab visit.  Beverley Jacob RN

## 2018-02-07 ENCOUNTER — MYC MEDICAL ADVICE (OUTPATIENT)
Dept: INTERNAL MEDICINE | Facility: CLINIC | Age: 83
End: 2018-02-07

## 2018-02-07 RX ORDER — LEVOTHYROXINE SODIUM 25 UG/1
25 TABLET ORAL DAILY
Qty: 90 TABLET | Refills: 0 | Status: SHIPPED | OUTPATIENT
Start: 2018-02-07 | End: 2018-05-14

## 2018-02-12 DIAGNOSIS — R11.0 NAUSEA: ICD-10-CM

## 2018-02-12 RX ORDER — ONDANSETRON 4 MG/1
4 TABLET, ORALLY DISINTEGRATING ORAL EVERY 8 HOURS PRN
Qty: 30 TABLET | Refills: 1 | Status: SHIPPED | OUTPATIENT
Start: 2018-02-12 | End: 2018-04-02

## 2018-03-01 DIAGNOSIS — E78.5 HYPERLIPIDEMIA, UNSPECIFIED HYPERLIPIDEMIA TYPE: ICD-10-CM

## 2018-03-01 RX ORDER — ATORVASTATIN CALCIUM 10 MG/1
10 TABLET, FILM COATED ORAL DAILY
Qty: 90 TABLET | Refills: 3 | Status: SHIPPED | OUTPATIENT
Start: 2018-03-01 | End: 2019-02-24

## 2018-03-02 ENCOUNTER — OFFICE VISIT (OUTPATIENT)
Dept: AUDIOLOGY | Facility: CLINIC | Age: 83
End: 2018-03-02

## 2018-03-02 DIAGNOSIS — H90.3 SENSORY HEARING LOSS, BILATERAL: Primary | ICD-10-CM

## 2018-03-05 ENCOUNTER — HOSPITAL ENCOUNTER (OUTPATIENT)
Dept: OCCUPATIONAL THERAPY | Facility: CLINIC | Age: 83
Discharge: HOME | End: 2018-03-05
Attending: OCCUPATIONAL THERAPIST | Admitting: OCCUPATIONAL THERAPIST
Payer: MEDICARE

## 2018-03-05 PROCEDURE — 40000249 ZZH STATISTIC VISIT LOW VISION CLINIC: Performed by: OCCUPATIONAL THERAPIST

## 2018-03-05 PROCEDURE — 97535 SELF CARE MNGMENT TRAINING: CPT | Mod: GO | Performed by: OCCUPATIONAL THERAPIST

## 2018-03-05 PROCEDURE — G8988 SELF CARE GOAL STATUS: HCPCS | Mod: GO,CI | Performed by: OCCUPATIONAL THERAPIST

## 2018-03-05 PROCEDURE — G8987 SELF CARE CURRENT STATUS: HCPCS | Mod: GO,CJ | Performed by: OCCUPATIONAL THERAPIST

## 2018-03-05 PROCEDURE — 97166 OT EVAL MOD COMPLEX 45 MIN: CPT | Mod: GO | Performed by: OCCUPATIONAL THERAPIST

## 2018-03-05 PROCEDURE — 97530 THERAPEUTIC ACTIVITIES: CPT | Mod: GO | Performed by: OCCUPATIONAL THERAPIST

## 2018-03-05 ASSESSMENT — ACTIVITIES OF DAILY LIVING (ADL)
ADL_EQUIPMENT_USED: GRAB BAR
PRIOR_ADL/IADL_STATUS: IMPAIRED PRIOR TO ONSET

## 2018-03-05 ASSESSMENT — VISUAL ACUITY
OD: 20/30
OS: 20/60

## 2018-03-05 NOTE — PROGRESS NOTES
[facility]          OUTPATIENT OCCUPATIONALTHERAPY  EVALUATION  PLAN OF TREATMENT FOR OUTPATIENT REHABILITATION  (COMPLETE FOR INITIAL CLAIMS ONLY)  Patient's Last Name, First Name, M.I.  YOB: 1925  Azeb Torres      Provider's Name  [facility] Medical Record No.  [id number]   Onset Date:  01/11/2018 Start of Care Date:  03/05/18   Type:     ___PT  _X_OT   ___SLP Medical Diagnosis:  homonymous hemianopia, glaucoma, AMD   Therapy Diagnosis: Impaired ADL/IADL with deficits in Reading based ADL, Written communication, Eating (safety in mobility, eating, light and glare managment, phone)    Visits from SOC: 1     _________________________________________________________________________________  Plan of Treatment/Functional Goals:  Planned Interventions: Visual field loss awareness, Visual skills training for near tasks, Visual skills training for safety in mobility, Low vision compensatory training for reading, Low vision compensatory training for written communication, Instruction in environmental adaptations for glare, Instruction in environmental adaptations for lighting, Optical device/ADL device instruction and training, Instruction in community resources (instruction in iphone use)        Goals  1. Patient will verbalize awareness of visual field Loss and demonstrate improved use of visual skills/adaptive equipment for increased independence in reading-based activities of daily living, written communication and detail ADL tasks.         Target Date: 05/28/18      2. Patient will verbalize awareness of visual field loss and demonstrate improved use of visual skills for increased safety/ independence in locating objects/obstacles and in navigation         Target Date: 05/28/18      3.  Patient will demonstrate understanding of the impact of lighting, contrast and glare on  ADL activities, in conjunction with environmentally-based ADL modifications         Target Date: 05/28/18      4. Patient will verbalize awareness of community resources for the following:, Audio access to print materials, Access to large print materials, Access to low vision devices         Target Date: 05/28/18      5.                   6.                    7.                 8.                            Therapy Frequency/Duration:  4 tx visits over 12 weeks - 1X every 3 weeks for 12 weeks    Evelina Bacon, OT       I CERTIFY THE NEED FOR THESE SERVICES FURNISHED UNDER        THIS PLAN OF TREATMENT AND WHILE UNDER MY CARE     (Physician co-signature of this document indicates review and certification of the therapy plan).                  Certification date from: 03/05/18 Certification date to: 05/28/18          Referring Physician: MD Bienvenido     Initial Assessment        See Epic Evaluation Start Of Care Date: 03/05/18     Evelina Bacon

## 2018-03-05 NOTE — MR AVS SNAPSHOT
After Visit Summary   3/5/2018    Azeb Torres    MRN: [id number]           Patient Information     Date Of Birth          4/3/1925        Visit Information        Provider Department      3/5/2018 10:00 AM Evelina Bacon OT [facility]         Follow-ups after your visit        Your next 10 appointments already scheduled     Mar 15, 2018  9:30 AM CDT   Treatment 60 with Evelina A Ruff, OT   [facility] ([facility])    [address] [place] [address], [address] [address]   [phone]            Mar 21, 2018 10:00 AM CDT   Treatment 60 with Evelina A Ruff, OT   [facility] [place] [facility] ([facility])    [address] [place] [address], [address] [address]   [phone]            Mar 29, 2018 10:00 AM CDT   Treatment 60 with Evelina A Ruff, OT   [facility] [place] [facility] ([facility])    [address] [place] [address], [address] [address]   [phone]            Apr 02, 2018  2:10 PM CDT   (Arrive by 1:55 PM)   Return Visit with Evelina Can MD   [facility] ([facility])    [address] [place] [id number] [phone]            Jul 12, 2018  9:25 AM CDT   (Arrive by 9:10 AM)   Return Visit with Evelina Can MD   [facility] ([facility])    [address] [place] [id number] [phone]              Who to contact     If you have questions or need follow up information about today's clinic visit or your schedule please contact [facility] directly at  [phone].  Normal or non-critical lab and imaging results will be communicated to you by MyChart, letter or phone within 4 business days after the clinic has received the results. If you do not hear from us within 7 days, please contact the clinic through [employer] or phone. If you have a critical or abnormal lab result, we will notify you by phone as soon as possible.  Submit refill requests through [employer] or call your pharmacy and they will forward the refill request to us. Please allow 3 business days for your refill to be completed.          Additional Information About Your Visit        [employer] Information     [employer] gives you secure access to your electronic health record. If you see a primary care provider, you can also send messages to your care team and make appointments. If you have questions, please call your primary care clinic.  If you do not have a primary care provider, please call [phone] and they will assist you.        Care EveryWhere ID     This is your Care EveryWhere ID. This could be used by other organizations to access your [facility] medical records  [id number]         Blood Pressure from Last 3 Encounters:   02/02/18 133/76   01/29/18 123/78   01/08/18 127/76    Weight from Last 3 Encounters:   02/02/18 76.6 kg (168 lb 14.4 oz)   01/29/18 76.7 kg (169 lb 1.6 oz)   01/08/18 79.5 kg (175 lb 4.8 oz)              Today, you had the following     No orders found for display       Primary Care Provider Office Phone # Fax #    Evelina Can [phone]       [address] [address]        Equal Access to Services     FRANKY FLORES : Hadii buddy Grady, waaxda emiliana, qaybta jessicaaljese ledezma. So [facility] [phone].    ATENCIÓN: Si habla español, tiene a johnson disposición servicios gratuitos de asistencia lingüística. Llame al [phone].    We comply with applicable federal civil rights  laws and [place] laws. We do not discriminate on the basis of race, color, national origin, age, disability, sex, sexual orientation, or gender identity.            Thank you!     Thank you for choosing [facility], [facility], [facility]  for your care. Our goal is always to provide you with excellent care. Hearing back from our patients is one way we can continue to improve our services. Please take a few minutes to complete the written survey that you may receive in the mail after your visit with us. Thank you!             Your Updated Medication List - Protect others around you: Learn how to safely use, store and throw away your medicines at www.disposemymeds.org.      ASK your doctor about these medications        Brand Name Dispense Instructions for use Diagnosis    aspirin 81 MG tablet      Take 1 tablet by mouth every evening        aspirin-dipyridamole  MG per 12 hr capsule    AGGRENOX    180 capsule    Take 1 capsule by mouth 2 times daily    Cerebrovascular disease       atorvastatin 10 MG tablet    LIPITOR    90 tablet    Take 1 tablet (10 mg) by mouth daily    Hyperlipidemia, unspecified hyperlipidemia type       azithromycin 250 MG tablet    ZITHROMAX    6 tablet    Take 1 tablet (250 mg) by mouth daily Take 2 tablets on day one, followed by one daily until gone    Cough       cholecalciferol 1000 UNIT tablet    vitamin D3     Take 1 tablet by mouth every morning        dorzolamide-timolol 2-0.5 % ophthalmic solution    COSOPT    1 Bottle    Place 1 drop into both eyes 2 times daily    Chronic angle-closure glaucoma of both eyes, severe stage       fluticasone-salmeterol 100-50 MCG/DOSE diskus inhaler    ADVAIR    60 Inhaler    Inhale 1 puff into the lungs every 12 hours    Wheezing       gabapentin 300 MG capsule    NEURONTIN    180 capsule    Take 1 capsule (300 mg) by mouth 3 times daily    Degenerative disc disease, cervical       hydrochlorothiazide 25 MG tablet     HYDRODIURIL    90 tablet    Take 1 tablet (25 mg) by mouth daily    Hyperlipidemia, unspecified hyperlipidemia type, Essential hypertension, benign       latanoprost 0.005 % ophthalmic solution    XALATAN    1 Bottle    Place 1 drop into both eyes At Bedtime    Chronic angle-closure glaucoma of both eyes, severe stage       levothyroxine 25 MCG tablet    SYNTHROID/LEVOTHROID    90 tablet    Take 1 tablet (25 mcg) by mouth daily    Hypothyroidism due to acquired atrophy of thyroid       meclizine 12.5 MG tablet    ANTIVERT    30 tablet    Take 1 tablet (12.5 mg) by mouth 4 times daily as needed for dizziness        omeprazole 20 MG CR capsule    priLOSEC    90 capsule    Take 1 capsule (20 mg) by mouth daily    Abdominal pain, epigastric       ondansetron 4 MG ODT tab    ZOFRAN-ODT    30 tablet    Place 1 tablet (4 mg) under the tongue every 8 hours as needed for nausea    Nausea       oseltamivir 30 MG capsule    TAMIFLU    10 capsule    Take 1 capsule (30 mg) by mouth daily    Influenza B       Potassium Chloride ER 20 MEQ Tbcr     10 tablet    Take 2 tablets (40 mEq) by mouth daily    Hypokalemia       TYLENOL 500 MG tablet   Generic drug:  acetaminophen      Take 2 tablets by mouth 2 times daily.

## 2018-03-05 NOTE — PROGRESS NOTES
03/05/18 1000   Visit Type   Type of Visit Initial       Present No   General Information   Start Of Care Date 03/05/18   Referring Physician MD Bienvenido   Orders Evaluate And Treat As Indicated   Date of Order 01/11/18   Medical Diagnosis AMD, Glaucoma, homonymous hemianopia   Onset Of Illness/injury Or Date Of Surgery 01/11/2018   Surgical/Medical history reviewed (yes)   Precautions/Limitations carpal tunnel syndrome, (surgery), light headedness (worst in the morning) , hearing loss   Additional Occupational Profile Info/Pertinent History of Current Problem stroke - affected right leg, right heminopia from stroke in 2004   Prior ADL/IADL status Impaired prior to onset   Others present at visit (Supriya Arndt)   Patient/family Goals Statement safety in navigation, reading, cell phone   Social History/Home Environment   Living Environment House/townhome  (lives with dtr - main level - is moving up stairs)   Current Community Support (home alone 8 hours or more)   Patient Role/employment History  Retired   Avocational word finds, light homemaking   Pain Assessment   Pain Reported No   Fall Risk Screen   Fall screen completed by OT   Have you fallen 2 or more times in the past year? No   Have you fallen and had an injury in the past year? No   Is patient a fall risk? Yes   Fall screen comments reports light-headedness and decreased vision interfere with balance   Cognitive/Behavioral   Communication Intact   Cognitive Status Impaired   Behavior Appropriate   Patient/family aware of diagnosis Yes   How well do you understand your eye condition? Not well   Vision related restrictions on visiting with family/friends Mild   Reported emotional impact of visual impairment Moderate   Adjustment to disability Good   Physical Status/Equipment   Physical Status Weakness  (carpal tunnel surgery)   Mobility equipment used Support cane;Walker   ADL Equipment used Grab bar   Visual Report   Functional  Complaints Reading;Writing;Safety in mobility  (management of light, managment of glare, phone communication)   Visual Complaints Constricted visual fields right eye;Constricted visual fields left eye;Visual fatigue;Light sensitivity   Baldo Bonnet Symptoms? No   Magnifier (strength and type) none   Reading glasses Single Lens  (persciption reading glasses)   Technology (cell phone - iphone)   Lighting and Glare   Is your lighting adequate? Yes/ at home   Is glare a problem? Yes/ outdoors;No/ indoors   Are you satisfied with your sunglasses? Yes   Sunglass filter color (brown lenses)   Visual Acuity   Acuity right eye 20/30   Acuity left eye 20/60   Contrast Sensitivity   Contrast sensitivity (score/25) 25/25   [place] Read   Smallest print size read 1.6M   Critical print size 2.5M without reading glasses (pt did not have reading glasses with her)   Words per minute at critical print size 54   Dynavision: Evaluation of visual skills/search of extra personal space via 5X4 foot computerized light board with 64 stimuli.  The user reacts as quickly as possible by striking the lights as they turn on in random succession.   Dynavision Cascade Dynavision Mode A (single stimulus attention, 1 minute   Dynavision Mode A (single stimulus attention, 1 minute)   Patient Score (Mode A, 1 min) 21, 25   Dynavision Mode A (SSA, 1 Min) Comment slowed response time to right side.  Lower right quadrant was the most impacted   SRAFVP SCORING   # relevant measures 27   Composite score 38   Percentage of disability (%) 29.63   Education   Learner Patient  (granddaughter)   Readiness Eager;Acceptance   Method Explanation;Demonstration   Response Verbalizes understanding;Needs reinforcement   Clinical Impression, OT Eval   Criteria for Skilled Therapeutic Interventions Met yes;treatment indicated   Therapy  Diagnosis: Impaired ADL/IADL with deficits in Reading based ADL;Written communication;Eating  (safety in mobility, eating, light and glare  managment, phone)   Assessment of Occupational Performance 5 or more Performance Deficits   Identified Performance Deficits as above   Clinical Decision Making (Complexity) Moderate complexity   Clinical Impression Comments recent carpal tunnel surgery, history of CVA with hemianopia, in additional to glaucoma and AMD   OT Visual Rehabilitation Evaluation Plan   Therapy Plan Occupational therapy intervention   Planned Interventions Visual field loss awareness;Visual skills training for near tasks;Visual skills training for safety in mobility;Low vision compensatory training for reading;Low vision compensatory training for written communication;Instruction in environmental adaptations for glare;Instruction in environmental adaptations for lighting;Optical device/ADL device instruction and training;Instruction in community resources  (instruction in iphone use)   Frequency / Duration 4 tx visits over 12 weeks - 1X every 3 weeks for 12 weeks   Risks and Benefits of Treatment have been explained. Yes   Patient, Family in agreement with plan of care Yes   GOALS   Goals Near Vision;Visual Field;Environmental Modification;Resource Education   Goals Addressed this Session Near vision;Visual field   Goal 1 - Near Vision   Goal Description: Near Vision Patient will verbalize awareness of visual field Loss and demonstrate improved use of visual skills/adaptive equipment for increased independence in reading-based activities of daily living, written communication and detail ADL tasks.   Target Date 05/28/18   Goal 2 - Visual field   Goal Description: Visual field Patient will verbalize awareness of visual field loss and demonstrate improved use of visual skills for increased safety/ independence in locating objects/obstacles and in navigation   Target Date 05/28/18   Goal 3 - Environmental modification   Goal Description: Environment modification Patient will demonstrate understanding of the impact of lighting, contrast and  glare on ADL activities, in conjunction with environmentally-based ADL modifications   Target Date 05/28/18   Goal 4 - Resource education   Goal Description: Resource education Patient will verbalize awareness of community resources for the following:;Audio access to print materials;Access to large print materials;Access to low vision devices   Target Date 05/28/18   Total Evaluation Time   Total Evaluation Time 60   Therapy Certification   Certification date from 03/05/18   Certification date to 05/28/18   Medical Diagnosis homonymous hemianopia, glaucoma, AMD

## 2018-04-02 ENCOUNTER — OFFICE VISIT (OUTPATIENT)
Dept: INTERNAL MEDICINE | Facility: CLINIC | Age: 83
End: 2018-04-02
Payer: MEDICARE

## 2018-04-02 VITALS
BODY MASS INDEX: 31.92 KG/M2 | WEIGHT: 174.5 LBS | DIASTOLIC BLOOD PRESSURE: 81 MMHG | SYSTOLIC BLOOD PRESSURE: 149 MMHG | HEART RATE: 73 BPM

## 2018-04-02 DIAGNOSIS — R42 DIZZINESS: Primary | ICD-10-CM

## 2018-04-02 DIAGNOSIS — E03.9 HYPOTHYROIDISM, UNSPECIFIED TYPE: ICD-10-CM

## 2018-04-02 DIAGNOSIS — R11.0 NAUSEA: ICD-10-CM

## 2018-04-02 LAB — TSH SERPL DL<=0.005 MIU/L-ACNC: 2.88 MU/L (ref 0.4–4)

## 2018-04-02 RX ORDER — ONDANSETRON 4 MG/1
4 TABLET, ORALLY DISINTEGRATING ORAL EVERY 8 HOURS PRN
Qty: 30 TABLET | Refills: 1 | Status: SHIPPED | OUTPATIENT
Start: 2018-04-02 | End: 2023-08-30

## 2018-04-02 ASSESSMENT — PAIN SCALES - GENERAL: PAINLEVEL: NO PAIN (0)

## 2018-04-02 NOTE — NURSING NOTE
Chief Complaint   Patient presents with     Recheck Medication     Patient here for medication recheck.      Dizziness     Patient here for dizziness.      Galileo Zeng CMA at 2:13 PM on 4/2/2018.

## 2018-04-02 NOTE — MR AVS SNAPSHOT
After Visit Summary   4/2/2018    Azeb Torres    MRN: [id number]           Patient Information     Date Of Birth          4/3/1925        Visit Information        Provider Department      4/2/2018 2:10 PM Evelina Can MD [facility]        Today's Diagnoses     Dizziness    -  1    Nausea        Hypothyroidism, unspecified type          Care Instructions    Primary Care Center: [phone]     [facility] Medication Refill Request Information:  * Please contact your pharmacy regarding ANY request for medication refills.  ** [facility] Prescription Fax = [phone]  * Please allow 3 business days for routine medication refills.  * Please allow 5 business days for controlled substance medication refills.     Primary Care Center Test Result notification information:  *You will be notified with in 7-10 days of your appointment day regarding the results of your test.  If you are on MyChart you will be notified as soon as the provider has reviewed the results and signed off on them.        Please try stopping omeprazole.  Taper off gabapentin.  Start taking 300 mg once daily for one week, then 300 mg every other day for one week.  If you develop increasing leg pain, stomach pain, nausea, etc, please call the clinic.          Follow-ups after your visit        Follow-up notes from your care team     Return in about 4 weeks (around 4/30/2018) for Routine Visit, f/u dizziness.      Your next 10 appointments already scheduled     May 03, 2018  8:25 AM CDT   (Arrive by 8:10 AM)   Return Visit with Evelina Can MD   [facility] ([facility])    [address] [place] [id number] [phone]            Jul 12, 2018  9:25 AM CDT   (Arrive by 9:10 AM)   Return Visit with Evelina Can MD   [facility] ([facility])    [address] [place] [address]  [address] [phone]              Future tests that were ordered for you today     Open Future Orders        Priority Expected Expires Ordered    TSH with free T4 reflex Routine 4/2/2018 4/16/2018 4/2/2018            Who to contact     Please call your clinic at [phone] to:    Ask questions about your health    Make or cancel appointments    Discuss your medicines    Learn about your test results    Speak to your doctor            Additional Information About Your Visit        [employer] Information     [employer] gives you secure access to your electronic health record. If you see a primary care provider, you can also send messages to your care team and make appointments. If you have questions, please call your primary care clinic.  If you do not have a primary care provider, please call [phone] and they will assist you.      [employer] is an electronic gateway that provides easy, online access to your medical records. With [employer], you can request a clinic appointment, read your test results, renew a prescription or communicate with your care team.     To access your existing account, please contact your [facility] or call [phone] for assistance.        Care EveryWhere ID     This is your Care EveryWhere ID. This could be used by other organizations to access your [facility] medical records  [id number]        Your Vitals Were     Pulse Breastfeeding? BMI (Body Mass Index)             73 No 31.92 kg/m2          Blood Pressure from Last 3 Encounters:   04/02/18 149/81   02/02/18 133/76   01/29/18 123/78    Weight from Last 3 Encounters:   04/02/18 79.2 kg (174 lb 8 oz)   02/02/18 76.6 kg (168 lb 14.4 oz)   01/29/18 76.7 kg (169 lb 1.6 oz)                 Today's Medication Changes          These changes are accurate as of 4/2/18  3:04 PM.  If you have any questions, ask your nurse or doctor.               These medicines have changed or have updated prescriptions.         Dose/Directions    omeprazole 20 MG CR capsule   Commonly known as:  priLOSEC   This may have changed:  when to take this   Used for:  Abdominal pain, epigastric        Dose:  20 mg   Take 1 capsule (20 mg) by mouth daily   Quantity:  90 capsule   Refills:  3         Stop taking these medicines if you haven't already. Please contact your care team if you have questions.     azithromycin 250 MG tablet   Commonly known as:  ZITHROMAX   Stopped by:  vEelina Can MD           fluticasone-salmeterol 100-50 MCG/DOSE diskus inhaler   Commonly known as:  ADVAIR   Stopped by:  Evelina Can MD           oseltamivir 30 MG capsule   Commonly known as:  TAMIFLU   Stopped by:  Evelina Can MD           Potassium Chloride ER 20 MEQ Tbcr   Stopped by:  Evelina Can MD                Where to get your medicines      These medications were sent to [address] [place] [address] [address] [place] [id number]    Hours:  TRANSPLANT PHONE NUMBER [phone] Phone:  [phone]     ondansetron 4 MG ODT tab                Primary Care Provider Office Phone # Fax #    Evelina Can [phone]       [address] [place] [address]        Equal Access to Services     KENDRA FLORES AH: Hadii buddy garcia hadasho Soomaali, waaxda luqadaha, qaybta kaalmada adeegyada, jese gustafson hayevann javed galvan. So [facility] [phone].    ATENCIÓN: Si habla español, tiene a johnson disposición servicios gratuitos de asistencia lingüística. Llame al [phone].    We comply with applicable federal civil rights laws and [place] laws. We do not discriminate on the basis of race, color, national origin, age, disability, sex, sexual orientation, or gender identity.            Thank you!     Thank you for choosing [facility]  for your care. Our goal is always to provide you with excellent care. Hearing  back from our patients is one way we can continue to improve our services. Please take a few minutes to complete the written survey that you may receive in the mail after your visit with us. Thank you!             Your Updated Medication List - Protect others around you: Learn how to safely use, store and throw away your medicines at www.disposemymeds.org.          This list is accurate as of 4/2/18  3:04 PM.  Always use your most recent med list.                   Brand Name Dispense Instructions for use Diagnosis    aspirin 81 MG tablet      Take 1 tablet by mouth every evening        aspirin-dipyridamole  MG per 12 hr capsule    AGGRENOX    180 capsule    Take 1 capsule by mouth 2 times daily    Cerebrovascular disease       atorvastatin 10 MG tablet    LIPITOR    90 tablet    Take 1 tablet (10 mg) by mouth daily    Hyperlipidemia, unspecified hyperlipidemia type       cholecalciferol 1000 UNIT tablet    vitamin D3     Take 1 tablet by mouth every morning        dorzolamide-timolol 2-0.5 % ophthalmic solution    COSOPT    1 Bottle    Place 1 drop into both eyes 2 times daily    Chronic angle-closure glaucoma of both eyes, severe stage       gabapentin 300 MG capsule    NEURONTIN    180 capsule    Take 1 capsule (300 mg) by mouth 3 times daily    Degenerative disc disease, cervical       hydrochlorothiazide 25 MG tablet    HYDRODIURIL    90 tablet    Take 1 tablet (25 mg) by mouth daily    Hyperlipidemia, unspecified hyperlipidemia type, Essential hypertension, benign       latanoprost 0.005 % ophthalmic solution    XALATAN    1 Bottle    Place 1 drop into both eyes At Bedtime    Chronic angle-closure glaucoma of both eyes, severe stage       levothyroxine 25 MCG tablet    SYNTHROID/LEVOTHROID    90 tablet    Take 1 tablet (25 mcg) by mouth daily    Hypothyroidism due to acquired atrophy of thyroid       meclizine 12.5 MG tablet    ANTIVERT    30 tablet    Take 1 tablet (12.5 mg) by mouth 4 times daily as  needed for dizziness        omeprazole 20 MG CR capsule    priLOSEC    90 capsule    Take 1 capsule (20 mg) by mouth daily    Abdominal pain, epigastric       ondansetron 4 MG ODT tab    ZOFRAN-ODT    30 tablet    Place 1 tablet (4 mg) under the tongue every 8 hours as needed for nausea    Nausea       TYLENOL 500 MG tablet   Generic drug:  acetaminophen      Take 2 tablets by mouth 2 times daily.

## 2018-04-02 NOTE — PROGRESS NOTES
"[facility]  Primary Care Center   Established Patient Encounter   Evelina Can MD  04/02/2018      Chief Complaint:   Recheck Medication and Dizziness    History of Present Illness:   Azeb Torres is a 89 year old female with a history of breast and uterine cancer, CVA, hypertension, and tricuspid regurgitation who presents to clinic for evaluation of lightheadedness. She describes increased feelings of lightheadedness recently, though she is not sure how long this has been going on. Her daughters feel that this has been an ongoing issue for a few years. She did have a hospitalization in 2015 for dizziness during which time PT eval for BPPV/vertigo was normal, as were CT and MRI of brain.  She did have hypotension and 1st degree AVB and her atenolol was discontinued.  She describes this is worse in the mornings, especially with waking up and getting out of bed. This is manageable when she moves between positions more slowly, noting that she pauses after sitting up, to \"get her head in the right place\" before going on to standing. She is mildly off balanced with walking, but does not feel that her lightheadedness has caused any falls recently. She denies chest pain, heart racing, or flutters. She denies tingling in her lower extremities. Zofran PO has been somewhat helpful for this feeling; she is wondering about a prescription for this.  Notably, she was placed on Gabapentin in 2013 for nerve pain in her leg. Additionally, she is on a diuretic for her hypertension. Her last EKG was in 11/17 which showed stable 1st degree AVB.       Review of Systems:   Pertinent items are noted in HPI, remainder of complete ROS is negative.      Active Medications:     Current Outpatient Prescriptions:      ondansetron (ZOFRAN-ODT) 4 MG ODT tab, Place 1 tablet (4 mg) under the tongue every 8 hours as needed for nausea, Disp: 30 tablet, Rfl: 1     atorvastatin (LIPITOR) 10 MG tablet, Take 1 tablet (10 mg) by mouth daily, Disp: " 90 tablet, Rfl: 3     levothyroxine (SYNTHROID/LEVOTHROID) 25 MCG tablet, Take 1 tablet (25 mcg) by mouth daily, Disp: 90 tablet, Rfl: 0     hydrochlorothiazide (HYDRODIURIL) 25 MG tablet, Take 1 tablet (25 mg) by mouth daily, Disp: 90 tablet, Rfl: 1     gabapentin (NEURONTIN) 300 MG capsule, Take 1 capsule (300 mg) by mouth 3 times daily, Disp: 180 capsule, Rfl: 1     aspirin-dipyridamole (AGGRENOX)  MG per 12 hr capsule, Take 1 capsule by mouth 2 times daily, Disp: 180 capsule, Rfl: 3     dorzolamide-timolol (COSOPT) 2-0.5 % ophthalmic solution, Place 1 drop into both eyes 2 times daily, Disp: 1 Bottle, Rfl: 11     latanoprost (XALATAN) 0.005 % ophthalmic solution, Place 1 drop into both eyes At Bedtime, Disp: 1 Bottle, Rfl: 11     omeprazole (PRILOSEC) 20 MG CR capsule, Take 1 capsule (20 mg) by mouth daily (Patient taking differently: Take 20 mg by mouth every morning ), Disp: 90 capsule, Rfl: 3     meclizine (ANTIVERT) 12.5 MG tablet, Take 1 tablet (12.5 mg) by mouth 4 times daily as needed for dizziness, Disp: 30 tablet, Rfl: 0     aspirin 81 MG tablet, Take 1 tablet by mouth every evening , Disp: , Rfl:      acetaminophen (TYLENOL) 500 MG tablet, Take 2 tablets by mouth 2 times daily., Disp: , Rfl:      cholecalciferol (VITAMIN D) 1000 UNIT tablet, Take 1 tablet by mouth every morning , Disp: , Rfl:       Allergies:   Review of patient's allergies indicates no known allergies.      Past Medical History:  History of breast cancer, ER positive   History of endometrial cancer   Pulmonary artery hypertension, noted on echo (2013)  Tricuspid regurgitation   Cerebral vascular accident  Degenerative joint disease  Glaucoma      Past Surgical History:  Hysterectomy   Lumpectomy   Bilateral hip replacements   Carpal tunnel release, right   Cataracts     Family History:   Father - coronary artery disease       Social History:   No history of tobacco use.   No alcohol use.      Physical Exam:   /81  Pulse  73  Wt 79.2 kg (174 lb 8 oz)  Breastfeeding? No  BMI 31.92 kg/m2   Constitutional: Alert, oriented, pleasant, no acute distress  Head: Normocephalic, atraumatic  Eyes: Extra-ocular movements intact, no scleral icterus  ENT: Oropharynx clear, moist mucus membranes, good dentition  Neck: Supple, no lymphadenopathy, no thyromegaly   Cardiovascular: Regular rate and rhythm, no murmurs, rubs or gallops, peripheral pulses full/symmetric  Respiratory: Good air movement bilaterally, lungs clear, no wheezes/rales/rhonchi  Musculoskeletal: No edema, normal muscle tone, slow gait, able to get onto exam table with minimal assistance   Neurologic: Alert and oriented, cranial nerves 2-12 intact. Negative Romberg's sign. Sensation grossly intact. No nystagmus.   Psychiatric: normal mentation, affect and mood    Assessment and Plan:  Lightheadedness and nausea  Symptoms not c/w vertigo. Her strength/proprioreception/neuro exam are nonfocal. Discussed possible etiologies including medication side effects, orthostasis, possibly cardiac (though patient reports no changes and symptoms are chronic). We will change some of her medications today. Discontinue Omeprazole. Taper off Gabapentin over the course of 2-3 weeks, gave instructions on AVS. She will recheck in with me at that time. If she is still having trouble, we will discuss changing her hypertension medications and/or cardiac evaluation.  - ondansetron (ZOFRAN-ODT) 4 MG ODT tab  Dispense: 30 tablet; Refill: 1    Thyroid   Annual thyroid check due. Order for TSH today.        Follow-up: Return in about 4 weeks (around 4/30/2018) for Routine Visit, f/u dizziness.         Scribe Disclosure:   Dea CHAKRABORTY, am serving as a scribe to document services personally performed by Evelina Can MD at this visit, based upon the provider's statements to me. All documentation has been reviewed by the aforementioned provider prior to being entered into the official medical  record.     Portions of this medical record were completed by a scribe. UPON MY REVIEW AND AUTHENTICATION BY ELECTRONIC SIGNATURE, this confirms (a) I performed the applicable clinical services, and (b) the record is accurate.   Evelina Can MD  Internal Medicine

## 2018-04-02 NOTE — PATIENT INSTRUCTIONS
[facility]: [phone]     [facility] Medication Refill Request Information:  * Please contact your pharmacy regarding ANY request for medication refills.  ** [facility] Prescription Fax = [phone]  * Please allow 3 business days for routine medication refills.  * Please allow 5 business days for controlled substance medication refills.     [facility] Test Result notification information:  *You will be notified with in 7-10 days of your appointment day regarding the results of your test.  If you are on MyChart you will be notified as soon as the provider has reviewed the results and signed off on them.        Please try stopping omeprazole.  Taper off gabapentin.  Start taking 300 mg once daily for one week, then 300 mg every other day for one week.  If you develop increasing leg pain, stomach pain, nausea, etc, please call the clinic.

## 2018-04-08 PROBLEM — R29.898 RIGHT HAND WEAKNESS: Status: RESOLVED | Noted: 2018-01-08 | Resolved: 2018-04-08

## 2018-04-08 PROBLEM — G56.01 CARPAL TUNNEL SYNDROME OF RIGHT WRIST: Status: RESOLVED | Noted: 2018-01-08 | Resolved: 2018-04-08

## 2018-04-08 PROBLEM — M79.644 THUMB PAIN, RIGHT: Status: RESOLVED | Noted: 2018-01-08 | Resolved: 2018-04-08

## 2018-04-08 PROBLEM — M79.644 PAIN OF FINGER OF RIGHT HAND: Status: RESOLVED | Noted: 2018-01-08 | Resolved: 2018-04-08

## 2018-04-09 NOTE — PROGRESS NOTES
Discharge Summary - Hand Therapy    4/8/2018    Patient did not return to therapy.  We will assume that patient's goals were met.  This episode of care will be resolved.  Plan: Discharge from hand therapy.    Neli Denton MS, OTR/L

## 2018-04-11 ENCOUNTER — TELEPHONE (OUTPATIENT)
Dept: INTERNAL MEDICINE | Facility: CLINIC | Age: 83
End: 2018-04-11

## 2018-04-11 NOTE — TELEPHONE ENCOUNTER
[facility] Call Center    Phone Message    May a detailed message be left on voicemail: no    Reason for Call: Azalia from [facility] Coverage Determination Team requesting a call back from Dr. Can' nurse, Dena.  Azalia is requesting more clinical information regarding the prior authorization of Rx, ondansetron.  Call the [facility] Coverage Determination Team (you can speak to anyone in this dept) at [phone], need to give pt's contact information and reference the Rx, ondansetron.    Action Taken: Message routed to:  [facility] ([facility] Primary Care Clinic

## 2018-04-11 NOTE — TELEPHONE ENCOUNTER
Central Prior Authorization Team   Phone: [phone]    Hi, this was routed to PA team. I called the Coverage determination dept # and answered some additional questions for the appeal the patient had requested per insurance.    Appeal has been approved:    Dates: 1/11/18 to 4/11/19  PA approval #: [id number]

## 2018-04-24 ENCOUNTER — HOSPITAL ENCOUNTER (EMERGENCY)
Facility: CLINIC | Age: 83
Discharge: HOME | End: 2018-04-24
Attending: EMERGENCY MEDICINE | Admitting: EMERGENCY MEDICINE
Payer: MEDICARE

## 2018-04-24 ENCOUNTER — APPOINTMENT (OUTPATIENT)
Dept: MRI IMAGING | Facility: CLINIC | Age: 83
End: 2018-04-24
Attending: EMERGENCY MEDICINE
Payer: MEDICARE

## 2018-04-24 VITALS
TEMPERATURE: 97.7 F | OXYGEN SATURATION: 99 % | SYSTOLIC BLOOD PRESSURE: 147 MMHG | DIASTOLIC BLOOD PRESSURE: 66 MMHG | RESPIRATION RATE: 16 BRPM

## 2018-04-24 DIAGNOSIS — R51.9 PAIN OF SCALP: ICD-10-CM

## 2018-04-24 DIAGNOSIS — R42 DIZZINESS: ICD-10-CM

## 2018-04-24 LAB
ALBUMIN SERPL-MCNC: 3.7 G/DL (ref 3.4–5)
ALP SERPL-CCNC: 64 U/L (ref 40–150)
ALT SERPL W P-5'-P-CCNC: 12 U/L (ref 0–50)
ANION GAP SERPL CALCULATED.3IONS-SCNC: 8 MMOL/L (ref 3–14)
AST SERPL W P-5'-P-CCNC: 15 U/L (ref 0–45)
BASOPHILS # BLD AUTO: 0 10E9/L (ref 0–0.2)
BASOPHILS NFR BLD AUTO: 0.5 %
BILIRUB SERPL-MCNC: 1.5 MG/DL (ref 0.2–1.3)
BUN SERPL-MCNC: 17 MG/DL (ref 7–30)
CALCIUM SERPL-MCNC: 9.5 MG/DL (ref 8.5–10.1)
CHLORIDE SERPL-SCNC: 101 MMOL/L (ref 94–109)
CO2 SERPL-SCNC: 27 MMOL/L (ref 20–32)
CREAT BLD-MCNC: 0.8 MG/DL (ref 0.52–1.04)
CREAT SERPL-MCNC: 0.82 MG/DL (ref 0.52–1.04)
DIFFERENTIAL METHOD BLD: NORMAL
EOSINOPHIL # BLD AUTO: 0.1 10E9/L (ref 0–0.7)
EOSINOPHIL NFR BLD AUTO: 3.3 %
ERYTHROCYTE [DISTWIDTH] IN BLOOD BY AUTOMATED COUNT: 13.7 % (ref 10–15)
GFR SERPL CREATININE-BSD FRML MDRD: 65 ML/MIN/1.7M2
GFR SERPL CREATININE-BSD FRML MDRD: 67 ML/MIN/1.7M2
GLUCOSE BLDC GLUCOMTR-MCNC: 99 MG/DL (ref 70–99)
GLUCOSE SERPL-MCNC: 100 MG/DL (ref 70–99)
HCT VFR BLD AUTO: 38.8 % (ref 35–47)
HGB BLD-MCNC: 13.1 G/DL (ref 11.7–15.7)
IMM GRANULOCYTES # BLD: 0 10E9/L (ref 0–0.4)
IMM GRANULOCYTES NFR BLD: 0.3 %
INR BLD: 1.1 (ref 0.86–1.14)
INTERPRETATION ECG - MUSE: NORMAL
LYMPHOCYTES # BLD AUTO: 1.3 10E9/L (ref 0.8–5.3)
LYMPHOCYTES NFR BLD AUTO: 32.8 %
MCH RBC QN AUTO: 30.6 PG (ref 26.5–33)
MCHC RBC AUTO-ENTMCNC: 33.8 G/DL (ref 31.5–36.5)
MCV RBC AUTO: 91 FL (ref 78–100)
MONOCYTES # BLD AUTO: 0.5 10E9/L (ref 0–1.3)
MONOCYTES NFR BLD AUTO: 13.6 %
NEUTROPHILS # BLD AUTO: 2 10E9/L (ref 1.6–8.3)
NEUTROPHILS NFR BLD AUTO: 49.5 %
NRBC # BLD AUTO: 0 10*3/UL
NRBC BLD AUTO-RTO: 0 /100
PLATELET # BLD AUTO: 204 10E9/L (ref 150–450)
POTASSIUM SERPL-SCNC: 3.4 MMOL/L (ref 3.4–5.3)
PROT SERPL-MCNC: 6.8 G/DL (ref 6.8–8.8)
RBC # BLD AUTO: 4.28 10E12/L (ref 3.8–5.2)
SODIUM SERPL-SCNC: 136 MMOL/L (ref 133–144)
WBC # BLD AUTO: 4 10E9/L (ref 4–11)

## 2018-04-24 PROCEDURE — 25000128 H RX IP 250 OP 636: Performed by: EMERGENCY MEDICINE

## 2018-04-24 PROCEDURE — 00000146 ZZHCL STATISTIC GLUCOSE BY METER IP

## 2018-04-24 PROCEDURE — 99285 EMERGENCY DEPT VISIT HI MDM: CPT | Mod: 25 | Performed by: EMERGENCY MEDICINE

## 2018-04-24 PROCEDURE — 85025 COMPLETE CBC W/AUTO DIFF WBC: CPT | Performed by: EMERGENCY MEDICINE

## 2018-04-24 PROCEDURE — 93005 ELECTROCARDIOGRAM TRACING: CPT | Performed by: EMERGENCY MEDICINE

## 2018-04-24 PROCEDURE — 85610 PROTHROMBIN TIME: CPT | Mod: QW

## 2018-04-24 PROCEDURE — 70544 MR ANGIOGRAPHY HEAD W/O DYE: CPT | Mod: XS

## 2018-04-24 PROCEDURE — 80053 COMPREHEN METABOLIC PANEL: CPT | Performed by: EMERGENCY MEDICINE

## 2018-04-24 PROCEDURE — A9585 GADOBUTROL INJECTION: HCPCS | Performed by: EMERGENCY MEDICINE

## 2018-04-24 PROCEDURE — 96360 HYDRATION IV INFUSION INIT: CPT | Mod: 59 | Performed by: EMERGENCY MEDICINE

## 2018-04-24 PROCEDURE — 82565 ASSAY OF CREATININE: CPT

## 2018-04-24 PROCEDURE — 93010 ELECTROCARDIOGRAM REPORT: CPT | Mod: Z6 | Performed by: EMERGENCY MEDICINE

## 2018-04-24 RX ORDER — GADOBUTROL 604.72 MG/ML
10 INJECTION INTRAVENOUS ONCE
Status: COMPLETED | OUTPATIENT
Start: 2018-04-24 | End: 2018-04-24

## 2018-04-24 RX ORDER — SODIUM CHLORIDE 9 MG/ML
INJECTION, SOLUTION INTRAVENOUS ONCE
Status: COMPLETED | OUTPATIENT
Start: 2018-04-24 | End: 2018-04-24

## 2018-04-24 RX ADMIN — GADOBUTROL 8 ML: 604.72 INJECTION INTRAVENOUS at 10:28

## 2018-04-24 RX ADMIN — SODIUM CHLORIDE: 9 INJECTION, SOLUTION INTRAVENOUS at 11:41

## 2018-04-24 ASSESSMENT — ENCOUNTER SYMPTOMS
VOMITING: 0
NUMBNESS: 0
COUGH: 0
DIZZINESS: 1
SHORTNESS OF BREATH: 0
WEAKNESS: 0
DIFFICULTY URINATING: 0
NAUSEA: 1

## 2018-04-24 NOTE — ED PROVIDER NOTES
"    History     Chief Complaint   Patient presents with     Headache     Dizziness     HPI  Azeb Torres is a 89 year old female with history of  CVA (2003) breast cancer (s/p lumpectomy), endometrial cancer (s/p hysterectomy) , HTN, and tricuspid regurgitation who presents to the ED with headache and dizziness.  Per review of her chart, she was seen by her primary care clinic on 4/2/18 with complaints of dizziness/lightheadedness.  Her PCP felt her symptoms were not consistent with vertigo and had nonfocal neuro exam at that time.  Possible etiology included medication side effects, so her omeprazole was discontinued.  Her PCP also started tapering her off of gabapentin over the course of 2-3 weeks and discharged her with a prescription for ondansetron.    Patient states that she did not sleep last night, and at 4 AM, she developed acute onset left-sided scalp pain which lasted for approximately 30 seconds.  Patient states it really was not a headache as she felt the pain only on the scalp.  She states this pain did not wake her up from sleep as she was already awake.  She rated that pain \"8-9/10\" and states that it resolved quickly afterwards but she continued to have less severe pain in the same area for another couple of hours.  She denies any changes in vision or speech or numbness or weakness in her arms or legs during this episode but did have some nausea.  She did take 2 tabs of Tylenol at 6 AM with some improvement and states she currently does not have this pain.  She also complains of some dizziness that is made worse with turning her head to the right or rightward gaze and this has been an ongoing problem for her for years.  She describes this dizziness as \"lightheadedness and not room spinning\", but states it is usually triggered by motion.  She denies any new changes in her dizziness besides left-sided scalp pain.  She also states that she has right sided visual field cut secondary to her previous " stroke and has glaucoma in both of her eyes.  She states that the glaucoma is well controlled and she denies any eye pain.  She states she lives with her daughter and normally ambulates around with a walker at home.  She also states that she has been eating and drinking okay.  She otherwise currently denies any unilateral weakness or numbness, recent falls or head injury, fevers, cough, chest pain, shortness of breath, vomiting, diarrhea, or difficulty urinating.    PAST MEDICAL HISTORY  Past Medical History:   Diagnosis Date     Arthritis      Breast cancer (H)     ER positive     Chronic angle-closure glaucoma      CVA (cerebral vascular accident) (H)      Degenerative joint disease      Endometrial cancer (H)      Hypertension      Left homonymous hemianopsia      Pulmonary artery hypertension 4/24/2013    Noted on echo. Mild-moderate. Moderate TR     Tricuspid regurgitation 4/24/2013     PAST SURGICAL HISTORY  Past Surgical History:   Procedure Laterality Date     C PELVIS/HIP JOINT SURGERY UNLISTED       C STOMACH SURGERY PROCEDURE UNLISTED       CATARACT IOL, RT/LT       glaucoma laser[       HIP SURGERY      s/p bilateral hip replacements     HYSTERECTOMY  1985    low grade endometrial cancer     LUMPECTOMY BREAST Left      RELEASE CARPAL TUNNEL Right 11/14/2017    Procedure: RELEASE CARPAL TUNNEL;  Right Open Carpal Tunnel Release;  Surgeon: Patrick Rivera MD;  Location:  OR     FAMILY HISTORY  Family History   Problem Relation Age of Onset     Coronary Artery Disease Father      SOCIAL HISTORY  Social History   Substance Use Topics     Smoking status: Never Smoker     Smokeless tobacco: Never Used     Alcohol use No      Comment: 0     MEDICATIONS  No current facility-administered medications for this encounter.      Current Outpatient Prescriptions   Medication     acetaminophen (TYLENOL) 500 MG tablet     aspirin 81 MG tablet     aspirin-dipyridamole (AGGRENOX)  MG per 12 hr capsule      atorvastatin (LIPITOR) 10 MG tablet     cholecalciferol (VITAMIN D) 1000 UNIT tablet     dorzolamide-timolol (COSOPT) 2-0.5 % ophthalmic solution     gabapentin (NEURONTIN) 300 MG capsule     hydrochlorothiazide (HYDRODIURIL) 25 MG tablet     latanoprost (XALATAN) 0.005 % ophthalmic solution     levothyroxine (SYNTHROID/LEVOTHROID) 25 MCG tablet     meclizine (ANTIVERT) 12.5 MG tablet     omeprazole (PRILOSEC) 20 MG CR capsule     ondansetron (ZOFRAN-ODT) 4 MG ODT tab     ALLERGIES  No Known Allergies    I have reviewed the Medications, Allergies, Past Medical and Surgical History, and Social History in the Epic system.    Review of Systems   Eyes: Negative for visual disturbance.   Respiratory: Negative for cough and shortness of breath.    Cardiovascular: Negative for chest pain.   Gastrointestinal: Positive for nausea. Negative for vomiting.   Genitourinary: Negative for difficulty urinating.   Musculoskeletal:        Positive for left sided scalp pain.   Neurological: Positive for dizziness. Negative for weakness and numbness.   All other systems reviewed and are negative.      Physical Exam     ED Triage Vitals   Enc Vitals Group      BP 04/24/18 0823 150/81      Pulse --       Resp 04/24/18 0823 14      Temp 04/24/18 0823 97.7  F (36.5  C)      Temp src 04/24/18 0823 Oral      SpO2 04/24/18 0823 99 %      Weight --       Height --       Head Cir --       Peak Flow --       Pain Score --       Pain Loc --       Pain Edu? --       Excl. in GC? --          Physical Exam  GEN:  Well developed, no acute distress  HEENT: Pupils are 4 mm and sluggishly reactive, EOMI, Mucous membranes are moist.   Cardio:  RRR, no murmur, radial pulses equal bilaterally  PULM:  Lungs clear, good air movement, no wheezes, rales   Abd:  Soft, normal bowel sounds, no focal tenderness  Musculoskeletal:  normal range of motion, there is mild lower extremity swelling without calf tenderness  Neuro:  Alert and oriented X3, Follows commands,  CN exam is normal, finger to nose is normal, sensation and strength is normal throughout, patellar reflexes are not detected on either leg, no pronator drift.    Skin:  Warm, dry    ED Course     ED Course     Procedures   8:25 AM  The patient was seen and examined by Dr. Sen in Room 17.                EKG Interpretation:      Interpreted by Mary Sen  Time reviewed: 09:00  Symptoms at time of EKG: lightheadedness   Rhythm: sinus rhythm with first-degree AV block  Rate: normal  Axis: normal  Ectopy: none  Conduction: normal  ST Segments/ T Waves: No ST-T wave changes  Q Waves: none  Comparison to prior: Unchanged compared to November 8, 2017    Clinical Impression: Unchanged EKG      Critical Care time:  none  Labs are normal except as shown.  Stroke protocol MRI was done and results are shown here:    Results for orders placed or performed during the hospital encounter of 04/24/18   MR Brain for Stroke Cmpl w/o & w Contras    Narrative    MRI brain without and with contrast  MRA of the head without contrast  Neck MRA without and with contrast    Provided History:  dizziness, headache; .    Comparison:  MRI brain 1/14/2015; CT head 11/5/2016, 1/14/2015      Technique:   Brain MRI:  Axial diffusion, FLAIR, T2-weighted, susceptibility, and  coronal T1-weighted images were obtained without intravenous contrast.  Following intravenous gadolinium-based contrast administration, axial  and coronal T1-weighted images were obtained.    Head MRA: 3D time-of-flight MRA of the [place] was performed  without intravenous contrast.  Neck MRA:  Limited non contrast 2DTOF images were obtained of the  mid-cervical region. Following intravenous gadolinium-based contrast  administration, a contrast enhanced MRA of the neck/cervical vessels  was performed.  Three-dimensional reconstructions of the neck and head MRA were  created, which were reviewed by the radiologist.    Dose: 8cc of Gadavist  injected.    Findings:   Brain MRI: Axial diffusion weighted images demonstrate no acute  infarct. Cystic encephalomalacia and gliosis in a left posterior  cerebral artery distribution involving the medial occipital lobe,  parasagittal parietal lobe, and medial temporal lobe including the  hippocampus, unchanged compared to 1/14/2015. On the FLAIR images,  there is nonspecific mild periventricular T2-hyperintensity, which are  most likely related to chronic small vessel ischemic disease, slightly  increased compared to prior. There is no definite intracranial  hemorrhage on susceptibility images. Unchanged mild cerebral atrophy.  Ventricles are proportionate to the cerebral sulci. Contrast-enhanced  images of the brain demonstrate no abnormal intra- or extra-axial  enhancement.    Head MRA demonstrates no definite aneurysm or stenosis of the major  intracranial arteries. Right greater than left fusiform dilation of  the cavernous portions of the internal carotid arteries. Unchanged  fenestration of the distal right A1 segment of the right anterior  cerebral artery. The anterior communicating artery is patent.  Regarding the posterior communicating arteries, both are patent.    Neck MRA demonstrates patent major cervical arteries. The normal  distal right internal carotid artery measures 8 mm. The normal distal  left internal carotid artery measures 8 mm. Antegrade flow in the  major cervical vasculature.      Impression    Impression:  1.  No evidence of acute infarction, intracranial hemorrhage, or mass  lesion.  2.  Unchanged old left posterior cerebral artery territory infarct.  3.  Mild leukoaraiosis.  4.  Head MRA demonstrates no definite aneurysm or stenosis of the  major intracranial arteries.  5.  Neck MRA demonstrates patent major cervical arteries.    I have personally reviewed the examination and initial interpretation  and I agree with the findings.    SINDY HILL MD     Patient was unable to provide  a urine sample in the ED because she missed the hat when she went to the toilet.  She denies any dysuria or hematuria so I did not think we needed to cath the patient for a urine sample.    Labs Ordered and Resulted from Time of ED Arrival Up to the Time of Departure from the ED   COMPREHENSIVE METABOLIC PANEL - Abnormal; Notable for the following:        Result Value    Glucose 100 (*)     Bilirubin Total 1.5 (*)     All other components within normal limits   INR POINT OF CARE   CREATININE POCT   GLUCOSE BY METER   CBC WITH PLATELETS DIFFERENTIAL   ROUTINE UA WITH MICROSCOPIC REFLEX TO CULTURE            Assessments & Plan (with Medical Decision Making)   Patient presents with pain on the left and superior part of her head that has now resolved.  She also reports dizziness that worsens with position changes and turning her head to the right but is unchanged for the past weeks to years.  She has no other new neurologic symptoms or complaints.  No neurologic deficits on exam except for the right sided visual field cut which is old.  The head pain is gone now and I doubt acute intracranial hemorrhage or infection.  MRI with stroke protocol was done because of the ongoing dizziness and she has not had any imaging of the brain in the last couple of years.  Since the pain is improved and the MRI is negative, I advised the patient that she can go home today.  She was advised to return to the emergency department if she has any new neurologic symptoms or concerns otherwise she should follow-up her primary care physician for ongoing care.    I have reviewed the nursing notes.    I have reviewed the findings, diagnosis, plan and need for follow up with the patient.    New Prescriptions    No medications on file       Final diagnoses:   Pain of scalp   Dizziness     ILamine, am serving as a trained medical scribe to document services personally performed by Mary Sen MD, based on the provider's statements to  me.      I, Mary Sen MD, was physically present and have reviewed and verified the accuracy of this note documented by Lamine Ochoa.       4/24/2018   [facility], [place], EMERGENCY DEPARTMENT     Mary Sen MD  04/24/18 [id number]

## 2018-04-24 NOTE — ED AVS SNAPSHOT
[facility], Emergency Department    [address] [facility] [address]    Phone:  [phone]                                       Azeb Torres   MRN: [id number]    Department:  [facility], Emergency Department   Date of Visit:  4/24/2018           Patient Information     Date Of Birth          4/3/1925        Your diagnoses for this visit were:     Pain of scalp     Dizziness        You were seen by Mary Sen MD.        Discharge Instructions         Dizziness (Uncertain Cause)  Dizziness is a common symptom. It may be described as lightheadedness, spinning, or feeling like you are going to faint. Dizziness can have many causes.  Be sure to tell the healthcare provider about:    All medicines you take, including prescription, over-the-counter, herbs, and supplements    Any other symptoms you have    Any health problems you are being treated for    Any past major health problems you've had, such as a heart attack, balance issues, hearing problems, or blood pressure problems    Anything that causes the dizziness to get worse or better  Today's exam did not show an exact cause for your dizziness. Other tests may be needed. Follow up with your healthcare provider.  Home care    Dizziness that occurs with sudden standing may be a sign of mild dehydration. Drink extra fluids for the next few days.    If you recently started a new medicine, stopped a medicine, or had the dose of a current medicine changed, talk with the prescribing healthcare provider. Your medicine plan may need adjustment.    If dizziness lasts more than a few seconds, sit or lie down until it passes. This may help prevent injury in case you pass out. Get up slowly when you feel better.    Don't drive or use power tools or dangerous equipment until you have had no dizziness for at least 48 hours.  Follow-up care  Follow up with your healthcare provider for further evaluation within the next 7 days or as advised.  When to seek  medical advice  Call your healthcare provider for any of the following:    Worsening of symptoms or new symptoms    Passing out or seizure    Repeated vomiting    Headache    Palpitations (the sense that your heart is fluttering or beating fast or hard)    Shortness of breath    Blood in vomit or stool (black or red color)    Weakness of an arm or leg or 1 side of the face    Vision or hearing changes    Trouble walking or speaking    Chest, arm, neck, back, or jaw pain  Date Last Reviewed: 11/1/2017 2000-2017 [employer]. [address] [place] [address]. All rights reserved. This information is not intended as a substitute for professional medical care. Always follow your healthcare professional's instructions.           * HEADACHE [unspecified]    The cause of your headache today is not clear, but it does not appear to be the sign of any serious illness.  Under stress, some people tense the muscles of their shoulder, neck and scalp without knowing it. If this condition lasts long enough, a TENSION HEADACHE can occur.  A MIGRAINE HEADACHE is caused by changes in blood flow to the brain. It can be mild or severe. A migraine attack may be triggered by emotional stress, hormone changes during the menstrual cycle, oral contraceptives, alcohol use, certain foods containing tyramine, eye strain, weather changes, missing meals, lack of sleep or oversleeping.  Other causes of headache include a viral illness, sinus, ear or throat infection, dental pain and TMJ (jaw joint) pain.  HOME CARE:      If you were given pain medicine for this headache, do not drive yourself home. Arrange for a ride, instead. When you get home, try to sleep. You should feel much better when you wake up.    If you are having nausea or vomiting, follow a light diet until your headache is relieved.    If you have a migraine type headache, use sunglasses when in the daylight or around bright indoor lighting until symptoms  improve. Bright glaring light can worsen this kind of headache.  FOLLOW UP with your doctor if the headache is not better within the next 24 hours. If you have frequent headaches you should discuss a treatment plan with your primary care doctor. By being aware of the earliest signs of headache, and starting treatment right away, you may be able to stop the pain yourself.  GET PROMPT MEDICAL ATTENTION if any of the following occur:    Worsening of your head pain or no improvement within 24 hours    Repeated vomiting (unable to keep liquids down)    Fever over 101 F (38.3 C)    Stiff neck    Extreme drowsiness, confusion or fainting    Weakness of an arm or leg or one side of the face    Difficulty with speech or vision    2876-3287 [employer]. [address] [place] [address], [place] [address]. All rights reserved. This information is not intended as a substitute for professional medical care. Always follow your healthcare professional's instructions.  This information has been modified by your health care provider with permission from the publisher.    Please make an appointment to follow up with Your Primary Care Provider in 2-3 days if any questions or concerns, otherwise, follow up with your primary care provider in 1 week.            Your next 10 appointments already scheduled     May 03, 2018  8:25 AM CDT   (Arrive by 8:10 AM)   Return Visit with Evelina Can MD   [facility] ([facility])    [address] [place] [id number] [phone]            Jul 12, 2018  9:25 AM CDT   (Arrive by 9:10 AM)   Return Visit with Evelina Can MD   [facility] ([facility])    [address] [place] [id number] [phone]              24 Hour Appointment Hotline       To make an appointment at any [facility], call [phone] ([phone]). If you don't have  a family doctor or clinic, we will help you find one. [facility] clinics are conveniently located to serve the needs of you and your family.             Review of your medicines      Our records show that you are taking the medicines listed below. If these are incorrect, please call your family doctor or clinic.        Dose / Directions Last dose taken    aspirin 81 MG tablet   Dose:  1 tablet        Take 1 tablet by mouth every evening   Refills:  0        aspirin-dipyridamole  MG per 12 hr capsule   Commonly known as:  AGGRENOX   Dose:  1 capsule   Quantity:  180 capsule        Take 1 capsule by mouth 2 times daily   Refills:  3        atorvastatin 10 MG tablet   Commonly known as:  LIPITOR   Dose:  10 mg   Quantity:  90 tablet        Take 1 tablet (10 mg) by mouth daily   Refills:  3        cholecalciferol 1000 UNIT tablet   Commonly known as:  vitamin D3   Dose:  1 tablet        Take 1 tablet by mouth every morning   Refills:  0        dorzolamide-timolol 2-0.5 % ophthalmic solution   Commonly known as:  COSOPT   Dose:  1 drop   Quantity:  1 Bottle        Place 1 drop into both eyes 2 times daily   Refills:  11        gabapentin 300 MG capsule   Commonly known as:  NEURONTIN   Dose:  300 mg   Quantity:  180 capsule        Take 1 capsule (300 mg) by mouth 3 times daily   Refills:  1        hydrochlorothiazide 25 MG tablet   Commonly known as:  HYDRODIURIL   Dose:  25 mg   Quantity:  90 tablet        Take 1 tablet (25 mg) by mouth daily   Refills:  1        latanoprost 0.005 % ophthalmic solution   Commonly known as:  XALATAN   Dose:  1 drop   Quantity:  1 Bottle        Place 1 drop into both eyes At Bedtime   Refills:  11        levothyroxine 25 MCG tablet   Commonly known as:  SYNTHROID/LEVOTHROID   Dose:  25 mcg   Quantity:  90 tablet        Take 1 tablet (25 mcg) by mouth daily   Refills:  0        meclizine 12.5 MG tablet   Commonly known as:  ANTIVERT   Dose:  12.5 mg   Quantity:  30 tablet        Take 1  tablet (12.5 mg) by mouth 4 times daily as needed for dizziness   Refills:  0        omeprazole 20 MG CR capsule   Commonly known as:  priLOSEC   Dose:  20 mg   Quantity:  90 capsule        Take 1 capsule (20 mg) by mouth daily   Refills:  3        ondansetron 4 MG ODT tab   Commonly known as:  ZOFRAN-ODT   Dose:  4 mg   Quantity:  30 tablet        Place 1 tablet (4 mg) under the tongue every 8 hours as needed for nausea   Refills:  1        TYLENOL 500 MG tablet   Dose:  2 tablet   Generic drug:  acetaminophen        Take 2 tablets by mouth 2 times daily.   Refills:  0                Procedures and tests performed during your visit     CBC with platelets differential    Comprehensive metabolic panel    Creatinine POCT    EKG 12-lead, tracing only    Glucose by meter    INR point of care    MR Brain for Stroke Cmpl w/o & w Contras      Orders Needing Specimen Collection     Ordered          04/24/18 0845  UA with Microscopic reflex to Culture - STAT, [facility]: STAT, Needs to be Collected     Scheduled Task Status   04/24/18 0841 Collect UA with Microscopic reflex to Culture Open   Order Class:  PCU Collect                  Pending Results     No orders found from 4/22/2018 to 4/25/2018.            Pending Culture Results     No orders found from 4/22/2018 to 4/25/2018.            Pending Results Instructions     If you had any lab results that were not finalized at the time of your Discharge, you can call the ED Lab Result RN at [phone]. You will be contacted by this team for any positive Lab results or changes in treatment. The nurses are available 7 days a week from 10A to 6:30P.  You can leave a message 24 hours per day and they will return your call.        Thank you for choosing [facility]       Thank you for choosing [facility] for your care. Our goal is always to provide you with excellent care. Hearing back from our patients is one way we can continue to improve our services. Please take a few minutes to  complete the written survey that you may receive in the mail after you visit with us. Thank you!        [email or web address] Information     [email or web address] gives you secure access to your electronic health record. If you see a primary care provider, you can also send messages to your care team and make appointments. If you have questions, please call your primary care clinic.  If you do not have a primary care provider, please call [phone] and they will assist you.        Care EveryWhere ID     This is your Care EveryWhere ID. This could be used by other organizations to access your [facility] medical records  [id number]        Equal Access to Services     [facility] : Ora Grady, reji hopson, jese de la torre. So [facility] [phone].    ATENCIÓN: Si habla español, tiene a johnson disposición servicios gratuitos de asistencia lingüística. Llame al [phone].    We comply with applicable federal civil rights laws and [place] laws. We do not discriminate on the basis of race, color, national origin, age, disability, sex, sexual orientation, or gender identity.            After Visit Summary       This is your record. Keep this with you and show to your community pharmacist(s) and doctor(s) at your next visit.

## 2018-04-24 NOTE — ED AVS SNAPSHOT
[facility] [place], Emergency Department    [address] [facility] [address]    Phone:  [phone]                                       Azeb Trores   MRN: [id number]    Department:  [facility], Emergency Department   Date of Visit:  4/24/2018           After Visit Summary Signature Page     I have received my discharge instructions, and my questions have been answered. I have discussed any challenges I see with this plan with the nurse or doctor.    ..........................................................................................................................................  Patient/Patient Representative Signature      ..........................................................................................................................................  Patient Representative Print Name and Relationship to Patient    ..................................................               ................................................  Date                                            Time    ..........................................................................................................................................  Reviewed by Signature/Title    ...................................................              ..............................................  Date                                                            Time

## 2018-04-24 NOTE — ED TRIAGE NOTES
Pt comes in for evaluation of a sudden, sharp headache, onset around 4am. Hx previous stroke, baseline dizziness, decreased peripheral vision in left eye. Alert and oriented, headache better after 2 tylenol around 6am.

## 2018-04-24 NOTE — DISCHARGE INSTRUCTIONS
Dizziness (Uncertain Cause)  Dizziness is a common symptom. It may be described as lightheadedness, spinning, or feeling like you are going to faint. Dizziness can have many causes.  Be sure to tell the healthcare provider about:    All medicines you take, including prescription, over-the-counter, herbs, and supplements    Any other symptoms you have    Any health problems you are being treated for    Any past major health problems you've had, such as a heart attack, balance issues, hearing problems, or blood pressure problems    Anything that causes the dizziness to get worse or better  Today's exam did not show an exact cause for your dizziness. Other tests may be needed. Follow up with your healthcare provider.  Home care    Dizziness that occurs with sudden standing may be a sign of mild dehydration. Drink extra fluids for the next few days.    If you recently started a new medicine, stopped a medicine, or had the dose of a current medicine changed, talk with the prescribing healthcare provider. Your medicine plan may need adjustment.    If dizziness lasts more than a few seconds, sit or lie down until it passes. This may help prevent injury in case you pass out. Get up slowly when you feel better.    Don't drive or use power tools or dangerous equipment until you have had no dizziness for at least 48 hours.  Follow-up care  Follow up with your healthcare provider for further evaluation within the next 7 days or as advised.  When to seek medical advice  Call your healthcare provider for any of the following:    Worsening of symptoms or new symptoms    Passing out or seizure    Repeated vomiting    Headache    Palpitations (the sense that your heart is fluttering or beating fast or hard)    Shortness of breath    Blood in vomit or stool (black or red color)    Weakness of an arm or leg or 1 side of the face    Vision or hearing changes    Trouble walking or speaking    Chest, arm, neck, back, or jaw pain  Date  Last Reviewed: 11/1/2017 2000-2017 [employer]. [address] [place] [address], [place] [address]. All rights reserved. This information is not intended as a substitute for professional medical care. Always follow your healthcare professional's instructions.           * HEADACHE [unspecified]    The cause of your headache today is not clear, but it does not appear to be the sign of any serious illness.  Under stress, some people tense the muscles of their shoulder, neck and scalp without knowing it. If this condition lasts long enough, a TENSION HEADACHE can occur.  A MIGRAINE HEADACHE is caused by changes in blood flow to the brain. It can be mild or severe. A migraine attack may be triggered by emotional stress, hormone changes during the menstrual cycle, oral contraceptives, alcohol use, certain foods containing tyramine, eye strain, weather changes, missing meals, lack of sleep or oversleeping.  Other causes of headache include a viral illness, sinus, ear or throat infection, dental pain and TMJ (jaw joint) pain.  HOME CARE:      If you were given pain medicine for this headache, do not drive yourself home. Arrange for a ride, instead. When you get home, try to sleep. You should feel much better when you wake up.    If you are having nausea or vomiting, follow a light diet until your headache is relieved.    If you have a migraine type headache, use sunglasses when in the daylight or around bright indoor lighting until symptoms improve. Bright glaring light can worsen this kind of headache.  FOLLOW UP with your doctor if the headache is not better within the next 24 hours. If you have frequent headaches you should discuss a treatment plan with your primary care doctor. By being aware of the earliest signs of headache, and starting treatment right away, you may be able to stop the pain yourself.  GET PROMPT MEDICAL ATTENTION if any of the following occur:    Worsening of your head pain or no improvement  within 24 hours    Repeated vomiting (unable to keep liquids down)    Fever over 101 F (38.3 C)    Stiff neck    Extreme drowsiness, confusion or fainting    Weakness of an arm or leg or one side of the face    Difficulty with speech or vision    0846-4444 [employer]. [address] [place] [address]. All rights reserved. This information is not intended as a substitute for professional medical care. Always follow your healthcare professional's instructions.  This information has been modified by your health care provider with permission from the publisher.    Please make an appointment to follow up with Your Primary Care Provider in 2-3 days if any questions or concerns, otherwise, follow up with your primary care provider in 1 week.

## 2018-05-03 ENCOUNTER — OFFICE VISIT (OUTPATIENT)
Dept: INTERNAL MEDICINE | Facility: CLINIC | Age: 83
End: 2018-05-03
Payer: MEDICARE

## 2018-05-03 VITALS
RESPIRATION RATE: 20 BRPM | DIASTOLIC BLOOD PRESSURE: 79 MMHG | HEART RATE: 75 BPM | OXYGEN SATURATION: 97 % | SYSTOLIC BLOOD PRESSURE: 138 MMHG

## 2018-05-03 DIAGNOSIS — R42 DIZZINESS: ICD-10-CM

## 2018-05-03 DIAGNOSIS — R12 HEARTBURN: Primary | ICD-10-CM

## 2018-05-03 DIAGNOSIS — I10 BENIGN ESSENTIAL HYPERTENSION: ICD-10-CM

## 2018-05-03 DIAGNOSIS — R03.0 ELEVATED BLOOD PRESSURE READING WITHOUT DIAGNOSIS OF HYPERTENSION: ICD-10-CM

## 2018-05-03 ASSESSMENT — PAIN SCALES - GENERAL: PAINLEVEL: NO PAIN (0)

## 2018-05-03 NOTE — PROGRESS NOTES
"[facility]   Evelina Can MD  05/03/2018      Chief Complaint:   ER F/U     History of Present Illness:   Azeb Torres is a 89 year old female with a history of breast and uterine cancer, CVA, hypertension, and tricuspid regurgitation who presents for a follow up on her emergency department visit (04/24/18) for dizziness.     Dizziness:  The patient presented to the emergency department with a headache and feelings of dizziness on 04/24; she had been seen in clinic on 04/02 for similar complaints and I felt that her symptoms were not consistent with vertigo--she also had a nonfocal neurologic exam at that time. In addition, I discontinued her omeprazole as I had concerns for medication side effects; we also began tapering her gabapentin at that time and she was given a prescription for ondansetron.     In the emergency department, she had complaints of a sleep disturbance preceding her headache and dizziness; the headache was more of a sharp and acute, superior left sided scalp pain at 0400 the morning that day. At presentation at 0825, she still had dull scalp pain with her dizziness, but less severe than before. Her dizziness was described as more lightheadedness rather than \"room spinning.\"  An MRI of her brain was performed, alongside routine labs and EKG (see below). Her findings in the emergency department were overall unremarkable and her headache resolved while there. She was discharged and advised follow up in primary care.    Her daughter states that they brought her into the emergency department with concerns for stroke because she thought that the headache was abnormal. Now, they are wondering if this could have been dehydration--the patient note that she drinks less water than she used to before, and instead drinks more coffee now (three lattes per day).     In terms of her recent dizziness, Azeb state that she does have to sit every morning to gather her bearings and prevent " "falling. She believes that her dizziness has gotten better since she was seen in clinic. She is now entirely off of the Gabapentin and the omeprazole. Overall, she does not miss these medications too much, but she has gotten pickier with her food because she is no longer taking the omeprazole. She has been taking Tums very intermittently--but these do not work for her as well.    Other concerns discussed:  1) Blood pressure - Her blood pressure this morning was in the lower 130's systolically, and her family is happy with this.     2) Exercise - Azeb is looking forward to walking more as the weather gets better--she has been unable to do so because of ice and snow on the side walks. She has a park near her home that she likes to walk in. Azeb states that she has a history of a \"little heart problem\"; she saw cardiology in 2011 for a racing heart and was diagnosed with SVT at that time. She denies having this more recently or any chest pain with exertion.     3) Home Activity - She gets up quite a bit at home during the day to walk around and go to the bathroom. Her daughters state that she does not do many chores at home, but does help out in the kitchen quite a bit.      Review of Systems:   Pertinent items are noted in HPI, remainder of complete ROS is negative.      Active Medications:      aspirin 81 MG tablet, Take 1 tablet by mouth every evening , Disp: , Rfl:      aspirin-dipyridamole (AGGRENOX)  MG per 12 hr capsule, Take 1 capsule by mouth 2 times daily, Disp: 180 capsule, Rfl: 3     atorvastatin (LIPITOR) 10 MG tablet, Take 1 tablet (10 mg) by mouth daily, Disp: 90 tablet, Rfl: 3     cholecalciferol (VITAMIN D) 1000 UNIT tablet, Take 1 tablet by mouth every morning , Disp: , Rfl:      dorzolamide-timolol (COSOPT) 2-0.5 % ophthalmic solution, Place 1 drop into both eyes 2 times daily, Disp: 1 Bottle, Rfl: 11     gabapentin (NEURONTIN) 300 MG capsule, Take 1 capsule (300 mg) by mouth 3 times daily, " Disp: 180 capsule, Rfl: 1     hydrochlorothiazide (HYDRODIURIL) 25 MG tablet, Take 1 tablet (25 mg) by mouth daily, Disp: 90 tablet, Rfl: 1     latanoprost (XALATAN) 0.005 % ophthalmic solution, Place 1 drop into both eyes At Bedtime, Disp: 1 Bottle, Rfl: 11     levothyroxine (SYNTHROID/LEVOTHROID) 25 MCG tablet, Take 1 tablet (25 mcg) by mouth daily, Disp: 90 tablet, Rfl: 0     meclizine (ANTIVERT) 12.5 MG tablet, Take 1 tablet (12.5 mg) by mouth 4 times daily as needed for dizziness, Disp: 30 tablet, Rfl: 0     ondansetron (ZOFRAN-ODT) 4 MG ODT tab, Place 1 tablet (4 mg) under the tongue every 8 hours as needed for nausea, Disp: 30 tablet, Rfl: 1      Allergies:   Review of patient's allergies indicates no known allergies.      Past Medical History:  Arthritis  Breast cancer, ER positive   Chronic angle-closure glaucoma  Cerebrovascular accident  Endometrial cancer  Hypertension  Degenerative joint disease  Left homonymous hemianopsia   Pulmonary artery hypertension  Tricuspid regurgitation   Glaucoma  Paroxysmal supraventricular tachycardia   Hypothyroidism  Rosacea  Inflamed seborrheic keratosis   Pseudophakia of both eyes  Non exudative senile macular degeneration of retina  Asteroid hyalosis of right eye  Dizziness of unknown cause  Vertigo     Past Surgical History:  Pelvis/Hip joint surgery unlisted  Stomach surgery procedure unlisted  Glaucoma laser  Cataract IOL, RT/LT  Bilateral hip replacements  Hysterectomy, low grade endometrial cancer  Lumpectomy breast  Release carpal tunnel     Family History:   Coronary artery disease  - Father       Social History:   The patient was accompanied to the appointment by: her daughters.  Smoking Status: Never   Smokeless Tobacco: Never   Alcohol Use: No       Physical Exam:   There were no vitals taken for this visit.     Constitutional: Alert, oriented, pleasant, no acute distress  Head: Normocephalic, atraumatic  Eyes: Extra-ocular movements intact, no scleral  icterus  Cardiovascular: Distant heart sounds. Regular rate and rhythm, no murmurs, rubs or gallops, peripheral pulses full/symmetric  Respiratory: Good air movement bilaterally, lungs clear, no wheezes/rales/rhonchi  Musculoskeletal: No edema, normal muscle tone, slow, cautious gait. A,bulates with a cane or a walker  Neurologic: Alert and oriented, cranial nerves 2-12 intact, grossly intact  Psychiatric: normal mentation, affect and mood    Labs and Imaging from ED:    EKG:  Interpreted by Mary Sen  Time reviewed: 09:00  Symptoms at time of EKG: lightheadedness   Rhythm: sinus rhythm with first-degree AV block  Rate: normal  Axis: normal  Ectopy: none  Conduction: normal  ST Segments/ T Waves: No ST-T wave changes  Q Waves: none  Comparison to prior: Unchanged compared to November 8, 2017     Clinical Impression: Unchanged EKG    RI brain without and with contrast  MRA of the head without contrast  Neck MRA without and with contrast     Provided History:  dizziness, headache; .     Comparison:  MRI brain 1/14/2015; CT head 11/5/2016, 1/14/2015        Impression:  1.  No evidence of acute infarction, intracranial hemorrhage, or mass  lesion.  2.  Unchanged old left posterior cerebral artery territory infarct.  3.  Mild leukoaraiosis.  4.  Head MRA demonstrates no definite aneurysm or stenosis of the  major intracranial arteries.  5.  Neck MRA demonstrates patent major cervical arteries.     I have personally reviewed the examination and initial interpretation  and I agree with the findings.     SINDY HILL MD    Labs:  Component      Latest Ref Rng & Units 4/24/2018 4/24/2018 4/24/2018           8:29 AM  8:34 AM  8:35 AM   WBC      4.0 - 11.0 10e9/L 4.0     RBC Count      3.8 - 5.2 10e12/L 4.28     Hemoglobin      11.7 - 15.7 g/dL 13.1     Hematocrit      35.0 - 47.0 % 38.8     MCV      78 - 100 fl 91     MCH      26.5 - 33.0 pg 30.6     MCHC      31.5 - 36.5 g/dL 33.8     RDW      10.0 - 15.0 %  13.7     Platelet Count      150 - 450 10e9/L 204     Diff Method       Automated Method     % Neutrophils      % 49.5     % Lymphocytes      % 32.8     % Monocytes      % 13.6     % Eosinophils      % 3.3     % Basophils      % 0.5     % Immature Granulocytes      % 0.3     Nucleated RBCs      0 /100 0     Absolute Neutrophil      1.6 - 8.3 10e9/L 2.0     Absolute Lymphocytes      0.8 - 5.3 10e9/L 1.3     Absolute Monocytes      0.0 - 1.3 10e9/L 0.5     Absolute Eosinophils      0.0 - 0.7 10e9/L 0.1     Absolute Basophils      0.0 - 0.2 10e9/L 0.0     Abs Immature Granulocytes      0 - 0.4 10e9/L 0.0     Absolute Nucleated RBC       0.0     Sodium      133 - 144 mmol/L 136     Potassium      3.4 - 5.3 mmol/L 3.4     Chloride      94 - 109 mmol/L 101     Carbon Dioxide      20 - 32 mmol/L 27     Anion Gap      3 - 14 mmol/L 8     Glucose      70 - 99 mg/dL 100 (H) 99    Urea Nitrogen      7 - 30 mg/dL 17     Creatinine      0.52 - 1.04 mg/dL 0.82     GFR Estimate      >60 mL/min/1.7m2 65     GFR Estimate If Black      >60 mL/min/1.7m2 79     Calcium      8.5 - 10.1 mg/dL 9.5     Bilirubin Total      0.2 - 1.3 mg/dL 1.5 (H)     Albumin      3.4 - 5.0 g/dL 3.7     Protein Total      6.8 - 8.8 g/dL 6.8     Alkaline Phosphatase      40 - 150 U/L 64     ALT      0 - 50 U/L 12     AST      0 - 45 U/L 15     INR Point of Care      0.86 - 1.14   1.1     Component      Latest Ref Rng & Units 4/24/2018           8:36 AM   WBC      4.0 - 11.0 10e9/L    RBC Count      3.8 - 5.2 10e12/L    Hemoglobin      11.7 - 15.7 g/dL    Hematocrit      35.0 - 47.0 %    MCV      78 - 100 fl    MCH      26.5 - 33.0 pg    MCHC      31.5 - 36.5 g/dL    RDW      10.0 - 15.0 %    Platelet Count      150 - 450 10e9/L    Diff Method          % Neutrophils      %    % Lymphocytes      %    % Monocytes      %    % Eosinophils      %    % Basophils      %    % Immature Granulocytes      %    Nucleated RBCs      0 /100    Absolute Neutrophil      1.6 -  8.3 10e9/L    Absolute Lymphocytes      0.8 - 5.3 10e9/L    Absolute Monocytes      0.0 - 1.3 10e9/L    Absolute Eosinophils      0.0 - 0.7 10e9/L    Absolute Basophils      0.0 - 0.2 10e9/L    Abs Immature Granulocytes      0 - 0.4 10e9/L    Absolute Nucleated RBC          Sodium      133 - 144 mmol/L    Potassium      3.4 - 5.3 mmol/L    Chloride      94 - 109 mmol/L    Carbon Dioxide      20 - 32 mmol/L    Anion Gap      3 - 14 mmol/L    Glucose      70 - 99 mg/dL    Urea Nitrogen      7 - 30 mg/dL    Creatinine      0.52 - 1.04 mg/dL 0.8   GFR Estimate      >60 mL/min/1.7m2 67   GFR Estimate If Black      >60 mL/min/1.7m2 81   Calcium      8.5 - 10.1 mg/dL    Bilirubin Total      0.2 - 1.3 mg/dL    Albumin      3.4 - 5.0 g/dL    Protein Total      6.8 - 8.8 g/dL    Alkaline Phosphatase      40 - 150 U/L    ALT      0 - 50 U/L    AST      0 - 45 U/L    INR Point of Care      0.86 - 1.14      Assessment and Plan:  Heartburn  I offered a new antacid, like Pepcid or Ranitidine, for the patient so that she does not have to limit her food too much. She consented to this, and I will prescribe her Ranitidine for this. I encouraged her to take this before her meals.   - ranitidine (ZANTAC) 150 MG tablet  Dispense: 60 tablet; Refill: 3    Dizziness  I encouraged her to drink more fluid as she drinks quite a bit of coffee. She has a water bottle to use as well.  I encouraged Azeb to take walks with family or friends as she is able to see how her balance is during those times. I asked her to let me know if she noticed any heart fluttering or chest pain, as she does have a history of SVT and we can investigate this further in the future if she would like.    Benign essential hypertension   I did express concerns that her blood pressure medication could also be making her lightheaded, and offered to explore changing this with the patient. I explained that the first step would be send her home with a monitor for 24 hours to  track her blood pressure; from this data, we can determine if we can lower her hydrochlorothiazide, take her off of it, or change her medication entirely. Azeb consented to getting a monitor, so I placed an order for this. We will follow up on this soon.   - 24 Hour Blood Pressure Monitor - Adult    Elevated blood pressure reading without diagnosis of hypertension   - 24 Hour Blood Pressure Monitor - Adult          Follow-up: Return in about 6 months (around 11/3/2018) for Routine Visit.         Scribe Disclosure:   I, Portia Salazar, am serving as a scribe to document services personally performed by Evelina Can MD at this visit, based upon the provider's statements to me. All documentation has been reviewed by the aforementioned provider prior to being entered into the official medical record.     Portions of this medical record were completed by a scribe. UPON MY REVIEW AND AUTHENTICATION BY ELECTRONIC SIGNATURE, this confirms (a) I performed the applicable clinical services, and (b) the record is accurate.   Evelina Can MD  Internal Medicine

## 2018-05-03 NOTE — MR AVS SNAPSHOT
After Visit Summary   5/3/2018    Azeb Torres    MRN: [id number]           Patient Information     Date Of Birth          4/3/1925        Visit Information        Provider Department      5/3/2018 8:25 AM Evelina Can MD [facility]        Today's Diagnoses     Heartburn    -  1    Dizziness        Benign essential hypertension        Elevated blood pressure reading without diagnosis of hypertension           Care Instructions    Primary Care Center: [phone]     Primary Care Center Medication Refill Request Information:  * Please contact your pharmacy regarding ANY request for medication refills.  ** [facility] Prescription Fax = [phone]  * Please allow 3 business days for routine medication refills.  * Please allow 5 business days for controlled substance medication refills.     Primary Care Center Test Result notification information:  *You will be notified with in 7-10 days of your appointment day regarding the results of your test.  If you are on MyChart you will be notified as soon as the provider has reviewed the results and signed off on them.          Follow-ups after your visit        Follow-up notes from your care team     Return in about 6 months (around 11/3/2018) for Routine Visit.      Your next 10 appointments already scheduled     May 14, 2018  9:00 AM CDT   24 Hour Blood Pressure Monitor with [facility]   [facility] ([facility]    [address] [address]               Nov 05, 2018  9:00 AM CST   (Arrive by 8:45 AM)   Return Visit with Evelina Can MD   [facility] ([facility])    [address]  [address] [facility] [address] [phone]              Future tests that were ordered for you today     Open Future Orders        Priority Expected Expires Ordered    24 Hour Blood Pressure Monitor - Adult Routine  6/17/2018 5/3/2018             Who to contact     Please call your clinic at [phone] to:    Ask questions about your health    Make or cancel appointments    Discuss your medicines    Learn about your test results    Speak to your doctor            Additional Information About Your Visit        [facility] Information     [employer] gives you secure access to your electronic health record. If you see a primary care provider, you can also send messages to your care team and make appointments. If you have questions, please call your primary care clinic.  If you do not have a primary care provider, please call [phone] and they will assist you.      [employer] is an electronic gateway that provides easy, online access to your medical records. With [employer], you can request a clinic appointment, read your test results, renew a prescription or communicate with your care team.     To access your existing account, please contact your [facility] or call [phone] for assistance.        Care EveryWhere ID     This is your Care EveryWhere ID. This could be used by other organizations to access your [facility] medical records  [id number]        Your Vitals Were     Pulse Respirations Pulse Oximetry             75 20 97%          Blood Pressure from Last 3 Encounters:   05/03/18 138/79   04/24/18 147/66   04/02/18 149/81    Weight from Last 3 Encounters:   04/02/18 79.2 kg (174 lb 8 oz)   02/02/18 76.6 kg (168 lb 14.4 oz)   01/29/18 76.7 kg (169 lb 1.6 oz)                 Today's Medication Changes          These changes are accurate as of 5/3/18  9:33 AM.  If you have any questions, ask your nurse or doctor.               Start taking these medicines.        Dose/Directions    ranitidine 150 MG tablet   Commonly known as:  ZANTAC   Used for:  Heartburn   Started by:  Evelina Can MD        Dose:  150 mg   Take 1 tablet (150 mg) by mouth 2 times daily as needed for heartburn   Quantity:  60 tablet   Refills:   3            Where to get your medicines      These medications were sent to [facility] - [place], [place] - [address]  [address], [facility] [address]    Hours:  TRANSPLANT PHONE NUMBER [phone] Phone:  [phone]     ranitidine 150 MG tablet                Primary Care Provider Office Phone # Fax #    Evelina Can [phone]       [address] [place] [address]        Equal Access to Services     KENDRA FLORES : Hadii aad ku hadasho Soomaali, waaxda luqadaha, qaybta kaalmada adeegyada, waxay idiin hayaan adechristiane veliz . So [facility] [phone].    ATENCIÓN: Si habla español, tiene a johnson disposición servicios gratuitos de asistencia lingüística. Jadoname al [phone].    We comply with applicable federal civil rights laws and [place] laws. We do not discriminate on the basis of race, color, national origin, age, disability, sex, sexual orientation, or gender identity.            Thank you!     Thank you for choosing [facility]  for your care. Our goal is always to provide you with excellent care. Hearing back from our patients is one way we can continue to improve our services. Please take a few minutes to complete the written survey that you may receive in the mail after your visit with us. Thank you!             Your Updated Medication List - Protect others around you: Learn how to safely use, store and throw away your medicines at www.disposemymeds.org.          This list is accurate as of 5/3/18  9:33 AM.  Always use your most recent med list.                   Brand Name Dispense Instructions for use Diagnosis    aspirin 81 MG tablet      Take 1 tablet by mouth every evening        aspirin-dipyridamole  MG per 12 hr capsule    AGGRENOX    180 capsule    Take 1 capsule by mouth 2 times daily    Cerebrovascular disease       atorvastatin 10 MG tablet    LIPITOR    90 tablet     Take 1 tablet (10 mg) by mouth daily    Hyperlipidemia, unspecified hyperlipidemia type       cholecalciferol 1000 UNIT tablet    vitamin D3     Take 1 tablet by mouth every morning        dorzolamide-timolol 2-0.5 % ophthalmic solution    COSOPT    1 Bottle    Place 1 drop into both eyes 2 times daily    Chronic angle-closure glaucoma of both eyes, severe stage       hydrochlorothiazide 25 MG tablet    HYDRODIURIL    90 tablet    Take 1 tablet (25 mg) by mouth daily    Hyperlipidemia, unspecified hyperlipidemia type, Essential hypertension, benign       latanoprost 0.005 % ophthalmic solution    XALATAN    1 Bottle    Place 1 drop into both eyes At Bedtime    Chronic angle-closure glaucoma of both eyes, severe stage       levothyroxine 25 MCG tablet    SYNTHROID/LEVOTHROID    90 tablet    Take 1 tablet (25 mcg) by mouth daily    Hypothyroidism due to acquired atrophy of thyroid       meclizine 12.5 MG tablet    ANTIVERT    30 tablet    Take 1 tablet (12.5 mg) by mouth 4 times daily as needed for dizziness        ondansetron 4 MG ODT tab    ZOFRAN-ODT    30 tablet    Place 1 tablet (4 mg) under the tongue every 8 hours as needed for nausea    Nausea       ranitidine 150 MG tablet    ZANTAC    60 tablet    Take 1 tablet (150 mg) by mouth 2 times daily as needed for heartburn    Heartburn       TYLENOL 500 MG tablet   Generic drug:  acetaminophen      Take 2 tablets by mouth 2 times daily.

## 2018-05-03 NOTE — PATIENT INSTRUCTIONS
[facility]: [phone]     [facility] Medication Refill Request Information:  * Please contact your pharmacy regarding ANY request for medication refills.  ** [facility] Prescription Fax = [phone]  * Please allow 3 business days for routine medication refills.  * Please allow 5 business days for controlled substance medication refills.     [facility] Test Result notification information:  *You will be notified with in 7-10 days of your appointment day regarding the results of your test.  If you are on MyChart you will be notified as soon as the provider has reviewed the results and signed off on them.

## 2018-05-03 NOTE — NURSING NOTE
Chief Complaint   Patient presents with     ER F/U     emergency room follow up (4/24/18) for left side scalp pain and dizziness   Fidelina Wilson LPN 8:14 AM on 5/3/2018

## 2018-05-14 DIAGNOSIS — E03.4 HYPOTHYROIDISM DUE TO ACQUIRED ATROPHY OF THYROID: ICD-10-CM

## 2018-05-15 ENCOUNTER — HOSPITAL ENCOUNTER (OUTPATIENT)
Dept: CARDIOLOGY | Facility: CLINIC | Age: 83
Discharge: HOME | End: 2018-05-15
Attending: INTERNAL MEDICINE | Admitting: INTERNAL MEDICINE
Payer: MEDICARE

## 2018-05-15 DIAGNOSIS — R42 DIZZINESS: ICD-10-CM

## 2018-05-15 DIAGNOSIS — I10 BENIGN ESSENTIAL HYPERTENSION: ICD-10-CM

## 2018-05-15 DIAGNOSIS — R03.0 ELEVATED BLOOD PRESSURE READING WITHOUT DIAGNOSIS OF HYPERTENSION: ICD-10-CM

## 2018-05-15 PROCEDURE — 93786 AMBL BP MNTR W/SW REC ONLY: CPT

## 2018-05-15 PROCEDURE — 93294 REM INTERROG EVL PM/LDLS PM: CPT | Performed by: INTERNAL MEDICINE

## 2018-05-15 RX ORDER — LEVOTHYROXINE SODIUM 25 UG/1
25 TABLET ORAL DAILY
Qty: 90 TABLET | Refills: 2 | Status: SHIPPED | OUTPATIENT
Start: 2018-05-15 | End: 2019-02-24

## 2018-05-29 ENCOUNTER — TELEPHONE (OUTPATIENT)
Dept: FAMILY MEDICINE | Facility: CLINIC | Age: 83
End: 2018-05-29

## 2018-05-29 NOTE — TELEPHONE ENCOUNTER
[facility] Call Center    Phone Message    May a detailed message be left on voicemail: yes    Reason for Call: Other: PT's daughter, Evelina Resendez is calling for her mother's 24 hour blood pressure monitor results.     Action Taken: Message routed to:  [facility] ([facility]): ZEV

## 2018-05-31 NOTE — TELEPHONE ENCOUNTER
Thank you for the call, I was not sent the results of the 24 hr blood pressure monitor.    However, I did review it and her average BP was around 130/70, which is good.  There were not too many significant highs or lows, and thus I think her blood pressure is less likely to be causing dizziness. I would not recommend we change her HCTZ unless she feels strongly about it.  I would encourage her to drink plenty of fluids and stand up slowly.    Please let me know if concerns.  Evelina Can MD  Internal Medicine

## 2018-06-01 NOTE — TELEPHONE ENCOUNTER
Guero notified of results and recommendations.  Vebalizes understanding.  Will encourage her mother to continue to drink plenty of fluids and stand slowly.  Perlita Thomas LPN

## 2018-06-17 ENCOUNTER — APPOINTMENT (OUTPATIENT)
Dept: CT IMAGING | Facility: CLINIC | Age: 83
End: 2018-06-17
Attending: INTERNAL MEDICINE
Payer: MEDICARE

## 2018-06-17 ENCOUNTER — HOSPITAL ENCOUNTER (EMERGENCY)
Facility: CLINIC | Age: 83
Discharge: HOME | End: 2018-06-17
Attending: INTERNAL MEDICINE | Admitting: INTERNAL MEDICINE
Payer: MEDICARE

## 2018-06-17 VITALS
RESPIRATION RATE: 18 BRPM | BODY MASS INDEX: 30.18 KG/M2 | SYSTOLIC BLOOD PRESSURE: 172 MMHG | DIASTOLIC BLOOD PRESSURE: 91 MMHG | OXYGEN SATURATION: 95 % | WEIGHT: 165 LBS | TEMPERATURE: 97.7 F | HEART RATE: 82 BPM
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
## 2018-06-17 DIAGNOSIS — S00.03XA CONTUSION OF OCCIPITAL REGION OF SCALP, INITIAL ENCOUNTER: ICD-10-CM

## 2018-06-17 PROCEDURE — 99284 EMERGENCY DEPT VISIT MOD MDM: CPT | Mod: 25 | Performed by: INTERNAL MEDICINE

## 2018-06-17 PROCEDURE — 99283 EMERGENCY DEPT VISIT LOW MDM: CPT | Mod: Z6 | Performed by: INTERNAL MEDICINE

## 2018-06-17 PROCEDURE — 70450 CT HEAD/BRAIN W/O DYE: CPT

## 2018-06-17 PROCEDURE — 72125 CT NECK SPINE W/O DYE: CPT

## 2018-06-17 ASSESSMENT — ENCOUNTER SYMPTOMS
VOMITING: 0
DIARRHEA: 0
SEIZURES: 0
NECK PAIN: 0
PALPITATIONS: 0
COUGH: 0
ADENOPATHY: 0
HEADACHES: 1
SPEECH DIFFICULTY: 0
FEVER: 0
NUMBNESS: 0
NAUSEA: 0
WHEEZING: 0
DIZZINESS: 1
BACK PAIN: 0
DIFFICULTY URINATING: 0
CHILLS: 0
LIGHT-HEADEDNESS: 0
SHORTNESS OF BREATH: 0
CONFUSION: 0
WEAKNESS: 0

## 2018-06-17 NOTE — ED AVS SNAPSHOT
[facility], [place], Emergency Department    [address] [address]    Phone:  [phone]                                       Azeb Torres   MRN: [id number]    Department:  [facility], Emergency Department   Date of Visit:  6/17/2018           After Visit Summary Signature Page     I have received my discharge instructions, and my questions have been answered. I have discussed any challenges I see with this plan with the nurse or doctor.    ..........................................................................................................................................  Patient/Patient Representative Signature      ..........................................................................................................................................  Patient Representative Print Name and Relationship to Patient    ..................................................               ................................................  Date                                            Time    ..........................................................................................................................................  Reviewed by Signature/Title    ...................................................              ..............................................  Date                                                            Time

## 2018-06-17 NOTE — ED PROVIDER NOTES
History     Chief Complaint   Patient presents with     Fall     HPI  Azeb Torres is a 89 year old female who presents with head trauma. She was out with friends. A friend was pushing her down a slope while she sat on her rolling walker. The friend lost control of the conveyance and it tipped backward. She struck the back of her head on the pavement. She sustained no LOC. She does have a lump on the left occiput. She has mild headache, She denies neck pain, back pain, chest pain, palpitations, pain in the elbows, upper or lower extremities. She denies visual changes, numbness or weakness. She is on aspirin and agrenox.    PAST MEDICAL HISTORY:   Past Medical History:   Diagnosis Date     Arthritis      Breast cancer (H)     ER positive     Chronic angle-closure glaucoma      CVA (cerebral vascular accident) (H)      Degenerative joint disease      Endometrial cancer (H)      Hypertension      Left homonymous hemianopsia      Pulmonary artery hypertension 4/24/2013    Noted on echo. Mild-moderate. Moderate TR     Tricuspid regurgitation 4/24/2013       PAST SURGICAL HISTORY:   Past Surgical History:   Procedure Laterality Date     C PELVIS/HIP JOINT SURGERY UNLISTED       C STOMACH SURGERY PROCEDURE UNLISTED       CATARACT IOL, RT/LT       glaucoma laser[       HIP SURGERY      s/p bilateral hip replacements     HYSTERECTOMY  1985    low grade endometrial cancer     LUMPECTOMY BREAST Left      RELEASE CARPAL TUNNEL Right 11/14/2017    Procedure: RELEASE CARPAL TUNNEL;  Right Open Carpal Tunnel Release;  Surgeon: Patrick Rivera MD;  Location:  OR       FAMILY HISTORY:   Family History   Problem Relation Age of Onset     Coronary Artery Disease Father        SOCIAL HISTORY:   Social History   Substance Use Topics     Smoking status: Never Smoker     Smokeless tobacco: Never Used     Alcohol use No      Comment: 0         I have reviewed the Medications, Allergies, Past Medical and Surgical History, and  Social History in the Epic system.    Review of Systems   Constitutional: Negative for chills and fever.   HENT: Negative for congestion and nosebleeds.    Eyes: Negative for visual disturbance.   Respiratory: Negative for cough, shortness of breath and wheezing.    Cardiovascular: Negative for chest pain and palpitations.   Gastrointestinal: Negative for diarrhea, nausea and vomiting.   Genitourinary: Negative for difficulty urinating.   Musculoskeletal: Negative for back pain and neck pain.   Skin: Negative for rash.   Neurological: Positive for dizziness (chronic) and headaches. Negative for seizures, syncope, speech difficulty, weakness, light-headedness and numbness.   Hematological: Negative for adenopathy.   Psychiatric/Behavioral: Negative for confusion.       Physical Exam   BP: 164/81  Heart Rate: 92  Temp: 97.7  F (36.5  C)  Weight: 74.8 kg (165 lb)  SpO2: 97 %      Physical Exam   Constitutional: She is oriented to person, place, and time. She appears well-developed and well-nourished. No distress.   HENT:   Head: Head is with contusion. Head is without raccoon's eyes, without Leon's sign and without laceration.       Right Ear: Hearing normal.   Left Ear: Hearing and tympanic membrane normal.   Nose: Nose normal.   Mouth/Throat: Uvula is midline, oropharynx is clear and moist and mucous membranes are normal.   Eyes: EOM are normal. Pupils are equal, round, and reactive to light. No scleral icterus.   Neck: Normal range of motion. Neck supple. No JVD present.   Cardiovascular: Normal rate, regular rhythm and normal heart sounds.    No murmur heard.  Pulmonary/Chest: Effort normal and breath sounds normal. She has no wheezes. She has no rales. She exhibits no tenderness.   Abdominal: Soft. Bowel sounds are normal. There is no tenderness.   Neurological: She is alert and oriented to person, place, and time. She displays normal reflexes. No cranial nerve deficit. Coordination normal.   Skin: Skin is warm  and dry.   Psychiatric: She has a normal mood and affect. Her behavior is normal.   Nursing note and vitals reviewed.      ED Course     ED Course     Procedures          Labs/Imaging    Results for orders placed or performed during the hospital encounter of 06/17/18 (from the past 24 hour(s))   Head CT w/o contrast    Narrative    1. No acute intracranial pathology.   2. High left parietal scalp hematoma without underlying fracture.  3. Chronic left PCA territory infarction.    Cervical spine CT w/o contrast    Narrative    1. No acute fracture or subluxation.  2. Multilevel cervical spondylosis, not significantly changed compared  to 4/30/2017.        Assessments & Plan (with Medical Decision Making)   Impression:  Elderly female with a history of HTN, occipital CVA, pulmonary HTN, past breast and endometrial cancer, chronic dizziness, hypothyroidism who presents with accidental fall with occipital scalp contusion (seated rolling walker tipped while being pushed by a friend). She had no LOC. She has mild headache. She has no acute traumatic injuries on CT of the head and cervical spine. She has no acute neurologic changes. She has no evidence of other acute traumatic injuries.    I have reviewed the nursing notes.    I have reviewed the findings, diagnosis, plan and need for follow up with the patient.    New Prescriptions    No medications on file       Final diagnoses:   Contusion of occipital region of scalp, initial encounter   Fall from wheelchair, initial encounter       6/17/2018   [facility], [place], EMERGENCY DEPARTMENT     Jerome Hall MD  06/17/18 4509

## 2018-06-17 NOTE — ED TRIAGE NOTES
Pt presents to ED with c/o falling backwards off of walker and hitting her head. Pt denies LOC, reports HA, alert and oriented x 4. Pt takes ASA daily and aggrenox due to hx of stroke. Pt denies cervical tenderness. Vitally stable in triage.

## 2018-06-17 NOTE — ED AVS SNAPSHOT
[facility], Emergency Department    [address] [facility] [address]    Phone:  [phone]                                       Azeb Torres   MRN: [id number]    Department:  [facility], Emergency Department   Date of Visit:  6/17/2018           Patient Information     Date Of Birth          4/3/1925        Your diagnoses for this visit were:     Contusion of occipital region of scalp, initial encounter     Fall from wheelchair, initial encounter        You were seen by Jerome Hall MD.      Follow-up Information     Follow up with Evelina Can MD.    Specialty:  Internal Medicine    Contact information:    [address] [place] [address] [phone]          Discharge Instructions       Ice to the swollen are of the scalp for 20 minutes, 3 times/ day for 2 days.  Tylenol as needed for pain.  Follow up with your primary physician.    Your next 10 appointments already scheduled     Nov 05, 2018  9:00 AM CST   (Arrive by 8:45 AM)   Return Visit with Evelina Can MD   [facility] ([facility])    [address] [place] [address] [phone]              24 Hour Appointment Hotline       To make an appointment at any [facility], call [phone] ([phone]). If you don't have a family doctor or clinic, we will help you find one. [facility] clinics are conveniently located to serve the needs of you and your family.             Review of your medicines      Our records show that you are taking the medicines listed below. If these are incorrect, please call your family doctor or clinic.        Dose / Directions Last dose taken    aspirin 81 MG tablet   Dose:  1 tablet        Take 1 tablet by mouth every evening   Refills:  0        aspirin-dipyridamole  MG per 12 hr capsule   Commonly known as:  AGGRENOX   Dose:  1 capsule   Quantity:  180 capsule        Take 1 capsule by mouth  2 times daily   Refills:  3        atorvastatin 10 MG tablet   Commonly known as:  LIPITOR   Dose:  10 mg   Quantity:  90 tablet        Take 1 tablet (10 mg) by mouth daily   Refills:  3        cholecalciferol 1000 UNIT tablet   Commonly known as:  vitamin D3   Dose:  1 tablet        Take 1 tablet by mouth every morning   Refills:  0        dorzolamide-timolol 2-0.5 % ophthalmic solution   Commonly known as:  COSOPT   Dose:  1 drop   Quantity:  1 Bottle        Place 1 drop into both eyes 2 times daily   Refills:  11        hydrochlorothiazide 25 MG tablet   Commonly known as:  HYDRODIURIL   Dose:  25 mg   Quantity:  90 tablet        Take 1 tablet (25 mg) by mouth daily   Refills:  1        latanoprost 0.005 % ophthalmic solution   Commonly known as:  XALATAN   Dose:  1 drop   Quantity:  1 Bottle        Place 1 drop into both eyes At Bedtime   Refills:  11        levothyroxine 25 MCG tablet   Commonly known as:  SYNTHROID/LEVOTHROID   Dose:  25 mcg   Quantity:  90 tablet        Take 1 tablet (25 mcg) by mouth daily   Refills:  2        meclizine 12.5 MG tablet   Commonly known as:  ANTIVERT   Dose:  12.5 mg   Quantity:  30 tablet        Take 1 tablet (12.5 mg) by mouth 4 times daily as needed for dizziness   Refills:  0        ondansetron 4 MG ODT tab   Commonly known as:  ZOFRAN-ODT   Dose:  4 mg   Quantity:  30 tablet        Place 1 tablet (4 mg) under the tongue every 8 hours as needed for nausea   Refills:  1        ranitidine 150 MG tablet   Commonly known as:  ZANTAC   Dose:  150 mg   Quantity:  60 tablet        Take 1 tablet (150 mg) by mouth 2 times daily as needed for heartburn   Refills:  3        TYLENOL 500 MG tablet   Dose:  2 tablet   Generic drug:  acetaminophen        Take 2 tablets by mouth 2 times daily.   Refills:  0                Procedures and tests performed during your visit     Cervical spine CT w/o contrast    Head CT w/o contrast      Orders Needing Specimen Collection     None      Pending  Results     Date and Time Order Name Status Description    6/17/2018 1824 Cervical spine CT w/o contrast Preliminary     6/17/2018 1824 Head CT w/o contrast Preliminary             Pending Culture Results     No orders found from 6/15/2018 to 6/18/2018.            Pending Results Instructions     If you had any lab results that were not finalized at the time of your Discharge, you can call the ED Lab Result RN at [phone]. You will be contacted by this team for any positive Lab results or changes in treatment. The nurses are available 7 days a week from 10A to 6:30P.  You can leave a message 24 hours per day and they will return your call.        Thank you for choosing [facility]       Thank you for choosing [facility] for your care. Our goal is always to provide you with excellent care. Hearing back from our patients is one way we can continue to improve our services. Please take a few minutes to complete the written survey that you may receive in the mail after you visit with us. Thank you!        [facility] Information     [employer] gives you secure access to your electronic health record. If you see a primary care provider, you can also send messages to your care team and make appointments. If you have questions, please call your primary care clinic.  If you do not have a primary care provider, please call [phone] and they will assist you.        Care EveryWhere ID     This is your Care EveryWhere ID. This could be used by other organizations to access your [facility] medical records  [id number]        Equal Access to Services     KENDRA FLORES : Hadii buddy Grady, wajose hopson, qaybta jessicaaljese ledezma . So [facility] [phone].    ATENCIÓN: Si habla español, tiene a johnson disposición servicios gratuitos de asistencia lingüística. Llame al [phone].    We comply with applicable federal civil rights laws and [place] laws. We do not discriminate on the  basis of race, color, national origin, age, disability, sex, sexual orientation, or gender identity.            After Visit Summary       This is your record. Keep this with you and show to your community pharmacist(s) and doctor(s) at your next visit.

## 2018-06-18 NOTE — DISCHARGE INSTRUCTIONS
Ice to the swollen are of the scalp for 20 minutes, 3 times/ day for 2 days.  Tylenol as needed for pain.  Follow up with your primary physician.

## 2018-06-22 DIAGNOSIS — H40.2233 CHRONIC ANGLE-CLOSURE GLAUCOMA OF BOTH EYES, SEVERE STAGE: ICD-10-CM

## 2018-06-22 RX ORDER — LATANOPROST 50 UG/ML
1 SOLUTION/ DROPS OPHTHALMIC AT BEDTIME
Qty: 3 BOTTLE | Refills: 1 | Status: SHIPPED | OUTPATIENT
Start: 2018-06-22 | End: 2019-03-07

## 2018-06-22 RX ORDER — DORZOLAMIDE HYDROCHLORIDE AND TIMOLOL MALEATE 20; 5 MG/ML; MG/ML
1 SOLUTION/ DROPS OPHTHALMIC 2 TIMES DAILY
Qty: 10 ML | Refills: 2 | Status: SHIPPED | OUTPATIENT
Start: 2018-06-22 | End: 2019-03-07

## 2018-06-22 NOTE — TELEPHONE ENCOUNTER
Medication: cosopt and Xalatan  Last Written Prescription Date:  6/8/17  Last Fill Quantity: 1  # refills: 1  Last Office Visit: 1/11/18  Future Office visit:9/18/18

## 2018-06-25 ENCOUNTER — TELEPHONE (OUTPATIENT)
Dept: INTERNAL MEDICINE | Facility: CLINIC | Age: 83
End: 2018-06-25

## 2018-06-25 DIAGNOSIS — G62.9 PERIPHERAL POLYNEUROPATHY: Primary | ICD-10-CM

## 2018-06-25 NOTE — TELEPHONE ENCOUNTER
[facility] Call Center    Phone Message    May a detailed message be left on voicemail: yes    Reason for Call: Other:  Pt's daughter Evelina Resendez requesting script refill of gabapentin. Marta Resendez stated she will be going out of town at 10:00am this morning and would like to pick it up at the [facility] before that      Action Taken: Message routed to:  [facility] ([facility]): primary care

## 2018-06-25 NOTE — TELEPHONE ENCOUNTER
I spoke with Jessa.  Patient was seen in the ED 6/17/18 for a fall. She continues to have pain from that fall and they have restarted her gabapentin 300mg at bedtime.  She has been taking the medication for three days and they note she is sleeping better and no dizziness noted.  Do you want to provide a refill?  Thanks,  Perlita Thomas LPN

## 2018-06-26 RX ORDER — GABAPENTIN 300 MG/1
300 CAPSULE ORAL
Qty: 90 CAPSULE | Refills: 1 | Status: SHIPPED | OUTPATIENT
Start: 2018-06-26 | End: 2018-11-05 | Stop reason: ALTCHOICE

## 2018-06-27 ENCOUNTER — TELEPHONE (OUTPATIENT)
Dept: INTERNAL MEDICINE | Facility: CLINIC | Age: 83
End: 2018-06-27

## 2018-06-27 NOTE — TELEPHONE ENCOUNTER
[facility] Call Center    Phone Message    May a detailed message be left on voicemail: yes    Reason for Call: Other: Pt fell off her walker went to ER, was told to f/u with pcp. Wants a call from [facility] at [phone], pain in her back and tailbone. Please call her     Action Taken: Message routed to:  Other: Pt fell off her walker went to ER, was told to f/u with pcp. Wants a call from [facility] at [phone], pain in her back and tailbone. Please call her

## 2018-06-28 ENCOUNTER — RADIANT APPOINTMENT (OUTPATIENT)
Dept: GENERAL RADIOLOGY | Facility: CLINIC | Age: 83
End: 2018-06-28
Attending: PREVENTIVE MEDICINE
Payer: MEDICARE

## 2018-06-28 ENCOUNTER — OFFICE VISIT (OUTPATIENT)
Dept: ORTHOPEDICS | Facility: CLINIC | Age: 83
End: 2018-06-28
Payer: MEDICARE

## 2018-06-28 VITALS
DIASTOLIC BLOOD PRESSURE: 74 MMHG | WEIGHT: 165 LBS | BODY MASS INDEX: 30.18 KG/M2 | HEART RATE: 87 BPM | SYSTOLIC BLOOD PRESSURE: 130 MMHG

## 2018-06-28 DIAGNOSIS — M25.552 HIP PAIN, LEFT: ICD-10-CM

## 2018-06-28 DIAGNOSIS — M54.16 LUMBAR RADICULAR PAIN: Primary | ICD-10-CM

## 2018-06-28 DIAGNOSIS — M54.16 LUMBAR RADICULAR PAIN: ICD-10-CM

## 2018-06-28 RX ORDER — METHYLPREDNISOLONE 4 MG
TABLET, DOSE PACK ORAL
Qty: 21 TABLET | Refills: 0 | Status: SHIPPED | OUTPATIENT
Start: 2018-06-28 | End: 2019-03-07

## 2018-06-28 NOTE — PROGRESS NOTES
SPORTS & ORTHOPEDIC WALK-IN VISIT 6/28/2018    Primary Care Physician: Dr. Can    Fell backwards in wheelchair, went to ED. Talked mostly about the head so only looked there in the ED. Hip pain has gotten worse. History of two hip replacements. 6/17. Pain, hard time walking or getting up. Most of pain is posterior. No numbness/tingling. Pain radiates down leg, into the calf when it gets bad.     Reason for visit:     What part of your body is injured / painful?  left hip    What caused the injury /pain? Fall    How long ago did your injury occur or pain begin? 6/17/18    What are your most bothersome symptoms? Pain    How would you characterize your symptom?  Steady and firm    What makes your symptoms better? Nothing - tylenol, icyhot/bengay, was taking gabapentin    What makes your symptoms worse? Standing and Sitting    Have you been previously seen for this problem? No    Medical History:    Any recent changes to your medical history? No    Any new medication prescribed since last visit? No    Have you had surgery on this body part before? Yes Left KATELYN done in the 90s.     Social History:    Occupation: retired    Handedness: Right    Exercise: None    Review of Systems:    Do you have fever, chills, weight loss? No    Do you have any vision problems? No    Do you have any chest pain or edema? No    Do you have any shortness of breath or wheezing?  No    Do you have stomach problems? No    Do you have any numbness or focal weakness? No    Do you have diabetes? No    Do you have problems with bleeding or clotting? Yes, takes aggrenox and aspirin    Do you have an rashes or other skin lesions? No       Patient seen and examined, agree with above. Dr Rodriguez

## 2018-06-28 NOTE — MR AVS SNAPSHOT
After Visit Summary   6/28/2018    Azeb Torres    MRN: [id number]           Patient Information     Date Of Birth          4/3/1925        Visit Information        Provider Department      6/28/2018 3:00 PM Sage Rodriguez MD [facility]        Today's Diagnoses     Lumbar radicular pain    -  1    Hip pain, left           Follow-ups after your visit        Your next 10 appointments already scheduled     Jul 02, 2018 11:25 AM CDT   (Arrive by 11:10 AM)   Return Visit with Kaylie Goins MD   [facility] ([facility])    [address] [place] [id number] [phone]            Jul 17, 2018  1:20 PM CDT   (Arrive by 1:05 PM)   Return Walk In Ortho with Sage Rodriguez MD   [facility] ([facility])    [address] [place] [id number] [phone]            Sep 18, 2018  1:00 PM CDT   RETURN GLAUCOMA with Alejandrina Faria MD   [facility] ([facility])    [address]  [facility] [address]   [phone]            Nov 05, 2018  9:00 AM CST   (Arrive by 8:45 AM)   Return Visit with Evelina Can MD   [facility] ([facility])    [address] [place] [id number] [phone]              Who to contact     Please call your clinic at [phone] to:    Ask questions about your health    Make or cancel appointments    Discuss your medicines    Learn about your test results    Speak to your doctor            Additional Information About Your Visit        MyChart Information     [employer] gives you secure access to your electronic health record. If you see a primary care provider, you can also send messages to your care team and make appointments. If you have questions, please call your  primary care clinic.  If you do not have a primary care provider, please call [phone] and they will assist you.      [employer] is an electronic gateway that provides easy, online access to your medical records. With [employer], you can request a clinic appointment, read your test results, renew a prescription or communicate with your care team.     To access your existing account, please contact your [facility] or call [phone] for assistance.        Care EveryWhere ID     This is your Care EveryWhere ID. This could be used by other organizations to access your [facility] medical records  [id number]        Your Vitals Were     Pulse BMI (Body Mass Index)                87 30.18 kg/m2           Blood Pressure from Last 3 Encounters:   06/28/18 130/74   06/17/18 (!) 172/91   05/03/18 138/79    Weight from Last 3 Encounters:   06/28/18 74.8 kg (165 lb)   06/17/18 74.8 kg (165 lb)   04/02/18 79.2 kg (174 lb 8 oz)                 Today's Medication Changes          These changes are accurate as of 6/28/18  4:27 PM.  If you have any questions, ask your nurse or doctor.               Start taking these medicines.        Dose/Directions    methylPREDNISolone 4 MG tablet   Commonly known as:  MEDROL DOSEPAK   Used for:  Lumbar radicular pain   Started by:  Sage Rodriguez MD        Follow package instructions   Quantity:  21 tablet   Refills:  0       tiZANidine 4 MG tablet   Commonly known as:  ZANAFLEX   Used for:  Lumbar radicular pain   Started by:  Sage Rodriguez MD        Dose:  4-8 mg   Take 1-2 tablets (4-8 mg) by mouth nightly as needed   Quantity:  30 tablet   Refills:  1            Where to get your medicines      These medications were sent to [place], [place] - [address]  [address] [facility] [address], [facility] [address]    Hours:  TRANSPLANT PHONE NUMBER [phone] Phone:  [phone]     methylPREDNISolone  4 MG tablet    tiZANidine 4 MG tablet                Primary Care Provider Office Phone # Fax #    TuletaChasity Can -[phone]        [address] [address]        Equal Access to Services     KENDRA FLORES : Ora buddy garcia brooklynno Soalisali, waaxda luqadaha, qaybta kaalmada adeegyada, jese dickinsonn javed hobbs laFadialexa galvan. So [facility] [phone].    ATENCIÓN: Si habla español, tiene a johnson disposición servicios gratuitos de asistencia lingüística. Llame al [phone].    We comply with applicable federal civil rights laws and [place] laws. We do not discriminate on the basis of race, color, national origin, age, disability, sex, sexual orientation, or gender identity.            Thank you!     Thank you for choosing [facility] CLINIC  for your care. Our goal is always to provide you with excellent care. Hearing back from our patients is one way we can continue to improve our services. Please take a few minutes to complete the written survey that you may receive in the mail after your visit with us. Thank you!             Your Updated Medication List - Protect others around you: Learn how to safely use, store and throw away your medicines at www.disposemymeds.org.          This list is accurate as of 6/28/18  4:27 PM.  Always use your most recent med list.                   Brand Name Dispense Instructions for use Diagnosis    aspirin 81 MG tablet      Take 1 tablet by mouth every evening        aspirin-dipyridamole  MG per 12 hr capsule    AGGRENOX    180 capsule    Take 1 capsule by mouth 2 times daily    Cerebrovascular disease       atorvastatin 10 MG tablet    LIPITOR    90 tablet    Take 1 tablet (10 mg) by mouth daily    Hyperlipidemia, unspecified hyperlipidemia type       cholecalciferol 1000 UNIT tablet    vitamin D3     Take 1 tablet by mouth every morning        dorzolamide-timolol 2-0.5 % ophthalmic solution    COSOPT    10  mL    Place 1 drop into both eyes 2 times daily    Chronic angle-closure glaucoma of both eyes, severe stage       gabapentin 300 MG capsule    NEURONTIN    90 capsule    Take 1 capsule (300 mg) by mouth nightly as needed    Peripheral polyneuropathy       hydrochlorothiazide 25 MG tablet    HYDRODIURIL    90 tablet    Take 1 tablet (25 mg) by mouth daily    Hyperlipidemia, unspecified hyperlipidemia type, Essential hypertension, benign       latanoprost 0.005 % ophthalmic solution    XALATAN    3 Bottle    Place 1 drop into both eyes At Bedtime    Chronic angle-closure glaucoma of both eyes, severe stage       levothyroxine 25 MCG tablet    SYNTHROID/LEVOTHROID    90 tablet    Take 1 tablet (25 mcg) by mouth daily    Hypothyroidism due to acquired atrophy of thyroid       meclizine 12.5 MG tablet    ANTIVERT    30 tablet    Take 1 tablet (12.5 mg) by mouth 4 times daily as needed for dizziness        methylPREDNISolone 4 MG tablet    MEDROL DOSEPAK    21 tablet    Follow package instructions    Lumbar radicular pain       ondansetron 4 MG ODT tab    ZOFRAN-ODT    30 tablet    Place 1 tablet (4 mg) under the tongue every 8 hours as needed for nausea    Nausea       ranitidine 150 MG tablet    ZANTAC    60 tablet    Take 1 tablet (150 mg) by mouth 2 times daily as needed for heartburn    Heartburn       tiZANidine 4 MG tablet    ZANAFLEX    30 tablet    Take 1-2 tablets (4-8 mg) by mouth nightly as needed    Lumbar radicular pain       TYLENOL 500 MG tablet   Generic drug:  acetaminophen      Take 2 tablets by mouth 2 times daily.

## 2018-06-28 NOTE — LETTER
6/28/2018       RE: Azeb Torres  [address] [facility] [address]     Dear Colleague,    Thank you for referring your patient, Azeb Torres, to the [facility] SPORTS AND ORTHOPAEDIC WALK IN CLINIC at [facility]. Please see a copy of my visit note below.          SPORTS & ORTHOPEDIC WALK-IN VISIT 6/28/2018    Primary Care Physician: Dr. Can    Fell backwards in wheelchair, went to ED. Talked mostly about the head so only looked there in the ED. Hip pain has gotten worse. History of two hip replacements. 6/17. Pain, hard time walking or getting up. Most of pain is posterior. No numbness/tingling. Pain radiates down leg, into the calf when it gets bad.     Reason for visit:     What part of your body is injured / painful?  left hip    What caused the injury /pain? Fall    How long ago did your injury occur or pain begin? 6/17/18    What are your most bothersome symptoms? Pain    How would you characterize your symptom?  Steady and firm    What makes your symptoms better? Nothing - tylenol, icyhot/bengay, was taking gabapentin    What makes your symptoms worse? Standing and Sitting    Have you been previously seen for this problem? No    Medical History:    Any recent changes to your medical history? No    Any new medication prescribed since last visit? No    Have you had surgery on this body part before? Yes Left KATELYN done in the 90s.     Social History:    Occupation: retired    Handedness: Right    Exercise: None    Review of Systems:    Do you have fever, chills, weight loss? No    Do you have any vision problems? No    Do you have any chest pain or edema? No    Do you have any shortness of breath or wheezing?  No    Do you have stomach problems? No    Do you have any numbness or focal weakness? No    Do you have diabetes? No    Do you have problems with bleeding or clotting? Yes, takes aggrenox and aspirin    Do you have an rashes or other skin lesions? No        Patient seen and examined, agree with above. Dr Rodriguez    HISTORY OF PRESENT ILLNESS  Ms. Torres is a pleasant 89 year old year old female who presents to clinic today with right hip and low back pain that radiates into right leg. She had a fall onto her head and low back over a week ago that caused her to be seen in the ER. She had a head CT and things were ok. 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)     PSVT (paroxysmal supraventricular tachycardia) (H)     Cerebral infarction (H)     Homonymous hemianopia     Pain in joint, shoulder region     Pain in joint, lower leg     Hyperlipidemia     Hypothyroidism     Tricuspid regurgitation     Pulmonary artery hypertension     Rosacea     Inflamed seborrheic keratosis     Dermatitis, seborrheic     Pseudophakia of both eyes     Nonexudative senile macular degeneration of retina     Asteroid hyalosis of right eye     Chronic angle-closure glaucoma     Dizziness of unknown cause     Vertigo     Hypothyroidism due to acquired atrophy of thyroid       Current Outpatient Prescriptions   Medication Sig Dispense Refill     acetaminophen (TYLENOL) 500 MG tablet Take 2 tablets by mouth 2 times daily.       aspirin 81 MG tablet Take 1 tablet by mouth every evening        aspirin-dipyridamole (AGGRENOX)  MG per 12 hr capsule Take 1 capsule by mouth 2 times daily 180 capsule 3     atorvastatin (LIPITOR) 10 MG tablet Take 1 tablet (10 mg) by mouth daily 90 tablet 3     cholecalciferol (VITAMIN D) 1000 UNIT tablet Take 1 tablet by mouth every morning        dorzolamide-timolol (COSOPT) 2-0.5 % ophthalmic solution Place 1 drop into both eyes 2 times daily 10 mL 2     gabapentin  (NEURONTIN) 300 MG capsule Take 1 capsule (300 mg) by mouth nightly as needed 90 capsule 1     hydrochlorothiazide (HYDRODIURIL) 25 MG tablet Take 1 tablet (25 mg) by mouth daily 90 tablet 1     latanoprost (XALATAN) 0.005 % ophthalmic solution Place 1 drop into both eyes At Bedtime 3 Bottle 1     levothyroxine (SYNTHROID/LEVOTHROID) 25 MCG tablet Take 1 tablet (25 mcg) by mouth daily 90 tablet 2     meclizine (ANTIVERT) 12.5 MG tablet Take 1 tablet (12.5 mg) by mouth 4 times daily as needed for dizziness 30 tablet 0     ondansetron (ZOFRAN-ODT) 4 MG ODT tab Place 1 tablet (4 mg) under the tongue every 8 hours as needed for nausea 30 tablet 1     ranitidine (ZANTAC) 150 MG tablet Take 1 tablet (150 mg) by mouth 2 times daily as needed for heartburn 60 tablet 3       No Known Allergies    Family History   Problem Relation Age of Onset     Coronary Artery Disease Father        Additional medical/Social/Surgical histories reviewed in [facility] and updated as appropriate.     REVIEW OF SYSTEMS (6/28/2018)  10 point ROS of systems including Constitutional, Eyes, Respiratory, Cardiovascular, Gastroenterology, Genitourinary, Integumentary, Musculoskeletal, Psychiatric were all negative except for pertinent positives noted in my HPI.     PHYSICAL EXAM  Vitals:    06/28/18 1458   BP: 130/74   Pulse: 87   Weight: 74.8 kg (165 lb)     Vital Signs: /74  Pulse 87  Wt 74.8 kg (165 lb)  BMI 30.18 kg/m2 Patient declined being weighed. Body mass index is 30.18 kg/(m^2).    General  - normal appearance, in no obvious distress  CV  - normal peripheral perfusion  Pulm  - normal respiratory pattern, non-labored  Musculoskeletal - lumbar spine and left hip  - stance: normal gait with limp, no obvious leg length discrepancy, abnormal heel and toe walk- needs assistance  - inspection: normal bone and joint alignment, no obvious scoliosis  - palpation: no paravertebral or bony tenderness, except over lateral hip and low back  - ROM:  flexion exacerbates pain, abnormal extension, sidebending, rotation cause pain in low back and hip  - strength: lower extremities 5/5 in all planes  - special tests:  (+) straight leg raise- left  (+) slump test  Neuro  - patellar and Achilles DTRs 2+ bilaterally, left lower extremity sensory deficit throughout L5 distribution, grossly normal coordination, normal muscle tone  Skin  - no ecchymosis, erythema, warmth, or induration, no obvious rash  Psych  - interactive, appropriate, normal mood and affect    ASSESSMENT & PLAN  89 yo female with lumbar ddd, herniated disc, history of left hip replacement  Given HEP  Medrol pack and tizanadine, tylenol  Reviewed xrays of hip: WNL with intact hip replacement  Lumbar xrays: shows ddd  Consider MRI/CT if not improving  Consider PT  Use walker  F/u as needed    Sage Rodriguez MD, CAQSM

## 2018-06-28 NOTE — TELEPHONE ENCOUNTER
[facility] Call Center    Phone Message    May a detailed message be left on voicemail: yes    Reason for Call: Other: Daughter Barbi called stating pt's pain has increased and would like her to be seen either today or tomorrow at the latest. Please call Anna.    Action Taken: Message routed to:  [facility] ([facility] ZEV

## 2018-06-28 NOTE — PROGRESS NOTES
SPORTS & ORTHOPEDIC WALK-IN VISIT 6/28/2018    Primary Care Physician: Dr. Can        Reason for visit:     What part of your body is injured / painful?  left hip    What caused the injury /pain? Fall    How long ago did your injury occur or pain begin? {DOI:555449}    What are your most bothersome symptoms? {SYMPTOMS:272019}    How would you characterize your symptom?  {PAIN ASSESSMENT:911181}    What makes your symptoms better? {SX BETTER:912082}    What makes your symptoms worse? {SX WORSE:130793}    Have you been previously seen for this problem? {YES/NO:908061}    Medical History:    Any recent changes to your medical history? {YES/NO:603760}    Any new medication prescribed since last visit? {YES/NO:499912}    Have you had surgery on this body part before? {YES/NO SX:081890}    Social History:    Occupation: ***    Handedness: {HANDDOMINANCE:673821}    Exercise: {EXERCISE TYPE:554250}    Review of Systems:    Do you have fever, chills, weight loss? {YES/NO:855262}    Do you have any vision problems? {YES/NO:950165}    Do you have any chest pain or edema? {YES/NO:286314}    Do you have any shortness of breath or wheezing?  {YES/NO:975271}    Do you have stomach problems? {YES/NO:975000}    Do you have any numbness or focal weakness? {YES/NO:720593}    Do you have diabetes? {YES/NO:397036}    Do you have problems with bleeding or clotting? {YES/NO:005139}    Do you have an rashes or other skin lesions? {YES/NO:273498}

## 2018-06-28 NOTE — TELEPHONE ENCOUNTER
Unable to get appointment in clinic at this time.  Patient has searing pain that is in a ball in the left hip.  Patient is able to ambulate and bear weight but pain is present during movement and at rest.  Pain worsens with movement.  Advised to be seen in the Ortho walk in clinic.  Family verbalizes understanding.  Perlita Thomas LPN

## 2018-06-28 NOTE — PROGRESS NOTES
HISTORY OF PRESENT ILLNESS  Ms. Torres is a pleasant 89 year old year old female who presents to clinic today with right hip and low back pain that radiates into right leg. She had a fall onto her head and low back over a week ago that caused her to be seen in the ER. She had a head CT and things were ok. 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)     PSVT (paroxysmal supraventricular tachycardia) (H)     Cerebral infarction (H)     Homonymous hemianopia     Pain in joint, shoulder region     Pain in joint, lower leg     Hyperlipidemia     Hypothyroidism     Tricuspid regurgitation     Pulmonary artery hypertension     Rosacea     Inflamed seborrheic keratosis     Dermatitis, seborrheic     Pseudophakia of both eyes     Nonexudative senile macular degeneration of retina     Asteroid hyalosis of right eye     Chronic angle-closure glaucoma     Dizziness of unknown cause     Vertigo     Hypothyroidism due to acquired atrophy of thyroid       Current Outpatient Prescriptions   Medication Sig Dispense Refill     acetaminophen (TYLENOL) 500 MG tablet Take 2 tablets by mouth 2 times daily.       aspirin 81 MG tablet Take 1 tablet by mouth every evening        aspirin-dipyridamole (AGGRENOX)  MG per 12 hr capsule Take 1 capsule by mouth 2 times daily 180 capsule 3     atorvastatin (LIPITOR) 10 MG tablet Take 1 tablet (10 mg) by mouth daily 90 tablet 3     cholecalciferol (VITAMIN D) 1000 UNIT tablet Take 1 tablet by mouth every morning        dorzolamide-timolol (COSOPT) 2-0.5 % ophthalmic solution Place 1 drop into both eyes 2 times daily 10 mL 2     gabapentin (NEURONTIN) 300 MG capsule Take 1 capsule (300 mg) by mouth nightly as  needed 90 capsule 1     hydrochlorothiazide (HYDRODIURIL) 25 MG tablet Take 1 tablet (25 mg) by mouth daily 90 tablet 1     latanoprost (XALATAN) 0.005 % ophthalmic solution Place 1 drop into both eyes At Bedtime 3 Bottle 1     levothyroxine (SYNTHROID/LEVOTHROID) 25 MCG tablet Take 1 tablet (25 mcg) by mouth daily 90 tablet 2     meclizine (ANTIVERT) 12.5 MG tablet Take 1 tablet (12.5 mg) by mouth 4 times daily as needed for dizziness 30 tablet 0     ondansetron (ZOFRAN-ODT) 4 MG ODT tab Place 1 tablet (4 mg) under the tongue every 8 hours as needed for nausea 30 tablet 1     ranitidine (ZANTAC) 150 MG tablet Take 1 tablet (150 mg) by mouth 2 times daily as needed for heartburn 60 tablet 3       No Known Allergies    Family History   Problem Relation Age of Onset     Coronary Artery Disease Father        Additional medical/Social/Surgical histories reviewed in [facility] and updated as appropriate.     REVIEW OF SYSTEMS (6/28/2018)  10 point ROS of systems including Constitutional, Eyes, Respiratory, Cardiovascular, Gastroenterology, Genitourinary, Integumentary, Musculoskeletal, Psychiatric were all negative except for pertinent positives noted in my HPI.     PHYSICAL EXAM  Vitals:    06/28/18 1458   BP: 130/74   Pulse: 87   Weight: 74.8 kg (165 lb)     Vital Signs: /74  Pulse 87  Wt 74.8 kg (165 lb)  BMI 30.18 kg/m2 Patient declined being weighed. Body mass index is 30.18 kg/(m^2).    General  - normal appearance, in no obvious distress  CV  - normal peripheral perfusion  Pulm  - normal respiratory pattern, non-labored  Musculoskeletal - lumbar spine and left hip  - stance: normal gait with limp, no obvious leg length discrepancy, abnormal heel and toe walk- needs assistance  - inspection: normal bone and joint alignment, no obvious scoliosis  - palpation: no paravertebral or bony tenderness, except over lateral hip and low back  - ROM: flexion exacerbates pain, abnormal extension, sidebending, rotation cause  pain in low back and hip  - strength: lower extremities 5/5 in all planes  - special tests:  (+) straight leg raise- left  (+) slump test  Neuro  - patellar and Achilles DTRs 2+ bilaterally, left lower extremity sensory deficit throughout L5 distribution, grossly normal coordination, normal muscle tone  Skin  - no ecchymosis, erythema, warmth, or induration, no obvious rash  Psych  - interactive, appropriate, normal mood and affect    ASSESSMENT & PLAN  89 yo female with lumbar ddd, herniated disc, history of left hip replacement  Given HEP  Medrol pack and tizanadine, tylenol  Reviewed xrays of hip: WNL with intact hip replacement  Lumbar xrays: shows ddd  Consider MRI/CT if not improving  Consider PT  Use walker  F/u as needed    Sage Rodriguez MD, CAQSM

## 2018-07-17 ENCOUNTER — TRANSFERRED RECORDS (OUTPATIENT)
Dept: HEALTH INFORMATION MANAGEMENT | Facility: CLINIC | Age: 83
End: 2018-07-17

## 2018-07-17 ENCOUNTER — OFFICE VISIT (OUTPATIENT)
Dept: ORTHOPEDICS | Facility: CLINIC | Age: 83
End: 2018-07-17
Payer: MEDICARE

## 2018-07-17 VITALS — BODY MASS INDEX: 28.17 KG/M2 | HEIGHT: 64 IN | WEIGHT: 165 LBS

## 2018-07-17 DIAGNOSIS — M51.369 DDD (DEGENERATIVE DISC DISEASE), LUMBAR: Primary | ICD-10-CM

## 2018-07-17 DIAGNOSIS — M84.48XS SACRAL INSUFFICIENCY FRACTURE, SEQUELA: ICD-10-CM

## 2018-07-17 ASSESSMENT — ENCOUNTER SYMPTOMS
MUSCLE CRAMPS: 0
MUSCLE WEAKNESS: 0
ARTHRALGIAS: 1
JOINT SWELLING: 0
MYALGIAS: 1
BACK PAIN: 1
NECK PAIN: 0
STIFFNESS: 0

## 2018-07-17 NOTE — MR AVS SNAPSHOT
After Visit Summary   7/17/2018    Azeb Torres    MRN: [id number]           Patient Information     Date Of Birth          4/3/1925        Visit Information        Provider Department      7/17/2018 1:20 PM Sage Rodriguez [facility]        Today's Diagnoses     DDD (degenerative disc disease), lumbar    -  1    Sacral insufficiency fracture, sequela           Follow-ups after your visit        Your next 10 appointments already scheduled     Sep 18, 2018  1:00 PM CDT   RETURN GLAUCOMA with Alejandrina Faria MD   Eye Clinic ([facility])    [address]  9[address] Clin [address]  [facility] [id number] [phone]            Nov 05, 2018  9:00 AM CST   (Arrive by 8:45 AM)   Return Visit with Evelina Can MD   [facility] ([facility])    [address] [facility]  4th [facility] [address] [phone]              Future tests that were ordered for you today     Open Future Orders        Priority Expected Expires Ordered    X-ray Lumbar epidural injection Routine 7/18/2018 7/18/2019 7/18/2018    MRI Lumbar spine w/o contrast Routine  7/17/2019 7/17/2018            Who to contact     Please call your clinic at [phone] to:    Ask questions about your health    Make or cancel appointments    Discuss your medicines    Learn about your test results    Speak to your doctor            Additional Information About Your Visit        MyChart Information     [employer] gives you secure access to your electronic health record. If you see a primary care provider, you can also send messages to your care team and make appointments. If you have questions, please call your primary care clinic.  If you do not have a primary care provider, please call [phone] and they will assist you.      [employer] is an electronic gateway that provides easy, online access to your medical records. With [employer],  "you can request a clinic appointment, read your test results, renew a prescription or communicate with your care team.     To access your existing account, please contact your [facility] or call [phone] for assistance.        Care EveryWhere ID     This is your Care EveryWhere ID. This could be used by other organizations to access your [place] medical records  [id number]        Your Vitals Were     Height BMI (Body Mass Index)                1.619 m (5' 3.75\") 28.54 kg/m2           Blood Pressure from Last 3 Encounters:   06/28/18 130/74   06/17/18 (!) 172/91   05/03/18 138/79    Weight from Last 3 Encounters:   07/17/18 74.8 kg (165 lb)   06/28/18 74.8 kg (165 lb)   06/17/18 74.8 kg (165 lb)               Primary Care Provider Office Phone # Fax #    Evelina Can [phone]       [address] [address]        Equal Access to Services     FRANKY FLORES : Hadii buddy ku hadasho Soomaali, waaxda luqadaha, qaybta kaalmada adeegyada, jese gustafson hayalexa veliz . So [facility] [phone].    ATENCIÓN: Si habla español, tiene a johnson disposición servicios gratuitos de asistencia lingüística. Llame al [phone].    We comply with applicable federal civil rights laws and [place] laws. We do not discriminate on the basis of race, color, national origin, age, disability, sex, sexual orientation, or gender identity.            Thank you!     Thank you for choosing [facility]  for your care. Our goal is always to provide you with excellent care. Hearing back from our patients is one way we can continue to improve our services. Please take a few minutes to complete the written survey that you may receive in the mail after your visit with us. Thank you!             Your Updated Medication List - Protect others around you: Learn how to safely use, store and throw away your medicines at www.disposemymeds.org.        "   This list is accurate as of 7/17/18 11:59 PM.  Always use your most recent med list.                   Brand Name Dispense Instructions for use Diagnosis    aspirin 81 MG tablet      Take 1 tablet by mouth every evening        aspirin-dipyridamole  MG per 12 hr capsule    AGGRENOX    180 capsule    Take 1 capsule by mouth 2 times daily    Cerebrovascular disease       atorvastatin 10 MG tablet    LIPITOR    90 tablet    Take 1 tablet (10 mg) by mouth daily    Hyperlipidemia, unspecified hyperlipidemia type       cholecalciferol 1000 UNIT tablet    vitamin D3     Take 1 tablet by mouth every morning        dorzolamide-timolol 2-0.5 % ophthalmic solution    COSOPT    10 mL    Place 1 drop into both eyes 2 times daily    Chronic angle-closure glaucoma of both eyes, severe stage       gabapentin 300 MG capsule    NEURONTIN    90 capsule    Take 1 capsule (300 mg) by mouth nightly as needed    Peripheral polyneuropathy       hydrochlorothiazide 25 MG tablet    HYDRODIURIL    90 tablet    Take 1 tablet (25 mg) by mouth daily    Hyperlipidemia, unspecified hyperlipidemia type, Essential hypertension, benign       latanoprost 0.005 % ophthalmic solution    XALATAN    3 Bottle    Place 1 drop into both eyes At Bedtime    Chronic angle-closure glaucoma of both eyes, severe stage       levothyroxine 25 MCG tablet    SYNTHROID/LEVOTHROID    90 tablet    Take 1 tablet (25 mcg) by mouth daily    Hypothyroidism due to acquired atrophy of thyroid       meclizine 12.5 MG tablet    ANTIVERT    30 tablet    Take 1 tablet (12.5 mg) by mouth 4 times daily as needed for dizziness        methylPREDNISolone 4 MG tablet    MEDROL DOSEPAK    21 tablet    Follow package instructions    Lumbar radicular pain       ondansetron 4 MG ODT tab    ZOFRAN-ODT    30 tablet    Place 1 tablet (4 mg) under the tongue every 8 hours as needed for nausea    Nausea       ranitidine 150 MG tablet    ZANTAC    60 tablet    Take 1 tablet (150 mg) by  mouth 2 times daily as needed for heartburn    Heartburn       tiZANidine 4 MG tablet    ZANAFLEX    30 tablet    Take 1-2 tablets (4-8 mg) by mouth nightly as needed    Lumbar radicular pain       TYLENOL 500 MG tablet   Generic drug:  acetaminophen      Take 2 tablets by mouth 2 times daily.

## 2018-07-17 NOTE — LETTER
7/17/2018       RE: Azeb Torres  [address]  [place] [address]     Dear Colleague,    Thank you for referring your patient, Azeb Torres, to the [facility]. Please see a copy of my visit note below.    HISTORY OF PRESENT ILLNESS  Ms. 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)     PSVT (paroxysmal supraventricular tachycardia) (H)     Cerebral infarction (H)     Homonymous hemianopia     Pain in joint, shoulder region     Pain in joint, lower leg     Hyperlipidemia     Hypothyroidism     Tricuspid regurgitation     Pulmonary artery hypertension     Rosacea     Inflamed seborrheic keratosis     Dermatitis, seborrheic     Pseudophakia of both eyes     Nonexudative senile macular degeneration of retina     Asteroid hyalosis of right eye     Chronic angle-closure glaucoma     Dizziness of unknown cause     Vertigo     Hypothyroidism due to acquired atrophy of thyroid       Current Outpatient Prescriptions   Medication Sig Dispense Refill     acetaminophen (TYLENOL) 500 MG tablet Take 2 tablets by mouth 2 times daily.       aspirin 81 MG tablet Take 1 tablet by mouth every evening        aspirin-dipyridamole (AGGRENOX)  MG per 12 hr capsule Take 1 capsule by mouth 2 times daily 180 capsule 3     atorvastatin (LIPITOR) 10 MG tablet Take 1 tablet (10 mg) by mouth daily 90 tablet 3     cholecalciferol (VITAMIN D) 1000 UNIT tablet Take 1 tablet by mouth every morning        dorzolamide-timolol (COSOPT) 2-0.5 % ophthalmic solution Place 1 drop into  "both eyes 2 times daily 10 mL 2     gabapentin (NEURONTIN) 300 MG capsule Take 1 capsule (300 mg) by mouth nightly as needed 90 capsule 1     hydrochlorothiazide (HYDRODIURIL) 25 MG tablet Take 1 tablet (25 mg) by mouth daily 90 tablet 1     latanoprost (XALATAN) 0.005 % ophthalmic solution Place 1 drop into both eyes At Bedtime 3 Bottle 1     levothyroxine (SYNTHROID/LEVOTHROID) 25 MCG tablet Take 1 tablet (25 mcg) by mouth daily 90 tablet 2     meclizine (ANTIVERT) 12.5 MG tablet Take 1 tablet (12.5 mg) by mouth 4 times daily as needed for dizziness 30 tablet 0     methylPREDNISolone (MEDROL DOSEPAK) 4 MG tablet Follow package instructions 21 tablet 0     ondansetron (ZOFRAN-ODT) 4 MG ODT tab Place 1 tablet (4 mg) under the tongue every 8 hours as needed for nausea 30 tablet 1     ranitidine (ZANTAC) 150 MG tablet Take 1 tablet (150 mg) by mouth 2 times daily as needed for heartburn 60 tablet 3     tiZANidine (ZANAFLEX) 4 MG tablet Take 1-2 tablets (4-8 mg) by mouth nightly as needed 30 tablet 1       No Known Allergies    Family History   Problem Relation Age of Onset     Coronary Artery Disease Father        Additional medical/Social/Surgical histories reviewed in [facility] and updated as appropriate.     REVIEW OF SYSTEMS (7/17/2018)  10 point ROS of systems including Constitutional, Eyes, Respiratory, Cardiovascular, Gastroenterology, Genitourinary, Integumentary, Musculoskeletal, Psychiatric were all negative except for pertinent positives noted in my HPI.     PHYSICAL EXAM  Vitals:    07/17/18 1321   Weight: 74.8 kg (165 lb)   Height: 1.619 m (5' 3.75\")     Vital Signs: Ht 1.619 m (5' 3.75\")  Wt 74.8 kg (165 lb)  BMI 28.54 kg/m2 Patient declined being weighed. Body mass index is 28.54 kg/(m^2).    General  - normal appearance, in no obvious distress  CV  - normal peripheral perfusion  Pulm  - normal respiratory pattern, non-labored  Musculoskeletal - lumbar spine  - stance: abnormal gait with limp due to pain " in low back, no obvious leg length discrepancy, ab normal heel and toe walk, needs assistance  - inspection: normal bone and joint alignment, no mild scoliosis  - palpation: no paravertebral or bony tenderness, except over bilateral SI joints, and lumbar paraspinal muscles  - ROM: flexion exacerbates pain, normal extension, sidebending, rotation  - strength: lower extremities 5/5 in all planes  - special tests:  (+) straight leg raise bilaterally  (+) slump test- low back  Neuro  - patellar and Achilles DTRs 2+ bilaterally,bilateral lower extremity sensory deficit throughout L4 distribution, grossly normal coordination, normal muscle tone  Skin  - no ecchymosis, erythema, warmth, or induration, no obvious rash  Psych  - interactive, appropriate, normal mood and affect    ASSESSMENT & PLAN  89 yo female with lumbar ddd, and chronic sacral insufficiency fractures  Reviewed her symptoms and response to medications, and will order an MRI with planned JOZEF  Consider PT and lidocaine patches  F/u after MRI and JOZEF      Again, thank you for allowing me to participate in the care of your patient.      Sincerely,    Sage Rodriguez MD

## 2018-07-17 NOTE — PROGRESS NOTES
HISTORY OF PRESENT ILLNESS  Ms. 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)     PSVT (paroxysmal supraventricular tachycardia) (H)     Cerebral infarction (H)     Homonymous hemianopia     Pain in joint, shoulder region     Pain in joint, lower leg     Hyperlipidemia     Hypothyroidism     Tricuspid regurgitation     Pulmonary artery hypertension     Rosacea     Inflamed seborrheic keratosis     Dermatitis, seborrheic     Pseudophakia of both eyes     Nonexudative senile macular degeneration of retina     Asteroid hyalosis of right eye     Chronic angle-closure glaucoma     Dizziness of unknown cause     Vertigo     Hypothyroidism due to acquired atrophy of thyroid       Current Outpatient Prescriptions   Medication Sig Dispense Refill     acetaminophen (TYLENOL) 500 MG tablet Take 2 tablets by mouth 2 times daily.       aspirin 81 MG tablet Take 1 tablet by mouth every evening        aspirin-dipyridamole (AGGRENOX)  MG per 12 hr capsule Take 1 capsule by mouth 2 times daily 180 capsule 3     atorvastatin (LIPITOR) 10 MG tablet Take 1 tablet (10 mg) by mouth daily 90 tablet 3     cholecalciferol (VITAMIN D) 1000 UNIT tablet Take 1 tablet by mouth every morning        dorzolamide-timolol (COSOPT) 2-0.5 % ophthalmic solution Place 1 drop into both eyes 2 times daily 10 mL 2     gabapentin (NEURONTIN) 300 MG capsule Take 1 capsule (300 mg) by mouth nightly as needed 90 capsule 1     hydrochlorothiazide (HYDRODIURIL) 25 MG tablet Take 1 tablet (25 mg) by mouth daily 90 tablet 1     latanoprost (XALATAN) 0.005 % ophthalmic  "solution Place 1 drop into both eyes At Bedtime 3 Bottle 1     levothyroxine (SYNTHROID/LEVOTHROID) 25 MCG tablet Take 1 tablet (25 mcg) by mouth daily 90 tablet 2     meclizine (ANTIVERT) 12.5 MG tablet Take 1 tablet (12.5 mg) by mouth 4 times daily as needed for dizziness 30 tablet 0     methylPREDNISolone (MEDROL DOSEPAK) 4 MG tablet Follow package instructions 21 tablet 0     ondansetron (ZOFRAN-ODT) 4 MG ODT tab Place 1 tablet (4 mg) under the tongue every 8 hours as needed for nausea 30 tablet 1     ranitidine (ZANTAC) 150 MG tablet Take 1 tablet (150 mg) by mouth 2 times daily as needed for heartburn 60 tablet 3     tiZANidine (ZANAFLEX) 4 MG tablet Take 1-2 tablets (4-8 mg) by mouth nightly as needed 30 tablet 1       No Known Allergies    Family History   Problem Relation Age of Onset     Coronary Artery Disease Father        Additional medical/Social/Surgical histories reviewed in [facility] and updated as appropriate.     REVIEW OF SYSTEMS (7/17/2018)  10 point ROS of systems including Constitutional, Eyes, Respiratory, Cardiovascular, Gastroenterology, Genitourinary, Integumentary, Musculoskeletal, Psychiatric were all negative except for pertinent positives noted in my HPI.     PHYSICAL EXAM  Vitals:    07/17/18 1321   Weight: 74.8 kg (165 lb)   Height: 1.619 m (5' 3.75\")     Vital Signs: Ht 1.619 m (5' 3.75\")  Wt 74.8 kg (165 lb)  BMI 28.54 kg/m2 Patient declined being weighed. Body mass index is 28.54 kg/(m^2).    General  - normal appearance, in no obvious distress  CV  - normal peripheral perfusion  Pulm  - normal respiratory pattern, non-labored  Musculoskeletal - lumbar spine  - stance: abnormal gait with limp due to pain in low back, no obvious leg length discrepancy, ab normal heel and toe walk, needs assistance  - inspection: normal bone and joint alignment, no mild scoliosis  - palpation: no paravertebral or bony tenderness, except over bilateral SI joints, and lumbar paraspinal muscles  - ROM: " flexion exacerbates pain, normal extension, sidebending, rotation  - strength: lower extremities 5/5 in all planes  - special tests:  (+) straight leg raise bilaterally  (+) slump test- low back  Neuro  - patellar and Achilles DTRs 2+ bilaterally,bilateral lower extremity sensory deficit throughout L4 distribution, grossly normal coordination, normal muscle tone  Skin  - no ecchymosis, erythema, warmth, or induration, no obvious rash  Psych  - interactive, appropriate, normal mood and affect    ASSESSMENT & PLAN  89 yo female with lumbar ddd, and chronic sacral insufficiency fractures  Reviewed her symptoms and response to medications, and will order an MRI with planned JOZEF  Consider PT and lidocaine patches  F/u after MRI and JOZEF      Sage Rodriguez MD, CAQSM

## 2018-07-18 ENCOUNTER — TELEPHONE (OUTPATIENT)
Dept: ORTHOPEDICS | Facility: CLINIC | Age: 83
End: 2018-07-18

## 2018-07-18 ENCOUNTER — MYC MEDICAL ADVICE (OUTPATIENT)
Dept: INTERNAL MEDICINE | Facility: CLINIC | Age: 83
End: 2018-07-18

## 2018-07-18 ENCOUNTER — TELEPHONE (OUTPATIENT)
Dept: INTERNAL MEDICINE | Facility: CLINIC | Age: 83
End: 2018-07-18

## 2018-07-18 NOTE — TELEPHONE ENCOUNTER
Dr. Chowdhury from [facility] calleding regarding Pt and her lumbar MR that she had done last evening, 7/17/18.  He reports that Pt has bilateral sacral fractures, likely insufficiency fractures exacerbated by recent fall.   Report will be here shortly, will relay results to Dr. Rodriguez today while he is in clinic.   Called [facility] and they will push images as well.    James Magana RN

## 2018-07-18 NOTE — TELEPHONE ENCOUNTER
Per huddle with Dr. Rodriguez, patient could benefit from lumbar epidural injection.   Lumbar epidural injection ordered.   Routed to Larry BARRY to help facilitate scheduling of this ASAP.

## 2018-07-18 NOTE — TELEPHONE ENCOUNTER
I spoke with Azeb's daughter Lake to schedule the X-ray Epidural injection. She would like to try and get her mom scheduled ASAP for this appt either at [facility] or the [facility]. I called and spoke with Martín at the [facility] and they are not able to get Azeb in till August 3. I called [facility] and they are able to get her in on July 24 at 120pm at the [place] location. Lake is aware that there will be a Imaging Tech calling her tomorrow to discuss hold orders for pt's Aggranox. I asked Lake to put a call out to the provider that monitors this medication for her mom to let them know that she may need to hold this medication. [facility] protocol is holding for 5 days prior, but the Imaging Techs will discuss this further with her tomorrow when they call her. I gave Lake the phone number for [facility] if she has any questions she can call [phone]. Orders need to be faxed to [phone].    Larry López  Procedure , [place]  [facility] Adult [facility]ocrin[facility]

## 2018-07-18 NOTE — TELEPHONE ENCOUNTER
I left a message for Azeb's daughter Lake to call back to schedule the Epidural injection that Dr Rodriguez ordered. I asked her to call me back directly at [phone].     Larry López  Procedure , [place]  [facility]ty and Adult Endocrinology

## 2018-07-18 NOTE — TELEPHONE ENCOUNTER
[facility] Call Center    Phone Message    May a detailed message be left on voicemail: yes    Reason for Call: Other: Please follow up with pt daughter to discuss if pt should stop taking medication before spine procedure.     Action Taken: Message routed to:  [facility] dedra buitrago

## 2018-07-19 ENCOUNTER — TELEPHONE (OUTPATIENT)
Dept: AUDIOLOGY | Facility: CLINIC | Age: 83
End: 2018-07-19

## 2018-07-19 NOTE — TELEPHONE ENCOUNTER
Azeb Torres's daughter brought her hearing aids to the [facility] on 7/19/18 for hearing aid services.  Both hearing aids and earmold were cleaned and the  filters were replaced.  The hearing aids were both found to be working normally.  They were given back to the patient's daughter.  She will continue to bring them to the clinic periodically for cleaning.

## 2018-07-31 DIAGNOSIS — E78.5 HYPERLIPIDEMIA, UNSPECIFIED HYPERLIPIDEMIA TYPE: ICD-10-CM

## 2018-07-31 DIAGNOSIS — I10 ESSENTIAL HYPERTENSION, BENIGN: ICD-10-CM

## 2018-08-01 RX ORDER — HYDROCHLOROTHIAZIDE 25 MG/1
25 TABLET ORAL DAILY
Qty: 90 TABLET | Refills: 2 | Status: SHIPPED | OUTPATIENT
Start: 2018-08-01 | End: 2019-04-22

## 2018-08-07 DIAGNOSIS — M54.16 LUMBAR RADICULAR PAIN: ICD-10-CM

## 2018-09-18 ENCOUNTER — OFFICE VISIT (OUTPATIENT)
Dept: OPHTHALMOLOGY | Facility: CLINIC | Age: 83
End: 2018-09-18
Attending: OPHTHALMOLOGY
Payer: MEDICARE

## 2018-09-18 DIAGNOSIS — H40.2290 CHRONIC ANGLE-CLOSURE GLAUCOMA: ICD-10-CM

## 2018-09-18 DIAGNOSIS — H35.3190 NONEXUDATIVE SENILE MACULAR DEGENERATION OF RETINA: ICD-10-CM

## 2018-09-18 DIAGNOSIS — Z96.1 PSEUDOPHAKIA OF BOTH EYES: ICD-10-CM

## 2018-09-18 DIAGNOSIS — H40.2233 CHRONIC ANGLE-CLOSURE GLAUCOMA OF BOTH EYES, SEVERE STAGE: Primary | ICD-10-CM

## 2018-09-18 DIAGNOSIS — H53.461 RIGHT HOMONYMOUS HEMIANOPSIA: ICD-10-CM

## 2018-09-18 PROCEDURE — G0463 HOSPITAL OUTPT CLINIC VISIT: HCPCS | Mod: ZF

## 2018-09-18 PROCEDURE — 92133 CPTRZD OPH DX IMG PST SGM ON: CPT | Mod: ZF | Performed by: OPHTHALMOLOGY

## 2018-09-18 PROCEDURE — 92083 EXTENDED VISUAL FIELD XM: CPT | Mod: ZF | Performed by: OPHTHALMOLOGY

## 2018-09-18 RX ORDER — DORZOLAMIDE HYDROCHLORIDE AND TIMOLOL MALEATE 20; 5 MG/ML; MG/ML
1 SOLUTION/ DROPS OPHTHALMIC 2 TIMES DAILY
Qty: 1 BOTTLE | Refills: 11 | Status: SHIPPED | OUTPATIENT
Start: 2018-09-18 | End: 2019-10-18

## 2018-09-18 RX ORDER — LATANOPROST 50 UG/ML
1 SOLUTION/ DROPS OPHTHALMIC AT BEDTIME
Qty: 1 BOTTLE | Refills: 11 | Status: SHIPPED | OUTPATIENT
Start: 2018-09-18 | End: 2019-10-12

## 2018-09-18 ASSESSMENT — CONF VISUAL FIELD
OD_SUPERIOR_TEMPORAL_RESTRICTION: 3
OS_INFERIOR_NASAL_RESTRICTION: 3
OS_SUPERIOR_NASAL_RESTRICTION: 3
OS_INFERIOR_TEMPORAL_RESTRICTION: 3
OD_INFERIOR_TEMPORAL_RESTRICTION: 3

## 2018-09-18 ASSESSMENT — REFRACTION_WEARINGRX
OS_CYLINDER: +0.25
OS_SPHERE: -1.75
OD_AXIS: 153
OD_CYLINDER: +1.00
OS_AXIS: 174
SPECS_TYPE: SVL
OD_SPHERE: -2.25

## 2018-09-18 ASSESSMENT — EXTERNAL EXAM - LEFT EYE: OS_EXAM: NORMAL

## 2018-09-18 ASSESSMENT — VISUAL ACUITY
CORRECTION_TYPE: GLASSES
OS_CC: 20/60
METHOD: SNELLEN - LINEAR
OS_CC+: -1
OD_CC: 20/40

## 2018-09-18 ASSESSMENT — TONOMETRY
OD_IOP_MMHG: 14
OS_IOP_MMHG: 14
IOP_METHOD: APPLANATION

## 2018-09-18 ASSESSMENT — EXTERNAL EXAM - RIGHT EYE: OD_EXAM: NORMAL

## 2018-09-18 ASSESSMENT — SLIT LAMP EXAM - LIDS: COMMENTS: DERMATOCHALASIS

## 2018-09-18 ASSESSMENT — CUP TO DISC RATIO
OS_RATIO: 0.9
OD_RATIO: 0.8

## 2018-09-18 NOTE — PATIENT INSTRUCTIONS
Patient will continue on Cosopt (Timolol/Dorzolamide) which is a blue top drop 2x/day (12 hours apart) in both eyes and Latanoprost which is a teal top drop at bedtime in both eyes.  Patient will return to clinic in 8-12 months with repeat visual field test, dilated eye exam, and OCT with RNFL analysis.  Patient will also continue on eye vitamins.

## 2018-09-18 NOTE — PROGRESS NOTES
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  Patient will return to clinic in 8-12 months with repeat visual field test, dilated eye exam, and OCT with RNFL analysis.  Patient will also continue on eye vitamins.        Attending Physician Attestation:  Complete documentation of historical and exam elements from today's encounter can be found in the full encounter summary report (not reduplicated in this progress note). I personally obtained the chief complaint(s) and history of present illness.  I confirmed and edited as necessary the review of systems, past medical/surgical history, family history, social history, and examination findings as documented by others; and I examined the patient myself. I personally reviewed the relevant tests, images, and reports as documented above. I formulated and edited as necessary the assessment and plan and discussed the findings and management plan with the patient and family.  - Alejandrina Faria MD

## 2018-09-18 NOTE — NURSING NOTE
Chief Complaints and History of Present Illnesses   Patient presents with     Follow Up For     8 month follow up Chronic angle-closure glaucoma of both eyes, severe stage      HPI    Affected eye(s):  Both   Symptoms:     No floaters   No flashes   No glare   No halos         Do you have eye pain now?:  No      Comments:  8 month follow up Chronic angle-closure glaucoma BE  Latanoprost every day BE  cosopt bid BE  Ness BURGESS 1:14 PM September 18, 2018

## 2018-09-18 NOTE — MR AVS SNAPSHOT
After Visit Summary   9/18/2018    Azeb Torres    MRN: [id number]           Patient Information     Date Of Birth          4/3/1925        Visit Information        Provider Department      9/18/2018 1:00 PM [facility]andrina F[facility]        Today's Diagnoses     Chronic angle-closure glaucoma of both eyes, severe stage    -  1    Chronic angle-closure glaucoma        Nonexudative senile macular degeneration of retina        Pseudophakia of both eyes        Right homonymous hemianopsia          Care Instructions    Patient will continue on Cosopt (Timolol/Dorzolamide) which is a blue top drop 2x/day (12 hours apart) in both eyes and Latanoprost which is a teal top drop at bedtime in both eyes.  Patient will return to clinic in 8-12 months with repeat visual field test, dilated eye exam, and OCT with RNFL analysis.  Patient will also continue on eye vitamins.            Follow-ups after your visit        Follow-up notes from your care team     Return 8-12 months with repeat visual field test, dilated eye exam, and OCT with RNFL analysis. .      Your next 10 appointments already scheduled     Nov 05, 2018  9:00 AM CST   (Arrive by 8:45 AM)   Return Visit with Evelina Can MD   [facility] ([facility])    [address] [place] [address] [phone]              Future tests that were ordered for you today     Open Future Orders        Priority Expected Expires Ordered    DILATED FUNDUS EXAM Routine  9/18/2019 9/18/2018            Who to contact     Please call your clinic at [phone] to:    Ask questions about your health    Make or cancel appointments    Discuss your medicines    Learn about your test results    Speak to your doctor            Additional Information About Your Visit        MyChart Information     [employer] gives you secure access to your electronic health record. If you see a primary care provider,  you can also send messages to your care team and make appointments. If you have questions, please call your primary care clinic.  If you do not have a primary care provider, please call [phone] and they will assist you.      [employer] is an electronic gateway that provides easy, online access to your medical records. With [employer], you can request a clinic appointment, read your test results, renew a prescription or communicate with your care team.     To access your existing account, please contact your [facility] or call [phone] for assistance.        Care EveryWhere ID     This is your Care EveryWhere ID. This could be used by other organizations to access your [place] medical records  [id number]         Blood Pressure from Last 3 Encounters:   06/28/18 130/74   06/17/18 (!) 172/91   05/03/18 138/79    Weight from Last 3 Encounters:   07/17/18 74.8 kg (165 lb)   06/28/18 74.8 kg (165 lb)   06/17/18 74.8 kg (165 lb)              We Performed the Following     OCT Optic Nerve RNFL Spectralis OU (both eyes)     OVF 24-2 Dynamic OU          Today's Medication Changes          These changes are accurate as of 9/18/18  2:34 PM.  If you have any questions, ask your nurse or doctor.               These medicines have changed or have updated prescriptions.        Dose/Directions    * dorzolamide-timolol 2-0.5 % ophthalmic solution   Commonly known as:  COSOPT   This may have changed:  Another medication with the same name was added. Make sure you understand how and when to take each.   Used for:  Chronic angle-closure glaucoma of both eyes, severe stage   Changed by:  Alejandrina Faria MD        Dose:  1 drop   Place 1 drop into both eyes 2 times daily   Quantity:  10 mL   Refills:  2       * dorzolamide-timolol 2-0.5 % ophthalmic solution   Commonly known as:  COSOPT   This may have changed:  You were already taking a medication with the same name, and this prescription was  added. Make sure you understand how and when to take each.   Used for:  Chronic angle-closure glaucoma of both eyes, severe stage   Changed by:  Alejandrina Faria MD        Dose:  1 drop   Place 1 drop into both eyes 2 times daily   Quantity:  1 Bottle   Refills:  11       * latanoprost 0.005 % ophthalmic solution   Commonly known as:  XALATAN   This may have changed:  Another medication with the same name was added. Make sure you understand how and when to take each.   Used for:  Chronic angle-closure glaucoma of both eyes, severe stage   Changed by:  Alejandrina Faria MD        Dose:  1 drop   Place 1 drop into both eyes At Bedtime   Quantity:  3 Bottle   Refills:  1       * latanoprost 0.005 % ophthalmic solution   Commonly known as:  XALATAN   This may have changed:  You were already taking a medication with the same name, and this prescription was added. Make sure you understand how and when to take each.   Used for:  Chronic angle-closure glaucoma of both eyes, severe stage   Changed by:  Alejandrina Faria MD        Dose:  1 drop   Place 1 drop into both eyes At Bedtime   Quantity:  1 Bottle   Refills:  11       * Notice:  This list has 4 medication(s) that are the same as other medications prescribed for you. Read the directions carefully, and ask your doctor or other care provider to review them with you.         Where to get your medicines      These medications were sent to [address] [place] [address]    Hours:  TRANSPLANT PHONE NUMBER [phone] Phone:  [phone]     dorzolamide-timolol 2-0.5 % ophthalmic solution    latanoprost 0.005 % ophthalmic solution                Primary Care Provider Office Phone # Fax #    Evelina Can [phone]       [address] [place] [address]        Equal Access to Services     KENDRA FLORES AH: Ora camargo  Gayatrijeanne, cecyda luqadaha, qadiandrata kamathew alvarado, jese burt derekari stewardsilvestre mandy. So [facility] [phone].    ATENCIÓN: Si kathleen vora, tiene a johnson disposición servicios gratuitos de asistencia lingüística. Megan al [phone].    We comply with applicable federal civil rights laws and [place] laws. We do not discriminate on the basis of race, color, national origin, age, disability, sex, sexual orientation, or gender identity.            Thank you!     Thank you for choosing [facility]  for your care. Our goal is always to provide you with excellent care. Hearing back from our patients is one way we can continue to improve our services. Please take a few minutes to complete the written survey that you may receive in the mail after your visit with us. Thank you!             Your Updated Medication List - Protect others around you: Learn how to safely use, store and throw away your medicines at www.disposemymeds.org.          This list is accurate as of 9/18/18  2:34 PM.  Always use your most recent med list.                   Brand Name Dispense Instructions for use Diagnosis    aspirin 81 MG tablet      Take 1 tablet by mouth every evening        aspirin-dipyridamole  MG per 12 hr capsule    AGGRENOX    180 capsule    Take 1 capsule by mouth 2 times daily    Cerebrovascular disease       atorvastatin 10 MG tablet    LIPITOR    90 tablet    Take 1 tablet (10 mg) by mouth daily    Hyperlipidemia, unspecified hyperlipidemia type       cholecalciferol 1000 UNIT tablet    vitamin D3     Take 1 tablet by mouth every morning        * dorzolamide-timolol 2-0.5 % ophthalmic solution    COSOPT    10 mL    Place 1 drop into both eyes 2 times daily    Chronic angle-closure glaucoma of both eyes, severe stage       * dorzolamide-timolol 2-0.5 % ophthalmic solution    COSOPT    1 Bottle    Place 1 drop into both eyes 2 times daily    Chronic angle-closure glaucoma of both eyes, severe stage        gabapentin 300 MG capsule    NEURONTIN    90 capsule    Take 1 capsule (300 mg) by mouth nightly as needed    Peripheral polyneuropathy       hydrochlorothiazide 25 MG tablet    HYDRODIURIL    90 tablet    Take 1 tablet (25 mg) by mouth daily    Hyperlipidemia, unspecified hyperlipidemia type, Essential hypertension, benign       * latanoprost 0.005 % ophthalmic solution    XALATAN    3 Bottle    Place 1 drop into both eyes At Bedtime    Chronic angle-closure glaucoma of both eyes, severe stage       * latanoprost 0.005 % ophthalmic solution    XALATAN    1 Bottle    Place 1 drop into both eyes At Bedtime    Chronic angle-closure glaucoma of both eyes, severe stage       levothyroxine 25 MCG tablet    SYNTHROID/LEVOTHROID    90 tablet    Take 1 tablet (25 mcg) by mouth daily    Hypothyroidism due to acquired atrophy of thyroid       meclizine 12.5 MG tablet    ANTIVERT    30 tablet    Take 1 tablet (12.5 mg) by mouth 4 times daily as needed for dizziness        methylPREDNISolone 4 MG tablet    MEDROL DOSEPAK    21 tablet    Follow package instructions    Lumbar radicular pain       ondansetron 4 MG ODT tab    ZOFRAN-ODT    30 tablet    Place 1 tablet (4 mg) under the tongue every 8 hours as needed for nausea    Nausea       ranitidine 150 MG tablet    ZANTAC    60 tablet    Take 1 tablet (150 mg) by mouth 2 times daily as needed for heartburn    Heartburn       tiZANidine 4 MG tablet    ZANAFLEX    30 tablet    Take 1-2 tablets (4-8 mg) by mouth nightly as needed    Lumbar radicular pain       TYLENOL 500 MG tablet   Generic drug:  acetaminophen      Take 2 tablets by mouth 2 times daily.        * Notice:  This list has 4 medication(s) that are the same as other medications prescribed for you. Read the directions carefully, and ask your doctor or other care provider to review them with you.

## 2018-10-19 DIAGNOSIS — I67.9 CEREBROVASCULAR DISEASE: ICD-10-CM

## 2018-10-19 RX ORDER — ASPIRIN AND DIPYRIDAMOLE 25; 200 MG/1; MG/1
1 CAPSULE, EXTENDED RELEASE ORAL 2 TIMES DAILY
Qty: 180 CAPSULE | Refills: 3 | Status: CANCELLED | OUTPATIENT
Start: 2018-10-19

## 2018-10-19 NOTE — TELEPHONE ENCOUNTER
[facility] Call Center    Phone Message    May a detailed message be left on voicemail: yes    Reason for Call: Medication Refill Request    Has the patient contacted the pharmacy for the refill? Yes   Name of medication being requested: aspirin-dipyridamole (AGGRENOX)  MG per 12 hr capsule  Provider who prescribed the medication: Evelina Can  Pharmacy: [facility]  Fax [phone]  Date medication is needed: ASAP   Pharmacy calling please send refill to fax above      Action Taken: Message routed to:  [facility] ([facility] Med Refills Team

## 2018-10-19 NOTE — TELEPHONE ENCOUNTER
aspirin-dipyridamole (AGGRENOX)  MG per 12 hr capsule      Last Written Prescription Date:  9-15-17  Last Fill Quantity: 180,   # refills: 3  Last Office Visit : 7-2-18  Future Office visit:  11-5-18    Routing refill request to provider for review/approval because:  Not on protocol.      Kathleen M Doege RN

## 2018-10-22 DIAGNOSIS — I67.9 CEREBROVASCULAR DISEASE: ICD-10-CM

## 2018-10-22 NOTE — TELEPHONE ENCOUNTER
aspirin-dipyridamole (AGGRENOX)  MG per 12 hr capsule   Last Written Prescription Date:  9/15/17  Last Fill Quantity: 180,   # refills: 3  Last Office Visit : 7/2/18  Future Office visit: 11/5/18    Routing refill request to provider for review/approval because: not on protocol, see tele call. Requests 3 mth supply.

## 2018-10-22 NOTE — TELEPHONE ENCOUNTER
[facility] Call Center    Phone Message    May a detailed message be left on voicemail: yes    Reason for Call: Medication Refill Request    Has the patient contacted the pharmacy for the refill? Yes   Name of medication being requested:  aspirin-dipyridamole (AGGRENOX)  MG per 12 hr capsule  Provider who prescribed the medication: Dr. De La Garza  Pharmacy: [facility], Ph: [phone], Fax: [phone]  Date medication is needed: Pt's Daughter Barbi called - Pt has some medication left-Pt has been using [facility]. [facility] will be ending their service on 10/31, and pt's order expires before then. Barbi stated that if her mother can get a new order for 3 months sent over to [facility] it will help save them a few hundred dollars. [facility] Ph: [phone], Fax: [phone], [id number]         Action Taken: Message routed to:  [facility] ([facility] ZEV

## 2018-10-24 RX ORDER — ASPIRIN AND DIPYRIDAMOLE 25; 200 MG/1; MG/1
1 CAPSULE, EXTENDED RELEASE ORAL 2 TIMES DAILY
Qty: 180 CAPSULE | Refills: 3 | Status: SHIPPED | OUTPATIENT
Start: 2018-10-24 | End: 2018-11-30

## 2018-11-02 ENCOUNTER — TELEPHONE (OUTPATIENT)
Dept: INTERNAL MEDICINE | Facility: CLINIC | Age: 83
End: 2018-11-02

## 2018-11-02 NOTE — TELEPHONE ENCOUNTER
Called [facility] to see what medication they are inquiring about, medication is: Aggrenox. [facility] states that medication is covered this year,next year it will be taken off formulary. Spoke to pharmacist to see if any more information was needed at this time. [facility] will be sending over a form that will need to be filled out if for patient to remain on this medication after the first of the year. [facility] will fax over form, states that form is Due by 11/5/18, informed [facility] that they just called the clinic today and it is Friday and that the form will not be able to be filled out by 11/5/18 and they will need to extend their deadline. Shira Sarabia LPN 11/2/2018 1:25 PM

## 2018-11-05 ENCOUNTER — OFFICE VISIT (OUTPATIENT)
Dept: INTERNAL MEDICINE | Facility: CLINIC | Age: 83
End: 2018-11-05
Payer: MEDICARE

## 2018-11-05 VITALS
OXYGEN SATURATION: 96 % | BODY MASS INDEX: 28.89 KG/M2 | HEART RATE: 94 BPM | SYSTOLIC BLOOD PRESSURE: 118 MMHG | DIASTOLIC BLOOD PRESSURE: 70 MMHG | WEIGHT: 167 LBS

## 2018-11-05 DIAGNOSIS — L20.9 ATOPIC DERMATITIS, UNSPECIFIED TYPE: Primary | ICD-10-CM

## 2018-11-05 DIAGNOSIS — I10 ESSENTIAL HYPERTENSION, BENIGN: ICD-10-CM

## 2018-11-05 DIAGNOSIS — G89.29 CHRONIC MIDLINE LOW BACK PAIN WITHOUT SCIATICA: ICD-10-CM

## 2018-11-05 DIAGNOSIS — M54.50 CHRONIC MIDLINE LOW BACK PAIN WITHOUT SCIATICA: ICD-10-CM

## 2018-11-05 DIAGNOSIS — B37.2 CANDIDIASIS OF SKIN: ICD-10-CM

## 2018-11-05 RX ORDER — TRIAMCINOLONE ACETONIDE 1 MG/G
OINTMENT TOPICAL 2 TIMES DAILY
Qty: 30 G | Refills: 1 | Status: SHIPPED | OUTPATIENT
Start: 2018-11-05 | End: 2019-10-16

## 2018-11-05 RX ORDER — NYSTATIN 100000 U/G
CREAM TOPICAL 3 TIMES DAILY
Qty: 30 G | Refills: 1 | Status: SHIPPED | OUTPATIENT
Start: 2018-11-05 | End: 2018-11-19

## 2018-11-05 ASSESSMENT — PAIN SCALES - GENERAL: PAINLEVEL: NO PAIN (0)

## 2018-11-05 NOTE — MR AVS SNAPSHOT
After Visit Summary   11/5/2018    Azeb Torres    MRN: [id number]           Patient Information     Date Of Birth          4/3/1925        Visit Information        Provider Department      11/5/2018 9:00 AM Evelina Can MD [facility]        Today's Diagnoses     Atopic dermatitis, unspecified type    -  1    Chronic midline low back pain without sciatica        Candidiasis of skin        Essential hypertension, benign          Care Instructions    Primary Care Center Medication Refill Request Information:  * Please contact your pharmacy regarding ANY request for medication refills.  ** [facility] Prescription Fax = [phone]  * Please allow 3 business days for routine medication refills.  * Please allow 5 business days for controlled substance medication refills.     Primary Care Center Test Result notification information:  *You will be notified with in 7-10 days of your appointment day regarding the results of your test.  If you are on MyChart you will be notified as soon as the provider has reviewed the results and signed off on them.    [facility]: [phone]           Follow-ups after your visit        Follow-up notes from your care team     Return in about 4 months (around 3/5/2019) for Routine Visit.      Who to contact     Please call your clinic at [phone] to:    Ask questions about your health    Make or cancel appointments    Discuss your medicines    Learn about your test results    Speak to your doctor            Additional Information About Your Visit        MyChart Information     [employer] gives you secure access to your electronic health record. If you see a primary care provider, you can also send messages to your care team and make appointments. If you have questions, please call your primary care clinic.  If you do not have a primary care provider, please call [phone] and they will assist you.      [employer] is an electronic gateway that  provides easy, online access to your medical records. With [employer], you can request a clinic appointment, read your test results, renew a prescription or communicate with your care team.     To access your existing account, please contact your [facility] or call [phone] for assistance.        Care EveryWhere ID     This is your Care EveryWhere ID. This could be used by other organizations to access your [place] medical records  [id number]        Your Vitals Were     Pulse Pulse Oximetry Breastfeeding? BMI (Body Mass Index)          94 96% No 28.89 kg/m2         Blood Pressure from Last 3 Encounters:   11/05/18 118/70   06/28/18 130/74   06/17/18 (!) 172/91    Weight from Last 3 Encounters:   11/05/18 75.8 kg (167 lb)   07/17/18 74.8 kg (165 lb)   06/28/18 74.8 kg (165 lb)              Today, you had the following     No orders found for display         Today's Medication Changes          These changes are accurate as of 11/5/18  9:48 AM.  If you have any questions, ask your nurse or doctor.               Start taking these medicines.        Dose/Directions    nystatin cream   Commonly known as:  MYCOSTATIN   Used for:  Candidiasis of skin   Started by:  Evelina Can MD        Apply topically 3 times daily for 14 days To Buttocks, cover with desitin cream, keep skin clean/dry   Quantity:  30 g   Refills:  1       triamcinolone 0.1 % ointment   Commonly known as:  KENALOG   Used for:  Atopic dermatitis, unspecified type   Started by:  Evelina Can MD        Apply topically 2 times daily To hands as needed   Quantity:  30 g   Refills:  1         Stop taking these medicines if you haven't already. Please contact your care team if you have questions.     gabapentin 300 MG capsule   Commonly known as:  NEURONTIN   Stopped by:  Evelina Can MD                Where to get your medicines      These medications were sent to [facility]  [place], [place] - [address], [facility] [address]    Hours:  TRANSPLANT PHONE NUMBER [phone] Phone:  [phone]     nystatin cream    triamcinolone 0.1 % ointment                Primary Care Provider Office Phone # Fax #    Evelina Can [phone]       [address] [facility] [address]        Equal Access to Services     KENDRA FLORES : Hadii aad ku hadasho Soomaali, waaxda luqadaha, qaybta kaalmada adeegyada, waxay idiin hayaan adeeg faby galvan. So [facility] [phone].    ATENCIÓN: Si habla español, tiene a johnson disposición servicios gratuitos de asistencia lingüística. [facility] [phone].    We comply with applicable federal civil rights laws and [place] laws. We do not discriminate on the basis of race, color, national origin, age, disability, sex, sexual orientation, or gender identity.            Thank you!     Thank you for choosing [facility]  for your care. Our goal is always to provide you with excellent care. Hearing back from our patients is one way we can continue to improve our services. Please take a few minutes to complete the written survey that you may receive in the mail after your visit with us. Thank you!             Your Updated Medication List - Protect others around you: Learn how to safely use, store and throw away your medicines at www.disposemymeds.org.          This list is accurate as of 11/5/18  9:48 AM.  Always use your most recent med list.                   Brand Name Dispense Instructions for use Diagnosis    aspirin 81 MG tablet      Take 1 tablet by mouth every evening        aspirin-dipyridamole  MG per 12 hr capsule    AGGRENOX    180 capsule    Take 1 capsule by mouth 2 times daily    Cerebrovascular disease       atorvastatin 10 MG tablet    LIPITOR    90 tablet    Take 1 tablet (10 mg) by mouth daily    Hyperlipidemia, unspecified hyperlipidemia type        cholecalciferol 1000 UNIT tablet    vitamin D3     Take 1 tablet by mouth every morning        * dorzolamide-timolol 2-0.5 % ophthalmic solution    COSOPT    10 mL    Place 1 drop into both eyes 2 times daily    Chronic angle-closure glaucoma of both eyes, severe stage       * dorzolamide-timolol 2-0.5 % ophthalmic solution    COSOPT    1 Bottle    Place 1 drop into both eyes 2 times daily    Chronic angle-closure glaucoma of both eyes, severe stage       hydrochlorothiazide 25 MG tablet    HYDRODIURIL    90 tablet    Take 1 tablet (25 mg) by mouth daily    Hyperlipidemia, unspecified hyperlipidemia type, Essential hypertension, benign       * latanoprost 0.005 % ophthalmic solution    XALATAN    3 Bottle    Place 1 drop into both eyes At Bedtime    Chronic angle-closure glaucoma of both eyes, severe stage       * latanoprost 0.005 % ophthalmic solution    XALATAN    1 Bottle    Place 1 drop into both eyes At Bedtime    Chronic angle-closure glaucoma of both eyes, severe stage       levothyroxine 25 MCG tablet    SYNTHROID/LEVOTHROID    90 tablet    Take 1 tablet (25 mcg) by mouth daily    Hypothyroidism due to acquired atrophy of thyroid       meclizine 12.5 MG tablet    ANTIVERT    30 tablet    Take 1 tablet (12.5 mg) by mouth 4 times daily as needed for dizziness        methylPREDNISolone 4 MG tablet    MEDROL DOSEPAK    21 tablet    Follow package instructions    Lumbar radicular pain       nystatin cream    MYCOSTATIN    30 g    Apply topically 3 times daily for 14 days To Buttocks, cover with desitin cream, keep skin clean/dry    Candidiasis of skin       ondansetron 4 MG ODT tab    ZOFRAN-ODT    30 tablet    Place 1 tablet (4 mg) under the tongue every 8 hours as needed for nausea    Nausea       ranitidine 150 MG tablet    ZANTAC    60 tablet    Take 1 tablet (150 mg) by mouth 2 times daily as needed for heartburn    Heartburn       tiZANidine 4 MG tablet    ZANAFLEX    30 tablet    Take 1-2 tablets (4-8 mg)  by mouth nightly as needed    Lumbar radicular pain       triamcinolone 0.1 % ointment    KENALOG    30 g    Apply topically 2 times daily To hands as needed    Atopic dermatitis, unspecified type       TYLENOL 500 MG tablet   Generic drug:  acetaminophen      Take 2 tablets by mouth 2 times daily.        * Notice:  This list has 4 medication(s) that are the same as other medications prescribed for you. Read the directions carefully, and ask your doctor or other care provider to review them with you.

## 2018-11-05 NOTE — NURSING NOTE
Chief Complaint   Patient presents with     Hypertension     Pt is here to follow up on BP.      Lilly Rascon LPN at 8:46 AM on 11/5/2018.

## 2018-11-05 NOTE — PATIENT INSTRUCTIONS
[facility] Medication Refill Request Information:  * Please contact your pharmacy regarding ANY request for medication refills.  ** [facility] Prescription Fax = [phone]  * Please allow 3 business days for routine medication refills.  * Please allow 5 business days for controlled substance medication refills.     [facility] Test Result notification information:  *You will be notified with in 7-10 days of your appointment day regarding the results of your test.  If you are on MyChart you will be notified as soon as the provider has reviewed the results and signed off on them.    [facility]: [phone]

## 2018-11-05 NOTE — PROGRESS NOTES
[facility]   Evelina Can MD  11/05/2018      Chief Complaint:   Follow up      History of Present Illness:   Azeb Torres is a 89 year old female with a history of breast and uterine caner, CAD, and HTN who presents for general follow up.    She had a fall at the mall in June and hit her head.  This was not due to dizziness rather was mechanical when she fell off her walker seat when it hit a bump in the pavement.  She was evaluated in the ER at the time and had a head CT which showed no intracranial injury.  She have pain in her low back and right hip after the fall and she saw sports medicine.  She had a MRI which showed degenerative changes but no compression fractures or significant nerve impingement.  They had discussed possibly doing a steroid injection however her pain improved and she never had this done.  She does have some soreness in her low back currently.  [facility] however she is not sure if this worsened her back pain.  She feels this is more in the skin than in her spine.      She has some cracking of the skin between her fingers.  She has had this in the past but it seems to have flared recently.  She has not been having her hands in water more than usual.  She denies any new lotions or soaps.  They have tried CeraVe and Aquaphor.  Her daughters think CeraVe helps more.  Desonide ointment has helped some in the past.    Family has some concerns about her Aggrenox.  This is apparently not well covered by her insurance currently.  It used to cost her $1100 for 3 months supply.  Now it would be in the low $200 range so is much improved as far as cost.  Note from 11/2/18 in her record mentions that per [employer] it is covered this year but is not on formulary next year.  [employer] was supposed to fax over a form but it is unclear if we have received this.      Other concerns discussed:  1.  Shingles - her daughter checked into coverage and  thinks she can get it at the pharmacy     Review of Systems:   Pertinent items are noted in HPI, remainder of complete ROS is negative.      Active Medications:      acetaminophen (TYLENOL) 500 MG tablet, Take 2 tablets by mouth 2 times daily., Disp: , Rfl:      aspirin 81 MG tablet, Take 1 tablet by mouth every evening , Disp: , Rfl:      aspirin-dipyridamole (AGGRENOX)  MG per 12 hr capsule, Take 1 capsule by mouth 2 times daily, Disp: 180 capsule, Rfl: 3     atorvastatin (LIPITOR) 10 MG tablet, Take 1 tablet (10 mg) by mouth daily, Disp: 90 tablet, Rfl: 3     cholecalciferol (VITAMIN D) 1000 UNIT tablet, Take 1 tablet by mouth every morning , Disp: , Rfl:      dorzolamide-timolol (COSOPT) 2-0.5 % ophthalmic solution, Place 1 drop into both eyes 2 times daily, Disp: 1 Bottle, Rfl: 11     gabapentin (NEURONTIN) 300 MG capsule, Take 1 capsule (300 mg) by mouth nightly as needed, Disp: 90 capsule, Rfl: 1     hydrochlorothiazide (HYDRODIURIL) 25 MG tablet, Take 1 tablet (25 mg) by mouth daily, Disp: 90 tablet, Rfl: 2     latanoprost (XALATAN) 0.005 % ophthalmic solution, Place 1 drop into both eyes At Bedtime, Disp: 1 Bottle, Rfl: 11     levothyroxine (SYNTHROID/LEVOTHROID) 25 MCG tablet, Take 1 tablet (25 mcg) by mouth daily, Disp: 90 tablet, Rfl: 2     meclizine (ANTIVERT) 12.5 MG tablet, Take 1 tablet (12.5 mg) by mouth 4 times daily as needed for dizziness, Disp: 30 tablet, Rfl: 0     ondansetron (ZOFRAN-ODT) 4 MG ODT tab, Place 1 tablet (4 mg) under the tongue every 8 hours as needed for nausea, Disp: 30 tablet, Rfl: 1     ranitidine (ZANTAC) 150 MG tablet, Take 1 tablet (150 mg) by mouth 2 times daily as needed for heartburn, Disp: 60 tablet, Rfl: 3     tiZANidine (ZANAFLEX) 4 MG tablet, Take 1-2 tablets (4-8 mg) by mouth nightly as needed, Disp: 30 tablet, Rfl: 1     methylPREDNISolone (MEDROL DOSEPAK) 4 MG tablet, Follow package instructions (Patient not taking: Reported on 11/5/2018), Disp: 21 tablet,  Rfl: 0      Allergies:   No known drug allergies.       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  Breast cancer  Homonymous hemianopia   Chronic angle-closure glaucoma   Asteroid hyalosis, right eye  Non-exudate senile macular degeneration  Pseudophakia, bilateral   Seborrheic dermatitis  Rosacea   Vertigo   Degenerative disc disease, cervical     Past Surgical History:  Carpal tunnel release, right 11/14/17  Breast lumpectomy, left   Hysterectomy   Total hip arthroplasty, bilateral   Cataract surgery  Glaucoma surgery    Family History:   Coronary artery disease - father      Social History:   Marital Status:    Presents to clinic with her 2 daughters  Tobacco Use: No previous or current tobacco use.   Alcohol Use: No alcohol use.      Physical Exam:   /70  Pulse 94  Wt 75.8 kg (167 lb)  SpO2 96%  Breastfeeding? No  BMI 28.89 kg/m2     Constitutional: Alert, oriented, pleasant, no acute distress  Head: Normocephalic, atraumatic  Eyes: Extra-ocular movements intact, no scleral icterus  Cardiovascular: Regular rate and rhythm, no murmurs, rubs or gallops, peripheral pulses full/symmetric  Musculoskeletal: No edema  Neurologic: Alert and oriented, cranial nerves 2-12 intact.  Skin: Erythematous skin in her intergluteal cleft  Psychiatric: normal mentation, affect and mood      Assessment and Plan:  Atopic dermatitis, unspecified type  - triamcinolone (KENALOG) 0.1 % ointment  Dispense: 30 g; Refill: 1    Chronic midline low back pain without sciatica  She had a fall over the summer resulting in some low back pain for which she underwent an MRI.  She decided not to do an injection because her pain resolved.    Candidiasis of skin  Recommended Nystatin and Desitin cream.  - nystatin (MYCOSTATIN)  cream  Dispense: 30 g; Refill: 1    Essential hypertension, benign  She underwent 24-hours blood pressure monitoring earlier in the year without concerning results and we will continue her same regimen.  She reports less dizziness now that she has discontinued Gabapentin.     Follow-up: Return in about 4 months (around 3/5/2019) for Routine Visit.         Scribe Disclosure:   I, Evelina Sheth, am serving as a scribe to document services personally performed by Evelina Can MD at this visit, based upon the provider's statements to me. All documentation has been reviewed by the aforementioned provider prior to being entered into the official medical record.     Portions of this medical record were completed by a scribe. UPON MY REVIEW AND AUTHENTICATION BY ELECTRONIC SIGNATURE, this confirms (a) I performed the applicable clinical services, and (b) the record is accurate.    Evelina Can MD  Internal Medicine    25 min spent face to face, of which >50% time spent on counseling/coordinating care exclusive of any procedure time

## 2018-11-09 ENCOUNTER — TELEPHONE (OUTPATIENT)
Dept: INTERNAL MEDICINE | Facility: CLINIC | Age: 83
End: 2018-11-09

## 2018-11-09 NOTE — TELEPHONE ENCOUNTER
Prior Authorization Retail Medication Request    Medication/Dose: aspirin-dipyridamole (AGGRENOX)  MG   ICD code (if different than what is on RX):    Previously Tried and Failed:    Rationale:      Insurance Name:    Insurance ID:        Pharmacy Information (if different than what is on RX)  Name:    Phone:

## 2018-11-13 NOTE — TELEPHONE ENCOUNTER
PA Initiation    Medication: aspirin-dipyridamole (AGGRENOX)  MG -   Insurance Company: [employer] - Phone [phone] Fax [phone]  Pharmacy Filling the Rx: [place], [place] - [address] [place], [place] [address]  Filling Pharmacy Phone: [phone]  Filling Pharmacy Fax:    Start Date: 11/13/2018

## 2018-11-13 NOTE — TELEPHONE ENCOUNTER
Prior Authorization Not Needed per Insurance    Medication: aspirin-dipyridamole (AGGRENOX)  MG - NOT NEEDED  Insurance Company: Liliya Chow - Phone [phone] Fax [phone]  Expected CoPay:      Pharmacy Filling the Rx: [place], [place] - [address]   Pharmacy Notified: Yes  Patient Notified: Yes    **PA already on file  [facility] cannot dispense Aggrenox. Patient is already aware of this. She must fill at local pharmacy.  Per [facility]- Pharmacy Tech

## 2018-11-19 ENCOUNTER — TELEPHONE (OUTPATIENT)
Dept: INTERNAL MEDICINE | Facility: CLINIC | Age: 83
End: 2018-11-19

## 2018-11-19 ENCOUNTER — MYC MEDICAL ADVICE (OUTPATIENT)
Dept: INTERNAL MEDICINE | Facility: CLINIC | Age: 83
End: 2018-11-19

## 2018-11-19 DIAGNOSIS — Z86.73 HISTORY OF CVA (CEREBROVASCULAR ACCIDENT): Primary | ICD-10-CM

## 2018-11-21 NOTE — TELEPHONE ENCOUNTER
[facility] Call Center    Phone Message    May a detailed message be left on voicemail: yes    Reason for Call: Medication Question or concern regarding medication   Prescription Clarification  Name of Medication: Per Barbi wanting to know which medication to switch to aspirin-dipyridamole (AGGRENOX)  MG per 12 hr capsule or Plavix (genertic name, clopidogrel)     Prescribing Provider: Yessica Hoyt   Pharmacy: [facility]   What on the order needs clarification? Which medication should the Pt take          Action Taken: Message routed to:  [facility] ZEV

## 2018-11-30 RX ORDER — CLOPIDOGREL BISULFATE 75 MG/1
75 TABLET ORAL DAILY
Qty: 90 TABLET | Refills: 1 | Status: SHIPPED | OUTPATIENT
Start: 2018-11-30 | End: 2019-05-28

## 2018-12-03 ENCOUNTER — TELEPHONE (OUTPATIENT)
Dept: OPHTHALMOLOGY | Facility: CLINIC | Age: 83
End: 2018-12-03

## 2018-12-03 NOTE — TELEPHONE ENCOUNTER
9-18-18 order sent with 11 refill. Pharmacy called and they confirm this and will let the pt/family know.      Kathleen M Doege RN

## 2018-12-03 NOTE — TELEPHONE ENCOUNTER
Health Call Center    Phone Message    May a detailed message be left on voicemail: yes    Reason for Call: Medication Refill Request    Has the patient contacted the pharmacy for the refill? Yes   Name of medication being requested: dorzolamide-timolol (COSOPT) 2-0.5 % ophthalmic solution   Provider who prescribed the medication: Alejandrina Faria MD  Pharmacy: [place], [place] - [address] [place] [address] [phone]  Date medication is needed: ASAP     Pharmacy wants us to know Rx has zero refills and they believe that is an error on our part.  Please call Pharmacy to discuss.    Action Taken: Message routed to:  [facility] eye clinic

## 2019-01-01 NOTE — PATIENT INSTRUCTIONS
Patient will continue on Cosopt (Timolol/Dorzolamide) which is a blue top drop 2x/day (12 hours apart) in both eyes and Latanoprost which is a teal top drop at bedtime in both eyes.  Patient will return to clinic in 8-12 months with repeat visual field test, dilated eye exam, and OCT with RNFL analysis.  Patient will also follow up with Evelina Bacon for low vision evaluation and start on eye vitamins.       Claudette Carmona  (RN)  2019 21:08:04

## 2019-02-21 ENCOUNTER — NURSE TRIAGE (OUTPATIENT)
Dept: CALL CENTER | Age: 84
End: 2019-02-21

## 2019-02-21 NOTE — TELEPHONE ENCOUNTER
Pt's daughter calls in today requesting to stop her mom's plavix because she had a bad nose bleed today.    She was not with her mother at the time of the nose bleed but her mother told her it was quite traumatizing.   Informed her to continue on the plavix but tonight she should humidify her nostrils with a nasal gel and normal saline nasal sptray which she can get over the counter at the local pharmacy.  Also encouraged her to use a humidifier in the room.     If the nose bleeds continue to call back as her mother may need to have an area cauterized.  Will update Dr. Can and [facility] pool about nose bleeds and have them reach out to daughter about stopping plavix....

## 2019-02-24 DIAGNOSIS — E78.5 HYPERLIPIDEMIA, UNSPECIFIED HYPERLIPIDEMIA TYPE: ICD-10-CM

## 2019-02-24 DIAGNOSIS — E03.4 HYPOTHYROIDISM DUE TO ACQUIRED ATROPHY OF THYROID: ICD-10-CM

## 2019-02-26 RX ORDER — LEVOTHYROXINE SODIUM 25 UG/1
TABLET ORAL
Qty: 90 TABLET | Refills: 2 | Status: SHIPPED | OUTPATIENT
Start: 2019-02-26 | End: 2019-10-09

## 2019-02-26 RX ORDER — ATORVASTATIN CALCIUM 10 MG/1
10 TABLET, FILM COATED ORAL DAILY
Qty: 90 TABLET | Refills: 0 | Status: SHIPPED | OUTPATIENT
Start: 2019-02-26 | End: 2019-05-28

## 2019-02-26 NOTE — TELEPHONE ENCOUNTER
LVD 11/5/2018  NVD 3/7/2019  TSH 4/12/2018  LDL due per protocol, last 5/26/2017. 90 day sanjuana sent for both medication. Message to staff re: labs.

## 2019-02-28 ENCOUNTER — DOCUMENTATION ONLY (OUTPATIENT)
Dept: CARE COORDINATION | Facility: CLINIC | Age: 84
End: 2019-02-28

## 2019-03-07 ENCOUNTER — OFFICE VISIT (OUTPATIENT)
Dept: INTERNAL MEDICINE | Facility: CLINIC | Age: 84
End: 2019-03-07
Payer: MEDICARE

## 2019-03-07 VITALS — BODY MASS INDEX: 28.86 KG/M2 | SYSTOLIC BLOOD PRESSURE: 158 MMHG | DIASTOLIC BLOOD PRESSURE: 70 MMHG | WEIGHT: 166.8 LBS

## 2019-03-07 DIAGNOSIS — L30.9 DERMATITIS: Primary | ICD-10-CM

## 2019-03-07 DIAGNOSIS — I10 BENIGN ESSENTIAL HYPERTENSION: ICD-10-CM

## 2019-03-07 DIAGNOSIS — I63.9 CEREBRAL INFARCTION, UNSPECIFIED MECHANISM (H): ICD-10-CM

## 2019-03-07 ASSESSMENT — PAIN SCALES - GENERAL: PAINLEVEL: MILD PAIN (2)

## 2019-03-07 NOTE — PROGRESS NOTES
[facility]  Primary Care Center   Evelina Can MD  03/07/2019      Chief Complaint:   No chief complaint on file.       History of Present Illness:   Azeb Torres is a 89 year old female with a history of *** who presents for evaluation of ***.    Other concerns discussed:  ***     Review of Systems:   Pertinent items are noted in HPI, remainder of complete ROS is negative.      Active Medications:     Current Outpatient Medications:      acetaminophen (TYLENOL) 500 MG tablet, Take 2 tablets by mouth 2 times daily., Disp: , Rfl:      atorvastatin (LIPITOR) 10 MG tablet, Take 1 tablet (10 mg) by mouth daily, Disp: 90 tablet, Rfl: 0     cholecalciferol (VITAMIN D) 1000 UNIT tablet, Take 1 tablet by mouth every morning , Disp: , Rfl:      clopidogrel (PLAVIX) 75 MG tablet, Take 1 tablet (75 mg) by mouth daily Discontinue aggrenox/aspirin, Disp: 90 tablet, Rfl: 1     dorzolamide-timolol (COSOPT) 2-0.5 % ophthalmic solution, Place 1 drop into both eyes 2 times daily, Disp: 1 Bottle, Rfl: 11     dorzolamide-timolol (COSOPT) 2-0.5 % ophthalmic solution, Place 1 drop into both eyes 2 times daily, Disp: 10 mL, Rfl: 2     hydrochlorothiazide (HYDRODIURIL) 25 MG tablet, Take 1 tablet (25 mg) by mouth daily, Disp: 90 tablet, Rfl: 2     latanoprost (XALATAN) 0.005 % ophthalmic solution, Place 1 drop into both eyes At Bedtime, Disp: 1 Bottle, Rfl: 11     latanoprost (XALATAN) 0.005 % ophthalmic solution, Place 1 drop into both eyes At Bedtime, Disp: 3 Bottle, Rfl: 1     levothyroxine (SYNTHROID/LEVOTHROID) 25 MCG tablet, TAKE ONE TABLET BY MOUTH EVERY DAY, Disp: 90 tablet, Rfl: 2     meclizine (ANTIVERT) 12.5 MG tablet, Take 1 tablet (12.5 mg) by mouth 4 times daily as needed for dizziness, Disp: 30 tablet, Rfl: 0     methylPREDNISolone (MEDROL DOSEPAK) 4 MG tablet, Follow package instructions (Patient not taking: Reported on 11/5/2018), Disp: 21 tablet, Rfl: 0     ondansetron (ZOFRAN-ODT) 4 MG ODT tab, Place 1 tablet (4 mg)  under the tongue every 8 hours as needed for nausea, Disp: 30 tablet, Rfl: 1     ranitidine (ZANTAC) 150 MG tablet, Take 1 tablet (150 mg) by mouth 2 times daily as needed for heartburn, Disp: 60 tablet, Rfl: 3     tiZANidine (ZANAFLEX) 4 MG tablet, Take 1-2 tablets (4-8 mg) by mouth nightly as needed, Disp: 30 tablet, Rfl: 1     triamcinolone (KENALOG) 0.1 % ointment, Apply topically 2 times daily To hands as needed, Disp: 30 g, Rfl: 1      Allergies:   Patient has no known allergies.      Past Medical History:  Arthritis  Breast cancer  Chronic angle closure glaucoma  Cerebral vascular accident  Degenerative joint disease  Endometrial cancer  Hypertension  Left homonymous hemianopsia  Pulmonary artery hypertension  Tricuspid regurgitation   Hypertension  Paroxysmal supraventricular tachycardia  Cerebral infarction  Hypothyroidism   Hyperlipidemia  Rosacea   Inflamed seborrheic keratosis  Pseudophakia of both eyes  non exudative senile macular degeneration of retina     Past Surgical History:  Pelvis/hip joint surgery (bilateral hip replacement)  Stomach surgery  Cataract IOL  Glaucoma laser  Hysterectomy  Lumpectomy breast  Release carpal tunnel syndrome    Family History:   CAD - father      Social History:   This patient was accompanied by: ***  Smoking status: never  Smokeless tobacco: never  Alcohol use: no  Drug use: no     Physical Exam:   There were no vitals taken for this visit.   Constitutional: Alert, oriented, pleasant, no acute distress  Head: Normocephalic, atraumatic  Eyes: Extra-ocular movements intact, no scleral icterus  ENT: Oropharynx clear, moist mucus membranes, good dentition  Neck: Supple, no lymphadenopathy, no thyromegaly   Cardiovascular: Regular rate and rhythm, no murmurs, rubs or gallops, peripheral pulses full/symmetric  Respiratory: Good air movement bilaterally, lungs clear, no wheezes/rales/rhonchi  GI: Abdomen soft, bowel sounds present, nondistended, nontender, no organomegaly  or masses, no rebound/guarding  Musculoskeletal: No edema, normal muscle tone, normal gait  Neurologic: Alert and oriented, cranial nerves 2-12 intact.  Skin: No rashes/lesions  Psychiatric: normal mentation, affect and mood      Assessment and Plan:  There are no diagnoses linked to this encounter.     Follow-up: Data Unavailable         Scribe Disclosure:   I, Cristina Armenta, am serving as a scribe to document services personally performed by Evelina Can MD at this visit, based upon the provider's statements to me. All documentation has been reviewed by the aforementioned provider prior to being entered into the official medical record.     Portions of this medical record were completed by a scribe. UPON MY REVIEW AND AUTHENTICATION BY ELECTRONIC SIGNATURE, this confirms (a) I performed the applicable clinical services, and (b) the record is accurate.

## 2019-03-07 NOTE — PATIENT INSTRUCTIONS
[facility] Medication Refill Request Information:  * Please contact your pharmacy regarding ANY request for medication refills.  ** [facility] Prescription Fax = [phone]  * Please allow 3 business days for routine medication refills.  * Please allow 5 business days for controlled substance medication refills.     [facility] Test Result notification information:  *You will be notified with in 7-10 days of your appointment day regarding the results of your test.  If you are on MyChart you will be notified as soon as the provider has reviewed the results and signed off on them.    [facility]: [phone]       For skin, try aquaphor at night. Lacthydrin/amlactin during the daytime.  If no improvement after 2 weeks, can schedule dermatology appt.

## 2019-03-07 NOTE — NURSING NOTE
Chief Complaint   Patient presents with     Derm Problem     pt states she has red dry patches on her hands     Recheck Medication     pt would like to discuss medications       Livia Douglas CMA at 8:31 AM on 3/7/2019.

## 2019-03-14 ENCOUNTER — TELEPHONE (OUTPATIENT)
Dept: AUDIOLOGY | Facility: CLINIC | Age: 84
End: 2019-03-14

## 2019-03-14 NOTE — TELEPHONE ENCOUNTER
Azeb Torres's daughter brought her hearing aids to the [facility] on 3/14/19 for servicing.  Both hearing aids and earmolds were cleaned and the  filters were replaced.  The hearing aids were found to be working normally and were returned to the patients daughter.

## 2019-04-01 NOTE — PROGRESS NOTES
"   03/05/18 1000   Signing Clinician's Name / Credentials   Signing clinician's name / credentials Evelina Bacon OTR/THERESA   Session Number   Session Number 1   Reporting period Discharge: pt failed to return to address plan of care   Recertification   Recertification Due 05/28/18   GOALS   Goals Near Vision;Visual Field;Environmental Modification;Resource Education   Goals Addressed this Session Near vision;Visual field   Goal 1 - Near Vision   Goal Description: Near Vision Patient will verbalize awareness of visual field Loss and demonstrate improved use of visual skills/adaptive equipment for increased independence in reading-based activities of daily living, written communication and detail ADL tasks.   Target Date 05/28/18   Goal 2 - Visual field   Goal Description: Visual field Patient will verbalize awareness of visual field loss and demonstrate improved use of visual skills for increased safety/ independence in locating objects/obstacles and in navigation   Target Date 05/28/18   Goal 3 - Environmental modification   Goal Description: Environment modification Patient will demonstrate understanding of the impact of lighting, contrast and glare on ADL activities, in conjunction with environmentally-based ADL modifications   Target Date 05/28/18   Goal 4 - Resource education   Goal Description: Resource education Patient will verbalize awareness of community resources for the following:;Audio access to print materials;Access to large print materials;Access to low vision devices   Target Date 05/28/18       Present No   Therapy Diagnosis   Therapy  Diagnosis: Impaired ADL/IADL with deficits in Reading based ADL;Written communication;Eating  (safety in mobility, eating, light and glare managment, phone)   Subjective Report   Subjective Report \"I had to learn to read after my stroke.  I am still a slow reader\"   Visual Rehab Treatment [place]   Daily Treatment [place] Self Care/Home " Management;Therapeutic Activity;Therapeutic Activity: General;Therapeutic Activity: Dynavision   Self Care/Home Management   Skilled Intervention (Ed: clinical findings, therapuetic interventions, technology)   Patient Response pt and her granddaugher verbalized good understanding   Treatment Detail Provided pt education on complex visual function findings and therapuetic interventions to address goals. intiated instrucition in reading intervention. Pt with right homonymous hemianopia reading very large print more slowly than moderatly large print. Provided pt and family education of impact of constricted visual field on reading process, and identified 20 pt. font as optimal print size in current glasses. Pt and her granddaugher verbalized understanding and appreciation for increased insight to reading barriers. Cell phone: pt reports she has an iphone, but does not use. Provided pt education on option of voice activited use of cell phone. Pt verbalized understanding that this would increase her phone communications, and motivation to learn these processes.    Progress initiated towards goal 1   Therapeutic Activity: General   Skilled Intervention Visual skill training for increased safety in navigation and search of extra personal space   Patient Response Pt and her dtr. verbalized understanding of impact of decreased visual field on safety in navigation.    Treatment Detail Conducted second trial of dynavision, and identified slowest response to and awareness of right lower quadrant. Pt and her granddaughter verbalized understanding on impact on safety in mobility.  Identified impact on safety on descending stairs    Progress initiated towards goal 2   Therapeutic Activity: Dynavision   Dynavision Cascade Dynavision Mode A (single stimulus attention, 1 minute   Dynavision Mode A (single stimulus attention, 1 minute)   Patient Score (Mode A, 1 min) 25   Dynavision Mode A (SSA, 1 Min) Comment slowed response time to  right side.  Lower right quadrant was the most impacted   [place] Read   Smallest print size read 1.6M   Critical print size 2.5M without reading glasses (pt did not have reading glasses with her)   Words per minute at critical print size 54   Assessments Completed   Assessments Completed dynavision - 2nd trial   Education   Learner Patient;Other  (granddaughter Yris)   Readiness Eager;Acceptance   Method Explanation;Demonstration   Response Verbalizes understanding;Demonstrates understanding;Needs reinforcement   Communication with other professionals   Communication with other professionals per EHR   Plan   Plan for next session audio books SSB, light and glare management, assess reading with reading glasses - magnification as needed, written communicaiton, iphone instructioni ([place])   Comments   Comments Discharge: patient failed to return to complete plan of care   Total Session Time   Timed Code Treatment Minutes 24   Total Treatment Time (sum of timed and untimed services) 84

## 2019-04-01 NOTE — ADDENDUM NOTE
Encounter addended by: Evelina Bacon, OT on: 4/1/2019 4:17 PM   Actions taken: Sign clinical note, Flowsheet accepted, Episode resolved

## 2019-04-03 ENCOUNTER — OFFICE VISIT (OUTPATIENT)
Dept: DERMATOLOGY | Facility: CLINIC | Age: 84
End: 2019-04-03
Payer: MEDICARE

## 2019-04-03 ENCOUNTER — TELEPHONE (OUTPATIENT)
Dept: DERMATOLOGY | Facility: CLINIC | Age: 84
End: 2019-04-03

## 2019-04-03 VITALS — HEART RATE: 89 BPM | SYSTOLIC BLOOD PRESSURE: 154 MMHG | DIASTOLIC BLOOD PRESSURE: 79 MMHG

## 2019-04-03 DIAGNOSIS — L30.9 CHRONIC DERMATITIS OF HANDS: Primary | ICD-10-CM

## 2019-04-03 RX ORDER — DESONIDE 0.5 MG/G
OINTMENT TOPICAL 2 TIMES DAILY
Qty: 60 G | Refills: 3 | Status: SHIPPED | OUTPATIENT
Start: 2019-04-03 | End: 2020-04-16

## 2019-04-03 ASSESSMENT — PAIN SCALES - GENERAL: PAINLEVEL: MODERATE PAIN (5)

## 2019-04-03 NOTE — NURSING NOTE
Dermatology Rooming Note    Azeb Torres's goals for this visit include:   Chief Complaint   Patient presents with     Derm Problem     Azeb says the skin on the top her hands hurt. She says they get rough and painful.     Martin Brady, [facility]

## 2019-04-03 NOTE — PATIENT INSTRUCTIONS
Continue desonide twice a day   Minimize hand washing and dish washing, try to avoid néstor dish soap  Lotion after washing hands (vanicream)  Vaseline at night with cotton gloves   Spring and summer humidity will also help, encouraged continuing humidifier   Allergy testing discussed as a future option, first better control to be sought

## 2019-04-03 NOTE — TELEPHONE ENCOUNTER
[facility] Prior Authorization Team Request    Medication: Desonide 0.05% Oint  Dosing: Apply twice daily  Qty: 60gm  Day Supply: 9  NDC (required for Medicaid members): [address]-38     Insurance   BIN: [id number]  PCN: [id number]  Grp: [id number]  ID: [id number]    CoverMyMeds Key (if applicable):     Additional documentation:       Filling Pharmacy: [facility]  Phone Number: [phone]  Contact:    Pharmacy NPI (required for Medicaid members): [id number]

## 2019-04-03 NOTE — LETTER
"4/3/2019       RE: Azeb Torres  [address]  [place] [address]     Dear Colleague,    Thank you for referring your patient, Azeb Torres, to the [facility] DERMATOLOGY at [facility]. Please see a copy of my visit note below.    [facility] Dermatology Note      Dermatology Problem List:  1. Hand Dermatitis  -Past treatment: triamcinolone 0.1% ointment BID  -Current treatment: Desonide 0.05% ointment BID, lotion (like vanicream) during day, vaseline with cotton gloves at night    Encounter Date: Apr 3, 2019    CC:  Chief Complaint   Patient presents with     Derm Problem     Azeb says the skin on the top her hands hurt. She says they get rough and painful.         History of Present Illness:  Ms. Azeb Torres is a 89 year old female who presents for evaluation of hand rash and pain. She describes a six month history of a rash and pain that began on her left fourth dorsal finger. Slowly spreading to adjacent fingers and distal dorsal hand. More recently over the past two month has started on the right hand. She states she saw Dr. Can around last november and started triamcinolone 0.1% ointment BID. She did this until last month when friend advised them to switch to her daughters Desonide BID. Also started Aquaphor at this time 2-3x/d and switched hand soap to \"free and clear\". She states the pain is always there. Rated 5/10. Lotions and topicals seem to help. Thinks the Desonide this past month has been better. Denies other skin concerns, similar rash on feet, weight change, recent illness.     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)     PSVT (paroxysmal supraventricular tachycardia) (H)     Cerebral infarction (H)     Homonymous hemianopia     Pain in joint, " shoulder region     Pain in joint, lower leg     Hyperlipidemia     Hypothyroidism     Tricuspid regurgitation     Pulmonary artery hypertension (H)     Rosacea     Inflamed seborrheic keratosis     Dermatitis, seborrheic     Pseudophakia of both eyes     Nonexudative senile macular degeneration of retina     Asteroid hyalosis of right eye     Chronic angle-closure glaucoma     Dizziness of unknown cause     Vertigo     Hypothyroidism due to acquired atrophy of thyroid     Past Medical History:   Diagnosis Date     Arthritis      Breast cancer (H)     ER positive     Chronic angle-closure glaucoma      CVA (cerebral vascular accident) (H) 2003    R cerebral artery, embolic after hip replacement     Degenerative joint disease      Endometrial cancer (H)      Hypertension      Left homonymous hemianopsia      Pulmonary artery hypertension (H) 4/24/2013    Noted on echo. Mild-moderate. Moderate TR     Tricuspid regurgitation 4/24/2013     Past Surgical History:   Procedure Laterality Date     C PELVIS/HIP JOINT SURGERY UNLISTED       C STOMACH SURGERY PROCEDURE UNLISTED       CATARACT IOL, RT/LT       glaucoma laser[       HIP SURGERY      s/p bilateral hip replacements     HYSTERECTOMY  1985    low grade endometrial cancer     LUMPECTOMY BREAST Left      RELEASE CARPAL TUNNEL Right 11/14/2017    Procedure: RELEASE CARPAL TUNNEL;  Right Open Carpal Tunnel Release;  Surgeon: Patrick Rivera MD;  Location:  OR       Social History:  Patient reports that  has never smoked. she has never used smokeless tobacco. She reports that she does not drink alcohol or use drugs.    Family History:  Family History   Problem Relation Age of Onset     Coronary Artery Disease Father    Daughter with atopic dermatitis.     Medications:  Current Outpatient Medications   Medication Sig Dispense Refill     acetaminophen (TYLENOL) 500 MG tablet Take 2 tablets by mouth 2 times daily.       atorvastatin (LIPITOR) 10 MG tablet Take 1  tablet (10 mg) by mouth daily 90 tablet 0     cholecalciferol (VITAMIN D) 1000 UNIT tablet Take 1 tablet by mouth every morning        clopidogrel (PLAVIX) 75 MG tablet Take 1 tablet (75 mg) by mouth daily Discontinue aggrenox/aspirin 90 tablet 1     dorzolamide-timolol (COSOPT) 2-0.5 % ophthalmic solution Place 1 drop into both eyes 2 times daily 1 Bottle 11     hydrochlorothiazide (HYDRODIURIL) 25 MG tablet Take 1 tablet (25 mg) by mouth daily 90 tablet 2     latanoprost (XALATAN) 0.005 % ophthalmic solution Place 1 drop into both eyes At Bedtime 1 Bottle 11     levothyroxine (SYNTHROID/LEVOTHROID) 25 MCG tablet TAKE ONE TABLET BY MOUTH EVERY DAY 90 tablet 2     ondansetron (ZOFRAN-ODT) 4 MG ODT tab Place 1 tablet (4 mg) under the tongue every 8 hours as needed for nausea 30 tablet 1     ranitidine (ZANTAC) 150 MG tablet Take 1 tablet (150 mg) by mouth 2 times daily as needed for heartburn 60 tablet 3     tiZANidine (ZANAFLEX) 4 MG tablet Take 1-2 tablets (4-8 mg) by mouth nightly as needed 30 tablet 1     meclizine (ANTIVERT) 12.5 MG tablet Take 1 tablet (12.5 mg) by mouth 4 times daily as needed for dizziness (Patient not taking: Reported on 3/7/2019) 30 tablet 0     triamcinolone (KENALOG) 0.1 % ointment Apply topically 2 times daily To hands as needed (Patient not taking: Reported on 3/7/2019) 30 g 1     No Known Allergies      Review of Systems:  -As per HPI  -Constitutional: Otherwise feeling well today, in usual state of health.  -HEENT: Patient denies nonhealing oral sores.  -Skin: As above in HPI. No additional skin concerns.    Physical exam:  Vitals: /79 (BP Location: Right arm, Patient Position: Sitting, Cuff Size: Adult Regular)   Pulse 89   GEN: This is a well developed, well-nourished female in no acute distress, in a pleasant mood.    SKIN: Focused examination of the hands was performed.  -Plaques of slight erythema with overlying hyperkeratosis and mild fissuring along left dorsal third,  fourth, and fifth and distal dorsal hand as well as right third and fourth fingers sometimes spreading down interdigit space   -No other lesions of concern on areas examined.     Impression/Plan:  1. Hand Dermatitis: etiology unclear with irritant and contact/allerigic dermatitis in differential       Triamcinolone 0.1% ointment is stronger than desonide topical steroid but she describes possible better control over last month with desonide. Will continue with this. Provided prescription for Desonide 0.05% ointment BID.     Minimize hand washing and dish washing, try to avoid néstor dish soap. Continue chemical free soap.    Vanicream after washing hands, vanicream samples given    Vaseline at night with cotton gloves    Spring and summer humidity will also help, encouraged humidifier     Allergy testing discussed as an option, first better control to be sought    CC Referred Self, MD  No address on file on close of this encounter.  Follow-up in 6 weeks, earlier for new or changing lesions.     Staff Involved:  I,Gabe Mckeon, saw and examined the patient in the presence of Dr. Morales.     .I was present with the medical student who participated in the service and in the documentation of the note. I have verified the history and personally performed the physical exam and medical decision making. I agree with the assessment and plan of care as documented in the note.  Francisca Morales MD

## 2019-04-03 NOTE — PROGRESS NOTES
"[facility] Dermatology Note      Dermatology Problem List:  1. Hand Dermatitis  -Past treatment: triamcinolone 0.1% ointment BID  -Current treatment: Desonide 0.05% ointment BID, lotion (like vanicream) during day, vaseline with cotton gloves at night    Encounter Date: Apr 3, 2019    CC:  Chief Complaint   Patient presents with     Derm Problem     Azeb says the skin on the top her hands hurt. She says they get rough and painful.         History of Present Illness:  Ms. Azeb Torres is a 89 year old female who presents for evaluation of hand rash and pain. She describes a six month history of a rash and pain that began on her left fourth dorsal finger. Slowly spreading to adjacent fingers and distal dorsal hand. More recently over the past two month has started on the right hand. She states she saw Dr. Can around last november and started triamcinolone 0.1% ointment BID. She did this until last month when friend advised them to switch to her daughters Desonide BID. Also started Aquaphor at this time 2-3x/d and switched hand soap to \"free and clear\". She states the pain is always there. Rated 5/10. Lotions and topicals seem to help. Thinks the Desonide this past month has been better. Denies other skin concerns, similar rash on feet, weight change, recent illness.     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)     PSVT (paroxysmal supraventricular tachycardia) (H)     Cerebral infarction (H)     Homonymous hemianopia     Pain in joint, shoulder region     Pain in joint, lower leg     Hyperlipidemia     Hypothyroidism     Tricuspid regurgitation     Pulmonary artery hypertension (H)     Rosacea     Inflamed seborrheic keratosis     Dermatitis, seborrheic     Pseudophakia of both eyes     Nonexudative senile " macular degeneration of retina     Asteroid hyalosis of right eye     Chronic angle-closure glaucoma     Dizziness of unknown cause     Vertigo     Hypothyroidism due to acquired atrophy of thyroid     Past Medical History:   Diagnosis Date     Arthritis      Breast cancer (H)     ER positive     Chronic angle-closure glaucoma      CVA (cerebral vascular accident) (H) 2003    R cerebral artery, embolic after hip replacement     Degenerative joint disease      Endometrial cancer (H)      Hypertension      Left homonymous hemianopsia      Pulmonary artery hypertension (H) 4/24/2013    Noted on echo. Mild-moderate. Moderate TR     Tricuspid regurgitation 4/24/2013     Past Surgical History:   Procedure Laterality Date     C PELVIS/HIP JOINT SURGERY UNLISTED       C STOMACH SURGERY PROCEDURE UNLISTED       CATARACT IOL, RT/LT       glaucoma laser[       HIP SURGERY      s/p bilateral hip replacements     HYSTERECTOMY  1985    low grade endometrial cancer     LUMPECTOMY BREAST Left      RELEASE CARPAL TUNNEL Right 11/14/2017    Procedure: RELEASE CARPAL TUNNEL;  Right Open Carpal Tunnel Release;  Surgeon: Patrick Rivera MD;  Location:  OR       Social History:  Patient reports that  has never smoked. she has never used smokeless tobacco. She reports that she does not drink alcohol or use drugs.    Family History:  Family History   Problem Relation Age of Onset     Coronary Artery Disease Father    Daughter with atopic dermatitis.     Medications:  Current Outpatient Medications   Medication Sig Dispense Refill     acetaminophen (TYLENOL) 500 MG tablet Take 2 tablets by mouth 2 times daily.       atorvastatin (LIPITOR) 10 MG tablet Take 1 tablet (10 mg) by mouth daily 90 tablet 0     cholecalciferol (VITAMIN D) 1000 UNIT tablet Take 1 tablet by mouth every morning        clopidogrel (PLAVIX) 75 MG tablet Take 1 tablet (75 mg) by mouth daily Discontinue aggrenox/aspirin 90 tablet 1     dorzolamide-timolol (COSOPT)  2-0.5 % ophthalmic solution Place 1 drop into both eyes 2 times daily 1 Bottle 11     hydrochlorothiazide (HYDRODIURIL) 25 MG tablet Take 1 tablet (25 mg) by mouth daily 90 tablet 2     latanoprost (XALATAN) 0.005 % ophthalmic solution Place 1 drop into both eyes At Bedtime 1 Bottle 11     levothyroxine (SYNTHROID/LEVOTHROID) 25 MCG tablet TAKE ONE TABLET BY MOUTH EVERY DAY 90 tablet 2     ondansetron (ZOFRAN-ODT) 4 MG ODT tab Place 1 tablet (4 mg) under the tongue every 8 hours as needed for nausea 30 tablet 1     ranitidine (ZANTAC) 150 MG tablet Take 1 tablet (150 mg) by mouth 2 times daily as needed for heartburn 60 tablet 3     tiZANidine (ZANAFLEX) 4 MG tablet Take 1-2 tablets (4-8 mg) by mouth nightly as needed 30 tablet 1     meclizine (ANTIVERT) 12.5 MG tablet Take 1 tablet (12.5 mg) by mouth 4 times daily as needed for dizziness (Patient not taking: Reported on 3/7/2019) 30 tablet 0     triamcinolone (KENALOG) 0.1 % ointment Apply topically 2 times daily To hands as needed (Patient not taking: Reported on 3/7/2019) 30 g 1     No Known Allergies      Review of Systems:  -As per HPI  -Constitutional: Otherwise feeling well today, in usual state of health.  -HEENT: Patient denies nonhealing oral sores.  -Skin: As above in HPI. No additional skin concerns.    Physical exam:  Vitals: /79 (BP Location: Right arm, Patient Position: Sitting, Cuff Size: Adult Regular)   Pulse 89   GEN: This is a well developed, well-nourished female in no acute distress, in a pleasant mood.    SKIN: Focused examination of the hands was performed.  -Plaques of slight erythema with overlying hyperkeratosis and mild fissuring along left dorsal third, fourth, and fifth and distal dorsal hand as well as right third and fourth fingers sometimes spreading down interdigit space   -No other lesions of concern on areas examined.     Impression/Plan:  1. Hand Dermatitis: etiology unclear with irritant and contact/allerigic dermatitis  in differential       Triamcinolone 0.1% ointment is stronger than desonide topical steroid but she describes possible better control over last month with desonide. Will continue with this. Provided prescription for Desonide 0.05% ointment BID.     Minimize hand washing and dish washing, try to avoid néstor dish soap. Continue chemical free soap.    Vanicream after washing hands, vanicream samples given    Vaseline at night with cotton gloves    Spring and summer humidity will also help, encouraged humidifier     Allergy testing discussed as an option, first better control to be sought    CC Referred Self, MD  No address on file on close of this encounter.  Follow-up in 6 weeks, earlier for new or changing lesions.     Staff Involved:  I,Gabe Mckeon, saw and examined the patient in the presence of Dr. Morales.     .I was present with the medical student who participated in the service and in the documentation of the note. I have verified the history and personally performed the physical exam and medical decision making. I agree with the assessment and plan of care as documented in the note.  Francisca Morales MD

## 2019-04-04 NOTE — TELEPHONE ENCOUNTER
Central Prior Authorization Team   Phone: [phone]    PA Initiation    Medication: Desonide 0.05% Oint  Insurance Company: [employer] - Phone [phone] Fax [phone]  Pharmacy Filling the Rx: [facility] [place], [place] - [address] [place] [address] [phone]  Filling Pharmacy Phone: [phone]  Filling Pharmacy Fax:    Start Date: 4/4/2019

## 2019-04-08 NOTE — TELEPHONE ENCOUNTER
Prior Authorization Approval    Authorization Effective Date: 1/4/2019  Authorization Expiration Date: 4/3/2020  Medication: Desonide 0.05% Oint - approved  Approved Dose/Quantity:   Reference #:     Insurance Company: [employer] - Phone [phone] Fax [phone]  Expected CoPay: $27.00     CoPay Card Available:      Foundation Assistance Needed:    Which Pharmacy is filling the prescription (Not needed for infusion/clinic administered): [facility] [address] [phone]  Pharmacy Notified: Yes  Patient Notified: Yes

## 2019-04-22 DIAGNOSIS — E78.5 HYPERLIPIDEMIA, UNSPECIFIED HYPERLIPIDEMIA TYPE: ICD-10-CM

## 2019-04-22 DIAGNOSIS — I10 ESSENTIAL HYPERTENSION, BENIGN: ICD-10-CM

## 2019-04-24 ENCOUNTER — MYC MEDICAL ADVICE (OUTPATIENT)
Dept: INTERNAL MEDICINE | Facility: CLINIC | Age: 84
End: 2019-04-24

## 2019-04-24 ENCOUNTER — OFFICE VISIT (OUTPATIENT)
Dept: AUDIOLOGY | Facility: CLINIC | Age: 84
End: 2019-04-24
Payer: MEDICARE

## 2019-04-24 DIAGNOSIS — H90.3 SENSORINEURAL HEARING LOSS, BILATERAL: Primary | ICD-10-CM

## 2019-04-24 NOTE — PROGRESS NOTES
AUDIOLOGY REPORT    BACKGROUND INFORMATION: Azeb Torres was seen in Audiology at the [facility] on 4/24/2019 for a hearing aid check.  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      TEST RESULTS AND PROCEDURES: The patient reports that she is not hearing as well with the hearing aids as she once did. The hearing aids were deep-cleaned and assessed and are functioning to specifications. The patient reports good volume and sound quality after the cleaning but is still unsure if the hearing aids are loud enough; she does have a manual volume control to utilize presently and it was recommended that the patient obtain an order and schedule for a hearing test to determine if the prescription in the hearing aids needs to be updated.     SUMMARY AND RECOMMENDATIONS: A hearing aid check was performed today.  Adjustments were made as noted above and the patient will return in the near future for a hearing evaluation and hearing aid programming. Please call this clinic with questions regarding today s visit.      Rica Murphy.  Licensed Audiologist  [place] #[id number]

## 2019-04-25 RX ORDER — HYDROCHLOROTHIAZIDE 25 MG/1
25 TABLET ORAL DAILY
Qty: 90 TABLET | Refills: 0 | Status: SHIPPED | OUTPATIENT
Start: 2019-04-25 | End: 2019-08-02

## 2019-04-25 NOTE — TELEPHONE ENCOUNTER
hydrochlorothiazide (HYDRODIURIL) 25 MG tablet      Last Written Prescription Date:  8/1/18  Last Fill Quantity: 90,   # refills: 2  Last Office Visit : 3/7/19  Future Office visit:  none    90 day to pharmacy.     Scheduling has been notified to contact the pt for appointment.

## 2019-05-15 ENCOUNTER — OFFICE VISIT (OUTPATIENT)
Dept: DERMATOLOGY | Facility: CLINIC | Age: 84
End: 2019-05-15
Payer: MEDICARE

## 2019-05-15 DIAGNOSIS — L30.9 CHRONIC DERMATITIS OF HANDS: Primary | ICD-10-CM

## 2019-05-15 ASSESSMENT — PAIN SCALES - GENERAL: PAINLEVEL: NO PAIN (0)

## 2019-05-15 NOTE — NURSING NOTE
Dermatology Rooming Note    Azeb Torres's goals for this visit include:   Chief Complaint   Patient presents with     Derm Problem     Azeb is here for a follow up for her hands, states they look better and feel better.        Sandra Sarabia LPN

## 2019-05-15 NOTE — PROGRESS NOTES
[facility] Dermatology Note      Dermatology Problem List:  1. Hand Dermatitis  -Past treatment: triamcinolone 0.1% ointment BID  -Current treatment: Desonide 0.05% ointment BID, lotion (like vanicream) during day, vaseline with cotton gloves at night      CC:   Chief Complaint   Patient presents with     Derm Problem     Azeb is here for a follow up for her hands, states they look better and feel better.          Encounter Date: May 15, 2019    History of Present Illness:  Ms. Azeb Torres is a 89 year old female who returns for follow up of hand dermatitis with several members of her family.  She is doing much better.  She is avoiding wet work and has been using vaseline nightly with white cotton gloves.  She uses the desonide sparingly to active areas.  She has not really being using any other emollients during the day.    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)     PSVT (paroxysmal supraventricular tachycardia) (H)     Cerebral infarction (H)     Homonymous hemianopia     Pain in joint, shoulder region     Pain in joint, lower leg     Hyperlipidemia     Hypothyroidism     Tricuspid regurgitation     Pulmonary artery hypertension (H)     Rosacea     Inflamed seborrheic keratosis     Dermatitis, seborrheic     Pseudophakia of both eyes     Nonexudative senile macular degeneration of retina     Asteroid hyalosis of right eye     Chronic angle-closure glaucoma     Dizziness of unknown cause     Vertigo     Hypothyroidism due to acquired atrophy of thyroid     Past Medical History:   Diagnosis Date     Arthritis      Breast cancer (H)     ER positive     Chronic angle-closure glaucoma      CVA (cerebral vascular accident) (H) 2003    R cerebral artery, embolic after hip replacement     Degenerative joint disease       Endometrial cancer (H)      Hypertension      Left homonymous hemianopsia      Pulmonary artery hypertension (H) 4/24/2013    Noted on echo. Mild-moderate. Moderate TR     Tricuspid regurgitation 4/24/2013     Past Surgical History:   Procedure Laterality Date     C PELVIS/HIP JOINT SURGERY UNLISTED       C STOMACH SURGERY PROCEDURE UNLISTED       CATARACT IOL, RT/LT       glaucoma laser[       HIP SURGERY      s/p bilateral hip replacements     HYSTERECTOMY  1985    low grade endometrial cancer     LUMPECTOMY BREAST Left      RELEASE CARPAL TUNNEL Right 11/14/2017    Procedure: RELEASE CARPAL TUNNEL;  Right Open Carpal Tunnel Release;  Surgeon: Patrick Rivera MD;  Location:  OR       Social History:  Patient reports that she has never smoked. She has never used smokeless tobacco. She reports that she does not drink alcohol or use drugs.    Family History:  Family History   Problem Relation Age of Onset     Coronary Artery Disease Father        Medications:  Current Outpatient Medications   Medication Sig Dispense Refill     acetaminophen (TYLENOL) 500 MG tablet Take 2 tablets by mouth 2 times daily.       atorvastatin (LIPITOR) 10 MG tablet Take 1 tablet (10 mg) by mouth daily 90 tablet 0     cholecalciferol (VITAMIN D) 1000 UNIT tablet Take 1 tablet by mouth every morning        clopidogrel (PLAVIX) 75 MG tablet Take 1 tablet (75 mg) by mouth daily Discontinue aggrenox/aspirin 90 tablet 1     desonide (DESOWEN) 0.05 % external ointment Apply topically 2 times daily 60 g 3     dorzolamide-timolol (COSOPT) 2-0.5 % ophthalmic solution Place 1 drop into both eyes 2 times daily 1 Bottle 11     hydrochlorothiazide (HYDRODIURIL) 25 MG tablet Take 1 tablet (25 mg) by mouth daily 90 tablet 0     latanoprost (XALATAN) 0.005 % ophthalmic solution Place 1 drop into both eyes At Bedtime 1 Bottle 11     levothyroxine (SYNTHROID/LEVOTHROID) 25 MCG tablet TAKE ONE TABLET BY MOUTH EVERY DAY 90 tablet 2      ondansetron (ZOFRAN-ODT) 4 MG ODT tab Place 1 tablet (4 mg) under the tongue every 8 hours as needed for nausea 30 tablet 1     ranitidine (ZANTAC) 150 MG tablet Take 1 tablet (150 mg) by mouth 2 times daily as needed for heartburn 60 tablet 3     tiZANidine (ZANAFLEX) 4 MG tablet Take 1-2 tablets (4-8 mg) by mouth nightly as needed 30 tablet 1     meclizine (ANTIVERT) 12.5 MG tablet Take 1 tablet (12.5 mg) by mouth 4 times daily as needed for dizziness (Patient not taking: Reported on 3/7/2019) 30 tablet 0     triamcinolone (KENALOG) 0.1 % ointment Apply topically 2 times daily To hands as needed (Patient not taking: Reported on 3/7/2019) 30 g 1     No Known Allergies          Physical exam:  Vitals: There were no vitals taken for this visit.  GEN: This is a well developed, well-nourished female in no acute distress, in a pleasant mood.    SKIN: Focused examination of the hands was performed.  -There are pink scaly patches and plaques on the right dorsal hand (mimimal).  -No other lesions of concern on areas examined.     Impression/Plan:  1. Hand dermatitis- improved    Continue vaseline with glove occlusion at night    Desonide ointment as needed    Increase use of vanicream during the day    Continue to avoid wet work      CC Referred Self, MD  No address on file on close of this encounter.  Follow-up in 6 months, earlier for new or changing lesions.       Staff Involved:  Staff Only

## 2019-05-15 NOTE — LETTER
5/15/2019       RE: Azeb Torres  [address]  [place] [address]     Dear Colleague,    Thank you for referring your patient, Azeb Torres, to the [facility] DERMATOLOGY at [facility]. Please see a copy of my visit note below.    [facility] Dermatology Note      Dermatology Problem List:  1. Hand Dermatitis  -Past treatment: triamcinolone 0.1% ointment BID  -Current treatment: Desonide 0.05% ointment BID, lotion (like vanicream) during day, vaseline with cotton gloves at night      CC:   Chief Complaint   Patient presents with     Derm Problem     Azeb is here for a follow up for her hands, states they look better and feel better.          Encounter Date: May 15, 2019    History of Present Illness:  Ms. Azeb Torres is a 89 year old female who returns for follow up of hand dermatitis with several members of her family.  She is doing much better.  She is avoiding wet work and has been using vaseline nightly with white cotton gloves.  She uses the desonide sparingly to active areas.  She has not really being using any other emollients during the day.    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)     PSVT (paroxysmal supraventricular tachycardia) (H)     Cerebral infarction (H)     Homonymous hemianopia     Pain in joint, shoulder region     Pain in joint, lower leg     Hyperlipidemia     Hypothyroidism     Tricuspid regurgitation     Pulmonary artery hypertension (H)     Rosacea     Inflamed seborrheic keratosis     Dermatitis, seborrheic     Pseudophakia of both eyes     Nonexudative senile macular degeneration of retina     Asteroid hyalosis of right eye     Chronic angle-closure glaucoma     Dizziness of unknown cause     Vertigo     Hypothyroidism due to acquired  atrophy of thyroid     Past Medical History:   Diagnosis Date     Arthritis      Breast cancer (H)     ER positive     Chronic angle-closure glaucoma      CVA (cerebral vascular accident) (H) 2003    R cerebral artery, embolic after hip replacement     Degenerative joint disease      Endometrial cancer (H)      Hypertension      Left homonymous hemianopsia      Pulmonary artery hypertension (H) 4/24/2013    Noted on echo. Mild-moderate. Moderate TR     Tricuspid regurgitation 4/24/2013     Past Surgical History:   Procedure Laterality Date     C PELVIS/HIP JOINT SURGERY UNLISTED       C STOMACH SURGERY PROCEDURE UNLISTED       CATARACT IOL, RT/LT       glaucoma laser[       HIP SURGERY      s/p bilateral hip replacements     HYSTERECTOMY  1985    low grade endometrial cancer     LUMPECTOMY BREAST Left      RELEASE CARPAL TUNNEL Right 11/14/2017    Procedure: RELEASE CARPAL TUNNEL;  Right Open Carpal Tunnel Release;  Surgeon: Patrick Rivera MD;  Location:  OR       Social History:  Patient reports that she has never smoked. She has never used smokeless tobacco. She reports that she does not drink alcohol or use drugs.    Family History:  Family History   Problem Relation Age of Onset     Coronary Artery Disease Father        Medications:  Current Outpatient Medications   Medication Sig Dispense Refill     acetaminophen (TYLENOL) 500 MG tablet Take 2 tablets by mouth 2 times daily.       atorvastatin (LIPITOR) 10 MG tablet Take 1 tablet (10 mg) by mouth daily 90 tablet 0     cholecalciferol (VITAMIN D) 1000 UNIT tablet Take 1 tablet by mouth every morning        clopidogrel (PLAVIX) 75 MG tablet Take 1 tablet (75 mg) by mouth daily Discontinue aggrenox/aspirin 90 tablet 1     desonide (DESOWEN) 0.05 % external ointment Apply topically 2 times daily 60 g 3     dorzolamide-timolol (COSOPT) 2-0.5 % ophthalmic solution Place 1 drop into both eyes 2 times daily 1 Bottle 11     hydrochlorothiazide (HYDRODIURIL) 25  MG tablet Take 1 tablet (25 mg) by mouth daily 90 tablet 0     latanoprost (XALATAN) 0.005 % ophthalmic solution Place 1 drop into both eyes At Bedtime 1 Bottle 11     levothyroxine (SYNTHROID/LEVOTHROID) 25 MCG tablet TAKE ONE TABLET BY MOUTH EVERY DAY 90 tablet 2     ondansetron (ZOFRAN-ODT) 4 MG ODT tab Place 1 tablet (4 mg) under the tongue every 8 hours as needed for nausea 30 tablet 1     ranitidine (ZANTAC) 150 MG tablet Take 1 tablet (150 mg) by mouth 2 times daily as needed for heartburn 60 tablet 3     tiZANidine (ZANAFLEX) 4 MG tablet Take 1-2 tablets (4-8 mg) by mouth nightly as needed 30 tablet 1     meclizine (ANTIVERT) 12.5 MG tablet Take 1 tablet (12.5 mg) by mouth 4 times daily as needed for dizziness (Patient not taking: Reported on 3/7/2019) 30 tablet 0     triamcinolone (KENALOG) 0.1 % ointment Apply topically 2 times daily To hands as needed (Patient not taking: Reported on 3/7/2019) 30 g 1     No Known Allergies    Physical exam:  Vitals: There were no vitals taken for this visit.  GEN: This is a well developed, well-nourished female in no acute distress, in a pleasant mood.    SKIN: Focused examination of the hands was performed.  -There are pink scaly patches and plaques on the right dorsal hand (mimimal).  -No other lesions of concern on areas examined.     Impression/Plan:  1. Hand dermatitis- improved    Continue vaseline with glove occlusion at night    Desonide ointment as needed    Increase use of vanicream during the day    Continue to avoid wet work      CC Referred Self, MD  No address on file on close of this encounter.  Follow-up in 6 months, earlier for new or changing lesions.       Staff Involved:  Staff Only    Again, thank you for allowing me to participate in the care of your patient.      Sincerely,    Francisca Morales MD

## 2019-05-28 ENCOUNTER — TELEPHONE (OUTPATIENT)
Dept: INTERNAL MEDICINE | Facility: CLINIC | Age: 84
End: 2019-05-28

## 2019-05-28 DIAGNOSIS — E78.5 HYPERLIPIDEMIA, UNSPECIFIED HYPERLIPIDEMIA TYPE: Primary | ICD-10-CM

## 2019-05-28 DIAGNOSIS — Z86.73 HISTORY OF CVA (CEREBROVASCULAR ACCIDENT): ICD-10-CM

## 2019-05-29 RX ORDER — CLOPIDOGREL BISULFATE 75 MG/1
75 TABLET ORAL DAILY
Qty: 90 TABLET | Refills: 0 | Status: SHIPPED | OUTPATIENT
Start: 2019-05-29 | End: 2019-09-02

## 2019-05-29 RX ORDER — ATORVASTATIN CALCIUM 10 MG/1
10 TABLET, FILM COATED ORAL DAILY
Qty: 90 TABLET | Refills: 0 | Status: SHIPPED | OUTPATIENT
Start: 2019-05-29 | End: 2019-08-25

## 2019-05-30 ENCOUNTER — OFFICE VISIT (OUTPATIENT)
Dept: AUDIOLOGY | Facility: CLINIC | Age: 84
End: 2019-05-30
Payer: MEDICARE

## 2019-05-30 DIAGNOSIS — H90.3 SENSORINEURAL HEARING LOSS, BILATERAL: Primary | ICD-10-CM

## 2019-05-31 NOTE — PROGRESS NOTES
AUDIOLOGY REPORT    SUBJECTIVE:  Azeb Torres is a 89 year old female who was seen in Audiology at the [facility] [place] for audiologic evaluation and hearing aid check.  The patient has been seen previously in this clinic on 6/10/2015 for hearing assessment and results indicated mild sloping to severe sensorineural hearing loss bilaterally with 72% speech understanding for the right ear and 92% speech understanding for the left ear. The patient was fit with binaural Unitron Latitude 8 Moxi 3G  in the canal hearing aids 8/29/2011. The patient reports a suspected (slight) decline in hearing bilaterally since last testing in 2015. The patient does have long-standing balance issues for which she utilizes a walker. The patient denies any ear related issues or true vertigo.    OBJECTIVE:  Fall Risk Screen:  1. Have you fallen two or more times in the past year? No  2. Have you fallen and had an injury in the past year? No    Abuse Screening:  Do you feel unsafe at home or work/school? No  Do you feel threatened by someone? No  Does anyone try to keep you from having contact with others, or doing things outside of your home? No  Physical signs of abuse present? No    Otoscopic exam indicates ears are clear of cerumen bilaterally     Pure Tone Thresholds assessed using conventional audiometry with good  reliability from 250-8000 Hz bilaterally using circumaural headphones     RIGHT:  Mild sloping to severe sensorineural hearing loss; stable    LEFT:    Mild sloping to severe sensorineural hearing loss; stable    Tympanogram:    RIGHT: restricted eardrum mobility     LEFT:   normal eardrum mobility    Reflexes (reported by stimulus ear):  RIGHT: Ipsilateral is absent at frequencies tested  RIGHT: Contralateral is absent at frequencies tested  LEFT:   Ipsilateral is present at elevated levels  LEFT:   Contralateral is absent at frequencies tested    Speech Reception  Threshold:    RIGHT: 55 dB HL    LEFT:   50 dB HL  Word Recognition Score:     RIGHT: 68% at 90 dB HL using NU-6 recorded word list; stable.    LEFT:   68% at 90 dB HL using NU-6 recorded word list; significant decline (92% 2015).    The patient's hearing aids were deep-cleaned and assessed and found to be functioning well. It was noted that the patient was not originally fit to 100% target prescription in 2011 when she received the devices and so the hearing aids were reprogrammed today with real-ear verification and the patient reports good volume and sound quality.    ASSESSMENT:  Stable mild to severe symmetric sensorineural hearing loss bilaterally with 68% speech understanding bilaterally and a significant decrease in speech understanding for the left ear 92% 2015). The patient's hearing aids were cleaned, assessed and reprogrammed with real-ear verification.     PLAN:    It is recommended that the patient return every 4-6 months for cleaning and assessment of the hearing aids and at least bi-annually for monitoring of hearing status, sooner if changes in hearing or ear issues are noted.  Please call this clinic with questions regarding these results or recommendations.      Rica Murphy.  Licensed Audiologist  [place] #[id number]

## 2019-06-11 ENCOUNTER — TELEPHONE (OUTPATIENT)
Dept: AUDIOLOGY | Facility: CLINIC | Age: 84
End: 2019-06-11

## 2019-06-11 NOTE — TELEPHONE ENCOUNTER
Azeb Torres's daughter brought her hearing aids to the [facility] on 6/11/19 for servicing.  The canal lock had broken off of the right earmold.  Both hearing aids and earmolds were cleaned and filters were replaced bilaterally.  The right earmold was buffed where the canal lock broke off so the patient can continue to wear it if she chooses to.  A new right earmold is being ordered from the scan on file and Barbi will be contacted when the new earmold is available to be picked up.

## 2019-06-21 ENCOUNTER — OFFICE VISIT (OUTPATIENT)
Dept: AUDIOLOGY | Facility: CLINIC | Age: 84
End: 2019-06-21

## 2019-06-21 ENCOUNTER — TELEPHONE (OUTPATIENT)
Dept: AUDIOLOGY | Facility: CLINIC | Age: 84
End: 2019-06-21

## 2019-06-21 DIAGNOSIS — H90.3 SENSORY HEARING LOSS, BILATERAL: Primary | ICD-10-CM

## 2019-06-21 NOTE — TELEPHONE ENCOUNTER
Azeb Torres's daughter, Lake, brought her right hearing aid to the [facility] on 6/21/19.  A new right earmold and  was attached to the hearing aid today without issue.  Azeb will be billed $80.00 for the new earmold and will return to the clinic as needed.

## 2019-07-12 ENCOUNTER — OFFICE VISIT (OUTPATIENT)
Dept: AUDIOLOGY | Facility: CLINIC | Age: 84
End: 2019-07-12

## 2019-07-12 DIAGNOSIS — H90.3 SENSORY HEARING LOSS, BILATERAL: Primary | ICD-10-CM

## 2019-07-26 ENCOUNTER — TELEPHONE (OUTPATIENT)
Dept: OPHTHALMOLOGY | Facility: CLINIC | Age: 84
End: 2019-07-26

## 2019-07-26 NOTE — TELEPHONE ENCOUNTER
Started on Wed with tearing and painful eyelid.  Stated that she started warm compresses yesterday and feels that the eye and lid feels better.  Advised her to increase or start PFAT.      Daughter was happy with advise

## 2019-07-26 NOTE — TELEPHONE ENCOUNTER
[facility] Call Center    Phone Message    May a detailed message be left on voicemail: yes    Reason for Call: Symptoms or Concerns     If patient has red-flag symptoms, warm transfer to triage line    Current symptom or concern: Rt eye is watery, draining, reddish, swollen, bothersome    Symptoms have been present for:  2-3 day(s)    Has patient previously been seen for this? No    By : Pt of Dr Faria    Date: N/A    Are there any new or worsening symptoms? Yes: get worse    Please call Pt daughter back to discuss - Thanks!      Action Taken: Message routed to:  Other: Eye Clinic

## 2019-08-02 DIAGNOSIS — E78.5 HYPERLIPIDEMIA, UNSPECIFIED HYPERLIPIDEMIA TYPE: ICD-10-CM

## 2019-08-02 DIAGNOSIS — I10 ESSENTIAL HYPERTENSION, BENIGN: ICD-10-CM

## 2019-08-02 RX ORDER — HYDROCHLOROTHIAZIDE 25 MG/1
25 TABLET ORAL DAILY
Qty: 90 TABLET | Refills: 0 | Status: SHIPPED | OUTPATIENT
Start: 2019-08-02 | End: 2019-10-09

## 2019-08-02 NOTE — TELEPHONE ENCOUNTER
[facility] Call Center    Phone Message    May a detailed message be left on voicemail: yes    Reason for Call: Medication Refill Request    Has the patient contacted the pharmacy for the refill? Yes   Name of medication being requested:  hydrochlorothiazide (HYDRODIURIL) 25 MG tablet  Provider who prescribed the medication: Pamella  [place] [facility]   Date medication is needed: ASAP - she has one pill left         Action Taken: Message routed to:  Other: Med Refill Team

## 2019-08-02 NOTE — TELEPHONE ENCOUNTER
Last Clinic Visit: 3/7/2019  [facility]  Potassium, Sodium and Creat overdue - clinic notified.  Per clinic note - HTN well controlled

## 2019-08-05 RX ORDER — HYDROCHLOROTHIAZIDE 25 MG/1
TABLET ORAL
Qty: 90 TABLET | Refills: 0 | OUTPATIENT
Start: 2019-08-05

## 2019-08-25 DIAGNOSIS — E78.5 HYPERLIPIDEMIA, UNSPECIFIED HYPERLIPIDEMIA TYPE: ICD-10-CM

## 2019-08-28 RX ORDER — ATORVASTATIN CALCIUM 10 MG/1
10 TABLET, FILM COATED ORAL DAILY
Qty: 90 TABLET | Refills: 0 | Status: SHIPPED | OUTPATIENT
Start: 2019-08-28 | End: 2019-10-09

## 2019-08-28 NOTE — TELEPHONE ENCOUNTER
Last Clinic Visit: 3/7/2019  [facility]  LDL overdue - clinic notified

## 2019-08-30 NOTE — PROGRESS NOTES
"Reason for visit:    Azeb Torres is POD# 8 and comes in today for a post-op wound check. She has been having pain in the fingers.     Her surgery was done 11/14/2017 by Dr Rivera.  She had open carpal tunnel right wrist.     Assessment:    Azeb came into the clinic in a soft post-op dressing. She has been elevating and icing the right hand. She describes pain in her index, middle, and ring fingers that seems worse than pre-op. She has a difficult time describing the numbness/tingling, but does describe that it feels better than pre-op.   The dressing was removed, the incision is well approximated with sutures, there is mild bruising, minimal swelling, the fingers are pink and warm. She is able to flex/extend all fingers.   After the dressing was removed she states the hand/wrist \"feels better\".      The Surgical wounds were exposed and found to be without evidence of infection; so the sutures were not removed. These will be removed at the next office visit.     Plan:     A 2x2 was placed over the incision and wrapped with a light layer of kerlix, the splint that was given to her post-operatively was placed and fit well.   She was told to continue current restrictions: Keep the dressing clean and dry, encouraged range of motion of the fingers, but not the wrist. Instructed on changing the gauze and washing the hand, but not over the incision.       She has an appointment 11/29/17 and at that time Dr. Rivera will determine further restrictions.    She has our phone number and will call with questions or problems.      "
Yes

## 2019-09-02 DIAGNOSIS — Z86.73 HISTORY OF CVA (CEREBROVASCULAR ACCIDENT): ICD-10-CM

## 2019-09-05 RX ORDER — CLOPIDOGREL BISULFATE 75 MG/1
75 TABLET ORAL DAILY
Qty: 90 TABLET | Refills: 0 | Status: SHIPPED | OUTPATIENT
Start: 2019-09-05 | End: 2019-10-09

## 2019-09-05 NOTE — TELEPHONE ENCOUNTER
Health Call Center    Phone Message    May a detailed message be left on voicemail: yes    Reason for Call: Medication Question or concern regarding medication   Prescription Clarification  Name of Medication: clopidogrel (PLAVIX) 75 MG tablet [Pharmacy Med Name: CLOPIDOGREL BISULFATE 75MG TABS    Prescribing Provider: Evelina Can MD   Pharmacy: [place], [place] - [address] [place] [address] [phone]   What on the order needs clarification?   The patients daughter called wanting to know the status update on the refill request the patient has one day left please call Evelina Resendez to update on refill request thank you.          Action Taken: Message routed to:  [facility] pcc

## 2019-09-05 NOTE — TELEPHONE ENCOUNTER
Last Clinic Visit: 3/7/2019  [facility]  HGB and PLT overdue - clinic notified.

## 2019-10-04 ENCOUNTER — HEALTH MAINTENANCE LETTER (OUTPATIENT)
Age: 84
End: 2019-10-04

## 2019-10-09 ENCOUNTER — OFFICE VISIT (OUTPATIENT)
Dept: INTERNAL MEDICINE | Facility: CLINIC | Age: 84
End: 2019-10-09
Payer: MEDICARE

## 2019-10-09 VITALS
WEIGHT: 164.5 LBS | DIASTOLIC BLOOD PRESSURE: 82 MMHG | HEART RATE: 81 BPM | SYSTOLIC BLOOD PRESSURE: 124 MMHG | OXYGEN SATURATION: 96 % | BODY MASS INDEX: 28.46 KG/M2

## 2019-10-09 DIAGNOSIS — I10 ESSENTIAL HYPERTENSION, BENIGN: ICD-10-CM

## 2019-10-09 DIAGNOSIS — E03.4 HYPOTHYROIDISM DUE TO ACQUIRED ATROPHY OF THYROID: ICD-10-CM

## 2019-10-09 DIAGNOSIS — Z97.4 HEARING AID WORN: ICD-10-CM

## 2019-10-09 DIAGNOSIS — Z86.73 HISTORY OF CVA (CEREBROVASCULAR ACCIDENT): ICD-10-CM

## 2019-10-09 DIAGNOSIS — R12 HEARTBURN: ICD-10-CM

## 2019-10-09 DIAGNOSIS — E78.5 HYPERLIPIDEMIA, UNSPECIFIED HYPERLIPIDEMIA TYPE: ICD-10-CM

## 2019-10-09 DIAGNOSIS — Z23 NEED FOR INFLUENZA VACCINATION: ICD-10-CM

## 2019-10-09 DIAGNOSIS — L98.9 SKIN LESION: ICD-10-CM

## 2019-10-09 DIAGNOSIS — Z00.00 ENCOUNTER FOR MEDICARE ANNUAL WELLNESS EXAM: Primary | ICD-10-CM

## 2019-10-09 LAB
CREAT SERPL-MCNC: 0.8 MG/DL (ref 0.52–1.04)
GFR SERPL CREATININE-BSD FRML MDRD: 63 ML/MIN/{1.73_M2}
HGB BLD-MCNC: 14.8 G/DL (ref 11.7–15.7)
TSH SERPL DL<=0.005 MIU/L-ACNC: 2.5 MU/L (ref 0.4–4)

## 2019-10-09 RX ORDER — CLOPIDOGREL BISULFATE 75 MG/1
75 TABLET ORAL DAILY
Qty: 90 TABLET | Refills: 3 | Status: SHIPPED | OUTPATIENT
Start: 2019-10-09 | End: 2020-11-19

## 2019-10-09 RX ORDER — HYDROCHLOROTHIAZIDE 25 MG/1
25 TABLET ORAL DAILY
Qty: 90 TABLET | Refills: 3 | Status: SHIPPED | OUTPATIENT
Start: 2019-10-09 | End: 2020-10-13

## 2019-10-09 RX ORDER — LEVOTHYROXINE SODIUM 25 UG/1
25 TABLET ORAL DAILY
Qty: 90 TABLET | Refills: 3 | Status: SHIPPED | OUTPATIENT
Start: 2019-10-09 | End: 2020-11-19

## 2019-10-09 RX ORDER — ATORVASTATIN CALCIUM 10 MG/1
10 TABLET, FILM COATED ORAL DAILY
Qty: 90 TABLET | Refills: 3 | Status: SHIPPED | OUTPATIENT
Start: 2019-10-09 | End: 2020-10-13

## 2019-10-09 ASSESSMENT — PAIN SCALES - GENERAL: PAINLEVEL: NO PAIN (0)

## 2019-10-09 NOTE — PATIENT INSTRUCTIONS
Patient Education   Personalized Prevention Plan  You are due for the preventive services outlined below.  Your care team is available to assist you in scheduling these services.  If you have already completed any of these items, please share that information with your care team to update in your medical record.  Health Maintenance Due   Topic Date Due     Discuss Advance Care Planning  [birth year]25     Annual Wellness Visit  04/03/1990     Flu Vaccine (1) 09/01/2019

## 2019-10-09 NOTE — PROGRESS NOTES
"[facility]  Primary Care Center   Established Patient Encounter   Evelina Can MD  10/09/2019     Medicare Annual Wellness Visit     History of Present Illness:   Azeb Torres is a 89 year old female with a history of stroke, hypertension, hypothyroidism, hyperlipidemia, breast cancer, and endometrial cancer who presents with her daughters for evaluation of a medicare wellness visit.      Balance   She reports having balance issues, but has not had any falls. She uses a walker when she walks and has railing on her stairs at home.      Hearing Loss   Her daughters do think her hearing is worse. She saw audiology in June 2019 and had two hearing aids molds made. However, shortly after, one of the hearing aid broke and she had a new mold. She has had \"squealing\" in the new hearing aids for the past 2 months. She was told she might have some ear wax in her ears. She tries to rinse her ears during the shower. Her daughters report that she has ear drops at home.     Memory  She and her friends are not concerned about her memory. She remembers all her family members when they come to visit. She does not use the stove at home. No dementia in her family history.     Other Concerns Discussed:   1. Dermatology - She has a sore on her right side that is irritated by the band of her underwear. No history of skin cancer.   2. Mood - She occasionally feels depressed.   3. Dizziness - Dizziness has resolved with discontinuation of gabapentin.   4. Immunizations - She has completed the two shot Shingrix series and is open to a flu shot today.   5. Caffeine - She drinks around 12 ounces of coffee BID and does not drink it after 4pm.   6. Hands - She is using a salve on her hands and does not have a current rash or arthritis flare-up in her hands.    Review of Systems:   Constitutional:  Fevers, night sweats or unintentional weight change ?  NO      Eyes:  Vision change, diplopia or red eyes?  NO      Ears, Nose, Mouth, Throat:  " Tinnitus or hearing change, epistaxis or nasal discharge, oral lesions, throat pain ?  NO      Neck:  Stiffness?  NO           Cardiovascular:   Chest pain, palpitations, or pain with walking, orthopnea or PND?  NO   Breasts:  Any bumps or unusual discharge?     NO         Respiratory:  Dyspnea, cough, shortness of breath or wheezing?  NO         GI:  Nausea, vomiting, diarrhea or constipation, abdominal pain ?  NO         :  Change in urine,  dysuria or hematuria, sexual dysfunction ?  NO        Musculoskeletal:   Joint or muscle pain or swelling?  NO            Skin:  Concerning lesions or moles?  NO           Nervous System:  Loss of strength or sensation, numbness or tingling, tremor,  dizziness,  headache?  NO   Endocrine/Hormone:  Polyuria or polydipsia, temperature intolerance?  NO            Blood and Lymphnodes:  Concerning bumps, bleeding problems?  NO            Allergy:  Environmental allergies?  NO            Mental Health: Depression or anxiety, sleep problems?  NO              Medical Care      Have you been to an ER or a hospital in the last year? No  Current providers sharing in care for this patient include:  Patient Care Team:  Patient Care Team       Relationship Specialty Notifications Start End    Evelina Can MD PCP - General Internal Medicine  11/28/17     Phone: [phone] Fax: [phone]         [address] [facility] [address] [facility] [address]    Amina Ramirez AuD Audiologist Audiology  10/6/15     Phone: [phone] Fax: [phone]         [address] [place] [address]    Dipti Zhang MD MD Orthopedics  10/12/17     Phone: [phone] Fax: [phone]         [address] [place] [address]    Evelina Bacon OT Occupational Therapist Occupational Therapy  1/29/18     Phone: [phone] Fax: [phone]         [facility]S [address] [address]    Alejandrina Faira MD MD Ophthalmology  6/22/18      Phone: [phone] Fax: [phone]         [address] [address]           Active Medications:      acetaminophen (TYLENOL) 500 MG tablet, Take 2 tablets by mouth 2 times daily., Disp: , Rfl:      atorvastatin (LIPITOR) 10 MG tablet, Take 1 tablet (10 mg) by mouth daily, Disp: 90 tablet, Rfl: 0     cholecalciferol (VITAMIN D) 1000 UNIT tablet, Take 1 tablet by mouth every morning , Disp: , Rfl:      clopidogrel (PLAVIX) 75 MG tablet, Take 1 tablet (75 mg) by mouth daily, Disp: 90 tablet, Rfl: 0     desonide (DESOWEN) 0.05 % external ointment, Apply topically 2 times daily, Disp: 60 g, Rfl: 3     dorzolamide-timolol (COSOPT) 2-0.5 % ophthalmic solution, Place 1 drop into both eyes 2 times daily, Disp: 1 Bottle, Rfl: 11     hydrochlorothiazide (HYDRODIURIL) 25 MG tablet, Take 1 tablet (25 mg) by mouth daily, Disp: 90 tablet, Rfl: 0     latanoprost (XALATAN) 0.005 % ophthalmic solution, Place 1 drop into both eyes At Bedtime, Disp: 1 Bottle, Rfl: 11     levothyroxine (SYNTHROID/LEVOTHROID) 25 MCG tablet, TAKE ONE TABLET BY MOUTH EVERY DAY, Disp: 90 tablet, Rfl: 2     ondansetron (ZOFRAN-ODT) 4 MG ODT tab, Place 1 tablet (4 mg) under the tongue every 8 hours as needed for nausea, Disp: 30 tablet, Rfl: 1     ranitidine (ZANTAC) 150 MG tablet, Take 1 tablet (150 mg) by mouth 2 times daily as needed for heartburn, Disp: 60 tablet, Rfl: 3    Allergies:   Patient has no known allergies.      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 Daughter ([place]ake)   Does your home have any of the following safety concerns? Loose rugs in the hallway, no grab bars in the bathroom, no handrails on the stairs or have poorly lit areas?  No  Do you feel threatened or controlled by a partner, ex-partner or anyone in your life? No  Has anyone hurt you physically, for example by pushing, hitting, slapping or kicking you   or forcing you to have sex? No  Do you need help with the phone, transportation, shopping, preparing meals, housework, laundry, medications or managing money? Yes, but daughter takes care of this    Have you noticed any hearing difficulties? Yes, pt reports not hearing as well as she used to, currently used hearing aids       Risk Behaviors and Healthy Habits     How many servings of fruits and vegetables do you eat a day? 2-3  How often do you exercise and what do you do? N/A minutes N/A a week  Do you frequently ride without a seatbelt? No  Do you use tobacco?  No  Do you use any other drugs? No       Do you use alcohol?No  Yes  Number of drinks per day 0  Number of drinking days a week 0    Sexual Health     Are you sexually active?  No   If yes, with men, women, or both? N/A  If yes, do you more than one current partner?No  Have you had any sexually transmitted infections? No   Any sexual concerns? No     FOR WOMEN ONLY  What year did you stop having periods? 1969  Any vaginal bleeding in the last year? No  Have you ever had an abnormal Pap smear? No    FUNCTIONAL  ABILITY/SAFETY SCREENING     Fall Risk Assessment Today:   Fall Risk Screening:  FALL RISK ASSESSMENT 3/7/2019   Fallen 2 or more times in the past year? No   Any fall with injury in the past year? No     Fall risk info is current. Hali Prado EMT at 11:15 AM sign on 10/9/2019    Hearing evaluation if done: No    Hearing evaluation if done: Yes, has seen audiology    Visual acuity : Wears glasses: worn for testing   Right eye: 20/40       Left eye: 20/50  Hali Prado EMT at 12:12 PM sign on 10/9/2019        CV Risk based on Pooled Cohort Risk:  The ASCVD Risk score (Pradip HODGES Jr., et al., 2013) failed to calculate for the following reasons:    The 2013 ASCVD risk score is only valid for ages 40 to 79    The patient has a prior MI or stroke diagnosis      EVALUATION OF COGNITIVE FUNCTION      Mini Cog Scoring   one point   Clock Draw Test result:  Abnormal     PHQ-2 Score:      PHQ-2 ( 1999 Pfizer) 4/3/2019 10/18/2017   Q1: Little interest or pleasure in doing things 0 0   Q2: Feeling down, depressed or hopeless 0 0   PHQ-2 Score 0 0         SCREENING FOR PREVENTION and EARLY DETECTION      Advanced Directives: Discussed and patient desires to Gave patient and family Advanced directive information.       Immunization status: up to date and documented.  Immunization History   Administered Date(s) Administered     Influenza (H1N1) 01/22/2010     Influenza (High Dose) 3 valent vaccine 10/18/2017, 10/20/2018     Influenza (IIV3) PF 12/16/2004, 10/27/2005, 11/09/2007, 09/19/2009, 10/18/2010, 10/29/2011, 11/01/2012, 10/15/2014     Influenza Vaccine IM > 6 months Valent IIV4 11/06/2013     Pneumo Conj 13-V (2010&after) 06/03/2015     Pneumococcal 23 valent 03/07/2001, 10/15/2014     TD (ADULT, 7+) 11/17/2010     Zoster vaccine recombinant adjuvanted (SHINGRIX) 11/05/2018, 03/07/2019      Reviewed Immunization Record Today  Pneumoccocal Vaccine: No  Varicella Vaccine: No  TDaP:No    Physical Exam:   /82 (BP  Location: Right arm, Patient Position: Sitting, Cuff Size: Adult Regular)   Pulse 81   Wt 74.6 kg (164 lb 8 oz)   SpO2 96%   BMI 28.46 kg/m       Constitutional: Alert, oriented, pleasant, no acute distress  Head: Normocephalic, atraumatic  Eyes: Extra-ocular movements intact, no scleral icterus, wearing glasses  ENT: Oropharynx clear, moist mucus membranes, good dentition, wearing hearing aids bilaterally, scant cerumen in bilateral canals   Neck: Supple, no lymphadenopathy  Cardiovascular: Regular rate and rhythm, rare ectopy, no murmurs, rubs or gallops, peripheral pulses full/symmetric  Respiratory: Good air movement bilaterally, lungs clear, no wheezes/rales/rhonchi  GI: soft, nondistended, nontender  Musculoskeletal: No edema, normal muscle tone, normal gait  Neurologic: Alert and oriented, cranial nerves 2-12 intact.  Skin: 2cm raised, papular lesion which appears verrucoid on the right lower abdomen   Psychiatric: normal mentation, affect and mood     Assessment and Plan:    Encounter for Medicare annual wellness exam  Reviewed and updated routine health maintenance as appropriate.     Need for influenza vaccination  -     FLU VACCINE HIGH DOSE PRESERVATIVE FREE, AGE =>65 YR    Hearing aid worn    Essential hypertension, benign  -     Creatinine; Future  -     Hemoglobin; Future    Hypothyroidism due to acquired atrophy of thyroid  -     TSH with free T4 reflex; Future    Skin lesion  Skin lesion on lower right abdomen. Referred to dermatology for further evaluation in ideally next 2-3 weeks. Suggested using Vaseline and keeping covered in interim.   -     DERMATOLOGY REFERRAL    Follow-up: Return in about 53 weeks (around 10/14/2020) for Annual Wellness Visit.     Scribe Disclosure:  MYLENE, Yumi Thompson, am serving as a scribe to document services personally performed by Evelina Can MD at this visit, based upon the provider's statements to me. All documentation has been reviewed by the  aforementioned provider prior to being entered into the official medical record.     Portions of this medical record were completed by a scribe. UPON MY REVIEW AND AUTHENTICATION BY ELECTRONIC SIGNATURE, this confirms (a) I performed the applicable clinical services, and (b) the record is accurate.  Evelina Can MD  Internal Medicine

## 2019-10-09 NOTE — NURSING NOTE
Chief Complaint   Patient presents with     Physical     Pt here for annual physical, Medicare Wellness questions will be asked      LEXIE Vogel at 11:07 AM sign on 10/9/2019    Pt would like flu shot. -RS

## 2019-10-12 DIAGNOSIS — H40.2233 CHRONIC ANGLE-CLOSURE GLAUCOMA OF BOTH EYES, SEVERE STAGE: ICD-10-CM

## 2019-10-12 RX ORDER — LATANOPROST 50 UG/ML
1 SOLUTION/ DROPS OPHTHALMIC AT BEDTIME
Qty: 2.5 ML | Refills: 5 | Status: SHIPPED | OUTPATIENT
Start: 2019-10-12 | End: 2020-07-15

## 2019-10-12 NOTE — TELEPHONE ENCOUNTER
" latanoprost (XALATAN) 0.005 % ophthalmic solution     Last Written Presciption Date: 9/18/18  Last Fill Quantity:1bottle    # refills: 11  Last Office Visit : 9/18/18  Future Office visit:  None    9/18/18  \"  \"  Latanoprost which is a teal top drop at bedtime in both eyes\"    Past due for appt. 6 additional mth per protocoll  "

## 2019-10-16 ENCOUNTER — OFFICE VISIT (OUTPATIENT)
Dept: DERMATOLOGY | Facility: CLINIC | Age: 84
End: 2019-10-16
Attending: INTERNAL MEDICINE
Payer: MEDICARE

## 2019-10-16 DIAGNOSIS — L82.0 INFLAMED SEBORRHEIC KERATOSIS: ICD-10-CM

## 2019-10-16 DIAGNOSIS — L30.9 CHRONIC DERMATITIS OF HANDS: Primary | ICD-10-CM

## 2019-10-16 RX ORDER — FLUOCINONIDE 0.5 MG/G
OINTMENT TOPICAL 2 TIMES DAILY
Qty: 30 G | Refills: 3 | Status: SHIPPED | OUTPATIENT
Start: 2019-10-16 | End: 2020-09-24

## 2019-10-16 ASSESSMENT — PAIN SCALES - GENERAL
PAINLEVEL: NO PAIN (0)
PAINLEVEL: NO PAIN (1)

## 2019-10-16 NOTE — PATIENT INSTRUCTIONS
Cryotherapy    What is it?    Use of a very cold liquid, such as liquid nitrogen, to freeze and destroy abnormal skin cells that need to be removed    What should I expect?    Tenderness and redness    A small blister that might grow and fill with dark purple blood. There may be crusting.    More than one treatment may be needed if the lesions do not go away.    How do I care for the treated area?    Gently wash the area with your hands when bathing.    Use a thin layer of Vaseline to help with healing. You may use a Band-Aid.     The area should heal within 7-10 days and may leave behind a pink or lighter color.     Do not use an antibiotic or Neosporin ointment.     You may take acetaminophen (Tylenol) for pain.     Call your Doctor if you have:    Severe pain    Signs of infection (warmth, redness, cloudy yellow drainage, and or a bad smell)    Questions or concerns    Who should I call with questions?       [facility]: [phone]       [facility]: [phone]       For urgent needs outside of business hours call the [facility] at [phone]        and ask for the dermatology resident on call

## 2019-10-16 NOTE — LETTER
"10/16/2019       RE: Azeb Torres  [address]  [place] [address]     Dear Colleague,    Thank you for referring your patient, Azeb Torres, to the [facility] DERMATOLOGY at [facility]. Please see a copy of my visit note below.    [facility] Dermatology Note      Dermatology Problem List:  1. Hand Dermatitis  -Past treatment: triamcinolone 0.1% ointment BID, Desonide 0.05% ointment BID, lotion (like vanicream) during day, vaseline with cotton gloves at night  -Current treatment: Describes using \"fluconazole\" cream from her daughter, which is likely fluocinonide. This has now been prescribed as of 10/16/19.   2. Inflamed seborrheic keratosis  -Treated with cryotherapy on 10/16/19    Encounter Date: Oct 16, 2019    CC:   Chief Complaint   Patient presents with     Derm Problem     Azeb is here for a concerning mole on the right side of her abdomen.          History of Present Illness:  Ms. Azeb Torres is a 89 year old female who presents for evaluation of abdominal lesion.    Patient states that she first noticed this lesion about 2 weeks ago. She states that the lesion grew \"kind of fast,\" and she didn't pay much attention to it at first. But she started to pay attention when it started to get \"sore.\" She went to her primary care provider last week, who told her to make an appointment. The lesion does not itch or bleed. She has been occluding the lesion with a band aid and Vaseline so that she doesn't hit it on anything. She has never had a lesion like this before.    She also states that for her hand dermatitis, she has been using her daughters \"fluconazole\" ointment. All of the other steroids, Vaseline, glove occlusion, has not helped at all, so she is asking for the same prescription her daughter has.     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)     PSVT (paroxysmal supraventricular tachycardia) (H)     Cerebral infarction (H)     Homonymous hemianopia     Pain in joint, shoulder region     Pain in joint, lower leg     Hyperlipidemia     Hypothyroidism     Tricuspid regurgitation     Pulmonary artery hypertension (H)     Rosacea     Inflamed seborrheic keratosis     Dermatitis, seborrheic     Pseudophakia of both eyes     Nonexudative senile macular degeneration of retina     Asteroid hyalosis of right eye     Chronic angle-closure glaucoma     Dizziness of unknown cause     Vertigo     Hypothyroidism due to acquired atrophy of thyroid     Past Medical History:   Diagnosis Date     Arthritis      Breast cancer (H)     ER positive     Chronic angle-closure glaucoma      CVA (cerebral vascular accident) (H) 2003    R cerebral artery, embolic after hip replacement     Degenerative joint disease      Endometrial cancer (H)      Hypertension      Left homonymous hemianopsia      Pulmonary artery hypertension (H) 4/24/2013    Noted on echo. Mild-moderate. Moderate TR     Tricuspid regurgitation 4/24/2013     Past Surgical History:   Procedure Laterality Date     C PELVIS/HIP JOINT SURGERY UNLISTED       C STOMACH SURGERY PROCEDURE UNLISTED       CATARACT IOL, RT/LT       glaucoma laser[       HIP SURGERY      s/p bilateral hip replacements     HYSTERECTOMY  1985    low grade endometrial cancer     LUMPECTOMY BREAST Left      RELEASE CARPAL TUNNEL Right 11/14/2017    Procedure: RELEASE CARPAL TUNNEL;  Right Open Carpal Tunnel Release;  Surgeon: Patrick Rivera MD;  Location:  OR       Social History:  Patient reports that she has never smoked. She has never used smokeless tobacco. She reports that she does not drink alcohol or use drugs.    Family History:  Family History   Problem Relation Age of Onset     Coronary Artery Disease Father        Medications:  Current  Outpatient Medications   Medication Sig Dispense Refill     acetaminophen (TYLENOL) 500 MG tablet Take 2 tablets by mouth 2 times daily.       atorvastatin (LIPITOR) 10 MG tablet Take 1 tablet (10 mg) by mouth daily 90 tablet 3     cholecalciferol (VITAMIN D) 1000 UNIT tablet Take 1 tablet by mouth every morning        clopidogrel (PLAVIX) 75 MG tablet Take 1 tablet (75 mg) by mouth daily 90 tablet 3     desonide (DESOWEN) 0.05 % external ointment Apply topically 2 times daily 60 g 3     dorzolamide-timolol (COSOPT) 2-0.5 % ophthalmic solution Place 1 drop into both eyes 2 times daily 1 Bottle 11     fluocinonide (LIDEX) 0.05 % external ointment Apply topically 2 times daily 30 g 3     hydrochlorothiazide (HYDRODIURIL) 25 MG tablet Take 1 tablet (25 mg) by mouth daily 90 tablet 3     latanoprost (XALATAN) 0.005 % ophthalmic solution Place 1 drop into both eyes At Bedtime 2.5 mL 5     levothyroxine (SYNTHROID/LEVOTHROID) 25 MCG tablet Take 1 tablet (25 mcg) by mouth daily 90 tablet 3     ondansetron (ZOFRAN-ODT) 4 MG ODT tab Place 1 tablet (4 mg) under the tongue every 8 hours as needed for nausea 30 tablet 1     ranitidine (ZANTAC) 150 MG tablet Take 1 tablet (150 mg) by mouth 2 times daily as needed for heartburn 180 tablet 3        No Known Allergies      Review of Systems:  -As per HPI  -Constitutional: Otherwise feeling well today, in usual state of health.  -HEENT: Patient denies nonhealing oral sores.  -Skin: As above in HPI. No additional skin concerns.    Physical exam:  Vitals: There were no vitals taken for this visit.  GEN: This is a well developed, well-nourished female in no acute distress, in a pleasant mood.    SKIN: Focused examination of the hands and abdomen was performed.  -There is a warty appearing, tan/skin colored to brown waxy stuck on papule with surrounding erythema on the patient's right lower abdomen. Under dermatoscope, lesion has comedonal openings.   -There are pink scaly patches and  plaques on the right dorsal hand (mimimal).  -No other lesions of concern on areas examined.     Impression/Plan:  1. Seborrheic keratosis, inflamed: Discussed benign nature of lesions.    Cryotherapy procedure note: After verbal consent and discussion of risks and benefits including but no limited to dyspigmentation/scar, blister, and pain, one was treated with 1-2mm freeze border for 2 cycles with liquid nitrogen. Post cryotherapy instructions were provided.    We will clinically monitor this area with follow up in 12 weeks    2. Hand dermatitis: Improved. Has been using daughter's ointment at home, which we can presume is fluocinonide. Will prescribe fluocinonide 0.05% ointment and can follow up when she returns for her 12 week appointment.     CC Evelina Can MD  [address] [place] [address] on close of this encounter.  Follow-up in 12 weeks, earlier for new or changing lesions.       Dr. Morales staffed the patient.    Walt Elkins MD  [facility] Resident, PGY2  I was present for the entire procedure. Francisca Morales MD  I, Francisca Morales MD, saw this patient with the resident and agree with the resident s findings and plan of care as documented in the resident s note.    Again, thank you for allowing me to participate in the care of your patient.      Sincerely,    Francisca Morales MD

## 2019-10-16 NOTE — PROGRESS NOTES
"[facility] Dermatology Note      Dermatology Problem List:  1. Hand Dermatitis  -Past treatment: triamcinolone 0.1% ointment BID, Desonide 0.05% ointment BID, lotion (like vanicream) during day, vaseline with cotton gloves at night  -Current treatment: Describes using \"fluconazole\" cream from her daughter, which is likely fluocinonide. This has now been prescribed as of 10/16/19.   2. Inflamed seborrheic keratosis  -Treated with cryotherapy on 10/16/19    Encounter Date: Oct 16, 2019    CC:   Chief Complaint   Patient presents with     Derm Problem     Azeb is here for a concerning mole on the right side of her abdomen.          History of Present Illness:  Ms. Azeb Torres is a 89 year old female who presents for evaluation of abdominal lesion.    Patient states that she first noticed this lesion about 2 weeks ago. She states that the lesion grew \"kind of fast,\" and she didn't pay much attention to it at first. But she started to pay attention when it started to get \"sore.\" She went to her primary care provider last week, who told her to make an appointment. The lesion does not itch or bleed. She has been occluding the lesion with a band aid and Vaseline so that she doesn't hit it on anything. She has never had a lesion like this before.    She also states that for her hand dermatitis, she has been using her daughters \"fluconazole\" ointment. All of the other steroids, Vaseline, glove occlusion, has not helped at all, so she is asking for the same prescription her daughter has.     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)     PSVT (paroxysmal supraventricular tachycardia) (H)     Cerebral infarction (H)     Homonymous hemianopia     Pain in joint, shoulder region     Pain in joint, lower leg     Hyperlipidemia "     Hypothyroidism     Tricuspid regurgitation     Pulmonary artery hypertension (H)     Rosacea     Inflamed seborrheic keratosis     Dermatitis, seborrheic     Pseudophakia of both eyes     Nonexudative senile macular degeneration of retina     Asteroid hyalosis of right eye     Chronic angle-closure glaucoma     Dizziness of unknown cause     Vertigo     Hypothyroidism due to acquired atrophy of thyroid     Past Medical History:   Diagnosis Date     Arthritis      Breast cancer (H)     ER positive     Chronic angle-closure glaucoma      CVA (cerebral vascular accident) (H) 2003    R cerebral artery, embolic after hip replacement     Degenerative joint disease      Endometrial cancer (H)      Hypertension      Left homonymous hemianopsia      Pulmonary artery hypertension (H) 4/24/2013    Noted on echo. Mild-moderate. Moderate TR     Tricuspid regurgitation 4/24/2013     Past Surgical History:   Procedure Laterality Date     C PELVIS/HIP JOINT SURGERY UNLISTED       C STOMACH SURGERY PROCEDURE UNLISTED       CATARACT IOL, RT/LT       glaucoma laser[       HIP SURGERY      s/p bilateral hip replacements     HYSTERECTOMY  1985    low grade endometrial cancer     LUMPECTOMY BREAST Left      RELEASE CARPAL TUNNEL Right 11/14/2017    Procedure: RELEASE CARPAL TUNNEL;  Right Open Carpal Tunnel Release;  Surgeon: Patrick Rivera MD;  Location:  OR       Social History:  Patient reports that she has never smoked. She has never used smokeless tobacco. She reports that she does not drink alcohol or use drugs.    Family History:  Family History   Problem Relation Age of Onset     Coronary Artery Disease Father        Medications:  Current Outpatient Medications   Medication Sig Dispense Refill     acetaminophen (TYLENOL) 500 MG tablet Take 2 tablets by mouth 2 times daily.       atorvastatin (LIPITOR) 10 MG tablet Take 1 tablet (10 mg) by mouth daily 90 tablet 3     cholecalciferol (VITAMIN D) 1000 UNIT tablet Take 1  tablet by mouth every morning        clopidogrel (PLAVIX) 75 MG tablet Take 1 tablet (75 mg) by mouth daily 90 tablet 3     desonide (DESOWEN) 0.05 % external ointment Apply topically 2 times daily 60 g 3     dorzolamide-timolol (COSOPT) 2-0.5 % ophthalmic solution Place 1 drop into both eyes 2 times daily 1 Bottle 11     fluocinonide (LIDEX) 0.05 % external ointment Apply topically 2 times daily 30 g 3     hydrochlorothiazide (HYDRODIURIL) 25 MG tablet Take 1 tablet (25 mg) by mouth daily 90 tablet 3     latanoprost (XALATAN) 0.005 % ophthalmic solution Place 1 drop into both eyes At Bedtime 2.5 mL 5     levothyroxine (SYNTHROID/LEVOTHROID) 25 MCG tablet Take 1 tablet (25 mcg) by mouth daily 90 tablet 3     ondansetron (ZOFRAN-ODT) 4 MG ODT tab Place 1 tablet (4 mg) under the tongue every 8 hours as needed for nausea 30 tablet 1     ranitidine (ZANTAC) 150 MG tablet Take 1 tablet (150 mg) by mouth 2 times daily as needed for heartburn 180 tablet 3        No Known Allergies      Review of Systems:  -As per HPI  -Constitutional: Otherwise feeling well today, in usual state of health.  -HEENT: Patient denies nonhealing oral sores.  -Skin: As above in HPI. No additional skin concerns.    Physical exam:  Vitals: There were no vitals taken for this visit.  GEN: This is a well developed, well-nourished female in no acute distress, in a pleasant mood.    SKIN: Focused examination of the hands and abdomen was performed.  -There is a warty appearing, tan/skin colored to brown waxy stuck on papule with surrounding erythema on the patient's right lower abdomen. Under dermatoscope, lesion has comedonal openings.   -There are pink scaly patches and plaques on the right dorsal hand (mimimal).  -No other lesions of concern on areas examined.     Impression/Plan:  1. Seborrheic keratosis, inflamed: Discussed benign nature of lesions.    Cryotherapy procedure note: After verbal consent and discussion of risks and benefits including  but no limited to dyspigmentation/scar, blister, and pain, one was treated with 1-2mm freeze border for 2 cycles with liquid nitrogen. Post cryotherapy instructions were provided.    We will clinically monitor this area with follow up in 12 weeks    2. Hand dermatitis: Improved. Has been using daughter's ointment at home, which we can presume is fluocinonide. Will prescribe fluocinonide 0.05% ointment and can follow up when she returns for her 12 week appointment.     CC Evelina Can MD  [address] [place] [address] on close of this encounter.  Follow-up in 12 weeks, earlier for new or changing lesions.       Dr. Morales staffed the patient.    Walt Elkins MD  [facility] Resident, PGY2  I was present for the entire procedure. Francisca Morales MD  I, Francisca Morales MD, saw this patient with the resident and agree with the resident s findings and plan of care as documented in the resident s note.

## 2019-10-16 NOTE — NURSING NOTE
Dermatology Rooming Note    Azeb Torres's goals for this visit include:   Chief Complaint   Patient presents with     Derm Problem     Azeb is here for a concerning mole on the right side of her abdomen.        Sandra Sarabia LPN

## 2019-10-18 ENCOUNTER — TELEPHONE (OUTPATIENT)
Dept: OPHTHALMOLOGY | Facility: CLINIC | Age: 84
End: 2019-10-18

## 2019-10-18 DIAGNOSIS — H40.2233 CHRONIC ANGLE-CLOSURE GLAUCOMA OF BOTH EYES, SEVERE STAGE: ICD-10-CM

## 2019-10-18 RX ORDER — DORZOLAMIDE HYDROCHLORIDE AND TIMOLOL MALEATE 20; 5 MG/ML; MG/ML
1 SOLUTION/ DROPS OPHTHALMIC 2 TIMES DAILY
Qty: 1 BOTTLE | Refills: 5 | Status: SHIPPED | OUTPATIENT
Start: 2019-10-18 | End: 2020-09-04

## 2019-10-31 ENCOUNTER — OFFICE VISIT (OUTPATIENT)
Dept: OPHTHALMOLOGY | Facility: CLINIC | Age: 84
End: 2019-10-31
Attending: OPHTHALMOLOGY
Payer: MEDICARE

## 2019-10-31 DIAGNOSIS — H35.3130 BILATERAL NONEXUDATIVE AGE-RELATED MACULAR DEGENERATION, UNSPECIFIED STAGE: ICD-10-CM

## 2019-10-31 DIAGNOSIS — H40.2233 CHRONIC ANGLE-CLOSURE GLAUCOMA OF BOTH EYES, SEVERE STAGE: Primary | ICD-10-CM

## 2019-10-31 DIAGNOSIS — Z96.1 PSEUDOPHAKIA OF BOTH EYES: ICD-10-CM

## 2019-10-31 PROCEDURE — 92083 EXTENDED VISUAL FIELD XM: CPT | Mod: ZF | Performed by: OPHTHALMOLOGY

## 2019-10-31 PROCEDURE — G0463 HOSPITAL OUTPT CLINIC VISIT: HCPCS | Mod: ZF

## 2019-10-31 PROCEDURE — 92133 CPTRZD OPH DX IMG PST SGM ON: CPT | Mod: ZF | Performed by: OPHTHALMOLOGY

## 2019-10-31 ASSESSMENT — REFRACTION_WEARINGRX
OD_AXIS: 153
OD_CYLINDER: +1.00
OD_SPHERE: -2.25
OS_AXIS: 172
OS_SPHERE: -1.75
OS_CYLINDER: +0.25
SPECS_TYPE: SVL

## 2019-10-31 ASSESSMENT — TONOMETRY
IOP_METHOD: APPLANATION
OD_IOP_MMHG: 14
OS_IOP_MMHG: 12

## 2019-10-31 ASSESSMENT — VISUAL ACUITY
OS_PH_CC: 20/50
METHOD: SNELLEN - LINEAR
CORRECTION_TYPE: GLASSES
OD_PH_CC+: +1
OD_CC: 20/70 SLOW
OD_PH_CC: 20/60
OS_CC: 20/60 SLOW

## 2019-10-31 ASSESSMENT — CUP TO DISC RATIO
OD_RATIO: 0.8
OS_RATIO: 0.9

## 2019-10-31 ASSESSMENT — SLIT LAMP EXAM - LIDS: COMMENTS: DERMATOCHALASIS

## 2019-10-31 ASSESSMENT — CONF VISUAL FIELD
OS_INFERIOR_NASAL_RESTRICTION: 1
OS_INFERIOR_TEMPORAL_RESTRICTION: 3
OD_INFERIOR_NASAL_RESTRICTION: 3
OD_INFERIOR_TEMPORAL_RESTRICTION: 1
OS_SUPERIOR_NASAL_RESTRICTION: 3
OD_SUPERIOR_TEMPORAL_RESTRICTION: 1

## 2019-10-31 ASSESSMENT — EXTERNAL EXAM - RIGHT EYE: OD_EXAM: NORMAL

## 2019-10-31 ASSESSMENT — EXTERNAL EXAM - LEFT EYE: OS_EXAM: NORMAL

## 2019-10-31 NOTE — PROGRESS NOTES
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  Patient will return to clinic in 8-12 months with repeat visual field test, dilated eye exam, and OCT with RNFL analysis.  Patient will also continue on eye vitamins.  Patient will also follow up with Dr. Hsieh for low vision refraction.      Resident note (educational purposes, please refer to text above for final A&P):  IOP, OCT RNFL and VF stable (poor reliability left eye) today  Consider LVC fields in the future  Continue current drops and AREDS vitamins  Discussed options for reading with patient and daughters - could strengthen reading glasses or use magnifier. Has met with Evelina Bacon in the past, could follow-up with her if needed  RTC in 8-12 months with repeat visual field test, dilated eye exam, and OCT with RNFL analysis    Bj Hope MD  Ophthalmology Resident, PGY-3    Attending Physician Attestation:  Complete documentation of historical and exam elements from today's encounter can be found in the full encounter summary report (not reduplicated in this progress note). I personally obtained the chief complaint(s) and history of present illness.  I confirmed and edited as necessary the review of systems, past medical/surgical history, family history, social history, and examination  findings as documented by others; and I examined the patient myself. I personally reviewed the relevant tests, images, and reports as documented above. I formulated and edited as necessary the assessment and plan and discussed the findings and management plan with the patient and family.  - Alejandrina Faria MD

## 2019-10-31 NOTE — NURSING NOTE
"Chief Complaints and History of Present Illnesses   Patient presents with     Glaucoma Follow Up     Chief Complaint(s) and History of Present Illness(es)     Glaucoma Follow Up     Laterality: both eyes    Associated symptoms: Negative for flashes, floaters and double vision    Pain scale: 0/10              Comments     Pt here for annual f/u chronic angle closure glaucoma  Pt reports her vision \"isn't as good as it was previously\" - pt feels that her vision doesn't line up appropriately and that the right eye \"isn't cooperative\"  When pt is trying to line up something, she always finds she veers to the left of it    Ocular meds:  Cosopt (Timolol/Dorzolamide) 2x/day (12 hours apart) in both eyes  Latanoprost at bedtime in both eyes    JENIFER Snyder 8:02 AM October 31, 2019                   "

## 2020-02-06 ENCOUNTER — OFFICE VISIT (OUTPATIENT)
Dept: DERMATOLOGY | Facility: CLINIC | Age: 85
End: 2020-02-06
Payer: MEDICARE

## 2020-02-06 DIAGNOSIS — L82.0 INFLAMED SEBORRHEIC KERATOSIS: Primary | ICD-10-CM

## 2020-02-06 ASSESSMENT — PAIN SCALES - GENERAL
PAINLEVEL: NO PAIN (1)
PAINLEVEL: NO PAIN (0)

## 2020-02-06 NOTE — PROGRESS NOTES
[facility] Dermatology Note    Dermatology Problem List:  1. Inflamed seborrheic keratosis; LN2 right neck 2/6/2020  2. Hand Dermatitis  -Previous treatment: triamcinolone 0.1% ointment BID, desonide 0.05% ointment BID  -Fluocinonide cream PRN  -Gentle skin care    Encounter Date: Feb 6, 2020    CC: brown spots    History of Present Illness:  Ms. Azeb Torres is a 89 year old female presenting for skin check and brown spots  States that brown spot behind the right ear grew rapidly in the past week; is worried about it, but not otherwise painful; sometimes itches; also has similar-appearing irritated spots on the right lower neck near the collar bone  Denies other itching, bleeding, painful, non-healing lesions; denies moles that are changing in size or color; otherwise feeling well    Past Medical, Social, Family History:   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)     PSVT (paroxysmal supraventricular tachycardia) (H)     Cerebral infarction (H)     Homonymous hemianopia     Pain in joint, shoulder region     Pain in joint, lower leg     Hyperlipidemia     Hypothyroidism     Tricuspid regurgitation     Pulmonary artery hypertension (H)     Rosacea     Inflamed seborrheic keratosis     Dermatitis, seborrheic     Pseudophakia of both eyes     Nonexudative senile macular degeneration of retina     Asteroid hyalosis of right eye     Chronic angle-closure glaucoma     Dizziness of unknown cause     Vertigo     Hypothyroidism due to acquired atrophy of thyroid     Past Medical History:   Diagnosis Date     Arthritis      Breast cancer (H)     ER positive     Chronic angle-closure glaucoma      CVA (cerebral vascular accident) (H) 2003    R cerebral artery, embolic after hip replacement     Degenerative joint disease      Endometrial cancer  (H)      Hypertension      Left homonymous hemianopsia      Pulmonary artery hypertension (H) 4/24/2013    Noted on echo. Mild-moderate. Moderate TR     Tricuspid regurgitation 4/24/2013     Past Surgical History:   Procedure Laterality Date     C PELVIS/HIP JOINT SURGERY UNLISTED       C STOMACH SURGERY PROCEDURE UNLISTED       CATARACT IOL, RT/LT       glaucoma laser[       HIP SURGERY      s/p bilateral hip replacements     HYSTERECTOMY  1985    low grade endometrial cancer     LUMPECTOMY BREAST Left      RELEASE CARPAL TUNNEL Right 11/14/2017    Procedure: RELEASE CARPAL TUNNEL;  Right Open Carpal Tunnel Release;  Surgeon: Patrick Rivera MD;  Location: [facility] OR       Family History   Problem Relation Age of Onset     Coronary Artery Disease Father      Glaucoma No family hx of      Macular Degeneration No family hx of        Social History  Patient  reports that she has never smoked. She has never used smokeless tobacco. She reports that she does not drink alcohol or use drugs.    Current Outpatient Medications   Medication Sig Dispense Refill     acetaminophen (TYLENOL) 500 MG tablet Take 2 tablets by mouth 2 times daily.       atorvastatin (LIPITOR) 10 MG tablet Take 1 tablet (10 mg) by mouth daily 90 tablet 3     cholecalciferol (VITAMIN D) 1000 UNIT tablet Take 1 tablet by mouth every morning        clopidogrel (PLAVIX) 75 MG tablet Take 1 tablet (75 mg) by mouth daily 90 tablet 3     desonide (DESOWEN) 0.05 % external ointment Apply topically 2 times daily 60 g 3     dorzolamide-timolol (COSOPT) 2-0.5 % ophthalmic solution Place 1 drop into both eyes 2 times daily 1 Bottle 5     fluocinonide (LIDEX) 0.05 % external ointment Apply topically 2 times daily 30 g 3     hydrochlorothiazide (HYDRODIURIL) 25 MG tablet Take 1 tablet (25 mg) by mouth daily 90 tablet 3     latanoprost (XALATAN) 0.005 % ophthalmic solution Place 1 drop into both eyes At Bedtime 2.5 mL 5     levothyroxine (SYNTHROID/LEVOTHROID) 25  MCG tablet Take 1 tablet (25 mcg) by mouth daily 90 tablet 3     ondansetron (ZOFRAN-ODT) 4 MG ODT tab Place 1 tablet (4 mg) under the tongue every 8 hours as needed for nausea 30 tablet 1     ranitidine (ZANTAC) 150 MG tablet Take 1 tablet (150 mg) by mouth 2 times daily as needed for heartburn (Patient not taking: Reported on 2/6/2020) 180 tablet 3        Allergies  No Known Allergies    Review of Systems:  Constitutional: Otherwise feeling well today, in usual state of health.  HEENT: Patient denies nonhealing oral sores.    Physical exam:  Vitals: There were no vitals taken for this visit.  General: In no acute distress, well-developed, well-nourished  Eyes: Conjunctivae clear  Pulmonary: Breathing comfortably in no distress  CV: Well-perfused, no cyanosis  Extremities: No deformity, no edema    Skin:   Dumont I  -Face  -Neck  -Chest  -Abdomen  -Back  -Bilateral upper extremities  -Bilateral lower extremities  -Buttocks      -Waxy stuck-on tan to brown macules and papules on the right postauricular neck, lower neck the the clavicle, diffusely on the back, chest, arms, legs, abdomen  -Pink fissured plaques on the right dorsal fingers    Impression/Plan:    1. Seborrheic keratosis, inflamed; right neck  -Cryotherapy procedure note: After verbal consent and discussion of risks and benefits including but no limited to dyspigmentation/scar, blister, and pain, 3 lesions was treated with 1-2mm freeze border for 2 cycles with liquid nitrogen. Post cryotherapy instructions were provided.     CC Evelina Can MD  [address] [place] [phone] on close of this encounter.    Follow up as needed for new or changing lesions    Faculty: Dr. Morales    Staff Involved:  Resident/Staff    Kiarra Mcdonald MD  Dermatology Resident  [facility]  I was present for the entire procedure. Francisca Morales MD  I, Francisca Morales MD, saw this patient with the resident and agree with the resident s  findings and plan of care as documented in the resident s note.

## 2020-02-06 NOTE — NURSING NOTE
Dermatology Rooming Note    Azeb Torres's goals for this visit include:   Chief Complaint   Patient presents with     Skin Check     Skin check, lesions of concern noted on neck.     Elinor Dnag LPN

## 2020-02-06 NOTE — LETTER
2/6/2020       RE: Azeb Torres  [address]  [place] [address]     Dear Colleague,    Thank you for referring your patient, Azeb Torres, to the [facility] DERMATOLOGY at [facility]. Please see a copy of my visit note below.    [facility] Dermatology Note    Dermatology Problem List:  1. Inflamed seborrheic keratosis; LN2 right neck 2/6/2020  2. Hand Dermatitis  -Previous treatment: triamcinolone 0.1% ointment BID,  desonide 0.05% ointment BID  -Fluocinonide cream PRN  -Gentle skin care    Encounter Date: Feb 6, 2020    CC: brown spots    History of Present Illness:  Ms. Azeb Torres is a 89 year old female presenting for skin check and brown spots  States that brown spot behind the right ear grew rapidly in the past week; is worried about it, but not otherwise painful; sometimes itches; also has similar-appearing irritated spots on the right lower neck near the collar bone  Denies other itching, bleeding, painful, non-healing lesions; denies moles that are changing in size or color; otherwise feeling well    Past Medical, Social, Family History:   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)     PSVT (paroxysmal supraventricular tachycardia) (H)     Cerebral infarction (H)     Homonymous hemianopia     Pain in joint, shoulder region     Pain in joint, lower leg     Hyperlipidemia     Hypothyroidism     Tricuspid regurgitation     Pulmonary artery hypertension (H)     Rosacea     Inflamed seborrheic keratosis     Dermatitis, seborrheic     Pseudophakia of both eyes     Nonexudative senile macular degeneration of retina     Asteroid hyalosis of right eye     Chronic angle-closure glaucoma     Dizziness of unknown cause     Vertigo     Hypothyroidism due to acquired atrophy of thyroid      Past Medical History:   Diagnosis Date     Arthritis      Breast cancer (H)     ER positive     Chronic angle-closure glaucoma      CVA (cerebral vascular accident) (H) 2003    R cerebral artery, embolic after hip replacement     Degenerative joint disease      Endometrial cancer (H)      Hypertension      Left homonymous hemianopsia      Pulmonary artery hypertension (H) 4/24/2013    Noted on echo. Mild-moderate. Moderate TR     Tricuspid regurgitation 4/24/2013     Past Surgical History:   Procedure Laterality Date     C PELVIS/HIP JOINT SURGERY UNLISTED       C STOMACH SURGERY PROCEDURE UNLISTED       CATARACT IOL, RT/LT       glaucoma laser[       HIP SURGERY      s/p bilateral hip replacements     HYSTERECTOMY  1985    low grade endometrial cancer     LUMPECTOMY BREAST Left      RELEASE CARPAL TUNNEL Right 11/14/2017    Procedure: RELEASE CARPAL TUNNEL;  Right Open Carpal Tunnel Release;  Surgeon: Patrick Rivera MD;  Location: [facility] OR       Family History   Problem Relation Age of Onset     Coronary Artery Disease Father      Glaucoma No family hx of      Macular Degeneration No family hx of        Social History  Patient  reports that she has never smoked. She has never used smokeless tobacco. She reports that she does not drink alcohol or use drugs.    Current Outpatient Medications   Medication Sig Dispense Refill     acetaminophen (TYLENOL) 500 MG tablet Take 2 tablets by mouth 2 times daily.       atorvastatin (LIPITOR) 10 MG tablet Take 1 tablet (10 mg) by mouth daily 90 tablet 3     cholecalciferol (VITAMIN D) 1000 UNIT tablet Take 1 tablet by mouth every morning        clopidogrel (PLAVIX) 75 MG tablet Take 1 tablet (75 mg) by mouth daily 90 tablet 3     desonide (DESOWEN) 0.05 % external ointment Apply topically 2 times daily 60 g 3     dorzolamide-timolol (COSOPT) 2-0.5 % ophthalmic solution Place 1 drop into both eyes 2 times daily 1 Bottle 5     fluocinonide (LIDEX) 0.05 % external ointment  Apply topically 2 times daily 30 g 3     hydrochlorothiazide (HYDRODIURIL) 25 MG tablet Take 1 tablet (25 mg) by mouth daily 90 tablet 3     latanoprost (XALATAN) 0.005 % ophthalmic solution Place 1 drop into both eyes At Bedtime 2.5 mL 5     levothyroxine (SYNTHROID/LEVOTHROID) 25 MCG tablet Take 1 tablet (25 mcg) by mouth daily 90 tablet 3     ondansetron (ZOFRAN-ODT) 4 MG ODT tab Place 1 tablet (4 mg) under the tongue every 8 hours as needed for nausea 30 tablet 1     ranitidine (ZANTAC) 150 MG tablet Take 1 tablet (150 mg) by mouth 2 times daily as needed for heartburn (Patient not taking: Reported on 2/6/2020) 180 tablet 3        Allergies  No Known Allergies    Review of Systems:  Constitutional: Otherwise feeling well today, in usual state of health.  HEENT: Patient denies nonhealing oral sores.    Physical exam:  Vitals: There were no vitals taken for this visit.  General: In no acute distress, well-developed, well-nourished  Eyes: Conjunctivae clear  Pulmonary: Breathing comfortably in no distress  CV: Well-perfused, no cyanosis  Extremities: No deformity, no edema    Skin:   Dumont I  -Face  -Neck  -Chest  -Abdomen  -Back  -Bilateral upper extremities  -Bilateral lower extremities  -Buttocks      -Waxy stuck-on tan to brown macules and papules on the right postauricular neck, lower neck the the clavicle, diffusely on the back, chest, arms, legs, abdomen  -Pink fissured plaques on the right dorsal fingers    Impression/Plan:    1. Seborrheic keratosis, inflamed; right neck  -Cryotherapy procedure note: After verbal consent and discussion of risks and benefits including but no limited to dyspigmentation/scar, blister, and pain, 3 lesions was treated with 1-2mm freeze border for 2 cycles with liquid nitrogen. Post cryotherapy instructions were provided.     CC Evelina Can MD  [address] [id number] on close of this encounter.    Follow up as needed for new or changing  lesions    Faculty: Dr. Morales    Staff Involved:  Resident/Staff    Kiarra Mcdonald MD  Dermatology Resident  [facility]  I was present for the entire procedure. Francisca Morales MD  I, Francisca Morales MD, saw this patient with the resident and agree with the resident s findings and plan of care as documented in the resident s note.

## 2020-02-06 NOTE — PATIENT INSTRUCTIONS
Cryotherapy Instructions     For the areas treated with liquid nitrogen (cryotherapy or freezing) today, they are expected to get pink, puffy, and perhaps even blister. The area should then crust up and fall off and the goal is to have nice new skin underneath. There is nothing special that you need to do for these areas. You can wash them as you do normal skin.     Sometimes a blister develops; if a blister does develop do NOT pop it. However, if it breaks open on its own, be sure to wash it with soap and water daily and put plain vasaline or petroleum jelly and a bandaid on it until the skin is healed.     Please call the clinic if you have any questions or concerns.

## 2020-04-14 DIAGNOSIS — L30.9 CHRONIC DERMATITIS OF HANDS: ICD-10-CM

## 2020-04-16 RX ORDER — DESONIDE 0.5 MG/G
OINTMENT TOPICAL
Qty: 60 G | Refills: 3 | Status: SHIPPED | OUTPATIENT
Start: 2020-04-16 | End: 2020-09-24

## 2020-07-14 DIAGNOSIS — H40.2233 CHRONIC ANGLE-CLOSURE GLAUCOMA OF BOTH EYES, SEVERE STAGE: ICD-10-CM

## 2020-07-15 RX ORDER — LATANOPROST 50 UG/ML
1 SOLUTION/ DROPS OPHTHALMIC AT BEDTIME
Qty: 2.5 ML | Refills: 5 | Status: SHIPPED | OUTPATIENT
Start: 2020-07-15

## 2020-07-15 NOTE — TELEPHONE ENCOUNTER
"  latanoprost (XALATAN) 0.005 % ophthalmic solution  Last Written Prescription Date:  10/12/19  Last Fill Quantity: 2.5ml,   # refills: 5  Last Office Visit : 10/31/19  Future Office visit:  None    \" Latanoprost which is a teal top drop at bedtime in both eyes.\"         "

## 2020-08-14 ENCOUNTER — TELEPHONE (OUTPATIENT)
Dept: OPHTHALMOLOGY | Facility: CLINIC | Age: 85
End: 2020-08-14

## 2020-08-14 NOTE — TELEPHONE ENCOUNTER
Reviewed Dr. Prabhakar Garcia would be good provider here with [facility]    Also provided information to Dr. Faria new location per request and reviewed release of information number if needed   Jerome Ramirez RN 3:51 PM 08/14/20          [facility] Call Center    Phone Message    May a detailed message be left on voicemail: yes     Reason for Call: Other: Pt's daughter would li9ke a call back about which Dr to see for her Mother's Glaucoma care    Please call to discuss  Thank You,    Action Taken: Message routed to:  [facility] eye    Travel Screening: Not Applicable

## 2020-09-04 ENCOUNTER — TELEPHONE (OUTPATIENT)
Dept: OPHTHALMOLOGY | Facility: CLINIC | Age: 85
End: 2020-09-04

## 2020-09-04 DIAGNOSIS — H40.2233 CHRONIC ANGLE-CLOSURE GLAUCOMA OF BOTH EYES, SEVERE STAGE: ICD-10-CM

## 2020-09-04 RX ORDER — DORZOLAMIDE HYDROCHLORIDE AND TIMOLOL MALEATE 20; 5 MG/ML; MG/ML
1 SOLUTION/ DROPS OPHTHALMIC 2 TIMES DAILY
Qty: 1 BOTTLE | Refills: 5 | Status: SHIPPED | OUTPATIENT
Start: 2020-09-04

## 2020-09-04 NOTE — TELEPHONE ENCOUNTER
[facility] Call Center    Phone Message    May a detailed message be left on voicemail: yes     Reason for Call: Medication Refill Request    Has the patient contacted the pharmacy for the refill? Yes   Name of medication being requested: dorzolamide-timolol (COSOPT) 2-0.5 % ophthalmic solution  Provider who prescribed the medication: Doege, Kathleen M, RN   Pharmacy: [phone]   Date medication is needed: ASAP       Action Taken: Message routed to:  [facility] EYE    Travel Screening: Not Applicable

## 2020-09-24 ENCOUNTER — OFFICE VISIT (OUTPATIENT)
Dept: INTERNAL MEDICINE | Facility: CLINIC | Age: 85
End: 2020-09-24
Payer: MEDICARE

## 2020-09-24 VITALS
SYSTOLIC BLOOD PRESSURE: 170 MMHG | BODY MASS INDEX: 31.86 KG/M2 | HEIGHT: 62 IN | TEMPERATURE: 97.8 F | DIASTOLIC BLOOD PRESSURE: 92 MMHG | WEIGHT: 173.1 LBS | OXYGEN SATURATION: 96 % | HEART RATE: 58 BPM

## 2020-09-24 DIAGNOSIS — E03.4 HYPOTHYROIDISM DUE TO ACQUIRED ATROPHY OF THYROID: ICD-10-CM

## 2020-09-24 DIAGNOSIS — I47.10 PSVT (PAROXYSMAL SUPRAVENTRICULAR TACHYCARDIA) (H): ICD-10-CM

## 2020-09-24 DIAGNOSIS — I10 ESSENTIAL HYPERTENSION, BENIGN: ICD-10-CM

## 2020-09-24 DIAGNOSIS — Z23 NEED FOR INFLUENZA VACCINATION: ICD-10-CM

## 2020-09-24 DIAGNOSIS — L98.9 SKIN LESION: ICD-10-CM

## 2020-09-24 DIAGNOSIS — I49.9 IRREGULAR HEARTBEAT: ICD-10-CM

## 2020-09-24 DIAGNOSIS — I27.21 PULMONARY ARTERY HYPERTENSION (H): ICD-10-CM

## 2020-09-24 DIAGNOSIS — I44.0 FIRST DEGREE AV BLOCK: ICD-10-CM

## 2020-09-24 DIAGNOSIS — L30.9 CHRONIC DERMATITIS OF HANDS: ICD-10-CM

## 2020-09-24 DIAGNOSIS — Z00.00 ENCOUNTER FOR MEDICARE ANNUAL WELLNESS EXAM: Primary | ICD-10-CM

## 2020-09-24 DIAGNOSIS — E83.52 HYPERCALCEMIA: ICD-10-CM

## 2020-09-24 LAB
ANION GAP SERPL CALCULATED.3IONS-SCNC: 7 MMOL/L (ref 3–14)
BUN SERPL-MCNC: 21 MG/DL (ref 7–30)
CALCIUM SERPL-MCNC: 10.3 MG/DL (ref 8.5–10.1)
CHLORIDE SERPL-SCNC: 103 MMOL/L (ref 94–109)
CO2 SERPL-SCNC: 28 MMOL/L (ref 20–32)
CREAT SERPL-MCNC: 0.88 MG/DL (ref 0.52–1.04)
GFR SERPL CREATININE-BSD FRML MDRD: 56 ML/MIN/{1.73_M2}
GLUCOSE SERPL-MCNC: 101 MG/DL (ref 70–99)
INTERPRETATION ECG - MUSE: NORMAL
POTASSIUM SERPL-SCNC: 3.8 MMOL/L (ref 3.4–5.3)
SODIUM SERPL-SCNC: 139 MMOL/L (ref 133–144)
TSH SERPL DL<=0.005 MIU/L-ACNC: 2.97 MU/L (ref 0.4–4)

## 2020-09-24 RX ORDER — DESONIDE 0.5 MG/G
OINTMENT TOPICAL
Qty: 60 G | Refills: 3 | Status: SHIPPED | OUTPATIENT
Start: 2020-09-24 | End: 2021-03-22

## 2020-09-24 RX ORDER — FLUOCINONIDE 0.5 MG/G
OINTMENT TOPICAL 2 TIMES DAILY
Qty: 30 G | Refills: 3 | Status: SHIPPED | OUTPATIENT
Start: 2020-09-24 | End: 2021-03-22

## 2020-09-24 ASSESSMENT — PAIN SCALES - GENERAL: PAINLEVEL: NO PAIN (0)

## 2020-09-24 ASSESSMENT — MIFFLIN-ST. JEOR: SCORE: 1139.81

## 2020-09-24 NOTE — NURSING NOTE
Chief Complaint   Patient presents with     Physical     pt here for physical       Livia Douglas CMA at 10:02 AM on 9/24/2020.

## 2020-09-24 NOTE — PROGRESS NOTES
Medicare Annual Wellness Visit    This 89 year old year old female presents for an subsequent Medicare Wellness Exam.         HPI       No recent concerns.  Lives in home with daughter Lake and granddaughter Yris  sometimes visits, also daughter Guero from [place].  Goes up/down stairs with cane, uses walker in home. No falls, no recent illnesses, no mental health concerns.  BP is high today, feels she is aggravated from waiting with a mask on.  Has a skin lesion on buttocks that's irritating her. Hand dermatitis is improved.  Denies CP, dyspnea, abd pain, stool changes, fevers, anorexia major changes in hearing/vision.        Past Medical History:   Diagnosis Date     Arthritis      Breast cancer (H)     ER positive     Chronic angle-closure glaucoma      CVA (cerebral vascular accident) (H) 2003    R cerebral artery, embolic after hip replacement     Degenerative joint disease      Endometrial cancer (H)      Hypertension      Left homonymous hemianopsia      Pulmonary artery hypertension (H) 4/24/2013    Noted on echo. Mild-moderate. Moderate TR     Tricuspid regurgitation 4/24/2013       Past Surgical History:   Procedure Laterality Date     C PELVIS/HIP JOINT SURGERY UNLISTED       C STOMACH SURGERY PROCEDURE UNLISTED       CATARACT IOL, RT/LT       glaucoma laser[       HIP SURGERY      s/p bilateral hip replacements     HYSTERECTOMY  1985    low grade endometrial cancer     LUMPECTOMY BREAST Left      RELEASE CARPAL TUNNEL Right 11/14/2017    Procedure: RELEASE CARPAL TUNNEL;  Right Open Carpal Tunnel Release;  Surgeon: Patrick Rivera MD;  Location:  OR       Family History   Problem Relation Age of Onset     Coronary Artery Disease Father      Glaucoma No family hx of      Macular Degeneration No family hx of        Social History     Tobacco Use     Smoking status: Never Smoker     Smokeless tobacco: Never Used   Substance Use Topics     Alcohol use: No     Comment: 0     Drug use: No             Medical Care     Have you been to an ER or a hospital in the last year? No  What other specialists or organizations are involved in your medical care?    Current providers sharing in care for this patient include:  Patient Care Team       Relationship Specialty Notifications Start End    Evelina Can MD PCP - General Internal Medicine  11/28/17     Phone: [phone] Fax: [phone]         [address] [address]    Amina Ramirez, Kylie Audiologist Audiology  10/6/15     Phone: [phone] Fax: [phone]         [address] [place] [address]    Dipti Zhang MD MD Orthopedics  10/12/17     Phone: [phone] Fax: [phone]         [facility] [address] [address]    Evelina Bacon, OT Occupational Therapist Occupational Therapy  1/29/18     Phone: [phone]          [facility]  [address] [address]    Alejandrina Faria MD MD Ophthalmology  6/22/18     Phone: [phone] Fax: [phone]         [address] [facility] [address] [facility] [address]    Francisca Morales MD MD Dermatology  10/9/19     Phone: [phone] Fax: [phone]         [address] [facility] [address] [facility] [address]    Martín Hsieh OD MD Optometry  11/7/19     Phone: [phone] Fax: [phone]         [address] [place] [address]    Alejandrina Faria MD Referring Physician Ophthalmology  11/7/19     refer to Dr. Hsieh    Phone: [phone] Fax: [phone]         [address] [facility] [address] [facility] [address]    Evelina Can MD Assigned PCP   6/21/20     Phone: [phone] Fax: [phone]         [address] [address]                 Social History     Marital Status:  Who lives in your household? self daughter  Does your home have any of the following safety concerns? Loose rugs in the hallway, no grab bars in the bathroom, no handrails on the stairs or have poorly lit  areas?  No  Do you feel threatened or controlled by a partner, ex-partner or anyone in your life? No  Has anyone hurt you physically, for example by pushing, hitting, slapping or kicking you   or forcing you to have sex? No  Do you need help with the phone, transportation, shopping, preparing meals, housework, laundry, medications or managing money?    Daughters provide care  Have you noticed any hearing difficulties? Has hearing aids      Risk Behaviors and Healthy Habits     How many servings of fruits and vegetables do you eat a day? 2  How often do you exercise and what do you do?  minutes  a week  Do you frequently ride without a seatbelt? No  Do you use tobacco?  No  Do you use any other drugs? No       Do you use alcohol?No  Yes  Number of drinks per day   Number of drinking days a week   PHQ-2 Score:  0  PHQ-2 Score: 0    PHQ-2 ( 1999 Pfizer) 9/24/2020 4/3/2019   Q1: Little interest or pleasure in doing things 0 0   Q2: Feeling down, depressed or hopeless 0 0   PHQ-2 Score 0 0         Sexual Health     Are you sexually active?  No   If yes, with men, women, or both?   If yes, do you more than one current partner?  If yes, are you using condoms?    Have you had any sexually transmitted infections?    Any sexual concerns?      FOR WOMEN ONLY  What year did you stop having periods? 45 years old  Any vaginal bleeding in the last year?   Have you ever had an abnormal Pap smear?     FUNCTIONAL ABILITY/SAFETY SCREENING   **RS to complete Fall Risk, PHQ2 in Assessments prior**    Fall Risk Assessment Today:   Fall Risk Screening:  FALL RISK ASSESSMENT 9/24/2020   Fallen 2 or more times in the past year? No   Any fall with injury in the past year? No       Hearing evaluation if done: Yes wears hearing aids    Visual acuity : Wears glasses: worn for testing   Visual Acuity (Snellen - Linear)      Right  Left    Dist cc  20/70 slow  20/60 slow    Dist ph cc  20/60 +1  20/50          SCREENING FOR PREVENTION and EARLY  DETECTION     Advanced Directives: Discussed and patient desires to .      **RS to STOP HERE**    Reviewed Immunization Record Today    Most Recent Immunizations   Administered Date(s) Administered     Influenza (H1N1) 01/22/2010     Influenza (High Dose) 3 valent vaccine 10/09/2019     Influenza (IIV3) PF 10/15/2014     Influenza Vaccine IM > 6 months Valent IIV4 11/06/2013     Pneumo Conj 13-V (2010&after) 06/03/2015     Pneumococcal 23 valent 10/15/2014     TD (ADULT, 7+) 11/17/2010     Zoster vaccine recombinant adjuvanted (SHINGRIX) 03/07/2019       Reviewed Health Maintenance Completed and Due    Health Maintenance   Topic Date Due     ADVANCE CARE PLANNING  04/03/1925     PHQ-2  01/01/2020     FALL RISK ASSESSMENT  03/07/2020     INFLUENZA VACCINE (1) 09/01/2020     MEDICARE ANNUAL WELLNESS VISIT  10/09/2020     DTAP/TDAP/TD IMMUNIZATION (2 - Td) 11/17/2020     PNEUMOCOCCAL IMMUNIZATION 65+ LOW/MEDIUM RISK  Completed     ZOSTER IMMUNIZATION  Completed     IPV IMMUNIZATION  Aged Out     MENINGITIS IMMUNIZATION  Aged Out     HEPATITIS B IMMUNIZATION  Aged Out       Health Maintenance Due   Topic Date Due     ADVANCE CARE PLANNING  04/03/1925     PHQ-2  01/01/2020     FALL RISK ASSESSMENT  03/07/2020     INFLUENZA VACCINE (1) 09/01/2020     MEDICARE ANNUAL WELLNESS VISIT  10/09/2020         CV Risk based on Pooled Cohort Risk:  The ASCVD Risk score (Pradip HODGES Jr., et al., 2013) failed to calculate for the following reasons:    The 2013 ASCVD risk score is only valid for ages 40 to 79    The patient has a prior MI or stroke diagnosis    --------------------------------------------------------------------------------------------------------------------------  --------------------------------------------------------------------------------------------------------------------------         Review of Systems     10 POINT review of systems performed and is negative unless specified above in HPI.    EVALUATION OF  COGNITIVE FUNCTION     Mood/affect:Normal  Appearance:Normal  Family member/caregiver input: Normal  Mini Cog Scoring   0 points   Clock Draw Test result:  Abnormal        A full 10-pt Review of Systems was performed, verified and is negative except as documented in the HPI.  All health questionnaires were reviewed, verified and relevant information documented above.    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 Mild-moderate. Moderate TR     Tricuspid regurgitation 4/24/2013       Past Surgical History:  Past Surgical History:   Procedure Laterality Date     C PELVIS/HIP JOINT SURGERY UNLISTED       C STOMACH SURGERY PROCEDURE UNLISTED       CATARACT IOL, RT/LT       glaucoma laser[       HIP SURGERY      s/p bilateral hip replacements     HYSTERECTOMY  1985    low grade endometrial cancer     LUMPECTOMY BREAST Left      RELEASE CARPAL TUNNEL Right 11/14/2017    Procedure: RELEASE CARPAL TUNNEL;  Right Open Carpal Tunnel Release;  Surgeon: Patrick Rivera MD;  Location: [facility] OR       Active Meds:  Current Outpatient Medications   Medication     acetaminophen (TYLENOL) 500 MG tablet     atorvastatin (LIPITOR) 10 MG tablet     cholecalciferol (VITAMIN D) 1000 UNIT tablet     clopidogrel (PLAVIX) 75 MG tablet     desonide (DESOWEN) 0.05 % external ointment     dorzolamide-timolol (COSOPT) 2-0.5 % ophthalmic solution     fluocinonide (LIDEX) 0.05 % external ointment     hydrochlorothiazide (HYDRODIURIL) 25 MG tablet     latanoprost (XALATAN) 0.005 % ophthalmic solution     levothyroxine (SYNTHROID/LEVOTHROID) 25 MCG tablet     ondansetron  "(ZOFRAN-ODT) 4 MG ODT tab     No current facility-administered medications for this visit.         Allergies:  Patient has no known allergies.    Family History:  family history includes Coronary Artery Disease in her father.    Social History:  Social History     Tobacco Use     Smoking status: Never Smoker     Smokeless tobacco: Never Used   Substance Use Topics     Alcohol use: No     Comment: 0     Drug use: No       Physical Exam:  Vitals: BP (!) 170/92 (BP Location: Right arm, Patient Position: Sitting, Cuff Size: Adult Regular)   Pulse 58   Temp 97.8  F (36.6  C) (Oral)   Ht 1.585 m (5' 2.4\")   Wt 78.5 kg (173 lb 1.6 oz)   SpO2 96%   BMI 31.25 kg/m    Constitutional: Alert, oriented, pleasant, no acute distress  Head: Normocephalic, atraumatic  Eyes: Extra-ocular movements intact, pupils equally round and reactive bilaterally, no scleral icterus  ENT: Oropharynx clear, moist mucus membranes  Neck: Supple, no lymphadenopathy  Cardiovascular: Regular rate and rhythm, 2/6 BONILLA, no rubs or gallops, peripheral pulses full/symmetric  Respiratory: Good air movement bilaterally, lungs clear, no wheezes/rales/rhonchi  GI: Abdomen soft, bowel sounds present, nondistended, nontender, no organomegaly or masses, no rebound/guarding  Musculoskeletal: No edema, ambulates with walker  Neurologic: Alert and oriented, cranial nerves 2-12 intact  Skin: No rashes/lesions, raised fleshy, verrucous appearing lesion R gluteal fold  Psychiatric: normal mentation, affect and mood          Assessment and Plan:    Azeb was seen today for physical.    Diagnoses and all orders for this visit:    Encounter for Medicare annual wellness exam    Skin lesion  Cryotherapy performed on above noted lesion located on the R buttocks after informed consent obtained.  Three freeze-thaw cycles performed.  Patient tolerated the procedure well without complications.  Routine aftercare instructions given.    Chronic dermatitis of hands  -     " desonide (DESOWEN) 0.05 % external ointment; APPLY TOPICALLY TWO TIMES A DAY  -     fluocinonide (LIDEX) 0.05 % external ointment; Apply topically 2 times daily    Need for influenza vaccination  -     FLU VACCINE HIGH DOSE PRESERVATIVE FREE, AGE =>65 YR    Irregular heartbeat  PSVT (paroxysmal supraventricular tachycardia) (H)  History of pulmonary HTN, TR, no change in cardiopulmonary status. However, ectopy noted on exam with AV block on previous ekg. Will reassess. --> EKG today with PACs, no AV block  -     EKG Performed in Clinic w/ Provider Reading Fee    Essential hypertension, benign  Very elevated today, perhaps situational. Advised home monitoring and letting me know if above goal.  -     Basic metabolic panel; Future    Hypothyroidism due to acquired atrophy of thyroid  -     TSH with free T4 reflex; Future              Return to clinic: 6 months    Evelina Can MD  Internal Medicine

## 2020-09-24 NOTE — PATIENT INSTRUCTIONS
[facility] Medication Refill Request Information:  * Please contact your pharmacy regarding ANY request for medication refills.  ** [facility] Prescription Fax = [phone]  * Please allow 3 business days for routine medication refills.  * Please allow 5 business days for controlled substance medication refills.     [facility] Test Result notification information:  *You will be notified with in 7-10 days of your appointment day regarding the results of your test.  If you are on MyChart you will be notified as soon as the provider has reviewed the results and signed off on them.    [facility] Center: [phone]       Check blood pressure at home 2-3 times when relaxed and send Dr. Can the numbers.  Goal is less than 140/90.  Patient Education   Personalized Prevention Plan  You are due for the preventive services outlined below.  Your care team is available to assist you in scheduling these services.  If you have already completed any of these items, please share that information with your care team to update in your medical record.  Health Maintenance Due   Topic Date Due     Discuss Advance Care Planning  04/03/1925     PHQ-2  01/01/2020     FALL RISK ASSESSMENT  03/07/2020     Flu Vaccine (1) 09/01/2020     Annual Wellness Visit  10/09/2020     Preventive Health Recommendations    See your health care provider every year to    Review health changes.     Discuss preventive care.      Review your medicines if your doctor has prescribed any.    You no longer need a yearly Pap test unless you've had an abnormal Pap test in the past 10 years. If you have vaginal symptoms, such as bleeding or discharge, be sure to talk with your provider about a Pap test.    Every 1 to 2 years, have a mammogram.  If you are over 69, talk with your health care provider about whether or not you want to continue having screening mammograms.    Every 10 years, have a colonoscopy. Or, have a yearly FIT test (stool test).  These exams will check for colon cancer.     Have a cholesterol test every 5 years, or more often if your doctor advises it.     Have a diabetes test (fasting glucose) every three years. If you are at risk for diabetes, you should have this test more often.     At age 65, have a bone density scan (DEXA) to check for osteoporosis (brittle bone disease).    Shots:    Get a flu shot each year.    Get a tetanus shot every 10 years.    Talk to your doctor about your pneumonia vaccines. There are now two you should receive - Pneumovax (PPSV 23) and Prevnar (PCV 13).    Talk to your pharmacist about the shingles vaccine.    Talk to your doctor about the hepatitis B vaccine.    Nutrition:     Eat at least 5 servings of fruits and vegetables each day.    Eat whole-grain bread, whole-wheat pasta and brown rice instead of white grains and rice.    Get adequate Calcium and Vitamin D.     Lifestyle    Exercise at least 150 minutes a week (30 minutes a day, 5 days a week). This will help you control your weight and prevent disease.    Limit alcohol to one drink per day.    No smoking.     Wear sunscreen to prevent skin cancer.     See your dentist twice a year for an exam and cleaning.    See your eye doctor every 1 to 2 years to screen for conditions such as glaucoma, macular degeneration and cataracts.    Personalized Prevention Plan  You are due for the preventive services outlined below.  Your care team is available to assist you in scheduling these services.  If you have already completed any of these items, please share that information with your care team to update in your medical record.  Health Maintenance   Topic Date Due     ADVANCE CARE PLANNING  04/03/1925     PHQ-2  01/01/2020     FALL RISK ASSESSMENT  03/07/2020     INFLUENZA VACCINE (1) 09/01/2020     MEDICARE ANNUAL WELLNESS VISIT  10/09/2020     DTAP/TDAP/TD IMMUNIZATION (2 - Td) 11/17/2020     PNEUMOCOCCAL IMMUNIZATION 65+ LOW/MEDIUM RISK  Completed     ZOSTER  IMMUNIZATION  Completed     IPV IMMUNIZATION  Aged Out     MENINGITIS IMMUNIZATION  Aged Out     HEPATITIS B IMMUNIZATION  Aged Out

## 2020-10-08 DIAGNOSIS — I10 ESSENTIAL HYPERTENSION, BENIGN: ICD-10-CM

## 2020-10-08 DIAGNOSIS — E78.5 HYPERLIPIDEMIA, UNSPECIFIED HYPERLIPIDEMIA TYPE: ICD-10-CM

## 2020-10-13 RX ORDER — ATORVASTATIN CALCIUM 10 MG/1
10 TABLET, FILM COATED ORAL DAILY
Qty: 90 TABLET | Refills: 0 | Status: SHIPPED | OUTPATIENT
Start: 2020-10-13 | End: 2021-02-17

## 2020-10-13 RX ORDER — HYDROCHLOROTHIAZIDE 25 MG/1
25 TABLET ORAL DAILY
Qty: 90 TABLET | Refills: 0 | Status: SHIPPED | OUTPATIENT
Start: 2020-10-13 | End: 2021-01-18

## 2020-10-13 NOTE — TELEPHONE ENCOUNTER
HYDROCHLOROTHIAZIDE 25MG TABS      Last Written Prescription Date: 10/9/19  Last Fill Quantity: 90, # refills: 3    Last Office Visit :  9/24/20   Next Office Visit: none scheduled    BP Readings from Last 3 Encounters:   09/24/20 (!) 170/92   10/09/19 124/82   04/03/19 154/79     BP addressed at visit:  Essential hypertension, benign  Very elevated today, perhaps situational. Advised home monitoring and letting me know if above goal.  -     Basic metabolic panel; Future  Home readings?    ATORVASTATIN CALCIUM 10MG TABS  Overdue for LDL, last one  Lab Test 05/26/17  1027   LDL 48     Nothing more recent in care everywhere nor media  Future orders in que.  90 day refills provided per protocol of each med, routed to clinic for follow up

## 2021-01-13 DIAGNOSIS — E78.5 HYPERLIPIDEMIA, UNSPECIFIED HYPERLIPIDEMIA TYPE: ICD-10-CM

## 2021-01-13 DIAGNOSIS — I10 ESSENTIAL HYPERTENSION, BENIGN: ICD-10-CM

## 2021-01-18 RX ORDER — HYDROCHLOROTHIAZIDE 25 MG/1
25 TABLET ORAL DAILY
Qty: 90 TABLET | Refills: 0 | Status: SHIPPED | OUTPATIENT
Start: 2021-01-18 | End: 2021-04-15

## 2021-01-18 NOTE — TELEPHONE ENCOUNTER
Last Clinic Visit: 9/24/2020  [facility]    HCTZ 25 mg: BP above protocol parameters - refill sent for 90 days supply and FYI to provider.    BP Readings from Last 1 Encounters:   09/24/20 (!) 170/92

## 2021-02-04 ENCOUNTER — IMMUNIZATION (OUTPATIENT)
Dept: NURSING | Facility: CLINIC | Age: 86
End: 2021-02-04
Payer: MEDICARE

## 2021-02-04 PROCEDURE — 0001A PR COVID VAC PFIZER DIL RECON 30 MCG/0.3 ML IM: CPT

## 2021-02-04 PROCEDURE — 91300 PR COVID VAC PFIZER DIL RECON 30 MCG/0.3 ML IM: CPT

## 2021-02-15 DIAGNOSIS — E78.5 HYPERLIPIDEMIA, UNSPECIFIED HYPERLIPIDEMIA TYPE: ICD-10-CM

## 2021-02-17 RX ORDER — ATORVASTATIN CALCIUM 10 MG/1
10 TABLET, FILM COATED ORAL DAILY
Qty: 90 TABLET | Refills: 0 | Status: SHIPPED | OUTPATIENT
Start: 2021-02-17 | End: 2021-05-19

## 2021-02-18 NOTE — TELEPHONE ENCOUNTER
Last Clinic Visit: 9/24/2020  [facility]    Overdue for lipid panel LDL...  90 day sanjuana refill sent to the pharmacy - including instructions for patient to have lipid panel done   Message sent to [facility] to schedule/order labs

## 2021-02-22 DIAGNOSIS — E78.5 HYPERLIPIDEMIA, UNSPECIFIED HYPERLIPIDEMIA TYPE: ICD-10-CM

## 2021-02-22 DIAGNOSIS — Z86.73 HISTORY OF CVA (CEREBROVASCULAR ACCIDENT): ICD-10-CM

## 2021-02-24 RX ORDER — CLOPIDOGREL BISULFATE 75 MG/1
75 TABLET ORAL DAILY
Qty: 90 TABLET | Refills: 0 | Status: SHIPPED | OUTPATIENT
Start: 2021-02-24 | End: 2021-05-28

## 2021-02-24 RX ORDER — ATORVASTATIN CALCIUM 10 MG/1
TABLET, FILM COATED ORAL
Qty: 90 TABLET | Refills: 0 | OUTPATIENT
Start: 2021-02-24

## 2021-02-24 RX ORDER — CLOPIDOGREL BISULFATE 75 MG/1
TABLET ORAL
Qty: 90 TABLET | Refills: 0 | OUTPATIENT
Start: 2021-02-24

## 2021-02-24 NOTE — TELEPHONE ENCOUNTER
Note     Last Clinic Visit: 9/24/2020  [facility]     Overdue for HGB,PLT...  90 day sanjuana refill sent to the pharmacy - including instructions for patient to have labs done   Message sent to [facility] to schedule/order labs

## 2021-02-25 ENCOUNTER — IMMUNIZATION (OUTPATIENT)
Dept: NURSING | Facility: CLINIC | Age: 86
End: 2021-02-25
Attending: FAMILY MEDICINE
Payer: MEDICARE

## 2021-02-25 PROCEDURE — 0002A PR COVID VAC PFIZER DIL RECON 30 MCG/0.3 ML IM: CPT

## 2021-02-25 PROCEDURE — 91300 PR COVID VAC PFIZER DIL RECON 30 MCG/0.3 ML IM: CPT

## 2021-03-12 ENCOUNTER — TELEPHONE (OUTPATIENT)
Dept: AUDIOLOGY | Facility: CLINIC | Age: 86
End: 2021-03-12

## 2021-03-12 NOTE — TELEPHONE ENCOUNTER
Walk-in hearing aid services on 3/12/21: Patient's daughter asked to have the hearing aids cleaned and checked.  Hearing aids and earmolds were cleaned and the  filter was replaced on the right side.  The left side filter could not be inserted due to the depth the  sits in the earmold.  The right hearing aid was found to be working normally but the left device was found to have significant internal feedback.  The patient's daughter was quoted $350.00 for repair charges, if they wanted to proceed.  She is deferring repairs at this time and would prefer to have her mom come in to be evaluated for new hearing aids.  She will schedule her mom for a hearing aid evaluation soon.

## 2021-03-18 ENCOUNTER — OFFICE VISIT (OUTPATIENT)
Dept: INTERNAL MEDICINE | Facility: CLINIC | Age: 86
End: 2021-03-18
Payer: MEDICARE

## 2021-03-18 VITALS
BODY MASS INDEX: 31.63 KG/M2 | DIASTOLIC BLOOD PRESSURE: 95 MMHG | HEART RATE: 89 BPM | SYSTOLIC BLOOD PRESSURE: 161 MMHG | OXYGEN SATURATION: 98 % | WEIGHT: 175.2 LBS

## 2021-03-18 DIAGNOSIS — Z23 NEED FOR TDAP VACCINATION: ICD-10-CM

## 2021-03-18 DIAGNOSIS — I10 ESSENTIAL HYPERTENSION, BENIGN: ICD-10-CM

## 2021-03-18 DIAGNOSIS — L98.9 SKIN LESION: Primary | ICD-10-CM

## 2021-03-18 PROCEDURE — 99213 OFFICE O/P EST LOW 20 MIN: CPT | Mod: 25 | Performed by: INTERNAL MEDICINE

## 2021-03-18 PROCEDURE — 90471 IMMUNIZATION ADMIN: CPT | Performed by: INTERNAL MEDICINE

## 2021-03-18 PROCEDURE — 90714 TD VACC NO PRESV 7 YRS+ IM: CPT | Performed by: INTERNAL MEDICINE

## 2021-03-18 NOTE — NURSING NOTE
Chief Complaint   Patient presents with     RECHECK     left low back skin issue, resolved previously 6 months ago       Nano Elaine, EMT at 9:30 AM on 3/18/2021

## 2021-03-18 NOTE — PROGRESS NOTES
History of Present Illness:  Ms. Torres is a 89 year old female who presents for  Chief Complaint   Patient presents with     RECHECK     left low back skin issue, resolved previously 6 months ago     Lives in home with daughter Lake and granddaughter Yris  sometimes visits, also daughter Guero from [place]    Skin lesion present on gluteal fold. Patient states it's intermittent, no known aggrevating/alleviating factors. LN applied previously which helped. She believe it may have been weeping recently.  It's tender.    She has been housebound, doing okay emotionally, got Covid vaccine x 2  No falls, no aches/pain, energy good, appetite good, no GI/ symptoms, no cardiopulmonary symptoms    BP checking at home 132/70  Ambulatory monitoring in 2018 ave was 130/70      Review of external notes as documented above                   A detailed Review of Systems was performed, verified and is negative except as documented in the HPI.  All health questionnaires were reviewed, verified and relevant information documented above.      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 Mild-moderate. Moderate TR     Tricuspid regurgitation 4/24/2013       Active Meds:  Current Outpatient Medications   Medication     acetaminophen (TYLENOL) 500 MG tablet     atorvastatin (LIPITOR) 10 MG tablet     cholecalciferol (VITAMIN D) 1000 UNIT tablet     clopidogrel (PLAVIX) 75 MG tablet     desonide (DESOWEN) 0.05 % external ointment     dorzolamide-timolol (COSOPT) 2-0.5 % ophthalmic solution     fluocinonide (LIDEX) 0.05 % external ointment     hydrochlorothiazide  (HYDRODIURIL) 25 MG tablet     latanoprost (XALATAN) 0.005 % ophthalmic solution     levothyroxine (SYNTHROID/LEVOTHROID) 25 MCG tablet     ondansetron (ZOFRAN-ODT) 4 MG ODT tab     No current facility-administered medications for this visit.         Allergies:  Reviewed, refer to EMR    Relevant Social History:  Social History     Tobacco Use     Smoking status: Never Smoker     Smokeless tobacco: Never Used   Substance Use Topics     Alcohol use: No     Comment: 0     Drug use: No        Physical Exam:  Vitals: BP (!) 161/95 (BP Location: Right arm, Patient Position: Sitting, Cuff Size: Adult Regular)   Pulse 89   Wt 79.5 kg (175 lb 3.2 oz)   SpO2 98%   Breastfeeding No   BMI 31.63 kg/m    Constitutional: Alert, oriented, pleasant, no acute distress  Head: Normocephalic, atraumatic  Eyes: Extra-ocular movements intact, pupils equally round bilaterally, no scleral icterus  ENT: Wearing mask  Skin: Small ~1cm raised lesion on L gluteal skin, excoriated, wet, erythematous with slightly raised borders  Psychiatric: normal mentation, affect and mood      Diagnostics:  Labs reviewed in Epic          Assessment and Plan:  Azeb was seen today for recheck.    Diagnoses and all orders for this visit:    Skin lesion  Last visit appeared to be an inflamed SK, LN applied with good effect, however patient states more recently it recurred and seems to be weeping.  I advised her to avoid scratching, keep covered and we can arrange for f/u cryotherapy versus biopsy next week depending on appearance.    Need for Tdap vaccination  -     TD PRESERVATIVE FREE, AGE >=7 YR    Essential hypertension, benign  Elevated in the office but controlled at home. Given history of dizziness, will permit systolic in 130s.          Eevlina Can MD  Internal Medicine      >20 minutes spent today performing chart review, history and exam, documentation and further activities as noted above.

## 2021-03-19 ENCOUNTER — TELEPHONE (OUTPATIENT)
Dept: DERMATOLOGY | Facility: CLINIC | Age: 86
End: 2021-03-19

## 2021-03-19 NOTE — TELEPHONE ENCOUNTER
Health Call Center    Phone Message    May a detailed message be left on voicemail: yes     Reason for Call: Appointment Intake    Referring Provider Name:   Dr Molina would like Pt seen by Derm in next week. This was a verbal comment in Pt's appt on 3/19/21.    Diagnosis and/or Symptoms:   Skin lesion present on gluteal fold- lower back    Action Taken: Message routed to:  [facility]    Travel Screening: Not Applicable     1st available appt is 4/23/21 and Pt would like to be seen sooner as Dr Molina suggested 1 week.   Pt would like to see Dr Morales.  Please call Pt's daughter Jessa Wilson to schedule.

## 2021-03-22 ENCOUNTER — OFFICE VISIT (OUTPATIENT)
Dept: DERMATOLOGY | Facility: CLINIC | Age: 86
End: 2021-03-22
Payer: MEDICARE

## 2021-03-22 ENCOUNTER — TELEPHONE (OUTPATIENT)
Dept: DERMATOLOGY | Facility: CLINIC | Age: 86
End: 2021-03-22

## 2021-03-22 DIAGNOSIS — L30.9 CHRONIC DERMATITIS OF HANDS: ICD-10-CM

## 2021-03-22 DIAGNOSIS — D48.5 NEOPLASM OF UNCERTAIN BEHAVIOR OF SKIN: Primary | ICD-10-CM

## 2021-03-22 PROCEDURE — 11102 TANGNTL BX SKIN SINGLE LES: CPT | Mod: GC | Performed by: DERMATOLOGY

## 2021-03-22 PROCEDURE — 88305 TISSUE EXAM BY PATHOLOGIST: CPT | Performed by: PATHOLOGY

## 2021-03-22 PROCEDURE — 99213 OFFICE O/P EST LOW 20 MIN: CPT | Mod: 25 | Performed by: DERMATOLOGY

## 2021-03-22 RX ORDER — DESONIDE 0.5 MG/G
OINTMENT TOPICAL
Qty: 60 G | Refills: 3 | Status: SHIPPED | OUTPATIENT
Start: 2021-03-22

## 2021-03-22 RX ORDER — FLUOCINONIDE 0.5 MG/G
OINTMENT TOPICAL 2 TIMES DAILY
Qty: 30 G | Refills: 3 | Status: SHIPPED | OUTPATIENT
Start: 2021-03-22

## 2021-03-22 ASSESSMENT — PAIN SCALES - GENERAL: PAINLEVEL: NO PAIN (0)

## 2021-03-22 NOTE — NURSING NOTE
Chief Complaint   Patient presents with     Derm Problem     Azeb, is here for skin lesions, pt states she has a new onre near her upper thigh.     Dmitry Jarquin EMT

## 2021-03-22 NOTE — TELEPHONE ENCOUNTER
PA Initiation    Medication: Desonide 0.05% Ointment -   Insurance Company: Medicare Blue - Phone [phone] Fax [phone]  Pharmacy Filling the Rx: [place], [place] - [address] [place] [address] [id number]  Filling Pharmacy Phone: [phone]  Filling Pharmacy Fax: [phone]  Start Date: 3/22/2021

## 2021-03-22 NOTE — PATIENT INSTRUCTIONS

## 2021-03-22 NOTE — PROGRESS NOTES
[facility] Dermatology Note  Encounter Date: Mar 22, 2021  Office Visit     Dermatology Problem List:  0. NUB, R medial buttock, s/p shave bx 3/22/21  1. Inflamed seborrheic keratosis  - LN2 right neck 2/6/2020  2. Hand Dermatitis  -Previous treatment: triamcinolone 0.1% ointment BID, desonide 0.05% ointment BID  -Fluocinonide cream PRN  -Gentle skin care  ____________________________________________    Assessment & Plan:     # NUB, R medial buttock.  Eroded papule with peripheral rim of hyperkeratotic scale.  DDX: iSK (previously treated with LN2 by Dr. Can) vs. SCC  - Shave biopsy performed today (see procedure note(s) below).     # Hand dermatitis, well-controlled with topical regimen  - Continue to apply fluocinonide 0.05% ointment  BID PRN for flares  - Continue gentle skin care and daily application of moisturizer    # Seborrheic keratoses, diffusely scattered across chest, back and abdomen  - Benign etiology discussed with patient. No further treatment necessary    # Multiple benign melanocytic nevi   - Sun protection: Counseled SPF30+ sunscreen, UPF clothing, sun avoidance, tanning bed avoidance.     Procedures Performed:   - Shave biopsy procedure note, location(s): R medial buttock. After discussion of benefits and risks including but not limited to bleeding, infection, scar, incomplete removal, recurrence, and non-diagnostic biopsy, written consent and photographs were obtained. The area was cleaned with isopropyl alcohol. 0.5mL of 1% lidocaine with epinephrine was injected to obtain adequate anesthesia of lesion(s). Shave biopsy at site(s) performed. Hemostasis was achieved with aluminium chloride. Petrolatum ointment and a sterile dressing were applied. The patient tolerated the procedure and no complications were noted. The patient was provided with verbal and written post care instructions.     Follow-up: pending path results    Staff and Resident:     Juanita Duggan MD  PGY-3  Dermatology Resident   I was present for key portions of the procedure. Francisca PURI I, Francisca Morales MD, saw this patient with the resident and agree with the resident s findings and plan of care as documented in the resident s note.    ____________________________________________    CC: Derm Problem (Azeb, is here for skin lesions, pt states she has a new onre near her upper thigh. )    HPI:  Ms. Azeb Torres is a(n) 89 year old female who presents today as a return patient for a skin check and evaluation of a new lesion of concern on her buttock.  She was last seen in clinic on 2/6/20 at which time an inflamed SK on her R neck was treated with cryotherapy.  Since her last visit, she states that for the past 2 weeks she has developed a lesion of concern on her buttock.  In the past, she had the lesion treated with LN by Dr. Can (9/24/20).  The lesion is not painful, but is more bothersome for her. On Friday, she put a blister bandaid on the lesion, which fell off last night. Aside from this, she denies any additional new, non-healing or tender lesions of concern that she would like to address today.      Labs Reviewed:  None    Physical Exam:  Vitals: There were no vitals taken for this visit.  SKIN: Full skin, which includes the head/face, both arms, chest, back, abdomen,both legs, genitalia and/or groin buttocks, digits and/or nails, was examined.  - On the R medial buttock, there is a pink, eroded papule with overlying hyperkeratotic crust present around the periphery.  - Multiple regular brown pigmented macules and papules are identified on the back and b/l lower extremities.   There are waxy stuck on tan to brown papules on the chest, back and abdomen.   - No other lesions of concern on areas examined.             Medications:  Current Outpatient Medications   Medication     acetaminophen (TYLENOL) 500 MG tablet     atorvastatin (LIPITOR) 10 MG tablet     cholecalciferol (VITAMIN D) 1000 UNIT  tablet     clopidogrel (PLAVIX) 75 MG tablet     desonide (DESOWEN) 0.05 % external ointment     dorzolamide-timolol (COSOPT) 2-0.5 % ophthalmic solution     fluocinonide (LIDEX) 0.05 % external ointment     hydrochlorothiazide (HYDRODIURIL) 25 MG tablet     latanoprost (XALATAN) 0.005 % ophthalmic solution     levothyroxine (SYNTHROID/LEVOTHROID) 25 MCG tablet     ondansetron (ZOFRAN-ODT) 4 MG ODT tab     No current facility-administered medications for this visit.       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)     PSVT (paroxysmal supraventricular tachycardia) (H)     Cerebral infarction (H)     Homonymous hemianopia     Pain in joint, shoulder region     Pain in joint, lower leg     Hyperlipidemia     Hypothyroidism     Tricuspid regurgitation     Pulmonary artery hypertension (H)     Rosacea     Inflamed seborrheic keratosis     Dermatitis, seborrheic     Pseudophakia of both eyes     Nonexudative senile macular degeneration of retina     Asteroid hyalosis of right eye     Chronic angle-closure glaucoma     Dizziness of unknown cause     Vertigo     Hypothyroidism due to acquired atrophy of thyroid     Past Medical History:   Diagnosis Date     Arthritis      Breast cancer (H)     ER positive     Chronic angle-closure glaucoma      CVA (cerebral vascular accident) (H) 2003    R cerebral artery, embolic after hip replacement     Degenerative joint disease      Endometrial cancer (H)      Hypertension      Left homonymous hemianopsia      Pulmonary artery hypertension (H) 4/24/2013    Noted on echo. Mild-moderate. Moderate TR     Tricuspid regurgitation 4/24/2013       CC No referring provider defined for this encounter. on close of this encounter.

## 2021-03-22 NOTE — TELEPHONE ENCOUNTER
[facility] Prior Authorization Team Request    Medication: Desonide 0.05% Ointment  Dosing: Apply to affected area(s) two times daily  Qty: 60  Day Supply: 15  NDC (required for Medicaid members): [id number]     Insurance   BIN: [id number]  PCN: [id number]  Grp: [id number]  ID: [id number]    CoverMyMeds Key (if applicable):     Additional documentation:       Filling Pharmacy: [facility]  Phone Number: [phone]  Contact:  Phan Carter NPI (required for Medicaid members): [id number]

## 2021-03-22 NOTE — LETTER
3/22/2021       RE: Azeb Torres  [address]  [place] [address]     Dear Colleague,    Thank you for referring your patient, Azeb Torres, to the [facility] DERMATOLOG[facility] [place] at [facility]. Please see a copy of my visit note below.    [facility] Dermatology Note  Encounter Date: Mar 22, 2021  Office Visit     Dermatology Problem List:  0. NUB, R medial buttock, s/p shave bx 3/22/21  1. Inflamed seborrheic keratosis  - LN2 right neck 2/6/2020  2. Hand Dermatitis  -Previous treatment: triamcinolone 0.1% ointment BID, desonide 0.05% ointment BID  -Fluocinonide cream PRN  -Gentle skin care  ____________________________________________    Assessment & Plan:     # NUB, R medial buttock.  Eroded papule with peripheral rim of hyperkeratotic scale.  DDX: iSK (previously treated with LN2 by Dr. Can) vs. SCC  - Shave biopsy performed today (see procedure note(s) below).     # Hand dermatitis, well-controlled with topical regimen  - Continue to apply fluocinonide 0.05% ointment  BID PRN for flares  - Continue gentle skin care and daily application of moisturizer    # Seborrheic keratoses, diffusely scattered across chest, back and abdomen  - Benign etiology discussed with patient. No further treatment necessary    # Multiple benign melanocytic nevi   - Sun protection: Counseled SPF30+ sunscreen, UPF clothing, sun avoidance, tanning bed avoidance.     Procedures Performed:   - Shave biopsy procedure note, location(s): R medial buttock. After discussion of benefits and risks including but not limited to bleeding, infection, scar, incomplete removal, recurrence, and non-diagnostic biopsy, written consent and photographs were obtained. The area was cleaned with isopropyl alcohol. 0.5mL of 1% lidocaine with epinephrine was injected to obtain adequate anesthesia of lesion(s). Shave biopsy at site(s) performed. Hemostasis was  achieved with aluminium chloride. Petrolatum ointment and a sterile dressing were applied. The patient tolerated the procedure and no complications were noted. The patient was provided with verbal and written post care instructions.     Follow-up: pending path results    Staff and Resident:     Juanita Duggan MD  PGY-3 Dermatology Resident   I was present for key portions of the procedure. Francisca Morales MD  I, Francisca Morales MD, saw this patient with the resident and agree with the resident s findings and plan of care as documented in the resident s note.    ____________________________________________    CC: Derm Problem (Azeb, is here for skin lesions, pt states she has a new onre near her upper thigh. )    HPI:  Ms. Azeb Torres is a(n) 89 year old female who presents today as a return patient for a skin check and evaluation of a new lesion of concern on her buttock.  She was last seen in clinic on 2/6/20 at which time an inflamed SK on her R neck was treated with cryotherapy.  Since her last visit, she states that for the past 2 weeks she has developed a lesion of concern on her buttock.  In the past, she had the lesion treated with LN by Dr. Can (9/24/20).  The lesion is not painful, but is more bothersome for her. On Friday, she put a blister bandaid on the lesion, which fell off last night. Aside from this, she denies any additional new, non-healing or tender lesions of concern that she would like to address today.      Labs Reviewed:  None    Physical Exam:  Vitals: There were no vitals taken for this visit.  SKIN: Full skin, which includes the head/face, both arms, chest, back, abdomen,both legs, genitalia and/or groin buttocks, digits and/or nails, was examined.  - On the R medial buttock, there is a pink, eroded papule with overlying hyperkeratotic crust present around the periphery.  - Multiple regular brown pigmented macules and papules are identified on the back and b/l lower extremities.    There are waxy stuck on tan to brown papules on the chest, back and abdomen.   - No other lesions of concern on areas examined.             Medications:  Current Outpatient Medications   Medication     acetaminophen (TYLENOL) 500 MG tablet     atorvastatin (LIPITOR) 10 MG tablet     cholecalciferol (VITAMIN D) 1000 UNIT tablet     clopidogrel (PLAVIX) 75 MG tablet     desonide (DESOWEN) 0.05 % external ointment     dorzolamide-timolol (COSOPT) 2-0.5 % ophthalmic solution     fluocinonide (LIDEX) 0.05 % external ointment     hydrochlorothiazide (HYDRODIURIL) 25 MG tablet     latanoprost (XALATAN) 0.005 % ophthalmic solution     levothyroxine (SYNTHROID/LEVOTHROID) 25 MCG tablet     ondansetron (ZOFRAN-ODT) 4 MG ODT tab     No current facility-administered medications for this visit.       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)     PSVT (paroxysmal supraventricular tachycardia) (H)     Cerebral infarction (H)     Homonymous hemianopia     Pain in joint, shoulder region     Pain in joint, lower leg     Hyperlipidemia     Hypothyroidism     Tricuspid regurgitation     Pulmonary artery hypertension (H)     Rosacea     Inflamed seborrheic keratosis     Dermatitis, seborrheic     Pseudophakia of both eyes     Nonexudative senile macular degeneration of retina     Asteroid hyalosis of right eye     Chronic angle-closure glaucoma     Dizziness of unknown cause     Vertigo     Hypothyroidism due to acquired atrophy of thyroid     Past Medical History:   Diagnosis Date     Arthritis      Breast cancer (H)     ER positive     Chronic angle-closure glaucoma      CVA (cerebral vascular accident) (H) 2003    R cerebral artery, embolic after hip replacement     Degenerative joint disease      Endometrial cancer (H)      Hypertension      Left  homonymous hemianopsia      Pulmonary artery hypertension (H) 4/24/2013    Noted on echo. Mild-moderate. Moderate TR     Tricuspid regurgitation 4/24/2013       CC No referring provider defined for this encounter. on close of this encounter.

## 2021-03-23 NOTE — TELEPHONE ENCOUNTER
Prior Authorization Approval    Medication: Desonide 0.05% Ointment - APPROVEd was approved on 3/22/2021  Effective: 12/22/2020 to 3/22/2022  Reference #:    Approved Dose/Quantity:   Insurance Company: Medicare Blue - Phone [phone] Fax [phone]  Expected CoPay:    Pharmacy Filling the Rx: [facility] [place], [place] - [address] [place] [address] [id number]  Pharmacy Notified: Yes  Patient Notified: Comment:  **Instructed pharmacy to notify patient when script is ready to /ship.**

## 2021-03-24 LAB — COPATH REPORT: NORMAL

## 2021-04-07 NOTE — TELEPHONE ENCOUNTER
Luevano Horacio  :   [place]  [place] [id number] [address] at [place] [id number] [facility], [address] [place] [address],[address], [place] [address].  [phone]   [phone]        OUTPATIENT PHYSICAL THERAPY: Daily Treatment Note 2021  Visit Count:  8    ICD-10: Treatment Diagnosis:   · Pain in right shoulder (M25.511)  · Stiffness of right shoulder, not elsewhere classified (M25.611)  Precautions/Allergies:   Patient has no known allergies. TREATMENT PLAN:  Effective Dates: 3/11/2021 TO 2021 (90 days). Frequency/Duration: 2 times a week for 90 Day(s)    Pre-treatment Symptoms/Complaints:  2021: Patient reports she is doing alright. Pain: Initial:   2.5/10// Post Session:  3/10   Medications Last Reviewed:  2021  Updated Objective Findings:  None Today  TREATMENT:     THERAPEUTIC EXERCISE: (25 minutes):  Exercises per grid below to improve mobility and strength. Required minimal visual, verbal and manual cues to promote proper body alignment, promote proper body posture and promote proper body mechanics. Progressed resistance, range, repetitions and complexity of movement as indicated. Date:  21   Activity/Exercise Parameters   Bicep Curls 20 reps  3 pounds   Wall Ladder 5 reps   UBE 5 minutes  Level 1.0   Theraband ER IR Red t-band  15 reps   Theraband Rows Red t-band  15 reps   Therband Extension Red t-band  15 reps   Pulleys into flexion 15 reps  2 sets   Pectoralis stretch in doorway 5 second hold  3 reps  R UE      MANUAL THERAPY: (15 minutes): Joint mobilization and Soft tissue mobilization was utilized and necessary because of the patient's restricted joint motion and loss of articular motion.  (R UE)    Treatment/Session Summary:    · Response to Treatment:  Patient tolerated treatment well with improving overall mobility.     · Communication/Consultation:  None today  · Equipment provided today:  None [facility]    Phone Message    May a detailed message be left on voicemail: yes    Reason for Call: Medication Refill Request    Has the patient contacted the pharmacy for the refill? Yes   Name of medication being requested: dorzolamide-timolol (COSOPT) 2-0.5 % ophthalmic solution  Provider who prescribed the medication: Dr. Faria  Pharmacy: [place], [place] - [address] [place] [address] [phone]  Date medication is needed: 10/18/2019, pt's daughter states that she thought it was called in with the Xalatan         Action Taken: Message routed to:  [facility]   today  · Recommendations/Intent for next treatment session: Next visit will focus on improving overall mobility and pain with daily activities.     Total Treatment Billable Duration:  40 minutes   PT Patient Time In/Time Out  Time In: 1430  Time Out: Marcos Basurto, [place]    Future Appointments   Date Time Provider Anaya Juana   4/8/2021  3:00 PM Goran Wren MD [facility] UCDG UCD   4/13/2021  9:00 AM MAT LAB [facility] MAT B[facility]   5/10/2021  1:40 PM Ericka Brown MD [facility] MAT [facility]   8/16/2021  3:45 PM SFE [facility] ROOM 1 SFERMAM SFE   1/18/2022  2:45 PM Posta, Roslynn Severin., MD [facility]

## 2021-04-13 DIAGNOSIS — I10 ESSENTIAL HYPERTENSION, BENIGN: ICD-10-CM

## 2021-04-13 DIAGNOSIS — E78.5 HYPERLIPIDEMIA, UNSPECIFIED HYPERLIPIDEMIA TYPE: ICD-10-CM

## 2021-04-15 RX ORDER — HYDROCHLOROTHIAZIDE 25 MG/1
25 TABLET ORAL DAILY
Qty: 90 TABLET | Refills: 0 | Status: SHIPPED | OUTPATIENT
Start: 2021-04-15 | End: 2021-07-14

## 2021-04-15 NOTE — TELEPHONE ENCOUNTER
hydrochlorothiazide (HYDRODIURIL) 25 MG tablet    Take 1 tablet (25 mg) by mouth daily     Last Written Prescription Date:  1/18/21  Last Fill Quantity: 90,   # refills: 0  Last Office Visit : 3/18/21   Return in about 6 months (around 9/18/2021).    Future Office visit:  none    Routing FYI because:  Blood pressure out of range   03/18/21 (!) 161/95     Per protocol, 90 day to pharmacy. Provider noted at clinic visit.

## 2021-04-29 NOTE — H&P
Pre-Operative H & P     CC:  Preoperative exam to assess for increased cardiopulmonary risk while undergoing surgery and anesthesia.    Date of Encounter: 11/8/2017  Primary Care Physician:  No primary care provider on file.    SHAYE Torres is a 89 year old female who presents for pre-operative H & P in preparation for Right Open Carpal Tunnel Release on 11/14/2017 with Dr. Patrick Rivera at [facility] under local anesthesia.  Ms. Torres saw Dr. Rivera on 11/1/2017 with complants of R>L carpal tunnel syndrome and the above procedure was recommended.  PAC referral for risk assessment and optimization of anesthesia with comorbid conditions of:  HTN; HLD; PH; h/o CVA; GERD; hypothyroidism; cervical spondylosis; neuropathy; and osteoarthritis with bilateral hip replacements.      Ms. Torres presents to PAC with her granddaughter, Yris.  She denies any chest pain, dyspnea or palpitations.  She would like to proceed with above intervention.     History is obtained from the patient, electronic health record and patient's granddaugher.     Past Medical History  Past Medical History:   Diagnosis Date     Arthritis      Chronic angle-closure glaucoma      Hypertension      Left homonymous hemianopsia      Pulmonary artery hypertension 4/24/2013    Noted on echo. Mild-moderate. Moderate TR     Tricuspid regurgitation 4/24/2013       Past Surgical History  Past Surgical History:   Procedure Laterality Date     CATARACT IOL, RT/LT       glaucoma laser[       HIP SURGERY      s/p bilateral hip replacements     HYSTERECTOMY  1985    low grade endometrial cancer       Hx of Blood transfusions/reactions: denies     Hx of abnormal bleeding or anti-platelet use: ASA and Aggrenox    Menstrual history: No LMP recorded. Patient has had a hysterectomy.:    Steroid use in the last year: denies     Personal or FH with difficulty with Anesthesia:  denies    Prior to Admission Medications  Current Outpatient  Prescriptions   Medication Sig Dispense Refill     gabapentin (NEURONTIN) 300 MG capsule Take 1 capsule (300 mg) by mouth 3 times daily 180 capsule 1     aspirin-dipyridamole (AGGRENOX)  MG per 12 hr capsule Take 1 capsule by mouth 2 times daily 180 capsule 3     dorzolamide-timolol (COSOPT) 2-0.5 % ophthalmic solution Place 1 drop into both eyes 2 times daily 1 Bottle 11     latanoprost (XALATAN) 0.005 % ophthalmic solution Place 1 drop into both eyes At Bedtime 1 Bottle 11     atorvastatin (LIPITOR) 10 MG tablet Take 1 tablet (10 mg) by mouth daily (Patient taking differently: Take 10 mg by mouth every evening ) 90 tablet 3     omeprazole (PRILOSEC) 20 MG CR capsule Take 1 capsule (20 mg) by mouth daily (Patient taking differently: Take 20 mg by mouth every morning ) 90 capsule 3     levothyroxine (SYNTHROID/LEVOTHROID) 25 MCG tablet Take 1 tablet (25 mcg) by mouth daily (Patient taking differently: Take 25 mcg by mouth every morning ) 90 tablet 3     hydrochlorothiazide (HYDRODIURIL) 25 MG tablet Take 1 tablet (25 mg) by mouth daily (Patient taking differently: Take 25 mg by mouth every morning ) 90 tablet 3     Multiple Vitamins-Minerals (PRESERVISION AREDS 2 PO) Take by mouth every evening        aspirin 81 MG tablet Take 1 tablet by mouth every evening        acetaminophen (TYLENOL) 500 MG tablet Take 2 tablets by mouth 2 times daily.       cholecalciferol (VITAMIN D) 1000 UNIT tablet Take 1 tablet by mouth every morning        order for DME Right wrist splint, use at night 1 Units 0     ondansetron (ZOFRAN-ODT) 4 MG ODT tab Place 1 tablet (4 mg) under the tongue every 8 hours as needed for nausea 30 tablet 1     meclizine (ANTIVERT) 12.5 MG tablet Take 1 tablet (12.5 mg) by mouth 4 times daily as needed for dizziness 30 tablet 0       Allergies  No Known Allergies    Social History  Social History     Social History     Marital status:      Spouse name: N/A     Number of children: N/A     Years  don't need to go. · Slowly increase the time between trips to the bathroom. · When you have found a schedule that works well for you, keep doing it. · If you wake up during the night and have to urinate, do it. Apply your schedule to waking hours only. Kegel exercises  These tighten and strengthen pelvic muscles, which can help you control the flow of urine. To do Kegel exercises:  · Squeeze the same muscles you would use to stop your urine. Your belly and thighs should not move. · Hold the squeeze for 3 seconds, and then relax for 3 seconds. · Start with 3 seconds. Then add 1 second each week until you are able to squeeze for 10 seconds. · Repeat the exercise 10 to 15 times a session. Do three or more sessions a day. When should you call for help? Watch closely for changes in your health, and be sure to contact your doctor if:    · Your incontinence is getting worse.     · You do not get better as expected. Where can you learn more? Go to [email or web address] and sign in to your [employer] account. Enter I554 in the [employer] box to learn more about \"Bladder Training: Care Instructions. \"     If you do not have an account, please click on the \"Sign Up Now\" link. Current as of: June 29, 2020               Content Version: 12.8  © 6675-7930 Healthwise, Incorporated. Care instructions adapted under license by [facility] ([facility]). If you have questions about a medical condition or this instruction, always ask your healthcare professional. [place] [address] any warranty or liability for your use of this information. "of education: N/A     Occupational History     Not on file.     Social History Main Topics     Smoking status: Never Smoker     Smokeless tobacco: Never Used     Alcohol use No     Drug use: No     Sexual activity: No     Other Topics Concern     Not on file     Social History Narrative       Family History  No family history on file.    ROS/MED HX    ENT/Pulmonary:     (+)HIGINIO risk factors hypertension, other ENT- glaucoma.  No peripheral vision on right side since CVA. , , . .   (-) tobacco use   Neurologic:     (+)neuropathy - pain and numbness down right leg, CVA date: early 2000's with deficits- Right sided vison loss.,     Cardiovascular:     (+) Dyslipidemia, hypertension----. Taking blood thinners Pt has received instructions: Instructions Given to patient: Taking aspirin-dipyridamole. . . :. . Previous cardiac testing Echodate:results:date: results:ECG reviewed date: results: date: results:          METS/Exercise Tolerance: Comment: METS<4.  Bedroom is upstairs so goes up and down stairs daily.     Hematologic:  - neg hematologic  ROS       Musculoskeletal: Comment: Carpal tunnel syndrome R>L  (+) arthritis (osteoarthritis - bilateral hip replacement ), , , -       GI/Hepatic:     (+) GERD Asymptomatic on medication,       Renal/Genitourinary:  - ROS Renal section negative       Endo:     (+) thyroid problem hypothyroidism, .      Psychiatric:  - neg psychiatric ROS       Infectious Disease:  - neg infectious disease ROS       Malignancy:   (+) Malignancy History of Breast  Breast CA Remission status post Surgery.         Other:    (+) No chance of pregnancy C-spine cleared: N/A, H/O Chronic Pain,no other significant disability          Temp: 97.5  F (36.4  C) Temp src: Oral BP: 154/79 Pulse: 79   Resp: 18 SpO2: 99 %         176 lbs 0 oz  5' 3\"   Body mass index is 31.18 kg/(m^2).       Physical Exam  Constitutional: Awake, alert, cooperative, no apparent distress, and appears stated age.  Eyes: Pupils " equal, round and reactive to light, sclera clear, conjunctiva normal.  HENT: Normocephalic, oral pharynx with moist mucus membranes, upper and lower partials with remaining native teeth intact. No goiter appreciated.   Respiratory: Clear to auscultation bilaterally, no crackles or wheezing.  Cardiovascular: Regular rate and rhythm with occasional ectopic beat, normal S1 and S2, and no murmur noted.  Carotids +2, no bruits. No edema. Palpable pulses to radial  DP and PT arteries.   GI: Normal bowel sounds, soft, non-distended, non-tender, no masses palpated, no hepatosplenomegaly.    Lymph/Hematologic: No cervical lymphadenopathy and no supraclavicular lymphadenopathy.  Genitourinary:  na  Skin: Warm and dry.    Musculoskeletal: limited ROM of neck. There is no redness, warmth, or swelling of the joints. Gross motor strength is appropriate for age.   Neurologic: Awake, alert, oriented to name, place and time. Cranial nerves II-XII are grossly intact. Gait is slow and steady.   Neuropsychiatric: Calm, cooperative. Normal affect.     Labs: (personally reviewed)  Lab Results   Component Value Date    WBC 5.4 11/05/2016     Lab Results   Component Value Date    RBC 4.14 11/05/2016     Lab Results   Component Value Date    HGB 12.9 11/05/2016     Lab Results   Component Value Date    HCT 38.2 11/05/2016     Lab Results   Component Value Date    MCV 92 11/05/2016     Lab Results   Component Value Date    MCH 31.2 11/05/2016     Lab Results   Component Value Date    MCHC 33.8 11/05/2016     Lab Results   Component Value Date    RDW 13.8 11/05/2016     Lab Results   Component Value Date     11/05/2016       Last Basic Metabolic Panel:  Lab Results   Component Value Date     09/06/2017      Lab Results   Component Value Date    POTASSIUM 4.4 09/06/2017     Lab Results   Component Value Date    CHLORIDE 100 09/06/2017     Lab Results   Component Value Date    JEAN-PIERRE 9.6 09/06/2017     Lab Results   Component Value  Date    CO2 29 09/06/2017     Lab Results   Component Value Date    BUN 20 09/06/2017     Lab Results   Component Value Date    CR 0.87 09/06/2017     Lab Results   Component Value Date     09/06/2017       Procedures  ECG 11/8/2016:  SR 70 bpm with 1st AVB    Echocardiogram 12/2010  Interpretation Summary  Global and regional left ventricular function is normal with an EF of 60-65%.   Moderate pulmonary hypertension is present. No pericardial effusion is   Present.      CT angiogram of the neck and head with contrast  Head CT without contrast  Reconstruction by the Radiologist on the 3D workstation     History:  vague dizziness versus vertigo; nausea; evaluate for stroke     Comparison:  Head CT 1/14/2015, brain MRI and head/neck MRA 1/14/2015  Impression:    1. Head CT: No acute intracranial pathology. Stable chronic left PCA  territory and left cerebellar hemisphere lacunar infarcts. Stable mild  generalized parenchymal volume loss and chronic small vessel ischemic  disease.  2. Head and neck CTA: Widely patent major intracranial and cervical  arteries. No aneurysm or significant stenosis.      ASSESSMENT and PLAN  Azeb Torres is a 89 year old female scheduled to undergo Right Open Carpal Tunnel Release on 11/14/2017 with Dr. Patrick Rivera at [facility] under local anesthesia.      She has the following specific operative considerations:     1.  Cardiology -  Denies cardiac symptoms.  Echo 2010:  EF 60-65%, Moderate pulmonary hypertension; RVSP 38 mmHg plus RAP.  HTN and HLD,  Hold diurteic DOS.  Take statin as prescribed.  RCRI : Cerebrovascular Disease (TIA or CVA).  0.9 % risk of major adverse cardiac event. METS <4. Lives with daughter and has to go up one flight of stair to bedroom in upper level on daily basis.   2.  Pulmonary - Denies pulmonary history or symptoms.  No smoking history        - HIGINIO # of risks 2/8 = low risk  3.  Hematology - VTE risk:  0.5%  4.  GI - Risk of  PONV score = 2.  If > 2, anti-emetic intervention recommended.       - GERD, Take PPI DOS  5. Endocrine - hypothyroidism, take levothyroxine DOS  6.  HEENT - Glaucoma, use eye drops as prescribed DOS   7.  Musculoskeletal - recommend careful positioning        - Cervical spondylosis without neck surgeries.  Limited neck extension.         - Osteoarthritis, s/p bilateral hip replacement.   8.  Neuro - h/o CVA post right hip replacement in early 2000's with residual of no right eye peripheral vision and right sided LE neuropathy. Hold Aggrenox for three days prior to procedure and ASA for 5 days prior to procedure.  Take Neurontin DOS as prescribed.    9. H/o breast cancer, s/p lumpectomy over 10 years ago without recurrence.    - Anesthesia considerations:  Refer to PAC assessment in anesthesia records    Arrival time, NPO, shower and medication instructions provided by nursing staff today.  Preparing For Your Surgery handout given.  Patient was discussed with Dr Rader. I spent 20 minutes face to face with patient, assessing, examining, and educating.    ROLANDO Valenzuela CNP  Preoperative Assessment Center  [facility]  Phone: [phone]  Fax: [phone]

## 2021-05-26 DIAGNOSIS — Z86.73 HISTORY OF CVA (CEREBROVASCULAR ACCIDENT): ICD-10-CM

## 2021-05-28 RX ORDER — CLOPIDOGREL BISULFATE 75 MG/1
75 TABLET ORAL DAILY
Qty: 90 TABLET | Refills: 0 | Status: SHIPPED | OUTPATIENT
Start: 2021-05-28 | End: 2021-08-27

## 2021-06-21 DIAGNOSIS — E83.52 HYPERCALCEMIA: ICD-10-CM

## 2021-06-21 DIAGNOSIS — E78.5 HYPERLIPIDEMIA, UNSPECIFIED HYPERLIPIDEMIA TYPE: ICD-10-CM

## 2021-06-21 LAB
ANION GAP SERPL CALCULATED.3IONS-SCNC: 9 MMOL/L (ref 3–14)
BUN SERPL-MCNC: 21 MG/DL (ref 7–30)
CALCIUM SERPL-MCNC: 9.8 MG/DL (ref 8.5–10.1)
CHLORIDE SERPL-SCNC: 105 MMOL/L (ref 94–109)
CHOLEST SERPL-MCNC: 132 MG/DL
CO2 SERPL-SCNC: 27 MMOL/L (ref 20–32)
CREAT SERPL-MCNC: 0.85 MG/DL (ref 0.52–1.04)
DEPRECATED CALCIDIOL+CALCIFEROL SERPL-MC: 65 UG/L (ref 20–75)
GFR SERPL CREATININE-BSD FRML MDRD: 58 ML/MIN/{1.73_M2}
GLUCOSE SERPL-MCNC: 97 MG/DL (ref 70–99)
HDLC SERPL-MCNC: 60 MG/DL
LDLC SERPL CALC-MCNC: 51 MG/DL
NONHDLC SERPL-MCNC: 72 MG/DL
POTASSIUM SERPL-SCNC: 3.8 MMOL/L (ref 3.4–5.3)
PTH-INTACT SERPL-MCNC: 41 PG/ML (ref 18–80)
SODIUM SERPL-SCNC: 140 MMOL/L (ref 133–144)
TRIGL SERPL-MCNC: 104 MG/DL

## 2021-06-21 PROCEDURE — 82306 VITAMIN D 25 HYDROXY: CPT | Performed by: INTERNAL MEDICINE

## 2021-06-21 PROCEDURE — 80061 LIPID PANEL: CPT | Performed by: PATHOLOGY

## 2021-06-21 PROCEDURE — 83970 ASSAY OF PARATHORMONE: CPT | Performed by: INTERNAL MEDICINE

## 2021-06-21 PROCEDURE — 80048 BASIC METABOLIC PNL TOTAL CA: CPT | Performed by: PATHOLOGY

## 2021-06-21 PROCEDURE — 36415 COLL VENOUS BLD VENIPUNCTURE: CPT | Performed by: PATHOLOGY

## 2021-06-21 RX ORDER — ATORVASTATIN CALCIUM 10 MG/1
10 TABLET, FILM COATED ORAL DAILY
Qty: 90 TABLET | Refills: 3 | Status: SHIPPED | OUTPATIENT
Start: 2021-06-21 | End: 2022-06-09

## 2021-06-21 NOTE — TELEPHONE ENCOUNTER
ATORVASTATIN CALCIUM 10MG TABS  Last Written Prescription Date:  5/19/2021  Last Fill Quantity: 30,   # refills: 0  Last Office Visit : 3/18/2021  Future Office visit:  None  Labs done today  90 Tabs, 3 Refills sent to pharm 6/21/2021      Amanda Foley RN  Central Triage Red Flags/Med Refills

## 2021-06-21 NOTE — TELEPHONE ENCOUNTER
[facility] Call Center    Phone Message    May a detailed message be left on voicemail: yes     Reason for Call: Medication Refill Request    Has the patient contacted the pharmacy for the refill? Yes   Name of medication being requested: atorvastatin (LIPITOR) 10 MG tablet     Provider who prescribed the medication: Dr. Can  Pharmacy: [facility]- script is mailed to pt  Date medication is needed: 6/21/21   Pt's daughter called and stated the pt had labs done this morning 6/21 for this script, pt needs it prior to Thursday when the pt will be out      Action Taken: Message routed to:  [facility] ([facility]): pcc

## 2021-06-29 ENCOUNTER — OFFICE VISIT (OUTPATIENT)
Dept: DERMATOLOGY | Facility: CLINIC | Age: 86
End: 2021-06-29
Payer: MEDICARE

## 2021-06-29 DIAGNOSIS — L30.9 CHRONIC DERMATITIS OF HANDS: ICD-10-CM

## 2021-06-29 DIAGNOSIS — L82.1 SK (SEBORRHEIC KERATOSIS): Primary | ICD-10-CM

## 2021-06-29 PROCEDURE — 99213 OFFICE O/P EST LOW 20 MIN: CPT | Performed by: DERMATOLOGY

## 2021-06-29 ASSESSMENT — PAIN SCALES - GENERAL: PAINLEVEL: NO PAIN (0)

## 2021-06-29 NOTE — PROGRESS NOTES
[facility] Dermatology Note  Encounter Date: Jun 29, 2021  Office Visit     Dermatology Problem List:  1. Inflamed seborrheic keratosis  - LN2 right neck 2/6/2020  2. Hand Dermatitis  -Previous treatment: triamcinolone 0.1% ointment BID, desonide 0.05% ointment BID  -Fluocinonide cream PRN  -Gentle skin care  3. Porokeratosis right medial buttock s/p biopsy 3/22/21    ____________________________________________    Assessment & Plan:     # Seborrheic keratoses- some inflamed but none are bothersome  - Reviewed the benign diagnosis      # Hand dermatitis- well controlled   - Mainly using desonide when active  - Continue emollients, triamcinolone and desonide     # Porokeratosis right medial buttock.   - resolved          Follow-up: 1 year(s) in-person, or earlier for new or changing lesions    Staff:     Francisca Morales MD  ____________________________________________    CC: Derm Problem (Azeb, is here for a skin check and she wants to get her bisospy site rechecked. other small spots she wants looked t on waist line and arm. )    HPI:  Ms. Azeb Torres is a(n) 89 year old female who presents today as a return patient for some spots on her body.  Some are new.  None are painful or itchy.  Her hand rash is doing very well.  She mainly uses desonide when it is active.  No other concerning lesions    Patient is otherwise feeling well, without additional skin concerns.     Labs Reviewed:  N/A    Physical Exam:  Vitals: There were no vitals taken for this visit.  SKIN: Focused examination of face,ears, neck, chest, abdomen, legs, back, hands, arms and buttocks was performed.  - There are waxy stuck on tan to brown papules on the exam. Some with erythema and crusting.   - No other lesions of concern on areas examined.     Medications:  Current Outpatient Medications   Medication     acetaminophen (TYLENOL) 500 MG tablet     atorvastatin (LIPITOR) 10 MG tablet     cholecalciferol (VITAMIN D) 1000 UNIT  tablet     clopidogrel (PLAVIX) 75 MG tablet     desonide (DESOWEN) 0.05 % external ointment     dorzolamide-timolol (COSOPT) 2-0.5 % ophthalmic solution     fluocinonide (LIDEX) 0.05 % external ointment     hydrochlorothiazide (HYDRODIURIL) 25 MG tablet     latanoprost (XALATAN) 0.005 % ophthalmic solution     levothyroxine (SYNTHROID/LEVOTHROID) 25 MCG tablet     ondansetron (ZOFRAN-ODT) 4 MG ODT tab     No current facility-administered medications for this visit.       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)     PSVT (paroxysmal supraventricular tachycardia) (H)     Cerebral infarction (H)     Homonymous hemianopia     Pain in joint, shoulder region     Pain in joint, lower leg     Hyperlipidemia     Hypothyroidism     Tricuspid regurgitation     Pulmonary artery hypertension (H)     Rosacea     Inflamed seborrheic keratosis     Dermatitis, seborrheic     Pseudophakia of both eyes     Nonexudative senile macular degeneration of retina     Asteroid hyalosis of right eye     Chronic angle-closure glaucoma     Dizziness of unknown cause     Vertigo     Hypothyroidism due to acquired atrophy of thyroid     Past Medical History:   Diagnosis Date     Arthritis      Breast cancer (H)     ER positive     Chronic angle-closure glaucoma      CVA (cerebral vascular accident) (H) 2003    R cerebral artery, embolic after hip replacement     Degenerative joint disease      Endometrial cancer (H)      Hypertension      Left homonymous hemianopsia      Pulmonary artery hypertension (H) 4/24/2013    Noted on echo. Mild-moderate. Moderate TR     Tricuspid regurgitation 4/24/2013       CC Referred Self, MD  No address on file on close of this encounter.

## 2021-06-29 NOTE — NURSING NOTE
Chief Complaint   Patient presents with     Derm Problem     Azeb, is here for a skin check and she wants to get her bisospy site rechecked. other small spots she wants looked t on waist line and arm.     Dmitry Jarquin EMT

## 2021-06-29 NOTE — LETTER
6/29/2021       RE: Azeb Torres  [address]  [place] [address]     Dear Colleague,    Thank you for referring your patient, Azeb Torres, to the [facility] [place] at [facility]. Please see a copy of my visit note below.    [facility] Dermatology Note  Encounter Date: Jun 29, 2021  Office Visit     Dermatology Problem List:  1. Inflamed seborrheic keratosis  - LN2 right neck 2/6/2020  2. Hand Dermatitis  -Previous treatment: triamcinolone 0.1% ointment BID, desonide 0.05% ointment BID  -Fluocinonide cream PRN  -Gentle skin care  3. Porokeratosis right medial buttock s/p biopsy 3/22/21    ____________________________________________    Assessment & Plan:     # Seborrheic keratoses- some inflamed but none are bothersome  - Reviewed the benign diagnosis      # Hand dermatitis- well controlled   - Mainly using desonide when active  - Continue emollients, triamcinolone and desonide     # Porokeratosis right medial buttock.   - resolved          Follow-up: 1 year(s) in-person, or earlier for new or changing lesions    Staff:     Francisca Morales MD  ____________________________________________    CC: Derm Problem (Azeb, is here for a skin check and she wants to get her bisospy site rechecked. other small spots she wants looked t on waist line and arm. )    HPI:  Ms. Azeb Torres is a(n) 89 year old female who presents today as a return patient for some spots on her body.  Some are new.  None are painful or itchy.  Her hand rash is doing very well.  She mainly uses desonide when it is active.  No other concerning lesions    Patient is otherwise feeling well, without additional skin concerns.     Labs Reviewed:  N/A    Physical Exam:  Vitals: There were no vitals taken for this visit.  SKIN: Focused examination of face,ears, neck, chest, abdomen, legs, back, hands, arms and buttocks was performed.  - There are  waxy stuck on tan to brown papules on the exam. Some with erythema and crusting.   - No other lesions of concern on areas examined.     Medications:  Current Outpatient Medications   Medication     acetaminophen (TYLENOL) 500 MG tablet     atorvastatin (LIPITOR) 10 MG tablet     cholecalciferol (VITAMIN D) 1000 UNIT tablet     clopidogrel (PLAVIX) 75 MG tablet     desonide (DESOWEN) 0.05 % external ointment     dorzolamide-timolol (COSOPT) 2-0.5 % ophthalmic solution     fluocinonide (LIDEX) 0.05 % external ointment     hydrochlorothiazide (HYDRODIURIL) 25 MG tablet     latanoprost (XALATAN) 0.005 % ophthalmic solution     levothyroxine (SYNTHROID/LEVOTHROID) 25 MCG tablet     ondansetron (ZOFRAN-ODT) 4 MG ODT tab     No current facility-administered medications for this visit.       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)     PSVT (paroxysmal supraventricular tachycardia) (H)     Cerebral infarction (H)     Homonymous hemianopia     Pain in joint, shoulder region     Pain in joint, lower leg     Hyperlipidemia     Hypothyroidism     Tricuspid regurgitation     Pulmonary artery hypertension (H)     Rosacea     Inflamed seborrheic keratosis     Dermatitis, seborrheic     Pseudophakia of both eyes     Nonexudative senile macular degeneration of retina     Asteroid hyalosis of right eye     Chronic angle-closure glaucoma     Dizziness of unknown cause     Vertigo     Hypothyroidism due to acquired atrophy of thyroid     Past Medical History:   Diagnosis Date     Arthritis      Breast cancer (H)     ER positive     Chronic angle-closure glaucoma      CVA (cerebral vascular accident) (H) 2003    R cerebral artery, embolic after hip replacement     Degenerative joint disease      Endometrial cancer (H)      Hypertension      Left  homonymous hemianopsia      Pulmonary artery hypertension (H) 4/24/2013    Noted on echo. Mild-moderate. Moderate TR     Tricuspid regurgitation 4/24/2013       CC Referred Self, MD  No address on file on close of this encounter.

## 2021-07-12 DIAGNOSIS — I10 ESSENTIAL HYPERTENSION, BENIGN: ICD-10-CM

## 2021-07-12 DIAGNOSIS — E78.5 HYPERLIPIDEMIA, UNSPECIFIED HYPERLIPIDEMIA TYPE: ICD-10-CM

## 2021-07-14 RX ORDER — HYDROCHLOROTHIAZIDE 25 MG/1
25 TABLET ORAL DAILY
Qty: 90 TABLET | Refills: 2 | Status: SHIPPED | OUTPATIENT
Start: 2021-07-14 | End: 2022-04-20

## 2021-07-14 NOTE — TELEPHONE ENCOUNTER
Last Clinic Visit: 3/18/21  Blood pressure elevated, addressed at appointment  Essential hypertension, benign  Elevated in the office but controlled at home. Given history of dizziness, will permit systolic in 130s.

## 2021-07-20 NOTE — MR AVS SNAPSHOT
After Visit Summary   8/30/2017    Azeb Torres    MRN: [id number]           Patient Information     Date Of Birth          4/3/1925        Visit Information        Provider Department      8/30/2017 10:55 AM Lilia Flores MD [facility]        Today's Diagnoses     Essential hypertension, benign    -  1    Numbness and tingling in right hand        Cervical radiculopathy          Care Instructions    Primary Care Center Medication Refill Request Information:  * Please contact your pharmacy regarding ANY request for medication refills.  ** [facility] Prescription Fax = [phone]  * Please allow 3 business days for routine medication refills.  * Please allow 5 business days for controlled substance medication refills.     Primary Care Center Test Result notification information:  *You will be notified with in 7-10 days of your appointment day regarding the results of your test.  If you are on MyChart you will be notified as soon as the provider has reviewed the results and signed off on them.    Primary Care Center [phone]     Followup in three months with one of these providers:  Dr. Evelina Underwood    Neurology [phone] (3rd Floor [facility])             Follow-ups after your visit        Follow-up notes from your care team     Return in about 3 months (around 11/30/2017).      Your next 10 appointments already scheduled     Aug 30, 2017 11:45 AM CDT   LAB with  LAB    Health Lab ([facility])    [address]  [address] [facility] [address] [phone]           Patient must bring picture ID. Patient should be prepared to give a urine specimen  Please do not eat 10-12 hours before your appointment if you are coming in fasting for labs on lipids, cholesterol, or glucose (sugar). Pregnant women should follow their Care Team instructions. Water with medications is okay. Do not drink coffee or  Addended by: MITCHEL IVEY on: 7/20/2021 01:18 PM     Modules accepted: Orders     other fluids. If you have concerns about taking  your medications, please ask at office or if scheduling via Groove, send a message by clicking on Secure Messaging, Message Your Care Team.              Future tests that were ordered for you today     Open Future Orders        Priority Expected Expires Ordered    EMG Routine  8/30/2018 8/30/2017            Who to contact     Please call your clinic at [phone] to:    Ask questions about your health    Make or cancel appointments    Discuss your medicines    Learn about your test results    Speak to your doctor   If you have compliments or concerns about an experience at your clinic, or if you wish to file a complaint, please contact [facility] Patient Relations at [phone] or email us at [email or web address]         Additional Information About Your Visit        Groove Information     Groove gives you secure access to your electronic health record. If you see a primary care provider, you can also send messages to your care team and make appointments. If you have questions, please call your primary care clinic.  If you do not have a primary care provider, please call [phone] and they will assist you.      Groove is an electronic gateway that provides easy, online access to your medical records. With Groove, you can request a clinic appointment, read your test results, renew a prescription or communicate with your care team.     To access your existing account, please contact your [facility] or call [phone] for assistance.        Care EveryWhere ID     This is your Care EveryWhere ID. This could be used by other organizations to access your [place] medical records  [id number]        Your Vitals Were     Pulse Respirations Pulse Oximetry Breastfeeding? BMI (Body Mass Index)       64 18 98% No 30.04 kg/m2        Blood Pressure from Last 3 Encounters:   08/30/17 140/58    05/26/17 124/66   04/28/17 141/66    Weight from Last 3 Encounters:   08/30/17 79.4 kg (175 lb)   04/28/17 79.1 kg (174 lb 6.4 oz)   01/25/17 78.9 kg (174 lb)              We Performed the Following     Basic metabolic panel        Primary Care Provider Office Phone # Fax #    Lilia Flores [phone]       [address] [place] [address]        Equal Access to Services     KENDRA FLORES : Hadii aad ku hadasho Soomaali, waaxda luqadaha, qaybta kaalmada adeegyada, waxay idiin hayaan javed veliz . So [facility] [phone].    ATENCIÓN: Si habla español, tiene a johnson disposición servicios gratuitos de asistencia lingüística. [facility] [phone].    We comply with applicable federal civil rights laws and [place] laws. We do not discriminate on the basis of race, color, national origin, age, disability sex, sexual orientation or gender identity.            Thank you!     Thank you for choosing [facility]  for your care. Our goal is always to provide you with excellent care. Hearing back from our patients is one way we can continue to improve our services. Please take a few minutes to complete the written survey that you may receive in the mail after your visit with us. Thank you!             Your Updated Medication List - Protect others around you: Learn how to safely use, store and throw away your medicines at www.disposemymeds.org.          This list is accurate as of: 8/30/17 11:31 AM.  Always use your most recent med list.                   Brand Name Dispense Instructions for use Diagnosis    aspirin 81 MG tablet      Take 1 tablet by mouth daily.        aspirin-dipyridamole  MG per 12 hr capsule    AGGRENOX    180 capsule    Take 1 capsule by mouth 2 times daily    Cerebrovascular disease       atorvastatin 10 MG tablet    LIPITOR    90 tablet    Take 1 tablet (10 mg) by mouth daily    Hyperlipidemia, unspecified hyperlipidemia type        cholecalciferol 1000 UNIT tablet    vitamin D     Take 1 tablet by mouth daily.        dorzolamide-timolol 2-0.5 % ophthalmic solution    COSOPT    1 Bottle    Place 1 drop into both eyes 2 times daily    Chronic angle-closure glaucoma of both eyes, severe stage       gabapentin 300 MG capsule    NEURONTIN    180 capsule    Take 1 capsule (300 mg) by mouth 2 times daily    Degenerative disc disease, cervical       hydrochlorothiazide 25 MG tablet    HYDRODIURIL    90 tablet    Take 1 tablet (25 mg) by mouth daily    Hyperlipidemia, unspecified hyperlipidemia type, Essential hypertension, benign       ketoconazole 2 % shampoo    NIZORAL    120 mL    Shampoo 1-2 times a week as needed for rash.    Seborrheic dermatitis       latanoprost 0.005 % ophthalmic solution    XALATAN    1 Bottle    Place 1 drop into both eyes At Bedtime    Chronic angle-closure glaucoma of both eyes, severe stage       levothyroxine 25 MCG tablet    SYNTHROID/LEVOTHROID    90 tablet    Take 1 tablet (25 mcg) by mouth daily    Hypothyroidism due to acquired atrophy of thyroid       meclizine 12.5 MG tablet    ANTIVERT    30 tablet    Take 1 tablet (12.5 mg) by mouth 4 times daily as needed for dizziness        omeprazole 20 MG CR capsule    priLOSEC    90 capsule    Take 1 capsule (20 mg) by mouth daily    Abdominal pain, epigastric       ondansetron 4 MG ODT tab    ZOFRAN-ODT    30 tablet    Place 1 tablet (4 mg) under the tongue every 8 hours as needed for nausea    Nausea       order for DME     1 Units    Right wrist splint, use at night    Numbness and tingling in right hand       PRESERVISION AREDS 2 PO      Take by mouth daily        traMADol 50 MG tablet    ULTRAM    28 tablet    Take 1 tablet (50 mg) by mouth nightly as needed for moderate pain    Cervicalgia       TYLENOL 500 MG tablet   Generic drug:  acetaminophen      Take 2 tablets by mouth 2 times daily.

## 2021-08-23 DIAGNOSIS — Z86.73 HISTORY OF CVA (CEREBROVASCULAR ACCIDENT): ICD-10-CM

## 2021-08-27 RX ORDER — CLOPIDOGREL BISULFATE 75 MG/1
75 TABLET ORAL DAILY
Qty: 90 TABLET | Refills: 0 | Status: SHIPPED | OUTPATIENT
Start: 2021-08-27 | End: 2021-12-02

## 2021-09-11 ENCOUNTER — HEALTH MAINTENANCE LETTER (OUTPATIENT)
Age: 86
End: 2021-09-11

## 2021-10-18 ENCOUNTER — OFFICE VISIT (OUTPATIENT)
Dept: DERMATOLOGY | Facility: CLINIC | Age: 86
End: 2021-10-18
Payer: MEDICARE

## 2021-10-18 DIAGNOSIS — Q82.8 POROKERATOSIS: Primary | ICD-10-CM

## 2021-10-18 PROCEDURE — 17000 DESTRUCT PREMALG LESION: CPT | Mod: GC | Performed by: DERMATOLOGY

## 2021-10-18 ASSESSMENT — PAIN SCALES - GENERAL
PAINLEVEL: NO PAIN (0)
PAINLEVEL: NO PAIN (1)

## 2021-10-18 NOTE — NURSING NOTE
Chief Complaint   Patient presents with     Derm Problem     low middle back     Pt states she had a shave biopsy.  The area never healed and since has had drainage.  PT states the drainage was clear.    Mine Rincon LPN on 10/18/2021 at 1:02 PM

## 2021-10-18 NOTE — PATIENT INSTRUCTIONS
Cryotherapy    What is it?    Use of a very cold liquid, such as liquid nitrogen, to freeze and destroy abnormal skin cells that need to be removed    What should I expect?    Tenderness and redness    A small blister that might grow and fill with dark purple blood. There may be crusting.    More than one treatment may be needed if the lesions do not go away.    How do I care for the treated area?    Gently wash the area with your hands when bathing.    Use a thin layer of Vaseline to help with healing. You may use a Band-Aid.     The area should heal within 7-10 days and may leave behind a pink or lighter color.     Do not use an antibiotic or Neosporin ointment.     You may take acetaminophen (Tylenol) for pain.     Call your Doctor if you have:    Severe pain    Signs of infection (warmth, redness, cloudy yellow drainage, and or a bad smell)    Questions or concerns    Who should I call with questions?       [facility]: [phone]       [facility]: [phone]       For urgent needs outside of business hours call the [facility] at [phone]        and ask for the dermatology resident on call

## 2021-10-18 NOTE — PROGRESS NOTES
[facility] Dermatology Note  Encounter Date: Oct 18, 2021  Office Visit     Dermatology Problem List:  1. Inflamed seborrheic keratosis  - LN2 right neck 2/6/2020  2. Hand Dermatitis  -Previous treatment: triamcinolone 0.1% ointment BID, desonide 0.05% ointment BID  -Fluocinonide cream PRN  -Gentle skin care  3. Porokeratosis right medial buttock s/p biopsy 3/22/21  - LN2 10/18/21      ___________________________________________    Assessment & Plan:   # Porokeratosis right medial buttock- recurred  - Discussed options today including monitoring, cryotherapy or repeat biopsy. Patient and daughter opted for cryotherapy.   - See procedure note    Procedure performed:  Cryotherapy procedure note: After verbal consent and discussion of risks and benefits including but no limited to dyspigmentation/scar, blister, and pain, 1 was(were) treated with 1-2mm freeze border for 2 cycles with liquid nitrogen. Post cryotherapy instructions were provided.       Follow-up: 6 months(s) in-person, or earlier for new or changing lesions    Staff and Resident:      Gary Chapin MD  Dermatology Resident, PGY-4  I was present for the entire procedure. Francisca Morales MD  I, Francisca Morales MD, saw this patient with the resident and agree with the resident s findings and plan of care as documented in the resident s note.      _________________________________________    CC: Derm Problem (low middle back)    HPI:  Ms. Azeb Torres is a(n) 89 year old female who presents today as a return patient for a spot of concern. Patient was last seen 6/29/21. She had a porokeratosis biopsied 3/2021 and she thinks it may have come back. It is not painful or tender. Not bleeding.    Patient is otherwise feeling well, without additional skin concerns.     Labs Reviewed:  N/A    Physical Exam:  Vitals: There were no vitals taken for this visit.  SKIN: Focused examination of the buttocks reveals:  - Right buttock with pink  hyperkeratotic papule with collarette of scale, identical to photo taken 3/2021  - No other lesions of concern on areas examined.     Medications:  Current Outpatient Medications   Medication     acetaminophen (TYLENOL) 500 MG tablet     atorvastatin (LIPITOR) 10 MG tablet     cholecalciferol (VITAMIN D) 1000 UNIT tablet     clopidogrel (PLAVIX) 75 MG tablet     desonide (DESOWEN) 0.05 % external ointment     dorzolamide-timolol (COSOPT) 2-0.5 % ophthalmic solution     fluocinonide (LIDEX) 0.05 % external ointment     hydrochlorothiazide (HYDRODIURIL) 25 MG tablet     latanoprost (XALATAN) 0.005 % ophthalmic solution     levothyroxine (SYNTHROID/LEVOTHROID) 25 MCG tablet     ondansetron (ZOFRAN-ODT) 4 MG ODT tab     No current facility-administered medications for this visit.      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)     PSVT (paroxysmal supraventricular tachycardia) (H)     Cerebral infarction (H)     Homonymous hemianopia     Pain in joint, shoulder region     Pain in joint, lower leg     Hyperlipidemia     Hypothyroidism     Tricuspid regurgitation     Pulmonary artery hypertension (H)     Rosacea     Inflamed seborrheic keratosis     Dermatitis, seborrheic     Pseudophakia of both eyes     Nonexudative senile macular degeneration of retina     Asteroid hyalosis of right eye     Chronic angle-closure glaucoma     Dizziness of unknown cause     Vertigo     Hypothyroidism due to acquired atrophy of thyroid     Past Medical History:   Diagnosis Date     Arthritis      Breast cancer (H)     ER positive     Chronic angle-closure glaucoma      CVA (cerebral vascular accident) (H) 2003    R cerebral artery, embolic after hip replacement     Degenerative joint disease      Endometrial cancer (H)      Hypertension      Left  homonymous hemianopsia      Pulmonary artery hypertension (H) 4/24/2013    Noted on echo. Mild-moderate. Moderate TR     Tricuspid regurgitation 4/24/2013       CC Referred Self, MD  No address on file on close of this encounter.

## 2021-10-18 NOTE — LETTER
10/18/2021       RE: Azeb Torres  [address]  [place] [address]     Dear Colleague,    Thank you for referring your patient, Azeb Torres, to the [facility] [place] at [facility]. Please see a copy of my visit note below.    [facility] Dermatology Note  Encounter Date: Oct 18, 2021  Office Visit     Dermatology Problem List:  1. Inflamed seborrheic keratosis  - LN2 right neck 2/6/2020  2. Hand Dermatitis  -Previous treatment: triamcinolone 0.1% ointment BID, desonide 0.05% ointment BID  -Fluocinonide cream PRN  -Gentle skin care  3. Porokeratosis right medial buttock s/p biopsy 3/22/21  - LN2 10/18/21      ___________________________________________    Assessment & Plan:   # Porokeratosis right medial buttock- recurred  - Discussed options today including monitoring, cryotherapy or repeat biopsy. Patient and daughter opted for cryotherapy.   - See procedure note    Procedure performed:  Cryotherapy procedure note: After verbal consent and discussion of risks and benefits including but no limited to dyspigmentation/scar, blister, and pain, 1 was(were) treated with 1-2mm freeze border for 2 cycles with liquid nitrogen. Post cryotherapy instructions were provided.       Follow-up: 6 months(s) in-person, or earlier for new or changing lesions    Staff and Resident:      Gary Chapin MD  Dermatology Resident, PGY-4  I was present for the entire procedure. Francisca Morales MD  I, Francisca Morales MD, saw this patient with the resident and agree with the resident s findings and plan of care as documented in the resident s note.      _________________________________________    CC: Derm Problem (low middle back)    HPI:  Ms. Azeb Torres is a(n) 89 year old female who presents today as a return patient for a spot of concern. Patient was last seen 6/29/21. She had a porokeratosis biopsied 3/2021 and she thinks it  may have come back. It is not painful or tender. Not bleeding.    Patient is otherwise feeling well, without additional skin concerns.     Labs Reviewed:  N/A    Physical Exam:  Vitals: There were no vitals taken for this visit.  SKIN: Focused examination of the buttocks reveals:  - Right buttock with pink hyperkeratotic papule with collarette of scale, identical to photo taken 3/2021  - No other lesions of concern on areas examined.     Medications:  Current Outpatient Medications   Medication     acetaminophen (TYLENOL) 500 MG tablet     atorvastatin (LIPITOR) 10 MG tablet     cholecalciferol (VITAMIN D) 1000 UNIT tablet     clopidogrel (PLAVIX) 75 MG tablet     desonide (DESOWEN) 0.05 % external ointment     dorzolamide-timolol (COSOPT) 2-0.5 % ophthalmic solution     fluocinonide (LIDEX) 0.05 % external ointment     hydrochlorothiazide (HYDRODIURIL) 25 MG tablet     latanoprost (XALATAN) 0.005 % ophthalmic solution     levothyroxine (SYNTHROID/LEVOTHROID) 25 MCG tablet     ondansetron (ZOFRAN-ODT) 4 MG ODT tab     No current facility-administered medications for this visit.      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)     PSVT (paroxysmal supraventricular tachycardia) (H)     Cerebral infarction (H)     Homonymous hemianopia     Pain in joint, shoulder region     Pain in joint, lower leg     Hyperlipidemia     Hypothyroidism     Tricuspid regurgitation     Pulmonary artery hypertension (H)     Rosacea     Inflamed seborrheic keratosis     Dermatitis, seborrheic     Pseudophakia of both eyes     Nonexudative senile macular degeneration of retina     Asteroid hyalosis of right eye     Chronic angle-closure glaucoma     Dizziness of unknown cause     Vertigo     Hypothyroidism due to acquired atrophy of thyroid     Past Medical  History:   Diagnosis Date     Arthritis      Breast cancer (H)     ER positive     Chronic angle-closure glaucoma      CVA (cerebral vascular accident) (H) 2003    R cerebral artery, embolic after hip replacement     Degenerative joint disease      Endometrial cancer (H)      Hypertension      Left homonymous hemianopsia      Pulmonary artery hypertension (H) 4/24/2013    Noted on echo. Mild-moderate. Moderate TR     Tricuspid regurgitation 4/24/2013       CC Referred Self, MD  No address on file on close of this encounter.

## 2021-11-01 ENCOUNTER — ANCILLARY PROCEDURE (OUTPATIENT)
Dept: GENERAL RADIOLOGY | Facility: CLINIC | Age: 86
End: 2021-11-01
Attending: INTERNAL MEDICINE
Payer: MEDICARE

## 2021-11-01 ENCOUNTER — OFFICE VISIT (OUTPATIENT)
Dept: INTERNAL MEDICINE | Facility: CLINIC | Age: 86
End: 2021-11-01
Payer: MEDICARE

## 2021-11-01 ENCOUNTER — LAB (OUTPATIENT)
Dept: LAB | Facility: CLINIC | Age: 86
End: 2021-11-01
Payer: MEDICARE

## 2021-11-01 ENCOUNTER — TELEPHONE (OUTPATIENT)
Dept: INTERNAL MEDICINE | Facility: CLINIC | Age: 86
End: 2021-11-01

## 2021-11-01 VITALS
DIASTOLIC BLOOD PRESSURE: 78 MMHG | WEIGHT: 177.7 LBS | OXYGEN SATURATION: 97 % | HEIGHT: 62 IN | BODY MASS INDEX: 32.7 KG/M2 | HEART RATE: 65 BPM | SYSTOLIC BLOOD PRESSURE: 171 MMHG

## 2021-11-01 DIAGNOSIS — H90.6 MIXED CONDUCTIVE AND SENSORINEURAL HEARING LOSS OF BOTH EARS: Primary | ICD-10-CM

## 2021-11-01 DIAGNOSIS — M84.48XD SACRAL INSUFFICIENCY FRACTURE WITH ROUTINE HEALING: ICD-10-CM

## 2021-11-01 DIAGNOSIS — M79.18 GLUTEAL PAIN: ICD-10-CM

## 2021-11-01 DIAGNOSIS — E03.4 HYPOTHYROIDISM DUE TO ACQUIRED ATROPHY OF THYROID: ICD-10-CM

## 2021-11-01 DIAGNOSIS — I10 ESSENTIAL HYPERTENSION, BENIGN: ICD-10-CM

## 2021-11-01 DIAGNOSIS — M47.817 LUMBOSACRAL SPONDYLOSIS WITHOUT MYELOPATHY: ICD-10-CM

## 2021-11-01 DIAGNOSIS — I10 BENIGN ESSENTIAL HYPERTENSION: ICD-10-CM

## 2021-11-01 DIAGNOSIS — Z00.00 MEDICARE ANNUAL WELLNESS VISIT, SUBSEQUENT: ICD-10-CM

## 2021-11-01 LAB
ALBUMIN SERPL-MCNC: 4.2 G/DL (ref 3.4–5)
ALP SERPL-CCNC: 81 U/L (ref 40–150)
ALT SERPL W P-5'-P-CCNC: 19 U/L (ref 0–50)
ANION GAP SERPL CALCULATED.3IONS-SCNC: 4 MMOL/L (ref 3–14)
AST SERPL W P-5'-P-CCNC: 18 U/L (ref 0–45)
BASOPHILS # BLD AUTO: 0 10E3/UL (ref 0–0.2)
BASOPHILS NFR BLD AUTO: 0 %
BILIRUB SERPL-MCNC: 1.5 MG/DL (ref 0.2–1.3)
BUN SERPL-MCNC: 30 MG/DL (ref 7–30)
CALCIUM SERPL-MCNC: 10.2 MG/DL (ref 8.5–10.1)
CHLORIDE BLD-SCNC: 106 MMOL/L (ref 94–109)
CO2 SERPL-SCNC: 29 MMOL/L (ref 20–32)
CREAT SERPL-MCNC: 0.86 MG/DL (ref 0.52–1.04)
EOSINOPHIL # BLD AUTO: 0.2 10E3/UL (ref 0–0.7)
EOSINOPHIL NFR BLD AUTO: 2 %
ERYTHROCYTE [DISTWIDTH] IN BLOOD BY AUTOMATED COUNT: 13.7 % (ref 10–15)
GFR SERPL CREATININE-BSD FRML MDRD: 57 ML/MIN/1.73M2
GLUCOSE BLD-MCNC: 102 MG/DL (ref 70–99)
HCT VFR BLD AUTO: 46.1 % (ref 35–47)
HGB BLD-MCNC: 15.5 G/DL (ref 11.7–15.7)
IMM GRANULOCYTES # BLD: 0 10E3/UL
IMM GRANULOCYTES NFR BLD: 0 %
LYMPHOCYTES # BLD AUTO: 1.7 10E3/UL (ref 0.8–5.3)
LYMPHOCYTES NFR BLD AUTO: 22 %
MCH RBC QN AUTO: 31.5 PG (ref 26.5–33)
MCHC RBC AUTO-ENTMCNC: 33.6 G/DL (ref 31.5–36.5)
MCV RBC AUTO: 94 FL (ref 78–100)
MONOCYTES # BLD AUTO: 0.6 10E3/UL (ref 0–1.3)
MONOCYTES NFR BLD AUTO: 8 %
NEUTROPHILS # BLD AUTO: 5.1 10E3/UL (ref 1.6–8.3)
NEUTROPHILS NFR BLD AUTO: 68 %
NRBC # BLD AUTO: 0 10E3/UL
NRBC BLD AUTO-RTO: 0 /100
PLATELET # BLD AUTO: 251 10E3/UL (ref 150–450)
POTASSIUM BLD-SCNC: 3.6 MMOL/L (ref 3.4–5.3)
PROT SERPL-MCNC: 7.7 G/DL (ref 6.8–8.8)
RBC # BLD AUTO: 4.92 10E6/UL (ref 3.8–5.2)
SODIUM SERPL-SCNC: 139 MMOL/L (ref 133–144)
TSH SERPL DL<=0.005 MIU/L-ACNC: 2.71 MU/L (ref 0.4–4)
WBC # BLD AUTO: 7.5 10E3/UL (ref 4–11)

## 2021-11-01 PROCEDURE — 85025 COMPLETE CBC W/AUTO DIFF WBC: CPT | Performed by: PATHOLOGY

## 2021-11-01 PROCEDURE — 72100 X-RAY EXAM L-S SPINE 2/3 VWS: CPT | Performed by: RADIOLOGY

## 2021-11-01 PROCEDURE — 73523 X-RAY EXAM HIPS BI 5/> VIEWS: CPT | Performed by: RADIOLOGY

## 2021-11-01 PROCEDURE — 84443 ASSAY THYROID STIM HORMONE: CPT | Performed by: PATHOLOGY

## 2021-11-01 PROCEDURE — 72200 X-RAY EXAM SI JOINTS: CPT | Performed by: RADIOLOGY

## 2021-11-01 PROCEDURE — 80053 COMPREHEN METABOLIC PANEL: CPT | Performed by: PATHOLOGY

## 2021-11-01 PROCEDURE — G0439 PPPS, SUBSEQ VISIT: HCPCS | Performed by: INTERNAL MEDICINE

## 2021-11-01 PROCEDURE — 36415 COLL VENOUS BLD VENIPUNCTURE: CPT | Performed by: PATHOLOGY

## 2021-11-01 RX ORDER — LIDOCAINE 50 MG/G
1 PATCH TOPICAL EVERY 24 HOURS
Qty: 30 PATCH | Refills: 2 | Status: SHIPPED | OUTPATIENT
Start: 2021-11-01 | End: 2022-11-11

## 2021-11-01 ASSESSMENT — ENCOUNTER SYMPTOMS
MUSCLE WEAKNESS: 0
STIFFNESS: 0
MUSCLE CRAMPS: 0
NECK PAIN: 0
ARTHRALGIAS: 1
JOINT SWELLING: 0
POOR WOUND HEALING: 0
SKIN CHANGES: 0
BACK PAIN: 1
NAIL CHANGES: 0

## 2021-11-01 ASSESSMENT — PAIN SCALES - GENERAL: PAINLEVEL: MODERATE PAIN (4)

## 2021-11-01 ASSESSMENT — ACTIVITIES OF DAILY LIVING (ADL)
IN_THE_PAST_7_DAYS,_DID_YOU_NEED_HELP_FROM_OTHERS_TO_PERFORM_EVERYDAY_ACTIVITIES_SUCH_AS_EATING,_GETTING_DRESSED,_GROOMING,_BATHING,_WALKING,_OR_USING_THE_TOILET: N
IN_THE_PAST_7_DAYS,_DID_YOU_NEED_HELP_FROM_OTHERS_TO_TAKE_CARE_OF_THINGS_SUCH_AS_LAUNDRY_AND_HOUSEKEEPING,_BANKING,_SHOPPING,_USING_THE_TELEPHONE,_FOOD_PREPARATION,_TRANSPORTATION,_OR_TAKING_YOUR_OWN_MEDICATIONS?: Y

## 2021-11-01 ASSESSMENT — MIFFLIN-ST. JEOR: SCORE: 1149.29

## 2021-11-01 NOTE — TELEPHONE ENCOUNTER
Central Prior Authorization Team   Phone: [phone]      PRIOR AUTHORIZATION DENIED    Medication: lidocaine (LIDODERM) 5 % patch - DENIED    Denial Date: 11/1/2021    Denial Rational:       Appeal Information:

## 2021-11-01 NOTE — NURSING NOTE
Chief Complaint   Patient presents with     Physical     Medicare wellness; Look at surface of skin where growth was frozen off     Pain     Pain in left hip       LEXIE Jim at 10:04 AM on 11/1/2021.

## 2021-11-01 NOTE — PROGRESS NOTES
Medicare Annual Wellness Visit Previsit note    This 89 year old year old female presents for a  Medicare Wellness Exam.           Medical Care     Have you been to an ER or a hospital in the last year? No  What other specialists or organizations are involved in your medical care?  Derm and Eye  Current providers sharing in care for this patient include:  Patient Care Team       Relationship Specialty Notifications Start End    Evelina Can MD PCP - General Internal Medicine  11/28/17     Phone: [phone] Fax: [phone]         [address] [facility] [address] [facility] [address]    Amina Ramirez AuD Audiologist Audiology  10/6/15     Phone: [phone] Fax: [phone]         [address] [facility] [address]    Dipti Zhang MD MD Orthopedics  10/12/17     Phone: [phone] Fax: [phone]         [address] [facility] [address]    Evelina Bacon, OT (Inactive) Occupational Therapist Occupational Therapy  1/29/18     Phone: [phone]          [facility] [address] [facility] [address]    Alejandrina Faria MD MD Ophthalmology  6/22/18     Phone: [phone] Fax: [phone]         [address] [address] [facility] [address]    Francisca Morales MD MD Dermatology  10/9/19     Phone: [phone] Fax: [phone]         [address] [facility] [address] [facility] [address]    Martín Hsieh OD MD Optometry  11/7/19     Phone: [phone] Fax: [phone]         [address] [facility] [address]    Alejandrina Faria MD Referring Physician Ophthalmology  11/7/19     refer to Dr. Hsieh    Phone: [phone] Fax: [phone]         [address] [address] [facility] [address]    Francisca Morales MD Assigned Surgical Provider   3/28/21     Phone: [phone] Fax: [phone]         [address] [facility] [address] [facility] [address]    Evelina Can MD Assigned PCP   4/4/21     Phone: [phone] Fax: [phone]          [address] [address]                 Social History     Marital Status:  Who lives in your household? Daughter  Does your home have any of the following safety concerns? Loose rugs in the hallway, no grab bars in the bathroom, no handrails on the stairs or have poorly lit areas?  No  Do you feel threatened or controlled by a partner, ex-partner or anyone in your life? No  Has anyone hurt you physically, for example by pushing, hitting, slapping or kicking you   or forcing you to have sex? No  Do you need help with the phone, transportation, shopping, preparing meals, housework, laundry, medications or managing money? Yes Daughters do this   Have you noticed any hearing difficulties? Yes looking for new hearing aids      Risk Behaviors and Healthy Habits     How many servings of fruits and vegetables do you eat a day? 4  How often do you exercise and what do you do? n/a minutes n/a a week  Do you frequently ride without a seatbelt? No  Do you use tobacco?  No  Do you use any other drugs? No       Do you use alcohol?No      Sexual Health     Are you sexually active? No   If yes, with men, women, or both? n/a  If yes, do you more than one current partner?N/A  If yes, do you use condoms? N/A  Have you had any sexually transmitted infections? No  Any sexual concerns? No       FOR WOMEN ONLY  What year did you stop having periods? 1970  Any vaginal bleeding in the last year? No  Have you ever had an abnormal Pap smear? Yes endometnal cx    PHQ-2:   1) 0  2) 0  Total = 0    Fall Risk:   1) No  2) No    Walt Rodriguez, LEXIE at 11:25 AM on 11/1/2021.

## 2021-11-01 NOTE — PATIENT INSTRUCTIONS
Okay to increase tylenol to 100 mg three times daily, morning noon and night.    Try lidocaine patch on buttocks area.      Measure blood pressure at home, if above 140/90.

## 2021-11-01 NOTE — PROGRESS NOTES
Medicare Annual Wellness Visit    This 89 year old year old female presents for an subsequent Medicare Wellness Exam.         HPI     Generally well in last year, dealing with gluteal skin lesion, as well as msk pain in the pelvis    Saw Derm 10/18 and had cryo for lesion on R glut--  FINAL DIAGNOSIS:   Skin, R medial buttock, shave:   - Features consistent with porokeratosis - (see description)   This is healing    She reports bilat buttocks pain, poorly localized, like an ache, deep in L buttock, she takes bid APAP, when asking her about this they story she gives in terms of location is not consistent and varies between gluteal fold and bilat gluts  She is s/p bilat KATELYN  She had a lumbar MRI in 2018, mult level spondylosis and bilat SI fax    Xray L hip 2018:  IMPRESSION:  1. No acute osseous abnormality or hardware complication about the  visualized pelvis and left hip.  2. Diffuse secondary changes of degenerative disc disease throughout  the lumbar spine along with marked facet osteoarthrosis. Degenerative  disc disease greatest at L1-2 where there is prominent endplate  sclerosis and vacuum phenomena and then at L2-3 and to a still lesser  degree at L3-4 and only mildly at L4-5. Endplate sclerosis, marginal  osteophyte about the phenomena and are prominent at the lumbosacral  junction. Facet osteoarthrosis begins to become prominent at the L3  level progressing to most prominent at the lumbosacral junction.     Otherwise she is well without acute concerns, no falls, no recent illnesses    Past Medical History:   Diagnosis Date     Arthritis      Breast cancer (H)     ER positive     Chronic angle-closure glaucoma      CVA (cerebral vascular accident) (H) 2003    R cerebral artery, embolic after hip replacement     Degenerative joint disease      Endometrial cancer (H)      Hypertension      Left homonymous hemianopsia      Pulmonary artery hypertension (H) 4/24/2013    Noted on echo. Mild-moderate. Moderate TR      Tricuspid regurgitation 4/24/2013       Past Surgical History:   Procedure Laterality Date     C PELVIS/HIP JOINT SURGERY UNLISTED       C STOMACH SURGERY PROCEDURE UNLISTED       CATARACT IOL, RT/LT       glaucoma laser[       HIP SURGERY      s/p bilateral hip replacements     HYSTERECTOMY  1985    low grade endometrial cancer     LUMPECTOMY BREAST Left      RELEASE CARPAL TUNNEL Right 11/14/2017    Procedure: RELEASE CARPAL TUNNEL;  Right Open Carpal Tunnel Release;  Surgeon: Patrick Rivera MD;  Location: [facility]       Family History   Problem Relation Age of Onset     Coronary Artery Disease Father      Glaucoma No family hx of      Macular Degeneration No family hx of        Social History     Tobacco Use     Smoking status: Never Smoker     Smokeless tobacco: Never Used   Substance Use Topics     Alcohol use: No     Comment: 0     Drug use: No            Medical Care     Have you been to an ER or a hospital in the last year? No  What other specialists or organizations are involved in your medical care?    Current providers sharing in care for this patient include:  Patient Care Team       Relationship Specialty Notifications Start End    Evelina Can MD PCP - General Internal Medicine  11/28/17     Phone: [phone] Fax: [phone]         [address] [address]    Amina Ramirez AuD Audiologist Audiology  10/6/15     Phone: [phone] Fax: [phone]         [address] [place] [address]    Dipti Zhang MD MD Orthopedics  10/12/17     Phone: [phone] Fax: [phone]         [facility] [address] [address]    Evelina Bacon OT (Inactive) Occupational Therapist Occupational Therapy  1/29/18     Phone: [phone]          [facility] [address] [address]    Alejandrina Faria MD MD Ophthalmology  6/22/18     Phone: [phone] Fax: [phone]         [address] [place] [address]  [address]    Francisca Morales MD MD Dermatology  10/9/19     Phone: [phone] Fax: [phone]         420 [facility] [address]    Martín Hsieh OD MD Optometry  11/7/19     Phone: [phone] Fax: [phone]         6 [facility] [address]    Alejandrina Faria MD Referring Physician Ophthalmology  11/7/19     refer to Dr. Hsieh    Phone: [phone] Fax: [phone]         [address] [address]    Francisca Morales MD Assigned Surgical Provider   3/28/21     Phone: [phone] Fax: [phone]         [address] [address]    Evelina Can MD Assigned PCP   4/4/21     Phone: [phone] Fax: [phone]         [address] [address]        Medicare Annual Wellness Visit Previsit note    This 89 year old year old female presents for a  Medicare Wellness Exam.           Medical Care     Have you been to an ER or a hospital in the last year? No  What other specialists or organizations are involved in your medical care?  Derm and Eye  Current providers sharing in care for this patient include:  Patient Care Team       Relationship Specialty Notifications Start End    Evelina Can MD PCP - General Internal Medicine  11/28/17     Phone: [phone] Fax: [phone]         [address] [address]    Amina Ramirez AuD Audiologist Audiology  10/6/15     Phone: [phone] Fax: [phone]         [address] [place] [address]    Dipti Zhang MD MD Orthopedics  10/12/17     Phone: [phone] Fax: [phone]         [address] [facility] [address] [address]    Evelina Bacon OT (Inactive) Occupational Therapist Occupational Therapy  1/29/18     Phone: [phone]          [facility] [address] [address]    Alejandrina Faria MD MD Ophthalmology  6/22/18     Phone: [phone] Fax:  [phone]         420 [facility] [address] [facility] [address]    Francisca Morales MD MD Dermatology  10/9/19     Phone: [phone] Fax: [phone]         [address] [facility] [address]    Martín Hsieh OD MD Optometry  11/7/19     Phone: [phone] Fax: [phone]         6 [facility] [address]    Alejandrina Faria MD Referring Physician Ophthalmology  11/7/19     refer to Dr. Hsieh    Phone: [phone] Fax: [phone]         420 [facility] [address] [facility] [address]    Francisca Morales MD Assigned Surgical Provider   3/28/21     Phone: [phone] Fax: [phone]         [address] [address]    Evelina Can MD Assigned PCP   4/4/21     Phone: [phone] Fax: [phone]         [address] [address]                 Social History     Marital Status:  Who lives in your household? Daughter  Does your home have any of the following safety concerns? Loose rugs in the hallway, no grab bars in the bathroom, no handrails on the stairs or have poorly lit areas?  No  Do you feel threatened or controlled by a partner, ex-partner or anyone in your life? No  Has anyone hurt you physically, for example by pushing, hitting, slapping or kicking you   or forcing you to have sex? No  Do you need help with the phone, transportation, shopping, preparing meals, housework, laundry, medications or managing money? Yes Daughters do this   Have you noticed any hearing difficulties? Yes looking for new hearing aids      Risk Behaviors and Healthy Habits     How many servings of fruits and vegetables do you eat a day? 4  How often do you exercise and what do you do? n/a minutes n/a a week  Do you frequently ride without a seatbelt? No  Do you use tobacco?  No  Do you use any other drugs? No       Do you use alcohol?No      Sexual Health     Are you sexually active? No   If yes, with men, women, or  both? n/a  If yes, do you more than one current partner?N/A  If yes, do you use condoms? N/A  Have you had any sexually transmitted infections? No  Any sexual concerns? No       FOR WOMEN ONLY  What year did you stop having periods? 1970  Any vaginal bleeding in the last year? No  Have you ever had an abnormal Pap smear? Yes endometnal cx    PHQ-2:   1) 0  2) 0  Total = 0    Fall Risk:   1) No  2) No    Walt Rodriguez, EMT at 11:25 AM on 11/1/2021.      FUNCTIONAL ABILITY/SAFETY SCREENING   **RS to complete Fall Risk, PHQ2 in Assessments prior**    Fall Risk Assessment Today:   Fall Risk Screening:  FALL RISK ASSESSMENT 9/24/2020   Fallen 2 or more times in the past year? No   Any fall with injury in the past year? No         Hearing evaluation if done: wondering about new hearing aids    Visual acuity : sees ophtho for glaucoma    SCREENING FOR PREVENTION and EARLY DETECTION     Advanced Directives: Discussed and patient desires to review with family.    **RS to STOP HERE**    Reviewed Immunization Record Today    Most Recent Immunizations   Administered Date(s) Administered     COVID-19,PF,Pfizer (12+ Yrs) 10/18/2021     Influenza (H1N1) 01/22/2010     Influenza (High Dose) 3 valent vaccine 10/09/2019     Influenza (IIV3) PF 10/15/2014     Influenza Vaccine IM > 6 months Valent IIV4 (Alfuria,Fluzone) 10/18/2021     Influenza, Quad, High Dose, Pf, 65yr+ (Fluzone HD) 09/24/2020     Pneumo Conj 13-V (2010&after) 10/18/2021     Pneumococcal 23 valent 10/15/2014     TD (ADULT, 7+) 03/18/2021     Zoster vaccine recombinant adjuvanted (SHINGRIX) 03/07/2019       Reviewed Health Maintenance Completed and Due    Health Maintenance   Topic Date Due     ADVANCE CARE PLANNING  Never done     MEDICARE ANNUAL WELLNESS VISIT  09/24/2021     FALL RISK ASSESSMENT  09/24/2021     DTAP/TDAP/TD IMMUNIZATION (3 - Td or Tdap) 03/18/2031     PHQ-2  Completed     INFLUENZA VACCINE  Completed     Pneumococcal Vaccine: 65+ Years  Completed  "    ZOSTER IMMUNIZATION  Completed     COVID-19 Vaccine  Completed     IPV IMMUNIZATION  Aged Out     MENINGITIS IMMUNIZATION  Aged Out     HEPATITIS B IMMUNIZATION  Aged Out       Health Maintenance Due   Topic Date Due     ADVANCE CARE PLANNING  Never done     MEDICARE ANNUAL WELLNESS VISIT  09/24/2021     FALL RISK ASSESSMENT  09/24/2021         CV Risk based on Pooled Cohort Risk:  The ASCVD Risk score (Pradip HODGES Jr., et al., 2013) failed to calculate for the following reasons:    The 2013 ASCVD risk score is only valid for ages 40 to 79    The patient has a prior MI or stroke diagnosis    --------------------------------------------------------------------------------------------------------------------------  --------------------------------------------------------------------------------------------------------------------------         Review of Systems     10 POINT review of systems performed and is negative unless specified above in HPI.    EVALUATION OF COGNITIVE FUNCTION       Mini Cog Scoring   +cognitive impairment         Physical Exam     BP (!) 171/78 (BP Location: Right arm, Patient Position: Sitting, Cuff Size: Adult Regular)   Pulse 65   Ht 1.575 m (5' 2\")   Wt 80.6 kg (177 lb 11.2 oz)   SpO2 97%   BMI 32.50 kg/m      Constitutional: Alert, oriented, pleasant, no acute distress  Head: Normocephalic, atraumatic  Eyes: Extra-ocular movements intact, pupils equally round and reactive bilaterally, no scleral icterus  ENT: Oropharynx clear, moist mucus membranes, good dentition  Neck: Supple, no lymphadenopathy, no thyromegaly, no JVD  Cardiovascular: Regular rate and rhythm, no murmurs, rubs or gallops, peripheral pulses full/symmetric  Respiratory: Good air movement bilaterally, lungs clear, no wheezes/rales/rhonchi  GI: Abdomen soft, bowel sounds present, nondistended, nontender, no organomegaly or masses, no rebound/guarding  Musculoskeletal: No edema, ambulates with walker, patient localizes " pain to deep L gluteal region, no palpable masses/erythema/warmth, no pain over hip joint  Neurologic: Alert and oriented, cranial nerves 2-12 intact, strength 5/5 throughout  Skin: gluteal cleft mildly erythematous, mild hypopigmentation in area of cryo without erythema/drainage/fluctuance  Psychiatric: normal mentation, affect and mood          Assessment and Plan     Medicare Wellness Exam    Azeb was seen today for physical and pain.    Diagnoses and all orders for this visit:    Mixed conductive and sensorineural hearing loss of both ears  -     Adult Audiology Referral; Future    Medicare annual wellness visit, subsequent    Gluteal pain  Difficult to localize based on inconsistent history and exam. Skin lesion appears healed. Pain may be stemming from old sacral insufficiency fracture, less likely lumbar radiculopathy or SI joint pain. Advised xrays and discussed pain management in interim.  -     XR Pelvis and Hip Bilateral 2 Views; Future  -     XR Lumbar Spine 2-3 Views; Future  -     XR Sacroiliac Joint 1/2 Views; Future  -     lidocaine (LIDODERM) 5 % patch; Place 1 patch onto the skin every 24 hours Place on buttock pain. To prevent lidocaine toxicity, patient should be patch free for 12 hrs daily.    Essential hypertension, benign  -     Comprehensive metabolic panel; Future  -     CBC with platelets differential; Future    Hypothyroidism due to acquired atrophy of thyroid  -     TSH with free T4 reflex; Future    Sacral insufficiency fracture with routine healing  -     XR Pelvis and Hip Bilateral 2 Views; Future  -     XR Sacroiliac Joint 1/2 Views; Future    Lumbosacral spondylosis without myelopathy  -     XR Lumbar Spine 2-3 Views; Future    BP Elevated  Likely secondary to white coat syndrome, advised home monitoring    Options for treatment and follow-up care were reviewed with Azeb Torres and/or guardian engaged in the decision making process and verbalized understanding of the options  discussed and agreed with the final plan.    Evelina Can MD        Answers for HPI/ROS submitted by the patient on 11/1/2021  General Symptoms: No  Skin Symptoms: Yes  HENT Symptoms: No  EYE SYMPTOMS: No  HEART SYMPTOMS: No  LUNG SYMPTOMS: No  INTESTINAL SYMPTOMS: No  URINARY SYMPTOMS: No  GYNECOLOGIC SYMPTOMS: No  BREAST SYMPTOMS: No  SKELETAL SYMPTOMS: Yes  BLOOD SYMPTOMS: No  NERVOUS SYSTEM SYMPTOMS: No  MENTAL HEALTH SYMPTOMS: No  Changes in hair: No  Changes in moles/birth marks: No  Itching: No  Rashes: No  Changes in nails: No  Acne: No  Hair in places you don't want it: No  Change in facial hair: No  Warts: No  Non-healing sores: No  Scarring: No  Flaking of skin: No  Color changes of hands/feet in cold : No  Sun sensitivity: No  Skin thickening: No  Back pain: Yes  Neck pain: No  Swollen joints: No  Joint pain: Yes  Bone pain: No  Muscle cramps: No  Muscle weakness: No  Joint stiffness: No  Bone fracture: No

## 2021-11-02 ENCOUNTER — TELEPHONE (OUTPATIENT)
Dept: INTERNAL MEDICINE | Facility: CLINIC | Age: 86
End: 2021-11-02
Payer: MEDICARE

## 2021-11-02 NOTE — TELEPHONE ENCOUNTER
[facility] Prior Authorization Team Request    Medication: Lidocaine 5 %  Dosing: Remove and Place one patch onto the skin every   NDC (required for Medicaid members):     Insurance   BIN:[id number]  PCN: [id number]  Grp:[id number]  ID:[id number]    CoverMyMeds Key (if applicable):     Additional documentation:     Filling Pharmacy:[facility]  Phone Number: [phone]  Contact: [employer] [facility] (P [id number]) please send all responses to this pool.  Pharmacy NPI (required for Medicaid members):

## 2021-11-03 NOTE — TELEPHONE ENCOUNTER
Prior Authorization Not Needed per Insurance, Prior Denial On file. See Encounter from 11/1/2021 for Denial Rational    Medication: Lidocaine 5 %-PA NOT NEEDED, PRIOR DENIAL   Insurance Company: Medicare Blue - Phone [phone] Fax [phone]  Expected CoPay:      Pharmacy Filling the Rx: [facility] [place], [place] - [address] [place] [address] [id number]  Pharmacy Notified: No  Patient Notified: No    PA Not Needed, Prior PA Denial on file as noted below. See Encounter from 11/1/2021 for Denial Rational and additional information on Appeal process.

## 2021-11-08 ENCOUNTER — TELEPHONE (OUTPATIENT)
Dept: AUDIOLOGY | Facility: CLINIC | Age: 86
End: 2021-11-08
Payer: MEDICARE

## 2021-11-08 NOTE — TELEPHONE ENCOUNTER
Walk-in hearing aid services on 11/8/21: The patient's daughter brought her hearing aids to the clinic to be cleaned.  Both hearing aids and slimtip earmolds were cleaned.   filters were replaced bilaterally.  Both hearing aids were found to be working normally.  The hearing aids were returned to the patient's daughter.  Azeb is scheduled to come in to the clinic in January 2022 for a new hearing test and evaluation of her current hearing aids.

## 2021-11-16 DIAGNOSIS — E03.4 HYPOTHYROIDISM DUE TO ACQUIRED ATROPHY OF THYROID: ICD-10-CM

## 2021-11-16 RX ORDER — LEVOTHYROXINE SODIUM 25 UG/1
25 TABLET ORAL DAILY
Qty: 90 TABLET | Refills: 3 | Status: SHIPPED | OUTPATIENT
Start: 2021-11-16 | End: 2022-11-07

## 2021-11-29 DIAGNOSIS — Z86.73 HISTORY OF CVA (CEREBROVASCULAR ACCIDENT): ICD-10-CM

## 2021-12-02 RX ORDER — CLOPIDOGREL BISULFATE 75 MG/1
75 TABLET ORAL DAILY
Qty: 90 TABLET | Refills: 3 | Status: SHIPPED | OUTPATIENT
Start: 2021-12-02 | End: 2022-11-29

## 2021-12-02 NOTE — TELEPHONE ENCOUNTER
clopidogrel (PLAVIX) 75 MG tablet    11/1/2021  [facility] Internal Medicine Evelina Arvizu MD    Internal Medicine

## 2021-12-10 ENCOUNTER — OFFICE VISIT (OUTPATIENT)
Dept: DERMATOLOGY | Facility: CLINIC | Age: 86
End: 2021-12-10
Payer: MEDICARE

## 2021-12-10 DIAGNOSIS — R21 RASH: Primary | ICD-10-CM

## 2021-12-10 PROCEDURE — 99213 OFFICE O/P EST LOW 20 MIN: CPT | Performed by: DERMATOLOGY

## 2021-12-10 PROCEDURE — 87529 HSV DNA AMP PROBE: CPT | Performed by: DERMATOLOGY

## 2021-12-10 ASSESSMENT — PAIN SCALES - GENERAL: PAINLEVEL: EXTREME PAIN (8)

## 2021-12-10 NOTE — PROGRESS NOTES
[facility] Dermatology Note  Encounter Date: Dec 10, 2021  Office Visit     Dermatology Problem List:  # Hand Dermatitis  - Current rx: Fluocinonide cream PRN  - Previous treatment: triamcinolone 0.1% ointment BID, desonide 0.05% ointment BID  #. Porokeratosis right medial buttock s/p biopsy 3/22/21  - LN2 10/18/21, resolved as of 12/10/2021   #. Perianal erosions  - Zinc oxide ointment  - HSV PCR 12/10/21    ____________________________________________    Assessment & Plan:    # Perianal erosions, suspect irritant. Will also obtain HSV PCR to r/o herpetic eruption.  - HSV PCR today  - Start zinc oxide ointment liberally  - Future: consider steroid if not resolved.    # Porokeratosis right medial buttock, s/p cryo - resolved today    Procedures Performed:   None    Follow-up: with Dr. Morales in January as scheduled, patient will call if issues sooner or perianal issues not improving    Staff and Scribe:     Scribe Disclosure:  I, Rakesh Araujo, am serving as a scribe to document services personally performed by Татьяна Sanchez MD based on data collection and the provider's statements to me.     Provider Disclosure:   The documentation recorded by the scribe accurately reflects the services I personally performed and the decisions made by me.    Татьяна Sanchez MD    Department of Dermatology  [facility]: Phone: [phone], Fax: [phone]  12/15/2021     ____________________________________________    CC: Skin Check (pt states she is here for follow up on a spot on her lowe back)    HPI:  Ms. Azeb Torres is a(n) 89 year old female who presents today as a return patient for skin check. Last seen in dermatology on 10/18/2021, at which time patient underwent cryo for treatment of a porokeratosis.    Today, patient reports that the spot on her right buttock that was treated with cryo at her  last visit is no longer present. She reports a new spot in the perianal area that is painful when sitting and drains fluid.    Patient is otherwise feeling well, without additional skin concerns.    Labs Reviewed:  N/A    Physical Exam:  Vitals: There were no vitals taken for this visit.  SKIN: Focused examination of perianal area was performed.  - Several small round perianal erosions.  - No other lesions of concern on areas examined.     Medications:  Current Outpatient Medications   Medication     acetaminophen (TYLENOL) 500 MG tablet     atorvastatin (LIPITOR) 10 MG tablet     cholecalciferol (VITAMIN D) 1000 UNIT tablet     clopidogrel (PLAVIX) 75 MG tablet     desonide (DESOWEN) 0.05 % external ointment     dorzolamide-timolol (COSOPT) 2-0.5 % ophthalmic solution     fluocinonide (LIDEX) 0.05 % external ointment     hydrochlorothiazide (HYDRODIURIL) 25 MG tablet     latanoprost (XALATAN) 0.005 % ophthalmic solution     levothyroxine (SYNTHROID/LEVOTHROID) 25 MCG tablet     lidocaine (LIDODERM) 5 % patch     ondansetron (ZOFRAN-ODT) 4 MG ODT tab     No current facility-administered medications for this visit.      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)     PSVT (paroxysmal supraventricular tachycardia) (H)     Cerebral infarction (H)     Homonymous hemianopia     Pain in joint, shoulder region     Pain in joint, lower leg     Hyperlipidemia     Hypothyroidism     Tricuspid regurgitation     Pulmonary artery hypertension (H)     Rosacea     Inflamed seborrheic keratosis     Dermatitis, seborrheic     Pseudophakia of both eyes     Nonexudative senile macular degeneration of retina     Asteroid hyalosis of right eye     Chronic angle-closure glaucoma     Dizziness of unknown cause     Vertigo     Hypothyroidism due to acquired  atrophy of thyroid     Past Medical History:   Diagnosis Date     Arthritis      Breast cancer (H)     ER positive     Chronic angle-closure glaucoma      CVA (cerebral vascular accident) (H) 2003    R cerebral artery, embolic after hip replacement     Degenerative joint disease      Endometrial cancer (H)      Hypertension      Left homonymous hemianopsia      Pulmonary artery hypertension (H) 4/24/2013    Noted on echo. Mild-moderate. Moderate TR     Tricuspid regurgitation 4/24/2013        CC Referred Self, MD  No address on file on close of this encounter.

## 2021-12-10 NOTE — LETTER
12/10/2021       RE: Azeb Torres  [address]  [place] [address]     Dear Colleague,    Thank you for referring your patient, Azeb Torres, to the [facility] DERMATOLOGY [facility] [place] at [facility]. Please see a copy of my visit note below.    [facility] Dermatology Note  Encounter Date: Dec 10, 2021  Office Visit     Dermatology Problem List:  # Hand Dermatitis  - Current rx: Fluocinonide cream PRN  - Previous treatment: triamcinolone 0.1% ointment BID, desonide 0.05% ointment BID  #. Porokeratosis right medial buttock s/p biopsy 3/22/21  - LN2 10/18/21, resolved as of 12/10/2021   #. Perianal erosions  - Zinc oxide ointment  - HSV PCR 12/10/21    ____________________________________________    Assessment & Plan:    # Perianal erosions, suspect irritant. Will also obtain HSV PCR to r/o herpetic eruption.  - HSV PCR today  - Start zinc oxide ointment liberally  - Future: consider steroid if not resolved.    # Porokeratosis right medial buttock, s/p cryo - resolved today    Procedures Performed:   None    Follow-up: with Dr. Morales in January as scheduled, patient will call if issues sooner or perianal issues not improving    Staff and Scribe:     Scribe Disclosure:  I, Rakesh Araujo, am serving as a scribe to document services personally performed by Татьяна Sanchez MD based on data collection and the provider's statements to me.     Provider Disclosure:   The documentation recorded by the scribe accurately reflects the services I personally performed and the decisions made by me.    Татьяна Sanchez MD    Department of Dermatology  [facility]: Phone: [phone], Fax: [phone]  12/15/2021     ____________________________________________    CC: Skin Check (pt states she is here for follow up on a spot on her lowe  back)    HPI:  Ms. Azeb Torres is a(n) 89 year old female who presents today as a return patient for skin check. Last seen in dermatology on 10/18/2021, at which time patient underwent cryo for treatment of a porokeratosis.    Today, patient reports that the spot on her right buttock that was treated with cryo at her last visit is no longer present. She reports a new spot in the perianal area that is painful when sitting and drains fluid.    Patient is otherwise feeling well, without additional skin concerns.    Labs Reviewed:  N/A    Physical Exam:  Vitals: There were no vitals taken for this visit.  SKIN: Focused examination of perianal area was performed.  - Several small round perianal erosions.  - No other lesions of concern on areas examined.     Medications:  Current Outpatient Medications   Medication     acetaminophen (TYLENOL) 500 MG tablet     atorvastatin (LIPITOR) 10 MG tablet     cholecalciferol (VITAMIN D) 1000 UNIT tablet     clopidogrel (PLAVIX) 75 MG tablet     desonide (DESOWEN) 0.05 % external ointment     dorzolamide-timolol (COSOPT) 2-0.5 % ophthalmic solution     fluocinonide (LIDEX) 0.05 % external ointment     hydrochlorothiazide (HYDRODIURIL) 25 MG tablet     latanoprost (XALATAN) 0.005 % ophthalmic solution     levothyroxine (SYNTHROID/LEVOTHROID) 25 MCG tablet     lidocaine (LIDODERM) 5 % patch     ondansetron (ZOFRAN-ODT) 4 MG ODT tab     No current facility-administered medications for this visit.      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)     PSVT (paroxysmal supraventricular tachycardia) (H)     Cerebral infarction (H)     Homonymous hemianopia     Pain in joint, shoulder region     Pain in joint, lower leg     Hyperlipidemia     Hypothyroidism     Tricuspid regurgitation     Pulmonary artery  hypertension (H)     Rosacea     Inflamed seborrheic keratosis     Dermatitis, seborrheic     Pseudophakia of both eyes     Nonexudative senile macular degeneration of retina     Asteroid hyalosis of right eye     Chronic angle-closure glaucoma     Dizziness of unknown cause     Vertigo     Hypothyroidism due to acquired atrophy of thyroid     Past Medical History:   Diagnosis Date     Arthritis      Breast cancer (H)     ER positive     Chronic angle-closure glaucoma      CVA (cerebral vascular accident) (H) 2003    R cerebral artery, embolic after hip replacement     Degenerative joint disease      Endometrial cancer (H)      Hypertension      Left homonymous hemianopsia      Pulmonary artery hypertension (H) 4/24/2013    Noted on echo. Mild-moderate. Moderate TR     Tricuspid regurgitation 4/24/2013        CC Referred Self, MD  No address on file on close of this encounter.

## 2021-12-11 LAB
HSV1 DNA SPEC QL NAA+PROBE: NOT DETECTED
HSV2 DNA SPEC QL NAA+PROBE: NOT DETECTED

## 2022-03-28 ENCOUNTER — OFFICE VISIT (OUTPATIENT)
Dept: AUDIOLOGY | Facility: CLINIC | Age: 87
End: 2022-03-28
Attending: INTERNAL MEDICINE
Payer: MEDICARE

## 2022-03-28 DIAGNOSIS — H90.6 MIXED CONDUCTIVE AND SENSORINEURAL HEARING LOSS OF BOTH EARS: ICD-10-CM

## 2022-03-28 DIAGNOSIS — H90.3 SENSORINEURAL HEARING LOSS, BILATERAL: Primary | ICD-10-CM

## 2022-03-28 PROCEDURE — V5014 HEARING AID REPAIR/MODIFYING: HCPCS | Mod: LT | Performed by: AUDIOLOGIST

## 2022-03-28 PROCEDURE — V5299 HEARING SERVICE: HCPCS | Mod: GY | Performed by: AUDIOLOGIST

## 2022-03-28 PROCEDURE — 92557 COMPREHENSIVE HEARING TEST: CPT | Mod: GY | Performed by: AUDIOLOGIST

## 2022-03-28 PROCEDURE — 92567 TYMPANOMETRY: CPT | Mod: GY | Performed by: AUDIOLOGIST

## 2022-03-28 NOTE — PROGRESS NOTES
AUDIOLOGY REPORT    SUBJECTIVE: Azeb Torres is a 89 year old female who was seen in the Audiology Clinic at [facility] for audiologic evaluation and hearing aid check, referred by Evelina Can M.D. She was accompanied by her two daughters. The patient has been seen previously in this clinic on 5/30/2019 for assessment, and results indicated mild sloping to severe sensorineural hearing loss bilaterally. Asymmetric word recognition scores have been noted in the past with the right ear poorer. She was fit with bilateral UnituConnect Latitude 8 Moxi 3G hearing aids with custom earmolds on 8/29/2011. The patient reports a possible decrease in hearing for both ears. Her daughters are unsure if her hearing loss has worsened of if the hearing aids are not working well. She denies bilateral pain, bilateral drainage, bilateral tinnitus, dizziness, or a history of ear surgeries.      OBJECTIVE:  Abuse Screening:  Do you feel unsafe at home or work/school? No  Do you feel threatened by someone? No  Does anyone try to keep you from having contact with others, or doing things outside of your home? No  Physical signs of abuse present? No     Fall Risk Screening:  Have you fallen two or more times in the past year? No  Have you fallen and had an injury in the past year? No    Otoscopic exam indicated ears are clear of cerumen bilaterally     Pure Tone Thresholds assessed using conventional audiometry with good reliability from 250-8000 Hz bilaterally using insert earphones and circumaural headphones     RIGHT:  Moderate sloping to severe sensorineural hearing loss    LEFT:    Moderate sloping to severe sensorineural hearing loss    Tympanogram:    RIGHT: Normal eardrum mobility    LEFT:   Normal eardrum mobility    Reflexes (reported by stimulus ear): Could not test, as unable to maintain a seal    Speech Reception Threshold:    RIGHT: 60 dB HL    LEFT:   55 dB HL    Word Recognition  Score:     RIGHT: 20% at 80 dB HL using NU-6 recorded word list    LEFT:   90% at 90 dB HL using NU-6 recorded word list    Both hearing aids were deep cleaned. A listening check revealed the hearing aids were weak. Both receivers were changed and a listening check revealed they sound crisp and clear. Significant feedback was noted when the hearing aids were placed in the patient's ear. Changed her custom earmolds to large power domes. Confirmed both hearing aids were meeting NAL-NL1 targets - No programming changes were needed. Azeb reported good volume and improved clarity with the new receivers and domes. Discussed realistic expectations, especially for the right ear, given her word recognition. Explained that there would still be limitations even with brand new hearing aids. If the patient does pursue new hearing aids, would recommend the addition of a remote microphone. Reviewed good communication strategies and provided a handout on these to the patient and her daughters. Also discussed using closed captioning on the TV and considering a [employer] phone.      ASSESSMENT: Bilateral sensorineural hearing loss. Compared to the patient's previous audiogram dated 5/30/2019, thresholds have worsened by 10-15 dB at 500-4000 Hz bilaterally. Word recognition scores are stable for the left ear and significantly poorer for the right ear (68% to 20%). Today s results were discussed with the patient in detail.     Hearing aid check completed. Changes made as outlined above.     PLAN: The patient will return for hearing aid follow-up as needed. Repeat audiologic evaluation is recommended if changes are noted. Please call this clinic with questions regarding these results or recommendations.      Lavern Aceves, [facility]  Licensed Audiologist  [place] #[id number]

## 2022-04-18 DIAGNOSIS — I10 ESSENTIAL HYPERTENSION, BENIGN: ICD-10-CM

## 2022-04-18 DIAGNOSIS — E78.5 HYPERLIPIDEMIA, UNSPECIFIED HYPERLIPIDEMIA TYPE: ICD-10-CM

## 2022-04-18 NOTE — TELEPHONE ENCOUNTER
[facility] Call Center    Phone Message    May a detailed message be left on voicemail: yes     Reason for Call: Medication Refill Request    Has the patient contacted the pharmacy for the refill? Yes   Name of medication being requested: hydroCHLOROthiazide 25 mg   Provider who prescribed the medication: Dr De La Garza  Pharmacy:    [address]  Date medication is needed: ASAP    Pt gets it mailed out and the patient has only 5 days left      Action Taken: Message routed to:  [facility] ([facility] PCC    Travel Screening: Not Applicable

## 2022-04-20 RX ORDER — HYDROCHLOROTHIAZIDE 25 MG/1
25 TABLET ORAL DAILY
Qty: 90 TABLET | Refills: 0 | Status: SHIPPED | OUTPATIENT
Start: 2022-04-20 | End: 2022-07-14

## 2022-04-20 NOTE — TELEPHONE ENCOUNTER
LCV: 11/1/2021  [facility] [place]  BP above protocol:addressed in last clinic note  RF 90 day

## 2022-05-02 ENCOUNTER — MYC MEDICAL ADVICE (OUTPATIENT)
Dept: INTERNAL MEDICINE | Facility: CLINIC | Age: 87
End: 2022-05-02
Payer: MEDICARE

## 2022-05-02 ENCOUNTER — TRANSCRIBE ORDERS (OUTPATIENT)
Dept: OTHER | Age: 87
End: 2022-05-02
Payer: MEDICARE

## 2022-05-02 DIAGNOSIS — N63.0 LUMP OR MASS IN BREAST: Primary | ICD-10-CM

## 2022-05-03 ENCOUNTER — TELEPHONE (OUTPATIENT)
Dept: INTERNAL MEDICINE | Facility: CLINIC | Age: 87
End: 2022-05-03

## 2022-05-03 ENCOUNTER — IMMUNIZATION (OUTPATIENT)
Dept: NURSING | Facility: CLINIC | Age: 87
End: 2022-05-03
Payer: MEDICARE

## 2022-05-03 ENCOUNTER — PATIENT OUTREACH (OUTPATIENT)
Dept: ONCOLOGY | Facility: CLINIC | Age: 87
End: 2022-05-03
Payer: MEDICARE

## 2022-05-03 DIAGNOSIS — N63.0 LUMP OR MASS IN BREAST: ICD-10-CM

## 2022-05-03 DIAGNOSIS — N64.4 BREAST PAIN: Primary | ICD-10-CM

## 2022-05-03 PROCEDURE — 91305 COVID-19,PF,PFIZER (12+ YRS): CPT

## 2022-05-03 PROCEDURE — 0054A COVID-19,PF,PFIZER (12+ YRS): CPT

## 2022-05-03 NOTE — TELEPHONE ENCOUNTER
----- Message from Janett Harvey RN sent at 5/3/2022  3:56 PM CDT -----  Regarding: Order for breast imaging please  Hi Dr. Can,  May I please ask you to place breast imaging order for this patient?  She will need bilateral diagnostic mammograms and ultrasound of left breast.   Thanks,  Janett

## 2022-05-03 NOTE — PROGRESS NOTES
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 She underwent lumpectomy and sentinel LN biopsy by Dr. Edgar Pacheco on 9/2/05. On the original diagnostic biopsy the tumor cells were ER 95% positive, IA positive, HER-2 negative by FISH with ratio of 1.5. 1 sentinel LN was negative and margins were clear. She received standard whole breast radiation under the care of Dr. Tony Neville at [facility]. She was treated with a 5 year course of Arimidex from 02/2006 - 02/2011. She saw both Dr. Schmidt and Dr. Lam.    Telephoned and spoke with Lake, a daughter of patient, and there is a new lump on the left breast visually detectable on the surface of the skin.  It is not eroding the surface of skin.  Daughter states this is approximately 1-2\" above the bra band. It is not associated with pain and no nipple discharge noted.  A photo was send via [employer] yesterday and is available in chart.    Records Location: See Bookmarked material     Additional testing needed prior to consult: Bilateral diagnostic mammogram and ultrasound of left breast.    Request for orders for bilateral diagnostic mammogram and left breast ultrasound sent by IB to pcp. Janett Harvey RN      "

## 2022-05-11 ENCOUNTER — ANCILLARY PROCEDURE (OUTPATIENT)
Dept: MAMMOGRAPHY | Facility: CLINIC | Age: 87
End: 2022-05-11
Attending: INTERNAL MEDICINE
Payer: MEDICARE

## 2022-05-11 DIAGNOSIS — N63.0 LUMP OR MASS IN BREAST: ICD-10-CM

## 2022-05-11 DIAGNOSIS — N64.4 BREAST PAIN: ICD-10-CM

## 2022-05-11 PROCEDURE — G0279 TOMOSYNTHESIS, MAMMO: HCPCS

## 2022-05-11 PROCEDURE — 77066 DX MAMMO INCL CAD BI: CPT

## 2022-05-13 NOTE — PROGRESS NOTES
Telephoned and spoke with patient's daughter Lake. Breast imaging performed on 5/11/22 recommended follow up with annual mammography.  If patient would like to be seen by an oncology provider she would be appropriate for our survivorship program.  Lake will check with her mother about this option.  Order for med onc will be cancelled for now, but can be reactivated if patient would like to pursue survivorship program. Janett Harvey RN

## 2022-06-02 ENCOUNTER — OFFICE VISIT (OUTPATIENT)
Dept: AUDIOLOGY | Facility: CLINIC | Age: 87
End: 2022-06-02
Payer: MEDICARE

## 2022-06-02 DIAGNOSIS — H90.3 SENSORINEURAL HEARING LOSS, BILATERAL: Primary | ICD-10-CM

## 2022-06-02 PROCEDURE — 92591 PR HEARING AID EXAM BINAURAL: CPT | Mod: GY | Performed by: AUDIOLOGIST

## 2022-06-02 NOTE — PROGRESS NOTES
AUDIOLOGY REPORT    SUBJECTIVE: Azeb Torres is a 89 year old female who was seen in the Audiology Clinic at [facility] on 6/2/2022 for hearing aid follow-up. She was accompanied by her two daughters. The patient has been seen previously in this clinic on 3/28/2022 for assessment, and results indicated moderate sloping to severe sensorineural hearing loss bilaterally. She was fit with bilateral Unitron Latitude 8 Moxi 3G hearing aids with custom earmolds on 8/29/2011. The receivers were changed, domes were added, and simulated real-ear measurements were performed to confirm both hearing aids were meeting NAL-NL1 targets at the visit on 3/28/2022. Discussed realistic expectations for the hearing aids, especially for the right ear, given her word recognition at that time. Also explained that there would still be limitations even with brand new hearing aids. Today, the patient reports feedback from the left hearing aid. Her daughters would like her to get new hearing aids.     OBJECTIVE: The patient is a hearing aid candidate. The patient would like to move forward with a hearing aid evaluation today. Therefore, the patient was presented with different options for amplification to help aid in communication. Discussed styles, levels of technology, monaural vs. binaural fitting, rechargeable options, and hearing aid accessories.     The hearing aids mutually chosen were:  BILATERAL: Zuleika Evol AI 2000 - R  COLOR: Beige  BATTERY SIZE: Rechargeable    Otoscopy revealed ears are clear of cerumen bilaterally. Bilateral earmold impressions were taken without incident.    ASSESSMENT: Reviewed purchase information and warranty information with the patient. The 45 day trial period was explained to the patient. The patient was given a copy of the [place] Department of Health consumer brochure on purchasing hearing instruments. Patient risk factors have been provided to the  patient in writing prior to the sale of the hearing aid per FDA regulation. The risk factors are also available in the User Instructional Booklet to be presented on the day of the hearing aid fitting. Hearing aids ordered. Hearing aid evaluation completed.    PLAN: Azeb is scheduled to return in 4-5 weeks for a hearing aid fitting and programming. Purchase agreement will be completed on that date. Please contact this clinic with any questions or concerns.      Lavern Aceves, [facility]  Licensed Audiologist  [place] #[id number]

## 2022-06-06 DIAGNOSIS — E78.5 HYPERLIPIDEMIA, UNSPECIFIED HYPERLIPIDEMIA TYPE: ICD-10-CM

## 2022-06-09 RX ORDER — ATORVASTATIN CALCIUM 10 MG/1
TABLET, FILM COATED ORAL
Qty: 90 TABLET | Refills: 0 | Status: SHIPPED | OUTPATIENT
Start: 2022-06-09 | End: 2022-09-15

## 2022-07-11 ENCOUNTER — OFFICE VISIT (OUTPATIENT)
Dept: AUDIOLOGY | Facility: CLINIC | Age: 87
End: 2022-07-11

## 2022-07-11 DIAGNOSIS — H90.3 SENSORINEURAL HEARING LOSS, BILATERAL: Primary | ICD-10-CM

## 2022-07-11 DIAGNOSIS — E78.5 HYPERLIPIDEMIA, UNSPECIFIED HYPERLIPIDEMIA TYPE: ICD-10-CM

## 2022-07-11 DIAGNOSIS — I10 ESSENTIAL HYPERTENSION, BENIGN: Primary | ICD-10-CM

## 2022-07-11 PROCEDURE — V5160 DISPENSING FEE BINAURAL: HCPCS | Performed by: AUDIOLOGIST

## 2022-07-11 PROCEDURE — V5260 HEARING AID, DIGIT, BIN, ITE: HCPCS | Performed by: AUDIOLOGIST

## 2022-07-11 PROCEDURE — V5011 HEARING AID FITTING/CHECKING: HCPCS | Performed by: AUDIOLOGIST

## 2022-07-11 PROCEDURE — V5020 CONFORMITY EVALUATION: HCPCS | Performed by: AUDIOLOGIST

## 2022-07-11 NOTE — PROGRESS NOTES
AUDIOLOGY REPORT    SUBJECTIVE: Azeb Torres is a 89 year old female who was seen in the Audiology Clinic at [facility] for a fitting of bilateral Zuleika Evolv AI 2000 - R hearing aids. She was accompanied by her daughter. Previous results have revealed moderate sloping to severe sensorineural hearing loss bilaterally. She is an experienced bilateral hearing aid user.    OBJECTIVE: The hearing aid conformity evaluation was completed.The hearing aids were placed and provided a good fit. Simulated real-ear measurements were completed on the Cloud[employer] system and were a good match to NAL-NL1 targets with soft sounds audible, moderate sounds comfortable, and loud sounds below discomfort. UCLs are verified through maximum power output measures and demonstrate appropriate limiting of loud inputs. Azeb was oriented to proper hearing aid use, care, cleaning (no water, dry brush), batteries (size: rechargeable, low-battery signal), aid insertion/removal, user booklet, warranty information, storage cases, and other hearing aid details. The patient had some trouble with insertion of the hearing aids today, especially for the right ear. Reviewed with her daughter who would like to practice with Azeb at home. Azeb reported good volume and sound quality today.   Hearing aids were programmed as follows:  Program 1: Normal  Program button and volume control were deactivated today.    EARS FIT: Bilateral  HEARING AID MODEL NAME: Zuleika Evolv AI 2000 - R  HEARING AID STYLE: Full shell in-the-ear  SERIAL NUMBERS: Right: [id number] Left: [id number]  WARRANTY END DATE: 7/9/2025    ASSESSMENT: Bilateral Zuleika Evolv AI 2000 - R hearing aids were fit today. Verification measures were performed. Azeb signed the Hearing Aid Purchase Agreement and was given a copy, as well as details on her hearing aids. The patient was counseled that exact out of pocket amounts cannot be determined for hearing  aid claims being sent to insurance. Any insurance coverage information presented to the patient is an estimate only, and is not a guarantee of payment. The patient has been advised to check with their own insurance.    PLAN: Azeb will return for follow-up in 3-4 weeks for a hearing aid review appointment. Please call this clinic with questions regarding today s appointment.    Lavern Aceves, [facility]  Licensed Audiologist  [place] #[id number]

## 2022-07-14 RX ORDER — HYDROCHLOROTHIAZIDE 25 MG/1
25 TABLET ORAL DAILY
Qty: 90 TABLET | Refills: 1 | Status: SHIPPED | OUTPATIENT
Start: 2022-07-14 | End: 2023-01-04

## 2022-08-30 ENCOUNTER — TELEPHONE (OUTPATIENT)
Dept: AUDIOLOGY | Facility: CLINIC | Age: 87
End: 2022-08-30

## 2022-08-30 NOTE — TELEPHONE ENCOUNTER
Walk-in hearing aid services on 8/30/22: The patient's daughter, Lake, brought her hearing aids to the clinic to be checked.  The patient and her family were concerned they were not working properly.  Examination found the wax filters were both plugged.  The hearing aids and  were cleaned and the wax filters were replaced.  Afterwards the hearing aids were found to be working normally.  The patient's daughter was shown how to replace the filters.  She was advised to change them every 3-4 weeks and contact the clinic when they need more.

## 2022-09-12 DIAGNOSIS — E78.5 HYPERLIPIDEMIA, UNSPECIFIED HYPERLIPIDEMIA TYPE: ICD-10-CM

## 2022-09-15 RX ORDER — ATORVASTATIN CALCIUM 10 MG/1
10 TABLET, FILM COATED ORAL DAILY
Qty: 90 TABLET | Refills: 0 | Status: SHIPPED | OUTPATIENT
Start: 2022-09-15 | End: 2022-12-13

## 2022-09-16 ENCOUNTER — IMMUNIZATION (OUTPATIENT)
Dept: NURSING | Facility: CLINIC | Age: 87
End: 2022-09-16
Payer: MEDICARE

## 2022-09-16 PROCEDURE — 0124A COVID-19,PF,PFIZER BOOSTER BIVALENT: CPT

## 2022-09-16 PROCEDURE — 91312 COVID-19,PF,PFIZER BOOSTER BIVALENT: CPT

## 2022-09-16 PROCEDURE — 90662 IIV NO PRSV INCREASED AG IM: CPT

## 2022-09-16 PROCEDURE — G0008 ADMIN INFLUENZA VIRUS VAC: HCPCS

## 2022-10-31 ENCOUNTER — TELEPHONE (OUTPATIENT)
Dept: AUDIOLOGY | Facility: CLINIC | Age: 87
End: 2022-10-31

## 2022-10-31 DIAGNOSIS — E03.4 HYPOTHYROIDISM DUE TO ACQUIRED ATROPHY OF THYROID: Primary | ICD-10-CM

## 2022-10-31 NOTE — TELEPHONE ENCOUNTER
Walk-in hearing aid services on 10/31/22: The patient's daughter brought her hearing aids to the clinic to be cleaned and checked.  Both hearing aids were cleaned and the wax filters were replaced bilaterally.  Both hearing aids were subsequently found to be working properly and were returned to the patient's daughter.

## 2022-11-05 ENCOUNTER — OFFICE VISIT (OUTPATIENT)
Dept: URGENT CARE | Facility: URGENT CARE | Age: 87
End: 2022-11-05
Payer: MEDICARE

## 2022-11-05 ENCOUNTER — NURSE TRIAGE (OUTPATIENT)
Dept: NURSING | Facility: CLINIC | Age: 87
End: 2022-11-05

## 2022-11-05 VITALS
SYSTOLIC BLOOD PRESSURE: 189 MMHG | OXYGEN SATURATION: 96 % | DIASTOLIC BLOOD PRESSURE: 77 MMHG | TEMPERATURE: 96.9 F | HEART RATE: 74 BPM

## 2022-11-05 DIAGNOSIS — L03.032 CELLULITIS OF TOE OF LEFT FOOT: Primary | ICD-10-CM

## 2022-11-05 PROCEDURE — 99213 OFFICE O/P EST LOW 20 MIN: CPT | Performed by: PHYSICIAN ASSISTANT

## 2022-11-05 RX ORDER — CEPHALEXIN 500 MG/1
500 CAPSULE ORAL 3 TIMES DAILY
Qty: 21 CAPSULE | Refills: 0 | Status: SHIPPED | OUTPATIENT
Start: 2022-11-05 | End: 2022-11-09

## 2022-11-05 NOTE — TELEPHONE ENCOUNTER
Pt gave verbal consent to speak with daughter Evelina Resendez about her medical care     Pt is having left foot middle toe redness and looks like it has puss in it     Pt has been soaking in epsom salt - with no relief     Rates pain 8/10    No fever     Pt states that she has a red area moving down from toe     Pt states that the discomfort started in her toe nail of the left middle toe, but now the whole toe is red     Per Disposition: See HCP Within 4 Hours (Or PCP Triage)    Pt will be going to [facility] now for evaluation     Care advice given per protocol and when to call back. Pt verbalized understanding and agrees to plan of care.    Kristy Smallwood RN  [place] Nurse Advisor  9:02 AM 11/5/2022      COVID 19 Nurse Triage Plan/Patient Instructions    Please be aware that novel coronavirus (COVID-19) may be circulating in the community. If you develop symptoms such as fever, cough, or SOB or if you have concerns about the presence of another infection including coronavirus (COVID-19), please contact your health care provider or visit [email or web address].     Disposition/Instructions    In-Person Visit with provider recommended. Reference Visit Selection Guide.    Thank you for taking steps to prevent the spread of this virus.  o Limit your contact with others.  o Wear a simple mask to cover your cough.  o Wash your hands well and often.    Resources    [facility]: About COVID-19: [email or web address]    CDC: What to Do If You're Sick: www.cdc.gov/coronavirus/2019-ncov/about/steps-when-sick.html    CDC: Ending Home Isolation: www.cdc.gov/coronavirus/2019-ncov/hcp/disposition-in-home-patients.html     CDC: Caring for Someone: www.cdc.gov/coronavirus/2019-ncov/if-you-are-sick/care-for-someone.html     [facility]: Interim Guidance for Hospital Discharge to Home: www.health.[facility].[place]/diseases/coronavirus/hcp/hospdischarge.pdf    [facility] clinical trials (COVID-19 research studies):  [email or web address].[email or web address]-clinical-trials     Below are the COVID-19 hotlines at the [place] Department of Health ([facility]). Interpreters are available.   o For health questions: Call 509-535-0971 or 1-169.647.3135 (7 a.m. to 7 p.m.)  o For questions about schools and childcare: Call 352-142-4742 or 1-796.626.3513 (7 a.m. to 7 p.m.)                       Reason for Disposition    [1] SEVERE pain (e.g., excruciating, unable to do any normal activities) AND [2] not improved after 2 hours of pain medicine    Additional Information    Negative: Followed a toe injury    Negative: Wound looks infected    Negative: Caused by an animal bite    Negative: Caused by frostbite    Negative: Caused by an ingrown toenail    Negative: Athlete's Foot suspected (i.e., itchy red rash in web space between toes)    Negative: Foot pain is main symptom    Negative: Foot is cool or blue in comparison to other foot    Negative: Purple or black skin on toe  (Exception: simple recalled injury with bruise)    Negative: [1] Looks infected (spreading redness, red streak, pus) AND [2] fever    Negative: Patient sounds very sick or weak to the triager    Protocols used: TOE PAIN-A-AH

## 2022-11-05 NOTE — PATIENT INSTRUCTIONS
Patient was educated on the natural course of condition. Take medication as prescribed. Side effects discussed.  Conservative measures discussed including over-the-counter analgesics (Tylenol). Observe carefully for any signs of increased redness or pain in the affected area. See your primary care provider if symptoms worsen or do not improve in 3 days. Seek emergency care if you develop severe pain, streaking, or fever.

## 2022-11-05 NOTE — PROGRESS NOTES
URGENT CARE VISIT:    SUBJECTIVE:   Chief Complaint   Patient presents with     Nail Problem     1 week. Worst last 3 days. Pain is worse when walking.Between Left middle and index toe. Seen green pus in this area. Used epsom  salt soaks for treatment.      Azeb Torres is a 89 year old female who presents with a chief complaint of in between left middle and index toe  pain, swelling and redness. Symptoms began 3 day(s) ago, are moderate and gradual onset. No known injury. Pain exacerbated by movement. Relieved by rest.  She treated it initially with epson salt soaks. This is the first time this type of injury has occurred to this patient.     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 Mild-moderate. Moderate TR     Tricuspid regurgitation 4/24/2013     Allergies: Patient has no known allergies.   Medications:   Current Outpatient Medications   Medication Sig Dispense Refill     cephALEXin (KEFLEX) 500 MG capsule Take 1 capsule (500 mg) by mouth 3 times daily for 7 days 21 capsule 0     acetaminophen (TYLENOL) 500 MG tablet Take 2 tablets by mouth 2 times daily.       atorvastatin (LIPITOR) 10 MG tablet Take 1 tablet (10 mg) by mouth daily CALL CLINIC TO SCHEDULE NEEDED LABS - APPOINTMENT DUE IN NOVEMBER - PLEASE SCHEDULE. 90 tablet 0     cholecalciferol (VITAMIN D) 1000 UNIT tablet Take 1 tablet by mouth every morning        clopidogrel (PLAVIX) 75 MG tablet Take 1 tablet (75 mg) by mouth daily 90 tablet 3     desonide (DESOWEN) 0.05 % external ointment APPLY TOPICALLY TWO TIMES A DAY 60 g 3     dorzolamide-timolol (COSOPT) 2-0.5 % ophthalmic solution  Place 1 drop into both eyes 2 times daily Please call [phone] for appointment with Dr. Mendez or Dr. Garcia for further refills 1 Bottle 5     fluocinonide (LIDEX) 0.05 % external ointment Apply topically 2 times daily 30 g 3     hydrochlorothiazide (HYDRODIURIL) 25 MG tablet Take 1 tablet (25 mg) by mouth daily 90 tablet 1     latanoprost (XALATAN) 0.005 % ophthalmic solution Place 1 drop into both eyes At Bedtime 2.5 mL 5     levothyroxine (SYNTHROID/LEVOTHROID) 25 MCG tablet Take 1 tablet (25 mcg) by mouth daily 90 tablet 3     lidocaine (LIDODERM) 5 % patch Place 1 patch onto the skin every 24 hours Place on buttock pain. To prevent lidocaine toxicity, patient should be patch free for 12 hrs daily. 30 patch 2     ondansetron (ZOFRAN-ODT) 4 MG ODT tab Place 1 tablet (4 mg) under the tongue every 8 hours as needed for nausea 30 tablet 1     Social History:   Social History     Tobacco Use     Smoking status: Never     Smokeless tobacco: Never   Substance Use Topics     Alcohol use: No     Comment: 0       ROS:  Review of systems negative except as stated above.    OBJECTIVE:  BP (!) 189/77   Pulse 74   Temp 96.9  F (36.1  C) (Temporal)   SpO2 96%   GENERAL APPEARANCE: healthy, alert and no distress  MUSCULOSKELETAL: moderate TTP in interdigital left 2nd and 3rd toe and in plantar aspect of 3rd toe. FROM toes  EXTREMITIES: peripheral pulses normal  SKIN: moderate erythema and edema in interdigital left 2nd and 3rd toe and in lateral/plantar aspect of 3rd toe.   NEURO: sensation intact         ASSESSMENT:    ICD-10-CM    1. Cellulitis of toe of left foot  L03.032 cephALEXin (KEFLEX) 500 MG capsule          PLAN:  Patient Instructions   Patient was educated on the natural course of condition. Take medication as prescribed. Side effects discussed.  Conservative measures discussed including over-the-counter analgesics (Tylenol). Observe carefully for any signs of increased redness or pain in the affected  area. See your primary care provider if symptoms worsen or do not improve in 3 days. Seek emergency care if you develop severe pain, streaking, or fever.    Patient verbalized understanding and is agreeable to plan. The patient was discharged ambulatory and in stable condition.    Melissa Maharaj PA-C on 11/5/2022 at 11:56 AM

## 2022-11-07 RX ORDER — LEVOTHYROXINE SODIUM 25 UG/1
25 TABLET ORAL DAILY
Qty: 90 TABLET | Refills: 3 | Status: SHIPPED | OUTPATIENT
Start: 2022-11-07 | End: 2023-12-07

## 2022-11-07 NOTE — TELEPHONE ENCOUNTER
Last Clinic Visit: [facility] Internal Medicine 11/1/22  [place]: 11/11/22  LEVOTHYROXINE 25 MCG #90, 3 refills

## 2022-11-10 NOTE — TELEPHONE ENCOUNTER
----- Message from Sully Hernandez sent at 12/5/2017  1:48 PM CST -----  Regarding: Does pt need to have an appt, for a blue tooth to be ordered?  Contact: [phone]  Emilio Mclaughlin,    Pts granddaughter, Yris, wanted to know if pt needs to make an appt and come in, if they want to order the blue tooth product that helps her hear the tv?  Please follow up with Yris at [phone].  Thank you!    Kind regards,  Sully    Please DO NOT send this message and/or reply back to sender. Call Center Representatives DO NOT respond to messages.    
Left voicemail for Yris stating that unfortunately, her hearing aids are too old to work with the  specific bluetooth technology.  Left name of [facility] which [place] as they may be able to find some assistive device non- specific to help her hear the TV better.  Left clinic number for her to call back if she had additional questions.      Kylie Pond  Audiologist  [place] License  #[id number]    
4 = No assist / stand by assistance

## 2022-11-11 ENCOUNTER — LAB (OUTPATIENT)
Dept: LAB | Facility: CLINIC | Age: 87
End: 2022-11-11
Payer: MEDICARE

## 2022-11-11 ENCOUNTER — OFFICE VISIT (OUTPATIENT)
Dept: INTERNAL MEDICINE | Facility: CLINIC | Age: 87
End: 2022-11-11
Payer: MEDICARE

## 2022-11-11 VITALS
HEART RATE: 99 BPM | OXYGEN SATURATION: 95 % | DIASTOLIC BLOOD PRESSURE: 76 MMHG | WEIGHT: 181.6 LBS | RESPIRATION RATE: 16 BRPM | SYSTOLIC BLOOD PRESSURE: 138 MMHG | TEMPERATURE: 97.6 F | BODY MASS INDEX: 33.42 KG/M2 | HEIGHT: 62 IN

## 2022-11-11 DIAGNOSIS — E83.52 HYPERCALCEMIA: ICD-10-CM

## 2022-11-11 DIAGNOSIS — I10 ESSENTIAL HYPERTENSION, BENIGN: ICD-10-CM

## 2022-11-11 DIAGNOSIS — L03.032 PARONYCHIA OF TOE, LEFT: ICD-10-CM

## 2022-11-11 DIAGNOSIS — L60.0 INGROWN NAIL: Primary | ICD-10-CM

## 2022-11-11 DIAGNOSIS — E03.4 HYPOTHYROIDISM DUE TO ACQUIRED ATROPHY OF THYROID: ICD-10-CM

## 2022-11-11 DIAGNOSIS — I49.9 IRREGULAR HEART BEAT: ICD-10-CM

## 2022-11-11 DIAGNOSIS — K59.00 CONSTIPATION, UNSPECIFIED CONSTIPATION TYPE: ICD-10-CM

## 2022-11-11 LAB
ALBUMIN SERPL BCG-MCNC: 4.4 G/DL (ref 3.5–5.2)
ALP SERPL-CCNC: 80 U/L (ref 35–104)
ALT SERPL W P-5'-P-CCNC: <5 U/L (ref 10–35)
ANION GAP SERPL CALCULATED.3IONS-SCNC: 12 MMOL/L (ref 7–15)
AST SERPL W P-5'-P-CCNC: 19 U/L (ref 10–35)
BILIRUB SERPL-MCNC: 1.1 MG/DL
BUN SERPL-MCNC: 35.6 MG/DL (ref 8–23)
CALCIUM SERPL-MCNC: 10.9 MG/DL (ref 8.2–9.6)
CHLORIDE SERPL-SCNC: 104 MMOL/L (ref 98–107)
CREAT SERPL-MCNC: 1.03 MG/DL (ref 0.51–0.95)
DEPRECATED HCO3 PLAS-SCNC: 26 MMOL/L (ref 22–29)
ERYTHROCYTE [DISTWIDTH] IN BLOOD BY AUTOMATED COUNT: 13.8 % (ref 10–15)
GFR SERPL CREATININE-BSD FRML MDRD: 49 ML/MIN/1.73M2
GLUCOSE SERPL-MCNC: 110 MG/DL (ref 70–99)
HCT VFR BLD AUTO: 42.4 % (ref 35–47)
HGB BLD-MCNC: 14.2 G/DL (ref 11.7–15.7)
MCH RBC QN AUTO: 31.7 PG (ref 26.5–33)
MCHC RBC AUTO-ENTMCNC: 33.5 G/DL (ref 31.5–36.5)
MCV RBC AUTO: 95 FL (ref 78–100)
PLATELET # BLD AUTO: 232 10E3/UL (ref 150–450)
POTASSIUM SERPL-SCNC: 4.2 MMOL/L (ref 3.4–5.3)
PROT SERPL-MCNC: 7.1 G/DL (ref 6.4–8.3)
RBC # BLD AUTO: 4.48 10E6/UL (ref 3.8–5.2)
SODIUM SERPL-SCNC: 142 MMOL/L (ref 136–145)
TSH SERPL DL<=0.005 MIU/L-ACNC: 3.46 UIU/ML (ref 0.3–4.2)
WBC # BLD AUTO: 8.4 10E3/UL (ref 4–11)

## 2022-11-11 PROCEDURE — 93000 ELECTROCARDIOGRAM COMPLETE: CPT | Performed by: INTERNAL MEDICINE

## 2022-11-11 PROCEDURE — 82306 VITAMIN D 25 HYDROXY: CPT | Performed by: INTERNAL MEDICINE

## 2022-11-11 PROCEDURE — 80053 COMPREHEN METABOLIC PANEL: CPT | Performed by: PATHOLOGY

## 2022-11-11 PROCEDURE — 36415 COLL VENOUS BLD VENIPUNCTURE: CPT | Performed by: PATHOLOGY

## 2022-11-11 PROCEDURE — 84443 ASSAY THYROID STIM HORMONE: CPT | Performed by: INTERNAL MEDICINE

## 2022-11-11 PROCEDURE — 85027 COMPLETE CBC AUTOMATED: CPT | Performed by: PATHOLOGY

## 2022-11-11 PROCEDURE — 99214 OFFICE O/P EST MOD 30 MIN: CPT | Mod: 25 | Performed by: INTERNAL MEDICINE

## 2022-11-11 RX ORDER — AMOXICILLIN 250 MG
1-2 CAPSULE ORAL DAILY
Qty: 100 TABLET | Refills: 3 | Status: SHIPPED | OUTPATIENT
Start: 2022-11-11

## 2022-11-11 NOTE — PROGRESS NOTES
History of Present Illness:  Ms. Torres is a 89 year old female who presents for  Chief Complaint   Patient presents with     Infection     The patient's daughter reports history of one week of antibiotics       Toe Injury         L 3rd toe painful, went to , got ceflex and took for 1 week  Have been soaking periodically    Lives with [place]  Generally well in last year, no falls, no hospitalizations  Uses walker for mobility  Has been working on getting new hearing aids  Goes up/down stairs 2x/day  Reports constipation  Using APAP TID for aches/pains  Denies cardiopulmonary or urinary issues      Review of external notes as documented above                   A detailed Review of Systems was performed, verified and is negative except as documented in the HPI.  All health questionnaires were reviewed, verified and relevant information documented above.      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 Mild-moderate. Moderate TR     Tricuspid regurgitation 4/24/2013       Active Meds:  Current Outpatient Medications   Medication     acetaminophen (TYLENOL) 500 MG tablet     atorvastatin (LIPITOR) 10 MG tablet     cephALEXin (KEFLEX) 500 MG capsule     cholecalciferol (VITAMIN D) 1000 UNIT tablet     clopidogrel (PLAVIX) 75 MG tablet     desonide (DESOWEN) 0.05 % external ointment     dorzolamide-timolol (COSOPT) 2-0.5 % ophthalmic solution     fluocinonide (LIDEX) 0.05 % external ointment     hydrochlorothiazide (HYDRODIURIL) 25 MG tablet     latanoprost (XALATAN) 0.005 % ophthalmic solution     levothyroxine (SYNTHROID/LEVOTHROID) 25 MCG tablet  "    ondansetron (ZOFRAN-ODT) 4 MG ODT tab     No current facility-administered medications for this visit.        Allergies:  Reviewed, refer to EMR    Relevant Social History:  Social History     Tobacco Use     Smoking status: Never     Smokeless tobacco: Never   Substance Use Topics     Alcohol use: No     Comment: 0     Drug use: No        Physical Exam:  Vitals: /76 (BP Location: Right arm, Patient Position: Sitting, Cuff Size: Adult Large)   Pulse 99   Temp 97.6  F (36.4  C) (Oral)   Resp 16   Ht 1.575 m (5' 2\")   Wt 82.4 kg (181 lb 9.6 oz)   SpO2 95%   Breastfeeding No   BMI 33.22 kg/m    Constitutional: Alert, oriented, pleasant, no acute distress  Head: Normocephalic, atraumatic  Eyes: Extra-ocular movements intact, pupils equally round bilaterally, no scleral icterus  ENT: Oropharynx clear, moist mucus membranes, good dentition  Neck: Supple, no lymphadenopathy  Cardiovascular: Regular rate and rhythm, ectopy noted, no murmurs, rubs or gallops, peripheral pulses full/symmetric  Respiratory: Good air movement bilaterally, lungs clear, no wheezes/rales/rhonchi  GI: Abdomen soft, bowel sounds present, nondistended, nontender, no organomegaly or masses, no rebound/guarding  Musculoskeletal: No edema, ambulates with walker  Neurologic: Alert and oriented, cranial nerves 2-12 intact, grossly non-focal  Skin: No rashes/lesions, L 3rd toenail ingrown with mild erythema, no fluctuance/drainage, callous noted on plantar toe  Psychiatric: normal mentation, affect and mood      Diagnostics:  Labs reviewed in Epic    EKG: sinus, 1st AVB, PACs, no acute changes otherwise          Assessment and Plan:  Azeb was seen today for infection and toe injury. Suspect paronychia was appropriately treated but ingrown and callous should be taken care of to prevent recurrent. Podiatry referral placed.    Labs revealed hypercalcemia. Will request patient return for further labs.    Diagnoses and all orders for this " visit:    Ingrown nail  -     Orthopedic  Referral; Future    Paronychia of toe, left  -     Orthopedic  Referral; Future    Irregular heart beat  -     EKG Performed in Clinic w/ Provider Reading Fee    Constipation, unspecified constipation type  -     senna-docusate (SENOKOT-S/PERICOLACE) 8.6-50 MG tablet; Take 1-2 tablets by mouth daily    Hypothyroidism due to acquired atrophy of thyroid  -     TSH with free T4 reflex; Future    Essential hypertension, benign  -     Comprehensive metabolic panel; Future  -     CBC with platelets; Future    Hypercalcemia  -     Vitamin D Deficiency; Future  -     Parathyroid Hormone Intact; Future  -     Basic metabolic panel **(2 WKS); Future  -     Ionized Calcium; Future            Evelina Can MD  Internal Medicine    >30 minutes spent today performing chart review, history and exam, counseling, care coordination, documentation and further activities as noted above exclusive of any procedures or EKG interpretation

## 2022-11-12 ENCOUNTER — MYC MEDICAL ADVICE (OUTPATIENT)
Dept: INTERNAL MEDICINE | Facility: CLINIC | Age: 87
End: 2022-11-12

## 2022-11-12 LAB
ATRIAL RATE - MUSE: 96 BPM
DIASTOLIC BLOOD PRESSURE - MUSE: NORMAL MMHG
INTERPRETATION ECG - MUSE: NORMAL
P AXIS - MUSE: -1 DEGREES
PR INTERVAL - MUSE: 280 MS
QRS DURATION - MUSE: 86 MS
QT - MUSE: 358 MS
QTC - MUSE: 452 MS
R AXIS - MUSE: -29 DEGREES
SYSTOLIC BLOOD PRESSURE - MUSE: NORMAL MMHG
T AXIS - MUSE: 27 DEGREES
VENTRICULAR RATE- MUSE: 96 BPM

## 2022-11-13 NOTE — TELEPHONE ENCOUNTER
DIAGNOSIS: Ingrown nail, Paronychia of toe, left   APPOINTMENT DATE: 12.7.22   NOTES STATUS DETAILS   OFFICE NOTE from other specialist Internal 11.11.22 Dr Evelina Can, [facility]   MEDICATION LIST Internal    LABS     CBC/DIFF Internal

## 2022-11-14 LAB — DEPRECATED CALCIDIOL+CALCIFEROL SERPL-MC: 62 UG/L (ref 20–75)

## 2022-11-28 DIAGNOSIS — Z86.73 HISTORY OF CVA (CEREBROVASCULAR ACCIDENT): ICD-10-CM

## 2022-11-29 RX ORDER — CLOPIDOGREL BISULFATE 75 MG/1
75 TABLET ORAL DAILY
Qty: 90 TABLET | Refills: 3 | Status: SHIPPED | OUTPATIENT
Start: 2022-11-29 | End: 2023-12-01

## 2022-11-29 NOTE — TELEPHONE ENCOUNTER
clopidogrel (PLAVIX) 75 MG tablet    Plavix Passed   11/11/2022  [facility] Internal Medicine [place]   Evelina Can MD  Internal Medicine

## 2022-12-07 ENCOUNTER — PRE VISIT (OUTPATIENT)
Dept: ORTHOPEDICS | Facility: CLINIC | Age: 87
End: 2022-12-07

## 2022-12-11 ENCOUNTER — HEALTH MAINTENANCE LETTER (OUTPATIENT)
Age: 87
End: 2022-12-11

## 2022-12-12 DIAGNOSIS — E78.5 HYPERLIPIDEMIA, UNSPECIFIED HYPERLIPIDEMIA TYPE: ICD-10-CM

## 2022-12-13 NOTE — TELEPHONE ENCOUNTER
atorvastatin (LIPITOR) 10 MG tablet       Last Written Prescription Date:  09-  Last Fill Quantity: 90,   # refills: 0  Last Office Visit : 11-  Future Office visit:  Not on file    Routing refill request to provider for review/approval because:  Statins Protocol Failed 12/12/2022 09:24 AM   Protocol Details  LDL on file in past 12 months     Lab Test 06/21/21  0835   LDL 51

## 2022-12-14 RX ORDER — ATORVASTATIN CALCIUM 10 MG/1
10 TABLET, FILM COATED ORAL DAILY
Qty: 90 TABLET | Refills: 0 | Status: SHIPPED | OUTPATIENT
Start: 2022-12-14 | End: 2023-03-16

## 2023-01-03 DIAGNOSIS — I10 ESSENTIAL HYPERTENSION, BENIGN: ICD-10-CM

## 2023-01-03 DIAGNOSIS — E78.5 HYPERLIPIDEMIA, UNSPECIFIED HYPERLIPIDEMIA TYPE: ICD-10-CM

## 2023-01-04 NOTE — TELEPHONE ENCOUNTER
Dr. Can had wanted pt to have creatinine and labs rechecked for medication management. Clinic coordinators routed to assist pt with scheduling lab for further refills.     ROMAINE DUNN RN on 1/4/2023 at 3:06 PM

## 2023-01-04 NOTE — TELEPHONE ENCOUNTER
HYDROCHLOROTHIAZIDE 25MG TABS      Last Written Prescription Date:  7/14/22  Last Fill Quantity: 90,   # refills: 1  Last Office Visit : 11/11/22  Future Office visit:  none    Routing refill request to provider for review/approval because:  Abnormal lab: creatinine

## 2023-01-05 RX ORDER — HYDROCHLOROTHIAZIDE 25 MG/1
TABLET ORAL
Qty: 90 TABLET | Refills: 0 | Status: SHIPPED | OUTPATIENT
Start: 2023-01-05 | End: 2023-04-21

## 2023-01-24 ENCOUNTER — TELEPHONE (OUTPATIENT)
Dept: AUDIOLOGY | Facility: CLINIC | Age: 88
End: 2023-01-24
Payer: MEDICARE

## 2023-01-24 NOTE — TELEPHONE ENCOUNTER
Walk-in hearing aid services on 1/24/23: The patient's daughter brought her hearing aids to the clinic to be cleaned.  Both hearing aids were cleaned and the wax filters and microphone filters were replaced bilaterally.  Afterwards the hearing aids were found to be working normally and were returned to the patient's daughter along with a pack of wax filters.

## 2023-03-01 ENCOUNTER — MYC MEDICAL ADVICE (OUTPATIENT)
Dept: INTERNAL MEDICINE | Facility: CLINIC | Age: 88
End: 2023-03-01
Payer: MEDICARE

## 2023-03-13 DIAGNOSIS — E78.5 HYPERLIPIDEMIA, UNSPECIFIED HYPERLIPIDEMIA TYPE: ICD-10-CM

## 2023-03-16 RX ORDER — ATORVASTATIN CALCIUM 10 MG/1
10 TABLET, FILM COATED ORAL DAILY
Qty: 90 TABLET | Refills: 0 | Status: SHIPPED | OUTPATIENT
Start: 2023-03-16 | End: 2023-06-06

## 2023-03-16 NOTE — TELEPHONE ENCOUNTER
Pt was last seen in clinic on 11/11/22. Pt last had atorvastatin (LIPITOR) 10 MG tablet refilled on 12/14/22 for a 90 day supply by PCP. Due for refill 3/14/23. Medication refill sent to pharmacy. LDL ordered, MyChart sent to patient to inform overdue for labs.     ROMAINE DUNN RN on 3/16/2023 at 10:34 AM

## 2023-03-16 NOTE — TELEPHONE ENCOUNTER
ATORVASTATIN CALCIUM 10MG TABS      Last Written Prescription Date:  12/14/22  Last Fill Quantity: 90,   # refills: 0  Last Office Visit : 11/11/22  Future Office visit:  none    Routing refill request to provider for review/approval because:  Overdue for LDL  Already given 90d sanjuana refill

## 2023-04-12 ENCOUNTER — TELEPHONE (OUTPATIENT)
Dept: AUDIOLOGY | Facility: CLINIC | Age: 88
End: 2023-04-12
Payer: MEDICARE

## 2023-04-12 NOTE — TELEPHONE ENCOUNTER
Walk-in hearing aid services on 4/12/23: The patient's daughter brought her hearing aids to the clinic for cleaning.  Both hearing aids were deep cleaned and the wax filters were replaced.  Afterwards both devices were found to be working properly and were returned to the patient's daughter.

## 2023-04-19 DIAGNOSIS — E78.5 HYPERLIPIDEMIA, UNSPECIFIED HYPERLIPIDEMIA TYPE: ICD-10-CM

## 2023-04-19 DIAGNOSIS — I10 ESSENTIAL HYPERTENSION, BENIGN: ICD-10-CM

## 2023-04-21 RX ORDER — HYDROCHLOROTHIAZIDE 25 MG/1
TABLET ORAL
Qty: 90 TABLET | Refills: 0 | Status: SHIPPED | OUTPATIENT
Start: 2023-04-21 | End: 2023-07-27

## 2023-04-21 NOTE — TELEPHONE ENCOUNTER
Pt was last seen in clinic on 11/11/22. Pt last had hydrochlorothiazide (HYDRODIURIL) 25 MG tablet refilled on 1/5/23 for a 90 day supply by PCP. Due for refill 4/5/23. Medication refill sent to pharmacy.     ROMAINE DUNN RN on 4/21/2023 at 2:06 PM

## 2023-04-21 NOTE — TELEPHONE ENCOUNTER
HYDROCHLOROTHIAZIDE 25MG TABS      Last Written Prescription Date:  1/5/23  Last Fill Quantity: 90,   # refills: 0  Last Office Visit : 11/11/22  Future Office visit:  NONE    Routing refill request to provider for review/approval because:    Abnormal creatinine

## 2023-05-18 ENCOUNTER — IMMUNIZATION (OUTPATIENT)
Dept: NURSING | Facility: CLINIC | Age: 88
End: 2023-05-18
Payer: MEDICARE

## 2023-05-18 PROCEDURE — 0134A COVID-19 BIVALENT 18+ (MODERNA): CPT

## 2023-05-18 PROCEDURE — 91313 COVID-19 BIVALENT 18+ (MODERNA): CPT

## 2023-06-01 DIAGNOSIS — E78.5 HYPERLIPIDEMIA, UNSPECIFIED HYPERLIPIDEMIA TYPE: ICD-10-CM

## 2023-06-06 RX ORDER — ATORVASTATIN CALCIUM 10 MG/1
10 TABLET, FILM COATED ORAL DAILY
Qty: 90 TABLET | Refills: 0 | Status: SHIPPED | OUTPATIENT
Start: 2023-06-06 | End: 2023-09-09

## 2023-06-06 NOTE — TELEPHONE ENCOUNTER
ATORVASTATIN CALCIUM 10MG TABS  Last Written Prescription Date:   3/16/2023  Last Fill Quantity: 90,   # refills: 0  Last Office Visit :  11/11/2022  Future Office visit:  None    Routing refill request to provider for review/approval because:  Second Request  Pt needing updated LIPID Profile.   Last done June 2021.  Please call Pt to schedule and have order placed.     Recent Labs   Lab Test 06/21/21  0835   LDL 51       Amanda Foley RN  Central Triage Red Flags/Med Refills

## 2023-06-06 NOTE — TELEPHONE ENCOUNTER
Pt was last seen in clinic on 11/11/22. Pt last had atorvastatin (LIPITOR) 10 MG tablet refilled on 3/16/23 for a 90 day supply by PCP. Due for refill 6/14/23. Medication refill sent to pharmacy.   LDL already ordered, routed to clinical coordinators to assist with scheduling.     ROMAINE DUNN RN on 6/6/2023 at 3:17 PM

## 2023-07-24 DIAGNOSIS — E78.5 HYPERLIPIDEMIA, UNSPECIFIED HYPERLIPIDEMIA TYPE: ICD-10-CM

## 2023-07-24 DIAGNOSIS — E83.52 HYPERCALCEMIA: Primary | ICD-10-CM

## 2023-07-24 DIAGNOSIS — I10 ESSENTIAL HYPERTENSION, BENIGN: ICD-10-CM

## 2023-07-27 RX ORDER — HYDROCHLOROTHIAZIDE 25 MG/1
25 TABLET ORAL DAILY
Qty: 90 TABLET | Refills: 0 | Status: SHIPPED | OUTPATIENT
Start: 2023-07-27 | End: 2023-08-30

## 2023-07-27 NOTE — TELEPHONE ENCOUNTER
hydrochlorothiazide (HYDRODIURIL) 25 MG tablet     Last Written Prescription Date:  04-  Last Fill Quantity: 90,   # refills: 0  Last Office Visit : 11-  Future Office visit:  none    Diuretics (Including Combos) Protocol Failed 07/24/2023 09:53 AM   Protocol Details  Normal serum creatinine on file in past 12 months     Lab Test 11/11/22  1304   CR 1.03*

## 2023-07-27 NOTE — TELEPHONE ENCOUNTER
Pt was last seen in clinic on 11/11/22. Pt last had hydrochlorothiazide (HYDRODIURIL) 25 MG tablet  refilled on 4/21/23 for a 90 day supply by PCP. Due for refill. Medication refill sent to pharmacy, CMP ordered, routed to clinical coordinators to help schedule lab.    ROMAINE DUNN RN on 7/27/2023 at 12:22 PM

## 2023-07-28 ENCOUNTER — TELEPHONE (OUTPATIENT)
Dept: AUDIOLOGY | Facility: CLINIC | Age: 88
End: 2023-07-28
Payer: MEDICARE

## 2023-07-28 NOTE — TELEPHONE ENCOUNTER
Walk-in hearing aid services on 7/28/23: The patient's daughter brought her hearing aids to the clinic to be cleaned.  Both hearing aids were cleaned, the wax filters were replaced and the microphone ports were cleaned with suction.  Afterwards both hearing aids were found to be working properly and were returned to the patient's daughter.

## 2023-07-31 ENCOUNTER — TELEPHONE (OUTPATIENT)
Dept: INTERNAL MEDICINE | Facility: CLINIC | Age: 88
End: 2023-07-31
Payer: MEDICARE

## 2023-08-02 ENCOUNTER — TELEPHONE (OUTPATIENT)
Dept: INTERNAL MEDICINE | Facility: CLINIC | Age: 88
End: 2023-08-02

## 2023-08-02 ENCOUNTER — LAB (OUTPATIENT)
Dept: LAB | Facility: CLINIC | Age: 88
End: 2023-08-02
Payer: MEDICARE

## 2023-08-02 DIAGNOSIS — E78.5 HYPERLIPIDEMIA, UNSPECIFIED HYPERLIPIDEMIA TYPE: ICD-10-CM

## 2023-08-02 DIAGNOSIS — E03.4 HYPOTHYROIDISM DUE TO ACQUIRED ATROPHY OF THYROID: ICD-10-CM

## 2023-08-02 DIAGNOSIS — E83.52 HYPERCALCEMIA: ICD-10-CM

## 2023-08-02 DIAGNOSIS — I10 ESSENTIAL HYPERTENSION, BENIGN: ICD-10-CM

## 2023-08-02 DIAGNOSIS — E03.4 HYPOTHYROIDISM DUE TO ACQUIRED ATROPHY OF THYROID: Primary | ICD-10-CM

## 2023-08-02 LAB
ALBUMIN SERPL BCG-MCNC: 4.5 G/DL (ref 3.5–5.2)
ALP SERPL-CCNC: 80 U/L (ref 35–104)
ALT SERPL W P-5'-P-CCNC: 7 U/L (ref 0–50)
ANION GAP SERPL CALCULATED.3IONS-SCNC: 15 MMOL/L (ref 7–15)
AST SERPL W P-5'-P-CCNC: 20 U/L (ref 0–45)
BILIRUB SERPL-MCNC: 1.3 MG/DL
BUN SERPL-MCNC: 24.2 MG/DL (ref 8–23)
CALCIUM SERPL-MCNC: 11 MG/DL (ref 8.2–9.6)
CHLORIDE SERPL-SCNC: 104 MMOL/L (ref 98–107)
CHOLEST SERPL-MCNC: 147 MG/DL
CREAT SERPL-MCNC: 1.03 MG/DL (ref 0.51–0.95)
DEPRECATED HCO3 PLAS-SCNC: 24 MMOL/L (ref 22–29)
GFR SERPL CREATININE-BSD FRML MDRD: 49 ML/MIN/1.73M2
GLUCOSE SERPL-MCNC: 104 MG/DL (ref 70–99)
HDLC SERPL-MCNC: 48 MG/DL
LDLC SERPL CALC-MCNC: 67 MG/DL
NONHDLC SERPL-MCNC: 99 MG/DL
POTASSIUM SERPL-SCNC: 4.7 MMOL/L (ref 3.4–5.3)
PROT SERPL-MCNC: 7.3 G/DL (ref 6.4–8.3)
SODIUM SERPL-SCNC: 143 MMOL/L (ref 136–145)
TRIGL SERPL-MCNC: 158 MG/DL

## 2023-08-02 PROCEDURE — 84439 ASSAY OF FREE THYROXINE: CPT

## 2023-08-02 PROCEDURE — 80053 COMPREHEN METABOLIC PANEL: CPT

## 2023-08-02 PROCEDURE — 36415 COLL VENOUS BLD VENIPUNCTURE: CPT

## 2023-08-02 PROCEDURE — 80061 LIPID PANEL: CPT

## 2023-08-02 PROCEDURE — 84443 ASSAY THYROID STIM HORMONE: CPT

## 2023-08-02 PROCEDURE — 82306 VITAMIN D 25 HYDROXY: CPT

## 2023-08-02 NOTE — TELEPHONE ENCOUNTER
[facility] Call Center    Phone Message    May a detailed message be left on voicemail: yes     Reason for Call: Other: Daughter called and is asking if TSH can be drawn with labs from today, lab said they dixon an extra vial in case this was okayed by you. Please call if questions.        Action Taken: Message routed to:  [facility] P[facility]    Travel Screening: Not Applicable

## 2023-08-03 ENCOUNTER — TELEPHONE (OUTPATIENT)
Dept: INTERNAL MEDICINE | Facility: CLINIC | Age: 88
End: 2023-08-03
Payer: MEDICARE

## 2023-08-03 DIAGNOSIS — E83.52 HYPERCALCEMIA: Primary | ICD-10-CM

## 2023-08-03 DIAGNOSIS — E03.4 HYPOTHYROIDISM DUE TO ACQUIRED ATROPHY OF THYROID: ICD-10-CM

## 2023-08-03 LAB
T4 FREE SERPL-MCNC: 1.46 NG/DL (ref 0.9–1.7)
TSH SERPL DL<=0.005 MIU/L-ACNC: 4.22 UIU/ML (ref 0.3–4.2)

## 2023-08-03 NOTE — CONFIDENTIAL NOTE
Please call patient:    Her calcium was elevated on her labs.  This could be from her hydrochlorothiazide medication.  I would recommend that she discontinue it for a few weeks and then repeat labs in 3-4 weeks.    They can monitor BP in the interim and let me know if greater than 140/90.    Can set up virtual visit in 4 weeks to review.    Thanks,  Evelina Can MD  Internal Medicine

## 2023-08-03 NOTE — TELEPHONE ENCOUNTER
Please call patient:     Her calcium was elevated on her labs.  This could be from her hydrochlorothiazide medication.  I would recommend that she discontinue it for a few weeks and then repeat labs in 3-4 weeks.     They can monitor BP in the interim and let me know if greater than 140/90.     Can set up virtual visit in 4 weeks to review.     Thanks,  Evelina Can MD  Internal Medicine    RN called patient's daughter [place], and explained above results per Dr. Can. RN helped them schedule video appt for August 30th at 1:30pm. Pt and daughter report that they will go into [place] to have labs rechecked around 8/24. They are thinking they would like to have TSH checked as well, RN routed to Dr. Can to approve of the TSH add on with the labs as well. Encouraged patient's daughter to check BP in AM and PM if able, and any time that pt is symptomatic. RN encouraged that they could also check at any time that pt is symptomatic for either high or low BP as well.     ROMAINE DUNN RN on 8/3/2023 at 3:45 PM

## 2023-08-04 LAB — DEPRECATED CALCIDIOL+CALCIFEROL SERPL-MC: 82 UG/L (ref 20–75)

## 2023-08-10 ENCOUNTER — MYC MEDICAL ADVICE (OUTPATIENT)
Dept: INTERNAL MEDICINE | Facility: CLINIC | Age: 88
End: 2023-08-10
Payer: MEDICARE

## 2023-08-11 NOTE — CONFIDENTIAL NOTE
I would not restart the hydrochlorothiazide at this time. We should get labs checked for calcium again first.    I would suggest they can keep taking BP measurements on arm cuff (not wrist) a couple times a week and staying off the hydrochlorothiazide until the appointment at end of month.  Only need to contact us if above 150/90.  Thanks,  Evelina Can MD  Internal Medicine

## 2023-08-11 NOTE — TELEPHONE ENCOUNTER
[employer] message sent to patient regarding below message from Dr. Can.     ROMAINE DUNN RN on 8/11/2023 at 3:05 PM

## 2023-08-22 ENCOUNTER — LAB (OUTPATIENT)
Dept: LAB | Facility: CLINIC | Age: 88
End: 2023-08-22
Payer: MEDICARE

## 2023-08-22 DIAGNOSIS — E83.52 HYPERCALCEMIA: ICD-10-CM

## 2023-08-22 LAB — PTH-INTACT SERPL-MCNC: 51 PG/ML (ref 15–65)

## 2023-08-22 PROCEDURE — 36415 COLL VENOUS BLD VENIPUNCTURE: CPT

## 2023-08-22 PROCEDURE — 83970 ASSAY OF PARATHORMONE: CPT

## 2023-08-30 ENCOUNTER — VIRTUAL VISIT (OUTPATIENT)
Dept: INTERNAL MEDICINE | Facility: CLINIC | Age: 88
End: 2023-08-30
Payer: MEDICARE

## 2023-08-30 DIAGNOSIS — E83.52 HYPERCALCEMIA: Primary | ICD-10-CM

## 2023-08-30 DIAGNOSIS — I10 ESSENTIAL HYPERTENSION, BENIGN: ICD-10-CM

## 2023-08-30 DIAGNOSIS — E67.3 HYPERVITAMINOSIS D: ICD-10-CM

## 2023-08-30 PROCEDURE — 99214 OFFICE O/P EST MOD 30 MIN: CPT | Mod: 95 | Performed by: INTERNAL MEDICINE

## 2023-08-30 RX ORDER — AMLODIPINE BESYLATE 2.5 MG/1
2.5 TABLET ORAL DAILY
Qty: 90 TABLET | Refills: 1 | Status: SHIPPED | OUTPATIENT
Start: 2023-08-30

## 2023-08-30 NOTE — PROGRESS NOTES
"  Azeb Torres is a 89 year old female who is being evaluated via a billable video visit for the following health issues:    Chief Complaint   Patient presents with    RECHECK       HPI  Here to follow-up on labs and BP    Here on video with daughters:  Evelina Iqbal    Labs showed hypercalcemia and mild LUIS  Using Omron cuff, range is 120-150/60-80 at home  Vitamin D was quite high, taking 5000 daily currently    No aches/pain, got new hearing aids which helped, appetite okay, she does have some constipation                              Review of Systems   A detailed ROS was performed and was negative unless indicated in the HPI above.        Physical Exam   There were no vitals taken for this visit.    Wt Readings from Last 2 Encounters:   11/11/22 82.4 kg (181 lb 9.6 oz)   11/01/21 80.6 kg (177 lb 11.2 oz)      Ht Readings from Last 2 Encounters:   11/11/22 1.575 m (5' 2\")   11/01/21 1.575 m (5' 2\")     GENERAL: healthy, alert and no distress  HEAD: Normocephalic, atraumatic  EYES: Eyes grossly normal to inspection, EOMI and conjunctivae and sclerae normal  RESP: Speaking in full sentences, unlabored, no audible wheezes or cough  SKIN: no suspicious lesions or rashes, no jaundice  NEURO: oriented, and speech normal  PSYCH: mentation appears normal, affect normal/bright          Diagnostic Test Results:  Labs reviewed in Epic            Assessment and Plan:  Azeb was seen today for recheck.    Diagnoses and all orders for this visit:    Hypercalcemia  Likely related to hydrochlorothiazide +/-  hypervitaminosis D. Recent PTH normal, no new alarm symptoms. Advised repeat labs, lower vitamin D dose and trial of new BP med.  -     Basic metabolic panel  (Ca, Cl, CO2, Creat, Gluc, K, Na, BUN); Future  -     Ionized Calcium; Future    Hypervitaminosis D    Essential hypertension, benign  -     amLODIPine (NORVASC) 2.5 MG tablet; Take 1 tablet (2.5 mg) by mouth daily          Video-Visit Details    Type of service:  " Video Visit    Video Start/End Time: 1:34 PM 2:02 PM    Originating Location (pt. Location): Home    Distant Location (provider location):  OFF SITE    Mode of Communication:  Video Conference via  [employer] or  [employer]        Evelina Can MD  Internal Medicine      >30 minutes spent today performing chart review, history and exam, documentation and further activities as noted above.

## 2023-08-30 NOTE — PATIENT INSTRUCTIONS
Thank you for visiting the Primary Care Center today at the [facility]! The following is some information about our clinic:     Primary Care Center Frequently-Asked Questions    (1) How do I schedule appointments at the [facility]?     Primary Care--to schedule or make changes to an existing appointment, please call our primary care line at [phone].    Labs--to schedule a lab appointment at the [facility] you can use KlickThru or call [phone]. If you have a [place] location that is closer to home, you can reach out to that location for scheduling options.     Imaging--if you need to schedule a CT, X-ray, MRI, ultrasound, or other imaging study you can call [phone] to schedule at the [facility] or any other [facility] imaging location.     Referrals--if a referral to another specialty was ordered you can expect a phone call from their scheduling team. If you have not heard from them in a week, please call us or send us a KlickThru message to check the status or get a scheduling number. Please note that this only applies to internal [facility] referrals. If the referral is external you would need to contact their office for scheduling.     (2) I have a question about my visit, who do I contact?     You can call us at the primary care line at [phone] to ask questions about your visit. You can also send a secure message through KlickThru, which is reviewed by clinic staff. Please note that KlickThru messages have a twenty-four to forty-eight business hour turnaround time and should not be used for urgent concerns.    (3) How will I get the results of my tests?    If you are signed up for NGenTect all tests will be released to you within twenty-four hours of resulting. Please allow three to five days for your doctor to review your results and place a note interpreting the results. If you do not have Papirushart you will receive your  results through mail seven to ten business days following the return of the tests. Please note that if there should be any urgent or concerning results that your doctor or their registered nurse will reach out to you the same day as the tests come back. If you have follow up questions about your results or would like to discuss the results in detail please schedule a follow up with your provider either in person or virtually.     (4) How do I get refills of my prescriptions?     You should always first contact your pharmacy for refills of your medications. If submitting a refill request on Bloxy, please be sure to submit the request only once--repeat requests can cause delays in refill. If you are requesting a NEW medication or a medication related to new symptoms you will need to schedule an appointment with a provider prior to approval. Please note: Routine medication refills have up to one to three business day turnaround whereas controlled substances refills have up to five to seven business day turnaround.    (5) I have new symptoms, what do I do?     If you are having an immediate medical emergency, you should dial 911 for assistance.   For anything urgent that needs to be seen within a few hours to one day you should visit a local urgent care for assistance.  For non-urgent symptoms that need to be seen within a few days to a week you can schedule with an available provider in primary care by going to [facility] or calling [phone].   If you are not sure how serious your symptoms are or you would like to receive medical advice you can always call [phone] to speak with a triage nurse.

## 2023-08-30 NOTE — NURSING NOTE
Is the patient currently in the state of [place]? YES    Visit mode:VIDEO    If the visit is dropped, the patient can be reconnected by: VIDEO VISIT: Text to cell phone:   Telephone Information:   Mobile [phone]       Will anyone else be joining the visit? YES: How would they like to receive their invitation? Text to cell phone: [phone]  (If patient encounters technical issues they should call [phone])    How would you like to obtain your AVS? MyChart    Are changes needed to the allergy or medication list? Yes Remove Keflex    Reason for visit: RICARDA LINDSEY

## 2023-08-31 ENCOUNTER — LAB (OUTPATIENT)
Dept: LAB | Facility: CLINIC | Age: 88
End: 2023-08-31
Payer: MEDICARE

## 2023-08-31 DIAGNOSIS — E83.52 HYPERCALCEMIA: ICD-10-CM

## 2023-08-31 LAB
ANION GAP SERPL CALCULATED.3IONS-SCNC: 14 MMOL/L (ref 7–15)
BUN SERPL-MCNC: 26 MG/DL (ref 8–23)
CA-I BLD-MCNC: 5.3 MG/DL (ref 4.4–5.2)
CALCIUM SERPL-MCNC: 10.3 MG/DL (ref 8.2–9.6)
CHLORIDE SERPL-SCNC: 108 MMOL/L (ref 98–107)
CREAT SERPL-MCNC: 0.83 MG/DL (ref 0.51–0.95)
DEPRECATED HCO3 PLAS-SCNC: 20 MMOL/L (ref 22–29)
GFR SERPL CREATININE-BSD FRML MDRD: 63 ML/MIN/1.73M2
GLUCOSE SERPL-MCNC: 100 MG/DL (ref 70–99)
POTASSIUM SERPL-SCNC: 4 MMOL/L (ref 3.4–5.3)
SODIUM SERPL-SCNC: 142 MMOL/L (ref 136–145)

## 2023-08-31 PROCEDURE — 80048 BASIC METABOLIC PNL TOTAL CA: CPT

## 2023-08-31 PROCEDURE — 82330 ASSAY OF CALCIUM: CPT

## 2023-08-31 PROCEDURE — 36415 COLL VENOUS BLD VENIPUNCTURE: CPT

## 2023-09-06 DIAGNOSIS — E78.5 HYPERLIPIDEMIA, UNSPECIFIED HYPERLIPIDEMIA TYPE: ICD-10-CM

## 2023-09-09 RX ORDER — ATORVASTATIN CALCIUM 10 MG/1
10 TABLET, FILM COATED ORAL DAILY
Qty: 90 TABLET | Refills: 3 | Status: SHIPPED | OUTPATIENT
Start: 2023-09-09

## 2023-09-09 NOTE — TELEPHONE ENCOUNTER
atorvastatin (LIPITOR) 10 MG tablet   90 tablet 0 6/6/2023     90 tablet 0 6/6/2023 8/30/2023  [facility] Internal Medicine Evelina Arvizu MD  Internal Medicine

## 2023-10-20 ENCOUNTER — LAB (OUTPATIENT)
Dept: LAB | Facility: CLINIC | Age: 88
End: 2023-10-20
Payer: MEDICARE

## 2023-10-20 ENCOUNTER — TELEPHONE (OUTPATIENT)
Dept: INTERNAL MEDICINE | Facility: CLINIC | Age: 88
End: 2023-10-20

## 2023-10-20 DIAGNOSIS — R30.0 DYSURIA: Primary | ICD-10-CM

## 2023-10-20 DIAGNOSIS — R30.0 DYSURIA: ICD-10-CM

## 2023-10-20 LAB
ALBUMIN UR-MCNC: 100 MG/DL
APPEARANCE UR: ABNORMAL
BACTERIA #/AREA URNS HPF: ABNORMAL /HPF
BILIRUB UR QL STRIP: NEGATIVE
CAOX CRY #/AREA URNS HPF: ABNORMAL /HPF
COLOR UR AUTO: YELLOW
GLUCOSE UR STRIP-MCNC: NEGATIVE MG/DL
HGB UR QL STRIP: NEGATIVE
KETONES UR STRIP-MCNC: ABNORMAL MG/DL
LEUKOCYTE ESTERASE UR QL STRIP: ABNORMAL
NITRATE UR QL: POSITIVE
PH UR STRIP: 5.5 [PH] (ref 5–8)
RBC #/AREA URNS AUTO: ABNORMAL /HPF
SP GR UR STRIP: 1.02 (ref 1–1.03)
SQUAMOUS #/AREA URNS AUTO: ABNORMAL /LPF
UROBILINOGEN UR STRIP-ACNC: 1 E.U./DL
WBC #/AREA URNS AUTO: ABNORMAL /HPF
WBC CLUMPS #/AREA URNS HPF: PRESENT /HPF

## 2023-10-20 PROCEDURE — 87186 SC STD MICRODIL/AGAR DIL: CPT

## 2023-10-20 PROCEDURE — 81001 URINALYSIS AUTO W/SCOPE: CPT

## 2023-10-20 PROCEDURE — 87086 URINE CULTURE/COLONY COUNT: CPT

## 2023-10-20 RX ORDER — SULFAMETHOXAZOLE/TRIMETHOPRIM 800-160 MG
1 TABLET ORAL 2 TIMES DAILY
Qty: 10 TABLET | Refills: 0 | Status: SHIPPED | OUTPATIENT
Start: 2023-10-20 | End: 2023-10-25

## 2023-10-20 NOTE — TELEPHONE ENCOUNTER
RN left a voice message for Lake with Dr. Can' message that Bactrim was sent to [facility], and to seek care if symptoms don't improve.    Montse Ding RN on 10/20/2023 at 2:26 PM

## 2023-10-20 NOTE — CONFIDENTIAL NOTE
Looks like UTI. Please inform them I sent abx and if symptoms don't improve, to seek additional care.    Thanks,  Evelina Can MD  Internal Medicine

## 2023-10-20 NOTE — TELEPHONE ENCOUNTER
RN called patient's daughter, Lake, and let her know a U/A with reflex to culture was ordered for patient.  Lake will drop off a sample at the [facility] today.   RN also advised that aLke could do an e-visit through My Chart and ask for Dr. Can' advice after the U/A results are back.  Lake was agreeable to this plan.    Montse Ding RN on 10/20/2023 at 10:51 AM

## 2023-10-20 NOTE — TELEPHONE ENCOUNTER
[facility] Call Center    Phone Message    May a detailed message be left on voicemail: yes     Reason for Call: The patient daughter was calling because she thinks her mother may have a UTI and its making the dementia worse, they wanted know if they can get a Test ordered for UTI,  she'll  the supplies from the [facility] they wanted to have this handled today or asap, please review and follow up to address any questions or concerns thank you.    Action Taken: Message routed to:  [facility] ([facility]    Travel Screening: Not Applicable

## 2023-10-21 LAB — BACTERIA UR CULT: ABNORMAL

## 2023-10-24 ENCOUNTER — MYC MEDICAL ADVICE (OUTPATIENT)
Dept: INTERNAL MEDICINE | Facility: CLINIC | Age: 88
End: 2023-10-24
Payer: MEDICARE

## 2023-10-24 DIAGNOSIS — R30.0 DYSURIA: Primary | ICD-10-CM

## 2023-10-25 ENCOUNTER — MYC MEDICAL ADVICE (OUTPATIENT)
Dept: INTERNAL MEDICINE | Facility: CLINIC | Age: 88
End: 2023-10-25

## 2023-10-25 DIAGNOSIS — M62.81 GENERALIZED MUSCLE WEAKNESS: Primary | ICD-10-CM

## 2023-10-25 DIAGNOSIS — E83.52 HYPERCALCEMIA: ICD-10-CM

## 2023-10-25 DIAGNOSIS — M50.30 DEGENERATIVE DISC DISEASE, CERVICAL: ICD-10-CM

## 2023-10-26 NOTE — CONFIDENTIAL NOTE
Please call daughters:    Patient calcium is still a bit elevated. I thought it may be related to her hydrochlorothiazide but she has since stopped this and it's still elevated.    I would typically recommend that we consider some additional labs to further evaluate.    I am going to place lab orders so perhaps they could get them done sometime in the next month or two?      Thanks,  Evelina Can MD  Internal Medicine

## 2023-10-26 NOTE — TELEPHONE ENCOUNTER
RN faxed PT order signed by Dr. Can to [facility] @ [phone].    Montse Ding RN on 10/26/2023 at 9:03 AM

## 2023-10-27 ENCOUNTER — APPOINTMENT (OUTPATIENT)
Dept: LAB | Facility: CLINIC | Age: 88
End: 2023-10-27
Payer: MEDICARE

## 2023-10-27 ENCOUNTER — LAB (OUTPATIENT)
Dept: LAB | Facility: CLINIC | Age: 88
End: 2023-10-27
Payer: MEDICARE

## 2023-10-27 DIAGNOSIS — R30.0 DYSURIA: ICD-10-CM

## 2023-10-27 LAB
ALBUMIN UR-MCNC: ABNORMAL MG/DL
APPEARANCE UR: CLEAR
BACTERIA #/AREA URNS HPF: ABNORMAL /HPF
BILIRUB UR QL STRIP: NEGATIVE
COLOR UR AUTO: YELLOW
GLUCOSE UR STRIP-MCNC: NEGATIVE MG/DL
HGB UR QL STRIP: NEGATIVE
KETONES UR STRIP-MCNC: NEGATIVE MG/DL
LEUKOCYTE ESTERASE UR QL STRIP: NEGATIVE
NITRATE UR QL: NEGATIVE
PH UR STRIP: 5.5 [PH] (ref 5–8)
RBC #/AREA URNS AUTO: ABNORMAL /HPF
SP GR UR STRIP: 1.02 (ref 1–1.03)
SQUAMOUS #/AREA URNS AUTO: ABNORMAL /LPF
UROBILINOGEN UR STRIP-ACNC: 1 E.U./DL
WBC #/AREA URNS AUTO: ABNORMAL /HPF

## 2023-10-27 PROCEDURE — 81001 URINALYSIS AUTO W/SCOPE: CPT

## 2023-10-31 ENCOUNTER — MEDICAL CORRESPONDENCE (OUTPATIENT)
Dept: INTERNAL MEDICINE | Facility: CLINIC | Age: 88
End: 2023-10-31

## 2023-10-31 ENCOUNTER — TELEPHONE (OUTPATIENT)
Dept: INTERNAL MEDICINE | Facility: CLINIC | Age: 88
End: 2023-10-31
Payer: MEDICARE

## 2023-10-31 NOTE — TELEPHONE ENCOUNTER
[facility] Call Center    Phone Message    May a detailed message be left on voicemail: yes     Reason for Call: Order(s): Home Care Orders: Physical Therapy (PT): 1 time a week for 4 weeks, 1 time every other week for 4 weeks, OT eval, home health aid 1 time a week for 8 weeks for bathing

## 2023-10-31 NOTE — TELEPHONE ENCOUNTER
Pt has future 11/30/23 physical schedule with pcp.      Called Marylou and left a secure VM. Gave verbal orders per Dr. Can for Order(s): Home Care Orders: Physical Therapy (PT): 1 time a week for 4 weeks, 1 time every other week for 4 weeks, OT eval, home health aid 1 time a week for 8 weeks for bathing         Khris Engle CMA ([facility]) at 2:06 PM on 10/31/2023

## 2023-11-10 ENCOUNTER — TELEPHONE (OUTPATIENT)
Dept: INTERNAL MEDICINE | Facility: CLINIC | Age: 88
End: 2023-11-10
Payer: MEDICARE

## 2023-11-10 NOTE — TELEPHONE ENCOUNTER
Called Yanet and left a secure VM. Gave verbal orders per Dr. Can for Order(s): Home Care Orders: Occupational Therapy (OT): 1 visit every other week for three visits starting the week of November 27th for upper body strengthening, safety with ADL's and cognition.          Khris Engle CMA ([facility]) at 12:29 PM on 11/10/2023

## 2023-11-10 NOTE — TELEPHONE ENCOUNTER
[facility] Call Center    Phone Message    May a detailed message be left on voicemail: yes     Reason for Call: Order(s): Home Care Orders: Occupational Therapy (OT): Needs verbal orders 1 visit every other week for three visits starting the week of November 27th for upper body strengthening, safety with ADL's and cognition.      Action Taken: Message routed to:  [facility]    Travel Screening: Not Applicable

## 2023-11-13 ENCOUNTER — TELEPHONE (OUTPATIENT)
Dept: INTERNAL MEDICINE | Facility: CLINIC | Age: 88
End: 2023-11-13
Payer: MEDICARE

## 2023-11-13 NOTE — TELEPHONE ENCOUNTER
Dr. Can last saw patient in-person on 11/12/2022.   Provider need to see patient in-person at least once a year.     11/30/23 appointment is a physical exam appointment. This type of visit cannot be done virtually.  \      Patient will have to reschedule if unable to make it to the 11/30/23 physical visit.         Khris Engle CMA ([facility]) at 9:51 AM on 11/13/2023

## 2023-11-13 NOTE — TELEPHONE ENCOUNTER
[facility] Call Center    Phone Message    May a detailed message be left on voicemail: yes     Reason for Call: Other: Per Daughters are wondering if the visit can be switch to video 11/30/23? Unless pt visit has to be inperson. Per Daughter are wondering due to them already taking pt in on Tuesday the 11/28/23 for another appt. Per daughters its difficult moving pt around many times. Please call daughters back @ [phone] Anisha. Please and thank you!     Action Taken: Message routed to:  [facility]    Travel Screening: Not Applicable

## 2023-11-14 ENCOUNTER — DOCUMENTATION ONLY (OUTPATIENT)
Dept: INTERNAL MEDICINE | Facility: CLINIC | Age: 88
End: 2023-11-14
Payer: MEDICARE

## 2023-11-14 NOTE — PROGRESS NOTES
Type of Form Received:     Form Received (Date) 11/14/23   Form Filled out Yes  11/20/23   Placed in provider folder Yes

## 2023-11-17 ENCOUNTER — MEDICAL CORRESPONDENCE (OUTPATIENT)
Dept: HEALTH INFORMATION MANAGEMENT | Facility: CLINIC | Age: 88
End: 2023-11-17
Payer: MEDICARE

## 2023-11-24 ENCOUNTER — DOCUMENTATION ONLY (OUTPATIENT)
Dept: INTERNAL MEDICINE | Facility: CLINIC | Age: 88
End: 2023-11-24
Payer: MEDICARE

## 2023-11-27 DIAGNOSIS — Z86.73 HISTORY OF CVA (CEREBROVASCULAR ACCIDENT): ICD-10-CM

## 2023-11-28 NOTE — PROGRESS NOTES
Type of Form Received: Plan of Care    Form Received (Date) 11/24/23   Form Filled out Yes, faxed 12/5   Placed in provider folder Yes     
Home

## 2023-11-30 ENCOUNTER — OFFICE VISIT (OUTPATIENT)
Dept: INTERNAL MEDICINE | Facility: CLINIC | Age: 88
End: 2023-11-30
Payer: MEDICARE

## 2023-11-30 ENCOUNTER — OFFICE VISIT (OUTPATIENT)
Dept: AUDIOLOGY | Facility: CLINIC | Age: 88
End: 2023-11-30
Payer: MEDICARE

## 2023-11-30 ENCOUNTER — LAB (OUTPATIENT)
Dept: LAB | Facility: CLINIC | Age: 88
End: 2023-11-30
Payer: MEDICARE

## 2023-11-30 VITALS
HEART RATE: 78 BPM | BODY MASS INDEX: 33.22 KG/M2 | DIASTOLIC BLOOD PRESSURE: 94 MMHG | HEIGHT: 62 IN | SYSTOLIC BLOOD PRESSURE: 162 MMHG | OXYGEN SATURATION: 97 %

## 2023-11-30 DIAGNOSIS — H90.3 SENSORINEURAL HEARING LOSS, BILATERAL: Primary | ICD-10-CM

## 2023-11-30 DIAGNOSIS — Z91.81 PERSONAL HISTORY OF FALL: ICD-10-CM

## 2023-11-30 DIAGNOSIS — K59.00 CONSTIPATION, UNSPECIFIED CONSTIPATION TYPE: ICD-10-CM

## 2023-11-30 DIAGNOSIS — L30.4 INTERTRIGO: ICD-10-CM

## 2023-11-30 DIAGNOSIS — E83.52 HYPERCALCEMIA: ICD-10-CM

## 2023-11-30 DIAGNOSIS — I10 ESSENTIAL HYPERTENSION, BENIGN: ICD-10-CM

## 2023-11-30 DIAGNOSIS — R41.89 COGNITIVE CHANGES: ICD-10-CM

## 2023-11-30 DIAGNOSIS — R73.01 IMPAIRED FASTING GLUCOSE: ICD-10-CM

## 2023-11-30 DIAGNOSIS — Z00.00 ENCOUNTER FOR MEDICARE ANNUAL WELLNESS EXAM: Primary | ICD-10-CM

## 2023-11-30 DIAGNOSIS — Z71.89 ADVANCED DIRECTIVES, COUNSELING/DISCUSSION: ICD-10-CM

## 2023-11-30 LAB
ANION GAP SERPL CALCULATED.3IONS-SCNC: 9 MMOL/L (ref 7–15)
BUN SERPL-MCNC: 28.5 MG/DL (ref 8–23)
CALCIUM SERPL-MCNC: 10.6 MG/DL (ref 8.2–9.6)
CHLORIDE SERPL-SCNC: 107 MMOL/L (ref 98–107)
CREAT SERPL-MCNC: 0.8 MG/DL (ref 0.51–0.95)
DEPRECATED HCO3 PLAS-SCNC: 26 MMOL/L (ref 22–29)
EGFRCR SERPLBLD CKD-EPI 2021: 66 ML/MIN/1.73M2
GLUCOSE SERPL-MCNC: 106 MG/DL (ref 70–99)
HBA1C MFR BLD: 5.2 %
POTASSIUM SERPL-SCNC: 4.4 MMOL/L (ref 3.4–5.3)
SODIUM SERPL-SCNC: 142 MMOL/L (ref 135–145)
TSH SERPL DL<=0.005 MIU/L-ACNC: 3.01 UIU/ML (ref 0.3–4.2)
VIT D+METAB SERPL-MCNC: 50 NG/ML (ref 20–50)

## 2023-11-30 PROCEDURE — 84443 ASSAY THYROID STIM HORMONE: CPT | Performed by: PATHOLOGY

## 2023-11-30 PROCEDURE — 80048 BASIC METABOLIC PNL TOTAL CA: CPT | Performed by: PATHOLOGY

## 2023-11-30 PROCEDURE — 99000 SPECIMEN HANDLING OFFICE-LAB: CPT | Performed by: PATHOLOGY

## 2023-11-30 PROCEDURE — 82306 VITAMIN D 25 HYDROXY: CPT | Performed by: INTERNAL MEDICINE

## 2023-11-30 PROCEDURE — 99207 PR ASSESSMENT FOR HEARING AID: CPT | Performed by: AUDIOLOGIST

## 2023-11-30 PROCEDURE — 36415 COLL VENOUS BLD VENIPUNCTURE: CPT | Performed by: PATHOLOGY

## 2023-11-30 PROCEDURE — 99214 OFFICE O/P EST MOD 30 MIN: CPT | Performed by: INTERNAL MEDICINE

## 2023-11-30 PROCEDURE — 83036 HEMOGLOBIN GLYCOSYLATED A1C: CPT | Performed by: INTERNAL MEDICINE

## 2023-11-30 RX ORDER — AMLODIPINE BESYLATE 2.5 MG/1
2.5 TABLET ORAL DAILY
Qty: 90 TABLET | Refills: 3 | Status: SHIPPED | OUTPATIENT
Start: 2023-11-30

## 2023-11-30 RX ORDER — NYSTATIN 100000 [USP'U]/G
POWDER TOPICAL
Qty: 60 G | Refills: 1 | Status: SHIPPED | OUTPATIENT
Start: 2023-11-30

## 2023-11-30 RX ORDER — AMLODIPINE BESYLATE 5 MG/1
5 TABLET ORAL DAILY
Qty: 90 TABLET | Refills: 3 | Status: CANCELLED | OUTPATIENT
Start: 2023-11-30

## 2023-11-30 NOTE — PATIENT INSTRUCTIONS
Try checking your blood pressure at home.  Can do it serially, three times in a row, to overcome measurement anxiety. Can check 1-2 times weekly for a month or so.  Check while you are feeling relaxed, not after heavy exertion or stress.  If consistently over 130/80, please let me know.      Try miralax and citrucel for stool regularity. Can adjust as needed.

## 2023-11-30 NOTE — PROGRESS NOTES
"History of Present Illness:  Ms. Torres is a 89 year old female who presents for  Chief Complaint   Patient presents with    Physical     Dry skin under breasts, discuss probiotics and senna, questions about DNR, discuss BP, fall this morning       Azeb is accompanied by both of her daughters today.   This morning Azeb had a \"fall\" while going down the back steps of her house. She did not actually fall, she just sat down abruptly on the steps as she was going down. Denies hitting her head, loss of consciousness or any pain. EMS was called and the paramedics helped her get back up and checked her out.    Would like to inquire about using Probiotics, senna has been intense. They are holding the senna now, Azeb only has bowel movements 1-2x a week. Firm stools, do not think she is straining.   PT comes weekly, assistance with showering weekly.  Higher BPs in the 140s, taking daily amlodipine. Some Bps have been in the 170s, most of the time they have been in the 120s.   Hearing aids have been working, still some difficulty. Audiology visit this afternoon.    POA paperwork recently complete. POLST needs to be completed, they were just notified about this form. Discussing advanced directive today, would like more guidance and assistance with these forms and navigating the conversation.   2 mos ago moved her into a downstairs room. They have noted some cognitive decline in the last few months, persistent confusion with doing daily activities. Noted some reduction and loss of interest with daily activities like crossword puzzles, and folding laundry to help with chores.       Review of external notes as documented above                   A detailed Review of Systems was performed, verified and is negative except as documented in the HPI.  All health questionnaires were reviewed, verified and relevant information documented above.      Past Medical History:  Past Medical History:   Diagnosis Date    Arthritis     Breast " "cancer (H)     ER positive    Chronic angle-closure glaucoma     CVA (cerebral vascular accident) (H) 2003    R cerebral artery, embolic after hip replacement    Degenerative joint disease     Endometrial cancer (H)     Hypertension     Left homonymous hemianopsia     Pulmonary artery hypertension (H) 4/24/2013    Noted on echo. Mild-moderate. Moderate TR    Tricuspid regurgitation 4/24/2013       Active Meds:  Current Outpatient Medications   Medication    acetaminophen (TYLENOL) 500 MG tablet    amLODIPine (NORVASC) 2.5 MG tablet    amLODIPine (NORVASC) 2.5 MG tablet    atorvastatin (LIPITOR) 10 MG tablet    cholecalciferol (VITAMIN D) 1000 UNIT tablet    clopidogrel (PLAVIX) 75 MG tablet    desonide (DESOWEN) 0.05 % external ointment    dorzolamide-timolol (COSOPT) 2-0.5 % ophthalmic solution    fluocinonide (LIDEX) 0.05 % external ointment    latanoprost (XALATAN) 0.005 % ophthalmic solution    levothyroxine (SYNTHROID/LEVOTHROID) 25 MCG tablet    nystatin (MYCOSTATIN) 379170 UNIT/GM external powder    senna-docusate (SENOKOT-S/PERICOLACE) 8.6-50 MG tablet     No current facility-administered medications for this visit.        Allergies:  Reviewed, refer to EMR    Relevant Social History:  Social History     Tobacco Use    Smoking status: Never    Smokeless tobacco: Never   Substance Use Topics    Alcohol use: No     Comment: 0    Drug use: No        Physical Exam:  Vitals: BP (!) 162/94 (BP Location: Right arm, Patient Position: Sitting, Cuff Size: Adult Regular)   Pulse 78   Ht 1.575 m (5' 2\")   SpO2 97%   BMI 33.22 kg/m    Constitutional: Alert, oriented, pleasant, no acute distress. Patient is interactive and cheerful.  Head: Normocephalic, atraumatic  Eyes: Extra-ocular movements intact  ENT: Oropharynx clear, moist mucus membranes  Cardiovascular: Regular rate and rhythm, no murmurs, peripheral pulses full/symmetric  Respiratory: Good air movement bilaterally, lungs clear, normal work of breathing. no " wheezes/rales/rhonchi  GI: Abdomen soft, bowel sounds present, nondistended, nontender. No masses present.   Musculoskeletal: Ankle swelling, no overt lower leg edema  Neurologic: Alert and oriented, cranial nerves 2-12 intact, grossly non-focal   Skin: Slight erythema below left breast, no yeast, papules or raised lesions present     Diagnostics:  Labs reviewed in Epic        Assessment and Plan:  Azeb was seen today for physical.    Diagnoses and all orders for this visit:    Encounter for Medicare annual wellness exam  -     REVIEW OF HEALTH MAINTENANCE PROTOCOL ORDERS    Essential hypertension, benign  Occasional elevated pressures in the 140-170s. Continue on amlodipine at the low 2.5 mg dose. Check serial blood pressures if concerned that they are high, over 130/80s. If there are days where BP is elevated for several hours, can give an additional tablet of amlodipine.   -     Basic metabolic panel  (Ca, Cl, CO2, Creat, Gluc, K, Na, BUN); Future  -     amLODIPine (NORVASC) 2.5 MG tablet; Take 1 tablet (2.5 mg) by mouth daily    Constipation, unspecified constipation type  Discontinue the senna, start Miralax and/or citrucel for stool regularity.    Impaired fasting glucose  Last A1C level checked in 2007, was 4.7%. Repeat while drawing labs today.  -     Hemoglobin A1c; Future    Intertrigo  Slight erythema and increased irritation below the left breast. Given history of previous yeast infection, discussed keeping the area dry to avoid irritation. Provided nystatin powder as needed to help with keeping moisture to a minimum and reducing any discomfort.    -     nystatin (MYCOSTATIN) 035064 UNIT/GM external powder; Apply to rash on breasts as needed for rash three times daily    Personal history of fall  Event this morning on the steps where she did fall to a sitting position. Patient denies any changes in mental status, pain or other new onset symptoms. No evidence of traumatic fall, memory loss or concern for  a bleed in the setting that patient is on daily anticoagulation. No physical exam findings and no further imaging warranted at this time.     Advanced directives, counseling/discussion  Cognitive changes   Discussed recent Power of  paperwork, provided blank POLST document to include advance directive decisions. Slight decline in cognition with increase in forgetfulness, confusion with daily activities and decreased interest in daily activities. Not concerned for a catalyst event with sharp decline but will continue to manage. Will refer family to Social Work with Sandra for additional education about different forms. Tentative plan to meet with family via video visit to finish this paperwork in the coming weeks.   -     TSH; Future  -     Vitamin D Deficiency; Future      Patient seen and discussed with Dr. Can.    Julieth Sharif, MS3  [employer]    I was present with the medical student who participated in the service and in the documentation of the note. I have verified the history and personally performed the physical exam and medical decision making. I agree with the assessment and plan of care as documented in the note.  Evelina Can MD  Internal Medicine    >30 minutes spent today performing chart review, history and exam, counseling, care coordination, documentation and further activities as noted above exclusive of any procedures or EKG interpretation

## 2023-11-30 NOTE — PROGRESS NOTES
AUDIOLOGY REPORT    SUBJECTIVE:Azeb Torres is a 89 year old female who was seen in the Audiology Clinic at the [facility] on 11/30/2023  for a  hearing aid check. She was accompanied by her daughter and granddaughter. 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  The patient has been seen previously in this clinic and was fit with bilateral [employer] AI 2000 - R hearing aids on 7/11/22.  Today, Azeb and her daughter report her hearing aids don't seem to see working as well and may need to be turned up.     OBJECTIVE: Otoscopy revealed clear ear canals bilaterally. Hearing aids were cleaned and checked. Hearing aids were deep cleaned; microphone ports were vacuumed,and wax filters were changed. Following cleaning, listening check was good bilaterally.     Discussed with patient that we can increase volume of hearing aids. However, an increase in volume is not always an increase in clarity. Reviewed patient's word understanding and realistic expectations. Patient and family members expressed understanding, but would like to try changing programming. Also discussed getting new audiogram completed as patient's hearing and word understanding may have changed given that hearing aids are working properly.     Based on patient report, the following changes were made; overall gain was increased 2 clicks on main screen, overall gain for mid frequencies was also increased 3 dB. Patient reported, things were a little too loud. Overall gain was decreased 1 click and patient reported it was comfortable. She noted a buzzing sound after programming was saved, however when we switched rooms it disappeared and buzzing was the fan that had kicked on.     Discussed use of a remote microphone. Discussed both  the mini remote carson or the remote carson plus (phone connectivity option for Android streaming included with remote carson feature). Patient and daughter would like to think about the mini remote carson further and will let the clinic know if they would like to purchase one.     No charge visit today (in warranty hearing aid check).    ASSESSMENT: A hearing aid check was completed today. Changes to hearing aid was completed as outlined above.     PLAN: Azeb will return for follow-up as needed, she will return for an updated audiogram with hearing aid check. Please call this clinic with any questions regarding today s appointment.    Rica Branham. CCC-A  Licensed Audiologist   [place] #[id number]

## 2023-11-30 NOTE — PROGRESS NOTES
Medicare Annual Wellness Questionnaire:  This 89 year old year old female presents for a Medicare Wellness Exam.    Fall Risk Assessment:  Have you fallen 2 or more times in the last year?  Yes     How many times were you injured due to a fall in the last year?  No    PHQ-2:  Over the last 2 weeks, how often have you been bothered by feeling down, depressed, or hopeless?  Not at all (0)     Over the last 2 weeks, how often have you had little interest or pleasure in doing things?  Not at all (0)    Social History:  What is your marital status?      Who lives in your household?  Steve hodges (daughter)    Does your home have loose rugs in the hallway:     No    Does your home have grab bars in the bathroom:    Yes     Does your home have handrails on the stairs?  Yes     Does your home have poorly lit areas?    No    Do you feel threatened or controlled by a partner, ex-partner or anyone in your life?   No    Has anyone hurt you physically, for example by pushing, hitting, slapping or kicking you or forcing you to have sex?   No    Do you need help with the phone, transportation, shopping, preparing meals, housework, laundry, medications or managing money?   Yes    Sexual Health:  Are you sexually active?    No    Have you had any sexually transmitted infections in the last year?   No    Do you have any sexual concerns?    No    Women Only:  Women: What year did you stop having periods (approximate age)?  1970s    Women: Any vaginal bleeding in the last year?    No    Women: Have you ever had an abnormal Pap smear?    Hysterectomy for cancer    General Health Assessment:  Have you noticed any hearing difficulties?   Yes     Do you wear hearing aids?   Yes     Have you seen a hearing professional such as an audiologist in the last 1 year?   Yes     Do you have vision difficulty?    Yes     Do you wear glasses or contacts?   Yes     Have you seen an eye doctor in the last 1 year?   Yes     How many servings of  fruits and vegetables do you eat a day?  Fruit: 1-2  Vegetables: 1    How often do you exercise in a week?  3x with Home Health PT    How long and what kind of exercise do you do?  15 min strength    Tobacco and Alcohol History:  Do you use tobacco/nicotine products?    No    If yes, please list the method of use and average weekly consumption?  N/A    Do you use any other drugs?   No         Do you drink alcohol?   No    If you drink alcohol, how many drinks per week?  N/A    Advanced Directive:  Have you completed an Advance Directives document?  No    If yes, have you given a copy to the clinic?   N/A    Do you need information on Advance Directives?   Yes     LEXIE Koehler at 11:59 AM on 11/30/2023Lois is a 89 year old that presents in clinic today for the following:     Chief Complaint   Patient presents with    Physical     Dry skin under breasts, discuss probiotics and senna, questions about DNR, discuss BP, fall this morning           11/30/2023    10:04 AM   Additional Questions   Roomed by Natalie Pop   Accompanied by Daughter Evelina Resendez and Lake Dawn from encounters over the past 10 days    No data recorded       Natalie Pop MA at 10:22 AM on 11/30/2023

## 2023-12-01 RX ORDER — CLOPIDOGREL BISULFATE 75 MG/1
75 TABLET ORAL DAILY
Qty: 90 TABLET | Refills: 0 | Status: SHIPPED | OUTPATIENT
Start: 2023-12-01 | End: 2024-02-29

## 2023-12-01 NOTE — TELEPHONE ENCOUNTER
clopidogrel (PLAVIX) 75 MG tablet 90 tablet 3 11/29/2022  Last Office Visit : 12/14/2023   Future Office visit:  12/14/2023     Routing refill request to provider for review/approval because:  Hgb, Plt overdue, please order for upcoming appointment. 90 day sanjuana refill given.    Hemoglobin   Date Value Ref Range Status   11/11/2022 14.2 11.7 - 15.7 g/dL Final   10/09/2019 14.8 11.7 - 15.7 g/dL Final   ]  Platelet Count   Date Value Ref Range Status   11/11/2022 232 150 - 450 10e3/uL Final   04/24/2018 204 150 - 450 10e9/L Final

## 2023-12-04 DIAGNOSIS — E03.4 HYPOTHYROIDISM DUE TO ACQUIRED ATROPHY OF THYROID: ICD-10-CM

## 2023-12-07 ENCOUNTER — LAB (OUTPATIENT)
Dept: LAB | Facility: CLINIC | Age: 88
End: 2023-12-07
Payer: MEDICARE

## 2023-12-07 DIAGNOSIS — E83.52 HYPERCALCEMIA: ICD-10-CM

## 2023-12-07 LAB
CA-I BLD-MCNC: 5.3 MG/DL (ref 4.4–5.2)
ERYTHROCYTE [DISTWIDTH] IN BLOOD BY AUTOMATED COUNT: 14.5 % (ref 10–15)
HCT VFR BLD AUTO: 42 % (ref 35–47)
HGB BLD-MCNC: 13.5 G/DL (ref 11.7–15.7)
MCH RBC QN AUTO: 31.4 PG (ref 26.5–33)
MCHC RBC AUTO-ENTMCNC: 32.1 G/DL (ref 31.5–36.5)
MCV RBC AUTO: 98 FL (ref 78–100)
PLATELET # BLD AUTO: 250 10E3/UL (ref 150–450)
PTH-INTACT SERPL-MCNC: 45 PG/ML (ref 15–65)
RBC # BLD AUTO: 4.3 10E6/UL (ref 3.8–5.2)
TOTAL PROTEIN SERUM FOR ELP: 6.7 G/DL (ref 6.4–8.3)
WBC # BLD AUTO: 6.9 10E3/UL (ref 4–11)

## 2023-12-07 PROCEDURE — 84165 PROTEIN E-PHORESIS SERUM: CPT | Performed by: PATHOLOGY

## 2023-12-07 PROCEDURE — 86335 IMMUNFIX E-PHORSIS/URINE/CSF: CPT | Performed by: STUDENT IN AN ORGANIZED HEALTH CARE EDUCATION/TRAINING PROGRAM

## 2023-12-07 PROCEDURE — 84155 ASSAY OF PROTEIN SERUM: CPT

## 2023-12-07 PROCEDURE — 85027 COMPLETE CBC AUTOMATED: CPT

## 2023-12-07 PROCEDURE — 83970 ASSAY OF PARATHORMONE: CPT

## 2023-12-07 PROCEDURE — 36415 COLL VENOUS BLD VENIPUNCTURE: CPT

## 2023-12-07 PROCEDURE — 86334 IMMUNOFIX E-PHORESIS SERUM: CPT | Performed by: PATHOLOGY

## 2023-12-07 PROCEDURE — 82330 ASSAY OF CALCIUM: CPT

## 2023-12-07 PROCEDURE — 84166 PROTEIN E-PHORESIS/URINE/CSF: CPT | Performed by: STUDENT IN AN ORGANIZED HEALTH CARE EDUCATION/TRAINING PROGRAM

## 2023-12-07 RX ORDER — LEVOTHYROXINE SODIUM 25 UG/1
25 TABLET ORAL DAILY
Qty: 90 TABLET | Refills: 3 | Status: SHIPPED | OUTPATIENT
Start: 2023-12-07

## 2023-12-07 NOTE — TELEPHONE ENCOUNTER
levothyroxine (SYNTHROID/LEVOTHROID) 25 MCG tablet 90 tablet 3 11/7/2022  Last Office Visit : 11/30/2023  [facility] Internal Medicine Evelina Arvizu MD     Future Office visit:  12/14/2023     TSH   Date Value Ref Range Status   11/30/2023 3.01 0.30 - 4.20 uIU/mL Final   11/01/2021 2.71 0.40 - 4.00 mU/L Final   09/24/2020 2.97 0.40 - 4.00 mU/L Final

## 2023-12-08 ENCOUNTER — PATIENT OUTREACH (OUTPATIENT)
Dept: CARE COORDINATION | Facility: CLINIC | Age: 88
End: 2023-12-08
Payer: MEDICARE

## 2023-12-08 DIAGNOSIS — E83.52 HYPERCALCEMIA: Primary | ICD-10-CM

## 2023-12-08 LAB
ALBUMIN SERPL ELPH-MCNC: 4.1 G/DL (ref 3.7–5.1)
ALPHA1 GLOB SERPL ELPH-MCNC: 0.3 G/DL (ref 0.2–0.4)
ALPHA2 GLOB SERPL ELPH-MCNC: 0.9 G/DL (ref 0.5–0.9)
B-GLOBULIN SERPL ELPH-MCNC: 0.7 G/DL (ref 0.6–1)
GAMMA GLOB SERPL ELPH-MCNC: 0.7 G/DL (ref 0.7–1.6)
M PROTEIN SERPL ELPH-MCNC: 0 G/DL
PROT PATTERN SERPL ELPH-IMP: NORMAL
PROT PATTERN SERPL IFE-IMP: NORMAL

## 2023-12-08 NOTE — PROGRESS NOTES
Social Work - Intervention  [facility]  Data/Intervention:    Patient Name: Azeb Torres Goes By: Azeb JESSICAB/Age: 4/3/1925 (89 year old)     Visit Type: telephone  Referral Source: Dr. Can  Reason for Referral: Advance Care Planning- Health Care Directives    Collaborated With:    -Patient's daughter Lake     Psychosocial Information/Concerns:  Dr. Can discussed with Patient and daughters about completing Health Care Directives (HCD).  called Patient's daughter, Lake, to answer questions.      Intervention/Education/Resources Provided:  Lake requested to set up a time to do a conference call with Lake, Patient, and Patient's other daughter, Evelina Resendez (who lives in [place]). Time set up for a call on 2023 at 1000h.     Assessment/Plan:  HCD conversation to occur at agreed upon time.      Provided patient/family with contact information and availability.    Sandra Serrato [facility]  Clinical , Outpatient Specialty Clinics  [facility]  Direct Phone: [phone]

## 2023-12-11 LAB
ALBUMIN MFR UR ELPH: 65.5 %
ALPHA1 GLOB MFR UR ELPH: 10.3 %
ALPHA2 GLOB MFR UR ELPH: 4.7 %
B-GLOBULIN MFR UR ELPH: 12.3 %
GAMMA GLOB MFR UR ELPH: 7.2 %
M PROTEIN MFR UR ELPH: 2.2 %
PROT ELPH PNL UR ELPH: NORMAL
PROT PATTERN UR ELPH-IMP: ABNORMAL

## 2023-12-13 ENCOUNTER — PATIENT OUTREACH (OUTPATIENT)
Dept: CARE COORDINATION | Facility: CLINIC | Age: 88
End: 2023-12-13
Payer: MEDICARE

## 2023-12-13 NOTE — PROGRESS NOTES
Social Work - Follow-Up  [facility]    Data/Intervention:    Patient Name: Azeb Torres Goes By: Azeb LATIF/Age: 4/3/1925 (89 year old)    Reason for Follow-Up:  Planned conference call with Patient and her daughters, Lake and Evelina Resendez, regarding Health Care Directives    Collaborated With:    -Patient  -Daughters, Lake and Evelina Resendez    Intervention/Education/Resources Provided:   initiated the conference call where Patient and her daughters were present.  explained what a Health Care Directive (HCD) entails and how to complete.  answered all questions that were presented. They have a virtual visit with Dr. Pamella lopez to discuss POLST. Will also consider completing HCD.     Assessment/Plan:  Patient and daughters to complete HCD.  will remain available for questions as needed.     Previously provided patient/family with writer's contact information and availability.      WILL Harvey  Clinical , Outpatient Specialty Clinics  [facility] and [facility]  Direct Phone: [phone]

## 2023-12-14 ENCOUNTER — VIRTUAL VISIT (OUTPATIENT)
Dept: INTERNAL MEDICINE | Facility: CLINIC | Age: 88
End: 2023-12-14
Payer: MEDICARE

## 2023-12-14 DIAGNOSIS — D47.2 MONOCLONAL GAMMOPATHY OF UNDETERMINED SIGNIFICANCE: ICD-10-CM

## 2023-12-14 DIAGNOSIS — E83.52 HYPERCALCEMIA: Primary | ICD-10-CM

## 2023-12-14 DIAGNOSIS — Z71.89 ADVANCED DIRECTIVES, COUNSELING/DISCUSSION: ICD-10-CM

## 2023-12-14 PROCEDURE — 99215 OFFICE O/P EST HI 40 MIN: CPT | Mod: 95 | Performed by: INTERNAL MEDICINE

## 2023-12-14 NOTE — PATIENT INSTRUCTIONS
Thank you for visiting the Primary Care Center today at the [facility]! The following is some information about our clinic:     Primary Care Center Frequently-Asked Questions    (1) How do I schedule appointments at the [facility]?     Primary Care--to schedule or make changes to an existing appointment, please call our primary care line at [phone].    Labs--to schedule a lab appointment at the [facility] you can use [employer] or call [phone]. If you have a [place] location that is closer to home, you can reach out to that location for scheduling options.     Imaging--if you need to schedule a CT, X-ray, MRI, ultrasound, or other imaging study you can call [phone] to schedule at the [facility] or any other [facility] imaging location.     Referrals--if a referral to another specialty was ordered you can expect a phone call from their scheduling team. If you have not heard from them in a week, please call us or send us a [employer] message to check the status or get a scheduling number. Please note that this only applies to internal [facility] referrals. If the referral is external you would need to contact their office for scheduling.     (2) I have a question about my visit, who do I contact?     You can call us at the primary care line at [phone] to ask questions about your visit. You can also send a secure message through [employer], which is reviewed by clinic staff. Please note that [employer] messages have a twenty-four to forty-eight business hour turnaround time and should not be used for urgent concerns.    (3) How will I get the results of my tests?    If you are signed up for [employer] all tests will be released to you within twenty-four hours of resulting. Please allow three to five days for your doctor to review your results and place a note interpreting the results. If you do not have [employer] you will receive your  results through mail seven to ten business days following the return of the tests. Please note that if there should be any urgent or concerning results that your doctor or their registered nurse will reach out to you the same day as the tests come back. If you have follow up questions about your results or would like to discuss the results in detail please schedule a follow up with your provider either in person or virtually.     (4) How do I get refills of my prescriptions?     You should always first contact your pharmacy for refills of your medications. If submitting a refill request on [employer], please be sure to submit the request only once--repeat requests can cause delays in refill. If you are requesting a NEW medication or a medication related to new symptoms you will need to schedule an appointment with a provider prior to approval. Please note: Routine medication refills have up to one to three business day turnaround whereas controlled substances refills have up to five to seven business day turnaround.    (5) I have new symptoms, what do I do?     If you are having an immediate medical emergency, you should dial 911 for assistance.   For anything urgent that needs to be seen within a few hours to one day you should visit a local urgent care for assistance.  For non-urgent symptoms that need to be seen within a few days to a week you can schedule with an available provider in primary care by going to [employer] or calling [phone].   If you are not sure how serious your symptoms are or you would like to receive medical advice you can always call [phone] to speak with a triage nurse.

## 2023-12-14 NOTE — PROGRESS NOTES
"  Azeb oTrres is a 89 year old female who is being evaluated via a billable video visit for the following health issues:    Chief Complaint   Patient presents with    RECHECK       HPI    Visit with patient, 2 daughters, friend and granddaughter today  Spoke with Sandra regarding health care directives, plan to all of 3 of the complete one, honoring choices  We discussed the various medical options/interventions for life sustaining treatment, DNR and intermediary measures  They are going to discuss it further and talk to their mother about it  Azeb also has hypercalcemia of unclear etiology PTH normal, TSH normal, thiazide stopped, vitamin D WNL but previously elevated  No urgent symptoms                              Review of Systems   A detailed ROS was performed and was negative unless indicated in the HPI above.        Physical Exam   There were no vitals taken for this visit.    Wt Readings from Last 2 Encounters:   11/11/22 82.4 kg (181 lb 9.6 oz)   11/01/21 80.6 kg (177 lb 11.2 oz)      Ht Readings from Last 2 Encounters:   11/30/23 1.575 m (5' 2\")   11/11/22 1.575 m (5' 2\")     GENERAL: healthy, alert and no distress  HEAD: Normocephalic, atraumatic  EYES: Eyes grossly normal to inspection, EOMI and conjunctivae and sclerae normal  RESP: Speaking in full sentences, unlabored, no audible wheezes or cough  SKIN: no suspicious lesions or rashes, no jaundice  NEURO: oriented, and speech normal  PSYCH: mentation appears normal, affect normal/bright          Diagnostic Test Results:  Labs reviewed in Epic            Assessment and Plan:  Azeb was seen today for recheck.    Diagnoses and all orders for this visit:    Hypercalcemia  Unclear etiology, possibly from previous  hypervitaminosis, though with m protein in urine would like her to see heme. They are open to diagnosis but prefer less visits and are not necessarily open to invasive tests or procedures.   PTHrp  -     Adult Oncology/Hematology  " Referral; Future  -     Total Light Chains Ur; Future  -     HC KAPPA LAMDA FREE LIGHT CHAINS  -     Immunoglobulins A G and M; Future    Monoclonal gammopathy of undetermined significance  -     Adult Oncology/Hematology  Referral; Future  -     Total Light Chains Ur; Future  -     HC KAPPA LAMDA FREE LIGHT CHAINS  -     Immunoglobulins A G and M; Future    Advanced directives, counseling/discussion  See above, spent time discussing medical interventions, options. They are going to discuss with mom further.      Video-Visit Details    Type of service:  Video Visit    Video Start/End Time: 11:20 12:07 PM    Originating Location (pt. Location): Home    Distant Location (provider location):  OFF SITE    Mode of Communication:  Video Conference via  [employer] or  [facility]        Evelina Can MD  Internal Medicine      >50 minutes spent today performing chart review, history and exam, documentation and further activities as noted above.             English

## 2023-12-14 NOTE — NURSING NOTE
Is the patient currently in the state of [place]? YES    Visit mode:VIDEO    If the visit is dropped, the patient can be reconnected by: VIDEO VISIT: Text to cell phone:   Telephone Information:   Mobile [phone]       Will anyone else be joining the visit? YES: How would they like to receive their invitation? Text to cell phone: [phone]  (If patient encounters technical issues they should call [phone]    How would you like to obtain your AVS? MyChart    Are changes needed to the allergy or medication list? Pt stated no changes to allergies and Pt stated no med changes    [facility] keeps vitals records    Reason for visit: RECHECK    Familia VELAZQUEZF

## 2023-12-15 ENCOUNTER — PATIENT OUTREACH (OUTPATIENT)
Dept: ONCOLOGY | Facility: CLINIC | Age: 88
End: 2023-12-15
Payer: MEDICARE

## 2023-12-15 NOTE — TELEPHONE ENCOUNTER
Hi, Thanks for triaging this. Intention is for patient to have labs drawn when she comes for appt. She is 89 and unable to make several trips to clinic. I would appreciate if you would schedule her. Of note, they are wondering if possible to see Dr. Franco but anyone okay.  Thanks,  Evelina Can MD  Internal Medicine

## 2023-12-15 NOTE — PROGRESS NOTES
New Patient Oncology Nurse Navigator Note     Referring provider: Evelina Can MD      Referring Clinic/Organization: [facility]      Referred to (specialty:) Hematology/Oncology     Requested provider (if applicable): NA     Date Referral Received: December 14, 2023     Evaluation for:    E83.52 (ICD-10-CM) - Hypercalcemia   D47.2 (ICD-10-CM) - Monoclonal gammopathy of undetermined significance     Hypercalcemia, Monoclonal spike, concern for myeloma.     Clinical History (per Nurse review of records provided):      Hypercalcemia  Unclear etiology, possibly from previous  hypervitaminosis, though with m protein in urine would like her to see heme. They are open to diagnosis but prefer less visits and are not necessarily open to invasive tests or procedures.              PTHrp  -     Adult Oncology/Hematology  Referral; Future  -     Total Light Chains Ur; Future  -     HC KAPPA LAMDA FREE LIGHT CHAINS  -     Immunoglobulins A G and M; Future     Monoclonal gammopathy of undetermined significance  -     Adult Oncology/Hematology  Referral; Future  -     Total Light Chains Ur; Future  -     HC KAPPA LAMDA FREE LIGHT CHAINS  -     Immunoglobulins A G and M; Future          Latest Reference Range & Units 11/30/23 12:11 12/07/23 14:10   Sodium 135 - 145 mmol/L 142    Potassium 3.4 - 5.3 mmol/L 4.4    Chloride 98 - 107 mmol/L 107    Carbon Dioxide (CO2) 22 - 29 mmol/L 26    Urea Nitrogen 8.0 - 23.0 mg/dL 28.5 (H)    Creatinine 0.51 - 0.95 mg/dL 0.80    GFR Estimate >60 mL/min/1.73m2 66    Calcium 8.2 - 9.6 mg/dL 10.6 (H)    Anion Gap 7 - 15 mmol/L 9    Calcium Ionized Whole Blood 4.4 - 5.2 mg/dL  5.3 (H)   Glucose 70 - 99 mg/dL 106 (H)    Hemoglobin A1C <5.7 % 5.2    TSH 0.30 - 4.20 uIU/mL 3.01    Vitamin D, Total (25-Hydroxy) 20 - 50 ng/mL 50    WBC 4.0 - 11.0 10e3/uL  6.9   Hemoglobin 11.7 - 15.7 g/dL  13.5   Hematocrit 35.0 - 47.0  %  42.0   Platelet Count 150 - 450 10e3/uL  250   RBC Count 3.80 - 5.20 10e6/uL  4.30   MCV 78 - 100 fL  98   MCH 26.5 - 33.0 pg  31.4   MCHC 31.5 - 36.5 g/dL  32.1   RDW 10.0 - 15.0 %  14.5   Albumin Fraction 3.7 - 5.1 g/dL  4.1   Albumin Fraction Urine <=0.0 %  65.5 (H)   Alpha 1 Fraction 0.2 - 0.4 g/dL  0.3   Alpha 1 Fraction Urine <=0.0 %  10.3 (H)   Alpha 2 Fraction 0.5 - 0.9 g/dL  0.9   Alpha 2 Fraction Urine <=0.0 %  4.7 (H)   Beta Fraction 0.6 - 1.0 g/dL  0.7   Beta Fraction Urine <=0.0 %  12.3 (H)   ELP Interpretation:   Essentially normal electrophoretic pattern. No obvious monoclonal proteins seen. Pathologic significance requires clinical correlation. ANDREW Sparks M.D., Ph.D., Pathologist ().   ELP Interpretation Urine   Albumin and globulins seen. A monoclonal protein (2.2%) is seen in the gamma fraction.  See immunofixation report on this sample. Pathologic significance requires clinical correlation.  Chelsy Bruno M.D., Ph.D.   Gamma Fraction 0.7 - 1.6 g/dL  0.7   Gamma Fraction Urine <=0.0 %  7.2 (H)   Immunofix ELP Urine   Monoclonal free immunoglobulin light chain of kappa chain type. Pathologic significance requires clinical correlation.  Chelsy Bruno M.D., Ph.D.   Immunofixation ELP   No monoclonal protein seen on immunofixation. Pathologic significance requires clinical correlation.  ANDREW Sparks M.D., Ph.D., Pathologist ()   Monoclonal Peak <=0.0 g/dL  0.0   Monoclonal Peak Urine <=0.0 %  2.2 (H)   Parathyroid Hormone Intact 15 - 65 pg/mL  45   Total Protein Serum for ELP 6.4 - 8.3 g/dL  6.7   (H): Data is abnormally high      Records Location: See Bookmarked material     Records Needed: NA     Additional testing needed prior to consult:   Additional Lab Tests     Payor: MEDICARE / Plan: MEDICARE / Product Type: Medicare /     December 15, 2023  Referral received and reviewed. Additional labs to be drawn. Will await those results prior to scheduling the patient.  "    December 20, 2023  Referral was sent to Dr. Draper to review. Per Dr. Draper:    \"Very doubtful this is myeloma - the hypercalcemia intermittently dates back to 2005 and she appears to take a lot of supplements.  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 The next workup step in addition to the labs that were/have been ordered would BMB and imaging.  From the notes it sounds like the patient/family may not be open to this, which is very reasonable at 89.  In general I don't think this would need to go to malignant heme - it's unlikely to be myeloma, and the workup may not be completed regardless. It could honestly go to one of our general community docs if that is more convenient for the patient.  Outlining the workup and discussing goals of care is the primary service we can provide here. \"    Called patient's daughter Lake to review recommendations by Dr. Draper. The would like to wait until after the Holidays to schedule a consultation with a hematologist. They were grateful for the coordination of care and explanation. Provided them with contact information and encouraged them to call with any further questions.     Labs 1/11/24   Consult with Dr. Malik 1/24/24     Roxy BUSATMANTE, RN   Oncology Nurse Navigator   [facility]   [phone] / [phone]     "

## 2023-12-29 ENCOUNTER — OFFICE VISIT (OUTPATIENT)
Dept: AUDIOLOGY | Facility: CLINIC | Age: 88
End: 2023-12-29
Payer: MEDICARE

## 2023-12-29 DIAGNOSIS — H90.3 SENSORINEURAL HEARING LOSS, BILATERAL: Primary | ICD-10-CM

## 2023-12-29 PROCEDURE — V5267 HEARING AID SUP/ACCESS/DEV: HCPCS | Performed by: AUDIOLOGIST

## 2024-01-01 NOTE — TELEPHONE ENCOUNTER
Last Clinic Visit:  2/2/18        2/17/17  30:1   For information on Fall & Injury Prevention, visit: [email or web address] OR  [email or web address] OR  https://www.cdc.gov/steadi/patient.html

## 2024-01-11 ENCOUNTER — APPOINTMENT (OUTPATIENT)
Dept: LAB | Facility: CLINIC | Age: 89
End: 2024-01-11
Payer: MEDICARE

## 2024-01-11 ENCOUNTER — LAB (OUTPATIENT)
Dept: LAB | Facility: CLINIC | Age: 89
End: 2024-01-11
Payer: MEDICARE

## 2024-01-11 DIAGNOSIS — E83.52 HYPERCALCEMIA: ICD-10-CM

## 2024-01-11 DIAGNOSIS — D47.2 MONOCLONAL GAMMOPATHY OF UNDETERMINED SIGNIFICANCE: ICD-10-CM

## 2024-01-11 PROCEDURE — 83970 ASSAY OF PARATHORMONE: CPT

## 2024-01-11 PROCEDURE — 82784 ASSAY IGA/IGD/IGG/IGM EACH: CPT

## 2024-01-11 PROCEDURE — 83521 IG LIGHT CHAINS FREE EACH: CPT

## 2024-01-11 PROCEDURE — 99000 SPECIMEN HANDLING OFFICE-LAB: CPT

## 2024-01-11 PROCEDURE — 83883 ASSAY NEPHELOMETRY NOT SPEC: CPT | Mod: 90

## 2024-01-11 PROCEDURE — 82542 COL CHROMOTOGRAPHY QUAL/QUAN: CPT | Mod: 90

## 2024-01-11 PROCEDURE — 36415 COLL VENOUS BLD VENIPUNCTURE: CPT

## 2024-01-12 LAB
IGA SERPL-MCNC: 103 MG/DL (ref 84–499)
IGG SERPL-MCNC: 725 MG/DL (ref 610–1616)
IGM SERPL-MCNC: 39 MG/DL (ref 35–242)
KAPPA LC FREE SER-MCNC: 1.85 MG/DL (ref 0.33–1.94)
KAPPA LC FREE/LAMBDA FREE SER NEPH: 1.17 {RATIO} (ref 0.26–1.65)
LAMBDA LC FREE SERPL-MCNC: 1.58 MG/DL (ref 0.57–2.63)
PTH-INTACT SERPL-MCNC: 55 PG/ML (ref 15–65)

## 2024-01-14 LAB — PTH RELATED PROT SERPL-SCNC: 7.9 PMOL/L

## 2024-01-15 LAB
KAPPA LC UR-MCNC: 5.01 MG/DL
KAPPA LC/LAMBDA UR: 4.51 {RATIO} (ref 0.7–6.2)
LAMBDA LC UR-MCNC: 1.11 MG/DL

## 2024-01-17 NOTE — TELEPHONE ENCOUNTER
RECORDS STATUS - ALL OTHER DIAGNOSIS      RECORDS RECEIVED FROM: Epic   DATE RECEIVED:    NOTES STATUS DETAILS   OFFICE NOTE from referring provider Epic 12/14/23: Dr. Evelina Can   MEDICATION LIST [facility]    LABS     ANYTHING RELATED TO DIAGNOSIS Epic Most recent 1/11/24      [FreeTextEntry1] : 3 year old boy with autism spectrum disorder, history of early motor, speech and sociability delays, referred for toe-waling. This was noticed by parents when he started to walk at 17 months. He does not do this consistently, but more when he runs. 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

## 2024-01-20 ENCOUNTER — HEALTH MAINTENANCE LETTER (OUTPATIENT)
Age: 89
End: 2024-01-20

## 2024-01-24 ENCOUNTER — PRE VISIT (OUTPATIENT)
Dept: TRANSPLANT | Facility: CLINIC | Age: 89
End: 2024-01-24
Payer: MEDICARE

## 2024-01-24 ENCOUNTER — ONCOLOGY VISIT (OUTPATIENT)
Dept: TRANSPLANT | Facility: CLINIC | Age: 89
End: 2024-01-24
Attending: INTERNAL MEDICINE
Payer: MEDICARE

## 2024-01-24 VITALS
SYSTOLIC BLOOD PRESSURE: 175 MMHG | OXYGEN SATURATION: 98 % | DIASTOLIC BLOOD PRESSURE: 96 MMHG | HEART RATE: 82 BPM | TEMPERATURE: 98.2 F | RESPIRATION RATE: 24 BRPM

## 2024-01-24 DIAGNOSIS — E83.52 HYPERCALCEMIA: ICD-10-CM

## 2024-01-24 DIAGNOSIS — D47.2 MONOCLONAL GAMMOPATHY OF UNDETERMINED SIGNIFICANCE: ICD-10-CM

## 2024-01-24 PROCEDURE — 99205 OFFICE O/P NEW HI 60 MIN: CPT | Performed by: INTERNAL MEDICINE

## 2024-01-24 PROCEDURE — G0463 HOSPITAL OUTPT CLINIC VISIT: HCPCS | Performed by: INTERNAL MEDICINE

## 2024-01-24 ASSESSMENT — PAIN SCALES - GENERAL: PAINLEVEL: NO PAIN (0)

## 2024-01-24 NOTE — NURSING NOTE
"Oncology Rooming Note    January 24, 2024 11:30 AM   Azeb Torres is a 89 year old female who presents for:    Chief Complaint   Patient presents with    Oncology Clinic Visit     Monoclonal gammopathy of undetermined significance      Initial Vitals: BP (!) 175/96   Pulse 82   Temp 98.2  F (36.8  C)   Resp 24   SpO2 98%  Estimated body mass index is 33.22 kg/m  as calculated from the following:    Height as of 11/30/23: 1.575 m (5' 2\").    Weight as of 11/11/22: 82.4 kg (181 lb 9.6 oz). There is no height or weight on file to calculate BSA.  No Pain (0) Comment: Data Unavailable   No LMP recorded. Patient has had a hysterectomy.  Allergies reviewed: Yes  Medications reviewed: Yes    Medications: Medication refills not needed today.  Pharmacy name entered into EPIC:    [facility] [place], [place] - [address]  [facility] [address]  [place], [place] - [address] [facility] [place], [place] - [address] [facility] [place], [place] - [address] [place] [address]    Frailty Screening:   Is the patient here for a new oncology consult visit in cancer care? 1. Yes. Over the past month, have you experienced difficulty or required a caregiver to assist with:   1. Balance, walking or general mobility (including any falls)? NO  2. Completion of self-care tasks such as bathing, dressing, toileting, grooming/hygiene?  NO  3. Concentration or memory that affects your daily life?  YES      Clinical concerns: no other complaints      Denny Durand"

## 2024-01-24 NOTE — LETTER
1/24/2024         RE: Azeb Torres  [address]  [place] [address]      Hematology Note.    Patient was sent for evaluation of MGUS in the setting of somewhat elevated calcium and PTHrp    Ms. Torres is a very elderly patient, who was noted to have mild-moderate degree of hypercalcemia on routine lab work in Nov of 23. Follow up blood work revealed an elevated PTHrp but normal SPEP / IFX / light chains in serum but UPEP/ urinary immunofixation was positive for monoclonal kappa light chains. Patient has a normal creatinine and hemoglobin level at this time.    She is here with her daughters. She tells me she is doing well. Has a good appetite and no major weight loss reported. Has not had a lot of constipation on confusion lately. Able to do ADLS supervised but otherwise needs help with IADLS.    Past Medical History:   Diagnosis Date    Arthritis     Breast cancer (H)     ER positive    Chronic angle-closure glaucoma     CVA (cerebral vascular accident) (H) 2003    R cerebral artery, embolic after hip replacement    Degenerative joint disease     Endometrial cancer (H)     Hypertension     Left homonymous hemianopsia     Pulmonary artery hypertension (H) 4/24/2013    Noted on echo. Mild-moderate. Moderate TR    Tricuspid regurgitation 4/24/2013        Past Surgical History:   Procedure Laterality Date    CATARACT IOL, RT/LT      glaucoma laser[      HIP SURGERY      s/p bilateral hip replacements    HYSTERECTOMY  1985    low grade endometrial cancer    LUMPECTOMY BREAST Left     RELEASE CARPAL TUNNEL Right 11/14/2017    Procedure: RELEASE CARPAL TUNNEL;  Right Open Carpal Tunnel Release;  Surgeon: Patrick Rivera MD;  Location:  OR    [facility] PELVIS/HIP JOINT SURGERY UNLISTED      [facility] STOMACH SURGERY PROCEDURE UNLISTED          I have reviewed this patient's family history and updated it with pertinent information if needed.  Family History   Problem Relation Age of Onset    Coronary Artery  Disease Father     Glaucoma No family hx of     Macular Degeneration No family hx of           Social History     Tobacco Use    Smoking status: Never    Smokeless tobacco: Never   Vaping Use    Vaping Use: Never used   Substance Use Topics    Alcohol use: No     Comment: 0    Drug use: No        Physical Exam:  BP (!) 175/96   Pulse 82   Temp 98.2  F (36.8  C)   Resp 24   SpO2 98%    In NAD    Assesment and Plan:    Ms. Torres has had some increment in calcium in the setting of elevated PTHrp and positive UPEP/urine IFX ( with negative serum IFX / SPEP though ) but with normal hemoglobin and renal function.    We discussed that PTHrp related hypercalcemia is most often seen in the setting of solid tumors, less commonly with myeloma and very uncommonly with lymphoma. It is not outside the realm of possibilities that she has an underlying malignancy, could be a hypo-secretory myeloma or a solid tumor. However, given her very advanced age, limited performance status, no concerning signs/symptoms, it is advised not to delve into this further and treat this symptomatically.    Pulses of steroids, fluids, bisphosphonate can be used on an as needed basis but invasive diagnostic workup for malignancy will likely not be in patient's best interest.    All the questions that the patient, her daughters and grand-daughter had were answered to the best of my abilities.    We will be available on as as needed basis.    More than 70 mins were spent in coordination of care, review of data and part of it was spent with the patient.    Radu Malik MD.

## 2024-01-24 NOTE — LETTER
1/24/2024         RE: Azeb Torres  [address]  [place] [address]        Dear Colleague,    Thank you for referring your patient, Azeb Torres, to the [facility] [place]. Please see a copy of my visit note below.    Hematology Note.    Patient was sent for evaluation of MGUS in the setting of somewhat elevated calcium and PTHrp    Ms. Torres is a very elderly patient, who was noted to have mild-moderate degree of hypercalcemia on routine lab work in Nov of 23. Follow up blood work revealed an elevated PTHrp but normal SPEP / IFX / light chains in serum but UPEP/ urinary immunofixation was positive for monoclonal kappa light chains. Patient has a normal creatinine and hemoglobin level at this time.    She is here with her daughters. She tells me she is doing well. Has a good appetite and no major weight loss reported. Has not had a lot of constipation on confusion lately. Able to do ADLS supervised but otherwise needs help with IADLS.    Past Medical History:   Diagnosis Date    Arthritis     Breast cancer (H)     ER positive    Chronic angle-closure glaucoma     CVA (cerebral vascular accident) (H) 2003    R cerebral artery, embolic after hip replacement    Degenerative joint disease     Endometrial cancer (H)     Hypertension     Left homonymous hemianopsia     Pulmonary artery hypertension (H) 4/24/2013    Noted on echo. Mild-moderate. Moderate TR    Tricuspid regurgitation 4/24/2013        Past Surgical History:   Procedure Laterality Date    CATARACT IOL, RT/LT      glaucoma laser[      HIP SURGERY      s/p bilateral hip replacements    HYSTERECTOMY  1985    low grade endometrial cancer    LUMPECTOMY BREAST Left     RELEASE CARPAL TUNNEL Right 11/14/2017    Procedure: RELEASE CARPAL TUNNEL;  Right Open Carpal Tunnel Release;  Surgeon: Patrick Rivera MD;  Location:  OR    [facility] PELVIS/HIP JOINT SURGERY UNLISTED      [facility] STOMACH SURGERY  PROCEDURE UNLISTED          I have reviewed this patient's family history and updated it with pertinent information if needed.  Family History   Problem Relation Age of Onset    Coronary Artery Disease Father     Glaucoma No family hx of     Macular Degeneration No family hx of           Social History     Tobacco Use    Smoking status: Never    Smokeless tobacco: Never   Vaping Use    Vaping Use: Never used   Substance Use Topics    Alcohol use: No     Comment: 0    Drug use: No        Physical Exam:  BP (!) 175/96   Pulse 82   Temp 98.2  F (36.8  C)   Resp 24   SpO2 98%    In NAD    Assesment and Plan:    Ms. Torres has had some increment in calcium in the setting of elevated PTHrp and positive UPEP/urine IFX ( with negative serum IFX / SPEP though ) but with normal hemoglobin and renal function.    We discussed that PTHrp related hypercalcemia is most often seen in the setting of solid tumors, less commonly with myeloma and very uncommonly with lymphoma. It is not outside the realm of possibilities that she has an underlying malignancy, could be a hypo-secretory myeloma or a solid tumor. However, given her very advanced age, limited performance status, no concerning signs/symptoms, it is advised not to delve into this further and treat this symptomatically.    Pulses of steroids, fluids, bisphosphonate can be used on an as needed basis but invasive diagnostic workup for malignancy will likely not be in patient's best interest.    All the questions that the patient, her daughters and grand-daughter had were answered to the best of my abilities.    We will be available on as as needed basis.    More than 70 mins were spent in coordination of care, review of data and part of it was spent with the patient.    Radu Malik MD.

## 2024-01-24 NOTE — PROGRESS NOTES
Hematology Note.    Patient was sent for evaluation of MGUS in the setting of somewhat elevated calcium and PTHrp    Ms. Torres is a very elderly patient, who was noted to have mild-moderate degree of hypercalcemia on routine lab work in Nov of 23. Follow up blood work revealed an elevated PTHrp but normal SPEP / IFX / light chains in serum but UPEP/ urinary immunofixation was positive for monoclonal kappa light chains. Patient has a normal creatinine and hemoglobin level at this time.    She is here with her daughters. She tells me she is doing well. Has a good appetite and no major weight loss reported. Has not had a lot of constipation on confusion lately. Able to do ADLS supervised but otherwise needs help with IADLS.    Past Medical History:   Diagnosis Date    Arthritis     Breast cancer (H)     ER positive    Chronic angle-closure glaucoma     CVA (cerebral vascular accident) (H) 2003    R cerebral artery, embolic after hip replacement    Degenerative joint disease     Endometrial cancer (H)     Hypertension     Left homonymous hemianopsia     Pulmonary artery hypertension (H) 4/24/2013    Noted on echo. Mild-moderate. Moderate TR    Tricuspid regurgitation 4/24/2013        Past Surgical History:   Procedure Laterality Date    CATARACT IOL, RT/LT      glaucoma laser[      HIP SURGERY      s/p bilateral hip replacements    HYSTERECTOMY  1985    low grade endometrial cancer    LUMPECTOMY BREAST Left     RELEASE CARPAL TUNNEL Right 11/14/2017    Procedure: RELEASE CARPAL TUNNEL;  Right Open Carpal Tunnel Release;  Surgeon: Patrick Rivera MD;  Location:  OR    [facility] PELVIS/HIP JOINT SURGERY UNLISTED      [facility] STOMACH SURGERY PROCEDURE UNLISTED          I have reviewed this patient's family history and updated it with pertinent information if needed.  Family History   Problem Relation Age of Onset    Coronary Artery Disease Father     Glaucoma No family hx of     Macular Degeneration No family hx of            Social History     Tobacco Use    Smoking status: Never    Smokeless tobacco: Never   Vaping Use    Vaping Use: Never used   Substance Use Topics    Alcohol use: No     Comment: 0    Drug use: No        Physical Exam:  BP (!) 175/96   Pulse 82   Temp 98.2  F (36.8  C)   Resp 24   SpO2 98%    In NAD    Assesment and Plan:    Ms. Torres has had some increment in calcium in the setting of elevated PTHrp and positive UPEP/urine IFX ( with negative serum IFX / SPEP though ) but with normal hemoglobin and renal function.    We discussed that PTHrp related hypercalcemia is most often seen in the setting of solid tumors, less commonly with myeloma and very uncommonly with lymphoma. It is not outside the realm of possibilities that she has an underlying malignancy, could be a hypo-secretory myeloma or a solid tumor. However, given her very advanced age, limited performance status, no concerning signs/symptoms, it is advised not to delve into this further and treat this symptomatically.    Pulses of steroids, fluids, bisphosphonate can be used on an as needed basis but invasive diagnostic workup for malignancy will likely not be in patient's best interest.    All the questions that the patient, her daughters and grand-daughter had were answered to the best of my abilities.    We will be available on as as needed basis.    More than 70 mins were spent in coordination of care, review of data and part of it was spent with the patient.    Radu Malik MD.

## 2024-02-27 DIAGNOSIS — Z86.73 HISTORY OF CVA (CEREBROVASCULAR ACCIDENT): ICD-10-CM

## 2024-02-27 NOTE — TELEPHONE ENCOUNTER
CLOPIDOGREL BISULFATE 75MG TABS     Not able to fill due to no updated associated diagnosis.  Please review and update.     Thank you     Amanda Foley RN  Central Triage Red Flags/Med Refills  Plavix [id number]/27/2024 11:34 AM   Protocol Details Medication indicated for associated diagnosis   Medication indicated for associated diagnosis    Medication is associated with one or more of the following diagnoses:  Cerebrovascular accident  Myocardial infarction  Percutaneous coronary intervention  Peripheral arterial occlusive disease  
Quality 110: Preventive Care And Screening: Influenza Immunization: Influenza Immunization Administered during Influenza season
Quality 111:Pneumonia Vaccination Status For Older Adults: Pneumococcal vaccine (PPSV23) administered on or after patient’s 60th birthday and before the end of the measurement period
Detail Level: Detailed

## 2024-02-29 RX ORDER — CLOPIDOGREL BISULFATE 75 MG/1
75 TABLET ORAL DAILY
Qty: 90 TABLET | Refills: 2 | Status: SHIPPED | OUTPATIENT
Start: 2024-02-29

## 2024-03-30 ENCOUNTER — HEALTH MAINTENANCE LETTER (OUTPATIENT)
Age: 89
End: 2024-03-30

## 2024-06-14 ENCOUNTER — DOCUMENTATION ONLY (OUTPATIENT)
Dept: AUDIOLOGY | Facility: CLINIC | Age: 89
End: 2024-06-14
Payer: MEDICARE

## 2024-06-14 NOTE — PROGRESS NOTES
Walk-in hearing aid services on 6/14/24: The patient's daughter brought in her hearing aids for cleaning. Both hearing aids were cleaned and the wax filters were replaced. A listening check found the hearing aids to be working well and they were returned to the patient's daughter.

## 2024-10-01 DIAGNOSIS — E78.5 HYPERLIPIDEMIA, UNSPECIFIED HYPERLIPIDEMIA TYPE: ICD-10-CM

## 2024-10-07 RX ORDER — ATORVASTATIN CALCIUM 10 MG/1
10 TABLET, FILM COATED ORAL DAILY
Qty: 90 TABLET | Refills: 0 | Status: SHIPPED | OUTPATIENT
Start: 2024-10-07

## 2024-10-07 NOTE — TELEPHONE ENCOUNTER
Patient is calling in to request refill on   atorvastatin (LIPITOR) 10 MG tablet  per pharmacy requests. Thank you

## 2024-10-07 NOTE — TELEPHONE ENCOUNTER
LCV:12/14/2023  [facility] [place]  Overdue LDL, FYI to clinic  RF 90 day

## 2024-10-08 NOTE — TELEPHONE ENCOUNTER
Patients daughter confirmed scheduled appointment:  Date: December 2nd  Time: 12pm  Visit type: UMP Return   Provider: PCP  [place] [facility] [place] notes: Annual medication follow up

## 2024-11-04 DIAGNOSIS — E78.5 HYPERLIPIDEMIA, UNSPECIFIED HYPERLIPIDEMIA TYPE: ICD-10-CM

## 2024-11-05 RX ORDER — ATORVASTATIN CALCIUM 10 MG/1
10 TABLET, FILM COATED ORAL DAILY
Qty: 90 TABLET | Refills: 0 | OUTPATIENT
Start: 2024-11-05

## 2024-11-26 DIAGNOSIS — E03.4 HYPOTHYROIDISM DUE TO ACQUIRED ATROPHY OF THYROID: ICD-10-CM

## 2024-11-29 DIAGNOSIS — E78.5 HYPERLIPIDEMIA, UNSPECIFIED HYPERLIPIDEMIA TYPE: ICD-10-CM

## 2024-12-02 ENCOUNTER — LAB (OUTPATIENT)
Dept: LAB | Facility: CLINIC | Age: 89
End: 2024-12-02
Payer: MEDICARE

## 2024-12-02 ENCOUNTER — OFFICE VISIT (OUTPATIENT)
Dept: INTERNAL MEDICINE | Facility: CLINIC | Age: 89
End: 2024-12-02
Payer: MEDICARE

## 2024-12-02 VITALS
RESPIRATION RATE: 18 BRPM | TEMPERATURE: 97.8 F | DIASTOLIC BLOOD PRESSURE: 77 MMHG | SYSTOLIC BLOOD PRESSURE: 160 MMHG | HEART RATE: 67 BPM | OXYGEN SATURATION: 99 %

## 2024-12-02 DIAGNOSIS — Z23 NEED FOR TDAP VACCINATION: ICD-10-CM

## 2024-12-02 DIAGNOSIS — Z00.00 ENCOUNTER FOR MEDICARE ANNUAL WELLNESS EXAM: Primary | ICD-10-CM

## 2024-12-02 DIAGNOSIS — Z86.73 HISTORY OF CVA (CEREBROVASCULAR ACCIDENT): ICD-10-CM

## 2024-12-02 DIAGNOSIS — E03.4 HYPOTHYROIDISM DUE TO ACQUIRED ATROPHY OF THYROID: ICD-10-CM

## 2024-12-02 DIAGNOSIS — E78.49 OTHER HYPERLIPIDEMIA: ICD-10-CM

## 2024-12-02 DIAGNOSIS — E78.5 HYPERLIPIDEMIA: ICD-10-CM

## 2024-12-02 DIAGNOSIS — Z12.31 VISIT FOR SCREENING MAMMOGRAM: ICD-10-CM

## 2024-12-02 DIAGNOSIS — B49 FUNGAL INFECTION: ICD-10-CM

## 2024-12-02 DIAGNOSIS — Z71.89 ADVANCED DIRECTIVES, COUNSELING/DISCUSSION: ICD-10-CM

## 2024-12-02 DIAGNOSIS — I10 ESSENTIAL HYPERTENSION, BENIGN: ICD-10-CM

## 2024-12-02 DIAGNOSIS — E78.5 HYPERLIPIDEMIA, UNSPECIFIED HYPERLIPIDEMIA TYPE: ICD-10-CM

## 2024-12-02 LAB
ALBUMIN SERPL BCG-MCNC: 4.1 G/DL (ref 3.5–5.2)
ALP SERPL-CCNC: 248 U/L (ref 40–150)
ALT SERPL W P-5'-P-CCNC: 5 U/L (ref 0–50)
ANION GAP SERPL CALCULATED.3IONS-SCNC: 10 MMOL/L (ref 7–15)
AST SERPL W P-5'-P-CCNC: 17 U/L (ref 0–45)
BILIRUB SERPL-MCNC: 1.1 MG/DL
BUN SERPL-MCNC: 18.2 MG/DL (ref 8–23)
CALCIUM SERPL-MCNC: 10.2 MG/DL (ref 8.8–10.4)
CHLORIDE SERPL-SCNC: 107 MMOL/L (ref 98–107)
CHOLEST SERPL-MCNC: 123 MG/DL
CREAT SERPL-MCNC: 0.75 MG/DL (ref 0.51–0.95)
EGFRCR SERPLBLD CKD-EPI 2021: 71 ML/MIN/1.73M2
ERYTHROCYTE [DISTWIDTH] IN BLOOD BY AUTOMATED COUNT: 14.5 % (ref 10–15)
FASTING STATUS PATIENT QL REPORTED: NO
FASTING STATUS PATIENT QL REPORTED: NO
GLUCOSE SERPL-MCNC: 104 MG/DL (ref 70–99)
HCO3 SERPL-SCNC: 25 MMOL/L (ref 22–29)
HCT VFR BLD AUTO: 38.7 % (ref 35–47)
HDLC SERPL-MCNC: 48 MG/DL
HGB BLD-MCNC: 12.5 G/DL (ref 11.7–15.7)
LDLC SERPL CALC-MCNC: 47 MG/DL
MCH RBC QN AUTO: 31.7 PG (ref 26.5–33)
MCHC RBC AUTO-ENTMCNC: 32.3 G/DL (ref 31.5–36.5)
MCV RBC AUTO: 98 FL (ref 78–100)
NONHDLC SERPL-MCNC: 75 MG/DL
PLATELET # BLD AUTO: 283 10E3/UL (ref 150–450)
POTASSIUM SERPL-SCNC: 4 MMOL/L (ref 3.4–5.3)
PROT SERPL-MCNC: 6.9 G/DL (ref 6.4–8.3)
RBC # BLD AUTO: 3.94 10E6/UL (ref 3.8–5.2)
SODIUM SERPL-SCNC: 142 MMOL/L (ref 135–145)
TRIGL SERPL-MCNC: 139 MG/DL
TSH SERPL DL<=0.005 MIU/L-ACNC: 2.89 UIU/ML (ref 0.3–4.2)
WBC # BLD AUTO: 9.1 10E3/UL (ref 4–11)

## 2024-12-02 PROCEDURE — 80053 COMPREHEN METABOLIC PANEL: CPT | Performed by: PATHOLOGY

## 2024-12-02 PROCEDURE — 85027 COMPLETE CBC AUTOMATED: CPT | Performed by: PATHOLOGY

## 2024-12-02 PROCEDURE — 36415 COLL VENOUS BLD VENIPUNCTURE: CPT | Performed by: PATHOLOGY

## 2024-12-02 PROCEDURE — 80061 LIPID PANEL: CPT | Performed by: PATHOLOGY

## 2024-12-02 PROCEDURE — 84443 ASSAY THYROID STIM HORMONE: CPT | Performed by: PATHOLOGY

## 2024-12-02 RX ORDER — ANTIOX #8/OM3/DHA/EPA/LUT/ZEAX 250-2.5 MG
2 CAPSULE ORAL
COMMUNITY
Start: 2024-12-01

## 2024-12-02 RX ORDER — LEVOTHYROXINE SODIUM 25 UG/1
25 TABLET ORAL DAILY
Qty: 90 TABLET | Refills: 3 | Status: SHIPPED | OUTPATIENT
Start: 2024-12-02

## 2024-12-02 RX ORDER — ATORVASTATIN CALCIUM 10 MG/1
10 TABLET, FILM COATED ORAL DAILY
Qty: 90 TABLET | Refills: 4 | Status: SHIPPED | OUTPATIENT
Start: 2024-12-02

## 2024-12-02 RX ORDER — AMLODIPINE BESYLATE 5 MG/1
5 TABLET ORAL DAILY
Qty: 90 TABLET | Refills: 4 | Status: SHIPPED | OUTPATIENT
Start: 2024-12-02

## 2024-12-02 RX ORDER — CLOPIDOGREL BISULFATE 75 MG/1
75 TABLET ORAL DAILY
Qty: 90 TABLET | Refills: 4 | Status: SHIPPED | OUTPATIENT
Start: 2024-12-02

## 2024-12-02 RX ORDER — CLOTRIMAZOLE 1 %
CREAM (GRAM) TOPICAL 2 TIMES DAILY
Qty: 60 G | Refills: 1 | Status: SHIPPED | OUTPATIENT
Start: 2024-12-02

## 2024-12-02 SDOH — HEALTH STABILITY: PHYSICAL HEALTH: ON AVERAGE, HOW MANY MINUTES DO YOU ENGAGE IN EXERCISE AT THIS LEVEL?: 0 MIN

## 2024-12-02 SDOH — HEALTH STABILITY: PHYSICAL HEALTH: ON AVERAGE, HOW MANY DAYS PER WEEK DO YOU ENGAGE IN MODERATE TO STRENUOUS EXERCISE (LIKE A BRISK WALK)?: 0 DAYS

## 2024-12-02 ASSESSMENT — SOCIAL DETERMINANTS OF HEALTH (SDOH): HOW OFTEN DO YOU GET TOGETHER WITH FRIENDS OR RELATIVES?: ONCE A WEEK

## 2024-12-02 NOTE — PATIENT INSTRUCTIONS
Can try fiber products such as metamucil, benefiber, citrucel for stool incontinence.    Add in fiber (citrucel, metamucil, benefiber, mirafiber). Start 1 tsp daily and slowly increase to 2 tsp after 2 weeks. You may experience more bloating/gas in the beginning. It may take several weeks to help regulate symtpoms, but try to stick with it to see if you can improve digestive health and regularity.

## 2024-12-02 NOTE — PROGRESS NOTES
Preventive Care Visit  [facility]  Evelina Can MD, Internal Medicine  Dec 2, 2024      Assessment & Plan     Encounter for Medicare annual wellness exam  Routine health care reviewed and updated as appropriate. Counseling on health risks, including diet, exercise, substance use, sun protection, etc were discussed as relevant. Vaccinations due/eligible reviewed with patient and advised to get at pharmacy.     Hyperlipidemia  - Lipid panel reflex to direct LDL Non-fasting; Future  - CBC with platelets; Future  - Comprehensive metabolic panel (BMP + Alb, Alk Phos, ALT, AST, Total. Bili, TP); Future    Essential hypertension, benign  Will increase from 2.5 mg  - amLODIPine (NORVASC) 5 MG tablet; Take 1 tablet (5 mg) by mouth daily.    Hypothyroidism due to acquired atrophy of thyroid  Unclear that patient needs this  - TSH with free T4 reflex; Future    Hyperlipidemia, unspecified hyperlipidemia type  - atorvastatin (LIPITOR) 10 MG tablet; Take 1 tablet (10 mg) by mouth daily.    History of CVA (cerebrovascular accident)  - clopidogrel (PLAVIX) 75 MG tablet; Take 1 tablet (75 mg) by mouth daily.    Fungal infection  Advised trying to keep area dry  - clotrimazole (LOTRIMIN) 1 % external cream; Apply topically 2 times daily. To rash in skin folds      Strongly encouraged them to complete POLST form at minimum, given they express desire for her to be DNR/I which is reasonable                No follow-ups on file.    Subjective   Azeb is a 89 year old, presenting for the following:  Physical        12/2/2024    12:10 PM   Additional Questions   Roomed by KTR   Accompanied by Evelina Resendez(daughter), Lake(Daughter)           SHAYE Bowie is accompanied by both of her daughters today, Lake and Evelina Resendez.     She has had some falls recently, called EMS but not hospitalized, no major injuries, bruises  She did meet with Onc given elevated Ca, PTHrP, advised against further testing  for malignancy given advanced age and lack of symptoms  Some fecal and urinary incontinence  Reports rash in the inguinal folds  Hearing aids have been working though she still has some difficulty hearing  They completed POA forms last year, we discussed POLST and adv directives at length. They discussed with a  but never completed/turned in.           Health Care Directive  Patient does not have a Health Care Directive: Discussed advance care planning with patient; information given to patient to review.      12/2/2024   General Health   How would you rate your overall physical health? Good   Feel stress (tense, anxious, or unable to sleep) Not at all            12/2/2024   Nutrition   Diet: Regular (no restrictions)            12/2/2024   Exercise   Days per week of moderate/strenous exercise 0 days   Average minutes spent exercising at this level 0 min      (!) EXERCISE CONCERN      12/2/2024   Social Factors   Frequency of gathering with friends or relatives Once a week   Worry food won't last until get money to buy more No   Food not last or not have enough money for food? No   Do you have housing? (Housing is defined as stable permanent housing and does not include staying ouside in a car, in a tent, in an abandoned building, in an overnight shelter, or couch-surfing.) Yes   Are you worried about losing your housing? No   Lack of transportation? No   Unable to get utilities (heat,electricity)? No            12/2/2024   Fall Risk   Fallen 2 or more times in the past year? Yes     Yes    Trouble with walking or balance? Yes     No    Reason Gait Speed Test Not Completed Patient does not tolerate an upright or standing position (e.g. wheelchair)       Patient-reported    Multiple values from one day are sorted in reverse-chronological order          12/2/2024   Activities of Daily Living- Home Safety   Needs help with the following daily activites Telephone use    Transportation    Shopping     Preparing meals    Housework    Bathing    Laundry    Medication administration    Money management    Toileting   Safety concerns in the home None of the above       Multiple values from one day are sorted in reverse-chronological order         12/2/2024   Dental   Dentist two times every year? Yes            12/2/2024   Hearing Screening   Hearing concerns? (!) I NEED TO ASK PEOPLE TO SPEAK UP OR REPEAT THEMSELVES.    (!) IT'S HARDER TO UNDERSTAND WOMEN'S VOICES THAN MEN'S VOICES.    (!) IT'S HARD TO FOLLOW A CONVERSATION IN A NOISY RESTAURANT OR CROWDED ROOM.    (!) TROUBLE UNDERSTANDING SOFT OR WHISPERED SPEECH.       Multiple values from one day are sorted in reverse-chronological order         12/2/2024   Driving Risk Screening   Patient/family members have concerns about driving No            12/2/2024   General Alertness/Fatigue Screening   Have you been more tired than usual lately? No            12/2/2024   Urinary Incontinence Screening   Bothered by leaking urine in past 6 months Yes            12/2/2024   TB Screening   Were you born outside of the [place]? No            Today's PHQ-2 Score:       12/2/2024    10:24 AM   PHQ-2 ( 1999 [employer])   Q1: Little interest or pleasure in doing things 0    Q2: Feeling down, depressed or hopeless 0    PHQ-2 Score 0    Q1: Little interest or pleasure in doing things Not at all   Q2: Feeling down, depressed or hopeless Not at all   PHQ-2 Score 0       Patient-reported           12/2/2024   Substance Use   Alcohol more than 3/day or more than 7/wk Not Applicable   Do you have a current opioid prescription? No   How severe/bad is pain from 1 to 10? 0/10 (No Pain)   Do you use any other substances recreationally? No        Social History     Tobacco Use    Smoking status: Never    Smokeless tobacco: Never   Vaping Use    Vaping status: Never Used   Substance Use Topics    Alcohol use: No     Comment: 0    Drug use: No           5/11/2022   LAST FHS-7 RESULTS   1st degree  relative breast or ovarian cancer No   Any relative bilateral breast cancer No   Any male have breast cancer No   Any ONE woman have BOTH breast AND ovarian cancer No   Any woman with breast cancer before 50yrs No   2 or more relatives with breast AND/OR ovarian cancer Yes   2 or more relatives with breast AND/OR bowel cancer No                     Reviewed and updated as needed this visit by Provider                      Current providers sharing in care for this patient include:  Patient Care Team:  Evelina Can MD as PCP - General (Internal Medicine)  Amina Ramirez AuD as Audiologist (Audiology)  Dipti Zhang MD as MD (Orthopedics)  Evelina Bacon OT (Inactive) as Occupational Therapist (Occupational Therapy)  Alejandrina Faria MD as MD (Ophthalmology)  Francisca Morales MD as MD (Dermatology)  Martín Hsieh OD as MD (Optometry)  Alejandrina Faria MD as Referring Physician (Ophthalmology)  Evelina Can MD as Assigned PCP  Татьяна Sanchez MD as MD (Dermatology)  Tamiko Roper AuD as Audiologist (Audiology)  Radu Malik MD as Physician (Hematology & Oncology)  Radu Malik MD as Assigned Cancer Care Provider    The following health maintenance items are reviewed in Epic and correct as of today:  Health Maintenance   Topic Date Due    DTAP/TDAP/TD IMMUNIZATION (1 - Tdap) 03/19/2021    MEDICARE ANNUAL WELLNESS VISIT  11/01/2022    MAMMO SCREENING  05/11/2023    LIPID  08/02/2024    BMP  11/30/2024    ANNUAL REVIEW OF HM ORDERS  11/30/2024    TSH W/FREE T4 REFLEX  11/30/2024    FALL RISK ASSESSMENT  12/02/2025    ADVANCE CARE PLANNING  12/14/2028    PHQ-2 (once per calendar year)  Completed    INFLUENZA VACCINE  Completed    Pneumococcal Vaccine: 65+ Years  Completed    ZOSTER IMMUNIZATION  Completed    RSV VACCINE  Completed    COVID-19 Vaccine  Completed    HPV IMMUNIZATION  Aged Out    MENINGITIS IMMUNIZATION  Aged Out    RSV MONOCLONAL ANTIBODY  Aged Out           "  Objective    Exam  BP (!) 160/77 (BP Location: Right arm, Patient Position: Sitting, Cuff Size: Adult Regular)   Pulse 67   Temp 97.8  F (36.6  C) (Oral)   Resp 18   SpO2 99%    Estimated body mass index is 33.22 kg/m  as calculated from the following:    Height as of 11/30/23: 1.575 m (5' 2\").    Weight as of 11/11/22: 82.4 kg (181 lb 9.6 oz).    Physical Exam  Constitutional: Alert, oriented, pleasant, no acute distress  Head: Normocephalic, atraumatic  Eyes: Extra-ocular movements intact, no scleral icterus  ENT: Oropharynx clear, moist mucus membranes  Neck: Supple  Cardiovascular: Regular rate and rhythm, no murmurs, rubs or gallops, peripheral pulses full/symmetric  Respiratory: Good air movement bilaterally, lungs clear, no wheezes/rales/rhonchi  Musculoskeletal: No edema, normal muscle tone, normal gait  Neurologic: Alert and oriented, cranial nerves 2-12 intact, no focal deficits  Skin: No rashes/lesions, mildly moist/erythematous in inguinal folds  Psychiatric: limited cognition, AOx1-2, pleasant           No data to display                       Signed Electronically by: Evelina Can MD    "

## 2024-12-04 RX ORDER — ATORVASTATIN CALCIUM 10 MG/1
10 TABLET, FILM COATED ORAL DAILY
Qty: 90 TABLET | Refills: 0 | OUTPATIENT
Start: 2024-12-04

## 2024-12-05 ENCOUNTER — DOCUMENTATION ONLY (OUTPATIENT)
Dept: OTHER | Facility: CLINIC | Age: 89
End: 2024-12-05
Payer: MEDICARE

## 2025-01-23 ENCOUNTER — DOCUMENTATION ONLY (OUTPATIENT)
Dept: AUDIOLOGY | Facility: CLINIC | Age: OVER 89
End: 2025-01-23
Payer: MEDICARE

## 2025-01-23 NOTE — PROGRESS NOTES
Walk-in hearing aid services on 1/23/25: The patient's daughter brought in both hearing aids for cleaning. The devices were cleaned and the wax filters were replaced. A listening check found both hearing aids to be working properly and they were returned to the patient's daughter. She reported the right hearing aid may not be charging properly as the light never turns solid green. It stays blinking green no matter how long it's left on the . She stated she's tried charging on both  posts and it only seems to be an issue with the right device. The hearing aid will be dropped off for warranty repair if the issue persists after today's cleaning.

## 2025-02-20 ENCOUNTER — HOSPITAL ENCOUNTER (INPATIENT)
Facility: CLINIC | Age: OVER 89
DRG: 871 | End: 2025-02-20
Attending: INTERNAL MEDICINE | Admitting: STUDENT IN AN ORGANIZED HEALTH CARE EDUCATION/TRAINING PROGRAM
Payer: MEDICARE

## 2025-02-20 ENCOUNTER — APPOINTMENT (OUTPATIENT)
Dept: GENERAL RADIOLOGY | Facility: CLINIC | Age: OVER 89
DRG: 871 | End: 2025-02-20
Attending: PHYSICIAN ASSISTANT
Payer: MEDICARE

## 2025-02-20 VITALS
TEMPERATURE: 97.4 F | DIASTOLIC BLOOD PRESSURE: 57 MMHG | OXYGEN SATURATION: 96 % | WEIGHT: 136.02 LBS | SYSTOLIC BLOOD PRESSURE: 93 MMHG | HEIGHT: 64 IN | RESPIRATION RATE: 40 BRPM | BODY MASS INDEX: 23.22 KG/M2 | HEART RATE: 102 BPM

## 2025-02-20 DIAGNOSIS — R42 DIZZINESS OF UNKNOWN CAUSE: ICD-10-CM

## 2025-02-20 DIAGNOSIS — N10 PYELONEPHRITIS, ACUTE: ICD-10-CM

## 2025-02-20 DIAGNOSIS — R42 VERTIGO: ICD-10-CM

## 2025-02-20 DIAGNOSIS — B96.1 BACTEREMIA DUE TO KLEBSIELLA PNEUMONIAE: Primary | ICD-10-CM

## 2025-02-20 DIAGNOSIS — R78.81 BACTEREMIA DUE TO KLEBSIELLA PNEUMONIAE: Primary | ICD-10-CM

## 2025-02-20 DIAGNOSIS — I10 ESSENTIAL HYPERTENSION, BENIGN: ICD-10-CM

## 2025-02-20 DIAGNOSIS — E03.4 HYPOTHYROIDISM DUE TO ACQUIRED ATROPHY OF THYROID: ICD-10-CM

## 2025-02-20 DIAGNOSIS — E87.20 LACTIC ACID INCREASED: ICD-10-CM

## 2025-02-20 DIAGNOSIS — R11.0 NAUSEA: ICD-10-CM

## 2025-02-20 LAB
ALBUMIN SERPL BCG-MCNC: 4.1 G/DL (ref 3.5–5.2)
ALBUMIN UR-MCNC: 200 MG/DL
ALP SERPL-CCNC: 121 U/L (ref 40–150)
ALT SERPL W P-5'-P-CCNC: 22 U/L (ref 0–50)
ANION GAP SERPL CALCULATED.3IONS-SCNC: 18 MMOL/L (ref 7–15)
APPEARANCE UR: ABNORMAL
AST SERPL W P-5'-P-CCNC: 45 U/L (ref 0–45)
BACTERIA #/AREA URNS HPF: ABNORMAL /HPF
BASE EXCESS BLDV CALC-SCNC: -1 MMOL/L (ref -3–3)
BILIRUB SERPL-MCNC: 1.9 MG/DL
BILIRUB UR QL STRIP: NEGATIVE
BUN SERPL-MCNC: 23 MG/DL (ref 8–23)
C PNEUM DNA SPEC QL NAA+PROBE: NOT DETECTED
CALCIUM SERPL-MCNC: 10.4 MG/DL (ref 8.8–10.4)
CHLORIDE SERPL-SCNC: 103 MMOL/L (ref 98–107)
COLOR UR AUTO: YELLOW
CREAT SERPL-MCNC: 0.95 MG/DL (ref 0.51–0.95)
CRP SERPL-MCNC: 27.5 MG/L
D DIMER PPP FEU-MCNC: 7.16 UG/ML FEU (ref 0–0.5)
EGFRCR SERPLBLD CKD-EPI 2021: 54 ML/MIN/1.73M2
ERYTHROCYTE [DISTWIDTH] IN BLOOD BY AUTOMATED COUNT: 14.1 % (ref 10–15)
FLUAV H1 2009 PAND RNA SPEC QL NAA+PROBE: NOT DETECTED
FLUAV H1 RNA SPEC QL NAA+PROBE: NOT DETECTED
FLUAV H3 RNA SPEC QL NAA+PROBE: NOT DETECTED
FLUAV RNA SPEC QL NAA+PROBE: NOT DETECTED
FLUBV RNA SPEC QL NAA+PROBE: NOT DETECTED
GLUCOSE BLDC GLUCOMTR-MCNC: 108 MG/DL (ref 70–99)
GLUCOSE SERPL-MCNC: 98 MG/DL (ref 70–99)
GLUCOSE UR STRIP-MCNC: NEGATIVE MG/DL
HADV DNA SPEC QL NAA+PROBE: NOT DETECTED
HCO3 BLDV-SCNC: 23 MMOL/L (ref 21–28)
HCO3 SERPL-SCNC: 19 MMOL/L (ref 22–29)
HCOV PNL SPEC NAA+PROBE: NOT DETECTED
HCT VFR BLD AUTO: 43.2 % (ref 35–47)
HGB BLD-MCNC: 14.2 G/DL (ref 11.7–15.7)
HGB UR QL STRIP: ABNORMAL
HMPV RNA SPEC QL NAA+PROBE: NOT DETECTED
HOLD SPECIMEN: NORMAL
HOLD SPECIMEN: NORMAL
HPIV1 RNA SPEC QL NAA+PROBE: NOT DETECTED
HPIV2 RNA SPEC QL NAA+PROBE: NOT DETECTED
HPIV3 RNA SPEC QL NAA+PROBE: NOT DETECTED
HPIV4 RNA SPEC QL NAA+PROBE: NOT DETECTED
HYALINE CASTS: 2 /LPF
KETONES UR STRIP-MCNC: NEGATIVE MG/DL
LACTATE SERPL-SCNC: 4.4 MMOL/L (ref 0.7–2)
LACTATE SERPL-SCNC: 4.6 MMOL/L (ref 0.7–2)
LEUKOCYTE ESTERASE UR QL STRIP: ABNORMAL
M PNEUMO DNA SPEC QL NAA+PROBE: NOT DETECTED
MCH RBC QN AUTO: 31.8 PG (ref 26.5–33)
MCHC RBC AUTO-ENTMCNC: 32.9 G/DL (ref 31.5–36.5)
MCV RBC AUTO: 97 FL (ref 78–100)
MRSA DNA SPEC QL NAA+PROBE: NEGATIVE
MUCOUS THREADS #/AREA URNS LPF: PRESENT /LPF
NITRATE UR QL: NEGATIVE
NT-PROBNP SERPL-MCNC: 4758 PG/ML (ref 0–1800)
O2/TOTAL GAS SETTING VFR VENT: 21 %
OXYHGB MFR BLDV: 14 % (ref 70–75)
PCO2 BLDV: 36 MM HG (ref 40–50)
PH BLDV: 7.42 [PH] (ref 7.32–7.43)
PH UR STRIP: 6 [PH] (ref 5–7)
PLATELET # BLD AUTO: 224 10E3/UL (ref 150–450)
PO2 BLDV: 13 MM HG (ref 25–47)
POTASSIUM SERPL-SCNC: 3.6 MMOL/L (ref 3.4–5.3)
PROCALCITONIN SERPL IA-MCNC: 3.67 NG/ML
PROT SERPL-MCNC: 6.6 G/DL (ref 6.4–8.3)
RBC # BLD AUTO: 4.46 10E6/UL (ref 3.8–5.2)
RBC URINE: 67 /HPF
RSV RNA SPEC QL NAA+PROBE: NOT DETECTED
RSV RNA SPEC QL NAA+PROBE: NOT DETECTED
RV+EV RNA SPEC QL NAA+PROBE: NOT DETECTED
SA TARGET DNA: POSITIVE
SAO2 % BLDV: 14.2 % (ref 70–75)
SARS-COV-2 RNA RESP QL NAA+PROBE: NEGATIVE
SODIUM SERPL-SCNC: 140 MMOL/L (ref 135–145)
SP GR UR STRIP: 1.02 (ref 1–1.03)
SQUAMOUS EPITHELIAL: <1 /HPF
TROPONIN T SERPL HS-MCNC: 33 NG/L
UROBILINOGEN UR STRIP-MCNC: NORMAL MG/DL
WBC # BLD AUTO: 3.8 10E3/UL (ref 4–11)
WBC URINE: 24 /HPF

## 2025-02-20 PROCEDURE — 85048 AUTOMATED LEUKOCYTE COUNT: CPT | Performed by: INTERNAL MEDICINE

## 2025-02-20 PROCEDURE — 71045 X-RAY EXAM CHEST 1 VIEW: CPT

## 2025-02-20 PROCEDURE — 82040 ASSAY OF SERUM ALBUMIN: CPT | Performed by: INTERNAL MEDICINE

## 2025-02-20 PROCEDURE — 84145 PROCALCITONIN (PCT): CPT | Performed by: PHYSICIAN ASSISTANT

## 2025-02-20 PROCEDURE — 87040 BLOOD CULTURE FOR BACTERIA: CPT | Performed by: EMERGENCY MEDICINE

## 2025-02-20 PROCEDURE — 71045 X-RAY EXAM CHEST 1 VIEW: CPT | Mod: 26 | Performed by: RADIOLOGY

## 2025-02-20 PROCEDURE — 99223 1ST HOSP IP/OBS HIGH 75: CPT | Mod: AI | Performed by: PHYSICIAN ASSISTANT

## 2025-02-20 PROCEDURE — 36415 COLL VENOUS BLD VENIPUNCTURE: CPT | Performed by: PHYSICIAN ASSISTANT

## 2025-02-20 PROCEDURE — 83880 ASSAY OF NATRIURETIC PEPTIDE: CPT | Performed by: PHYSICIAN ASSISTANT

## 2025-02-20 PROCEDURE — 85018 HEMOGLOBIN: CPT | Performed by: INTERNAL MEDICINE

## 2025-02-20 PROCEDURE — 87641 MR-STAPH DNA AMP PROBE: CPT | Performed by: INTERNAL MEDICINE

## 2025-02-20 PROCEDURE — 258N000003 HC RX IP 258 OP 636: Performed by: INTERNAL MEDICINE

## 2025-02-20 PROCEDURE — 87186 SC STD MICRODIL/AGAR DIL: CPT | Performed by: EMERGENCY MEDICINE

## 2025-02-20 PROCEDURE — 85379 FIBRIN DEGRADATION QUANT: CPT | Performed by: PHYSICIAN ASSISTANT

## 2025-02-20 PROCEDURE — 81001 URINALYSIS AUTO W/SCOPE: CPT | Performed by: INTERNAL MEDICINE

## 2025-02-20 PROCEDURE — 87635 SARS-COV-2 COVID-19 AMP PRB: CPT | Performed by: PHYSICIAN ASSISTANT

## 2025-02-20 PROCEDURE — 120N000002 HC R&B MED SURG/OB UMMC

## 2025-02-20 PROCEDURE — 82962 GLUCOSE BLOOD TEST: CPT

## 2025-02-20 PROCEDURE — 99418 PROLNG IP/OBS E/M EA 15 MIN: CPT | Mod: FS | Performed by: PHYSICIAN ASSISTANT

## 2025-02-20 PROCEDURE — 250N000011 HC RX IP 250 OP 636: Performed by: INTERNAL MEDICINE

## 2025-02-20 PROCEDURE — 86140 C-REACTIVE PROTEIN: CPT | Performed by: PHYSICIAN ASSISTANT

## 2025-02-20 PROCEDURE — 83605 ASSAY OF LACTIC ACID: CPT | Performed by: EMERGENCY MEDICINE

## 2025-02-20 PROCEDURE — 250N000011 HC RX IP 250 OP 636: Performed by: PHYSICIAN ASSISTANT

## 2025-02-20 PROCEDURE — 87149 DNA/RNA DIRECT PROBE: CPT | Performed by: EMERGENCY MEDICINE

## 2025-02-20 PROCEDURE — 99284 EMERGENCY DEPT VISIT MOD MDM: CPT | Performed by: INTERNAL MEDICINE

## 2025-02-20 PROCEDURE — 36415 COLL VENOUS BLD VENIPUNCTURE: CPT | Performed by: EMERGENCY MEDICINE

## 2025-02-20 PROCEDURE — 87633 RESP VIRUS 12-25 TARGETS: CPT | Performed by: PHYSICIAN ASSISTANT

## 2025-02-20 PROCEDURE — 87186 SC STD MICRODIL/AGAR DIL: CPT | Performed by: INTERNAL MEDICINE

## 2025-02-20 PROCEDURE — 84484 ASSAY OF TROPONIN QUANT: CPT | Performed by: PHYSICIAN ASSISTANT

## 2025-02-20 PROCEDURE — 99285 EMERGENCY DEPT VISIT HI MDM: CPT | Mod: 25 | Performed by: INTERNAL MEDICINE

## 2025-02-20 PROCEDURE — 96360 HYDRATION IV INFUSION INIT: CPT | Mod: 59 | Performed by: INTERNAL MEDICINE

## 2025-02-20 PROCEDURE — 99207 PR APP CREDIT; MD BILLING SHARED VISIT: CPT | Mod: FS | Performed by: STUDENT IN AN ORGANIZED HEALTH CARE EDUCATION/TRAINING PROGRAM

## 2025-02-20 PROCEDURE — 82805 BLOOD GASES W/O2 SATURATION: CPT | Performed by: PHYSICIAN ASSISTANT

## 2025-02-20 PROCEDURE — 999N000127 HC STATISTIC PERIPHERAL IV START W US GUIDANCE

## 2025-02-20 RX ORDER — POLYETHYLENE GLYCOL 3350 17 G/17G
17 POWDER, FOR SOLUTION ORAL 2 TIMES DAILY PRN
Status: DISCONTINUED | OUTPATIENT
Start: 2025-02-20 | End: 2025-02-24 | Stop reason: HOSPADM

## 2025-02-20 RX ORDER — CEFTRIAXONE 1 G/1
1 INJECTION, POWDER, FOR SOLUTION INTRAMUSCULAR; INTRAVENOUS EVERY 24 HOURS
Status: DISCONTINUED | OUTPATIENT
Start: 2025-02-20 | End: 2025-02-20

## 2025-02-20 RX ORDER — LEVOTHYROXINE SODIUM 25 UG/1
25 TABLET ORAL DAILY
Status: DISCONTINUED | OUTPATIENT
Start: 2025-02-21 | End: 2025-02-24 | Stop reason: HOSPADM

## 2025-02-20 RX ORDER — LATANOPROST 50 UG/ML
1 SOLUTION/ DROPS OPHTHALMIC AT BEDTIME
Status: DISCONTINUED | OUTPATIENT
Start: 2025-02-20 | End: 2025-02-24 | Stop reason: HOSPADM

## 2025-02-20 RX ORDER — LIDOCAINE 40 MG/G
CREAM TOPICAL
Status: DISCONTINUED | OUTPATIENT
Start: 2025-02-20 | End: 2025-02-24 | Stop reason: HOSPADM

## 2025-02-20 RX ORDER — ATORVASTATIN CALCIUM 10 MG/1
10 TABLET, FILM COATED ORAL DAILY
Status: DISCONTINUED | OUTPATIENT
Start: 2025-02-21 | End: 2025-02-24 | Stop reason: HOSPADM

## 2025-02-20 RX ORDER — AMOXICILLIN 250 MG
2 CAPSULE ORAL 2 TIMES DAILY PRN
Status: DISCONTINUED | OUTPATIENT
Start: 2025-02-20 | End: 2025-02-24 | Stop reason: HOSPADM

## 2025-02-20 RX ORDER — AMOXICILLIN 250 MG
1 CAPSULE ORAL 2 TIMES DAILY PRN
Status: DISCONTINUED | OUTPATIENT
Start: 2025-02-20 | End: 2025-02-24 | Stop reason: HOSPADM

## 2025-02-20 RX ORDER — IOPAMIDOL 755 MG/ML
117 INJECTION, SOLUTION INTRAVASCULAR ONCE
Status: DISCONTINUED | OUTPATIENT
Start: 2025-02-20 | End: 2025-02-21 | Stop reason: DRUGHIGH

## 2025-02-20 RX ORDER — CLOPIDOGREL BISULFATE 75 MG/1
75 TABLET ORAL DAILY
Status: DISCONTINUED | OUTPATIENT
Start: 2025-02-21 | End: 2025-02-24 | Stop reason: HOSPADM

## 2025-02-20 RX ORDER — IOPAMIDOL 755 MG/ML
117 INJECTION, SOLUTION INTRAVASCULAR ONCE
Status: COMPLETED | OUTPATIENT
Start: 2025-02-20 | End: 2025-02-21

## 2025-02-20 RX ORDER — VITAMIN B COMPLEX
25 TABLET ORAL EVERY MORNING
Status: DISCONTINUED | OUTPATIENT
Start: 2025-02-21 | End: 2025-02-24 | Stop reason: HOSPADM

## 2025-02-20 RX ORDER — AMLODIPINE BESYLATE 5 MG/1
5 TABLET ORAL DAILY
Status: DISCONTINUED | OUTPATIENT
Start: 2025-02-21 | End: 2025-02-24 | Stop reason: HOSPADM

## 2025-02-20 RX ADMIN — SODIUM CHLORIDE, POTASSIUM CHLORIDE, SODIUM LACTATE AND CALCIUM CHLORIDE 1000 ML: 600; 310; 30; 20 INJECTION, SOLUTION INTRAVENOUS at 20:21

## 2025-02-20 RX ADMIN — PIPERACILLIN SODIUM AND TAZOBACTAM SODIUM 3.38 G: 3; .375 INJECTION, SOLUTION INTRAVENOUS at 21:34

## 2025-02-20 RX ADMIN — CEFTRIAXONE SODIUM 1 G: 1 INJECTION, POWDER, FOR SOLUTION INTRAMUSCULAR; INTRAVENOUS at 19:53

## 2025-02-20 RX ADMIN — SODIUM CHLORIDE, POTASSIUM CHLORIDE, SODIUM LACTATE AND CALCIUM CHLORIDE 1000 ML: 600; 310; 30; 20 INJECTION, SOLUTION INTRAVENOUS at 18:30

## 2025-02-20 ASSESSMENT — ACTIVITIES OF DAILY LIVING (ADL)
ADLS_ACUITY_SCORE: 44

## 2025-02-20 NOTE — ED PROVIDER NOTES
"ED Provider Note  [facility]      History     Chief Complaint   Patient presents with    Altered Mental Status     HPI  Azeb Torres is a 89 year old female with a past medical history of breast cancer, cerebral infarction, hypothyroidism who presents to the emergency department via EMS seeking evaluation of altered mental status. I spoke with her two daughters with whom the patient lives. Today the patient seemed \"not herself.\" She was sleepy, confused, and not able to get up from the chair. No other symptoms.          Physical Exam   BP: 115/77  Pulse: 95  Temp: 98.4  F (36.9  C)  Resp: (!) 44  Height: 162.6 cm (5' 4.02\")  Weight: 61.7 kg (136 lb 0.4 oz)  SpO2: 98 %  Physical Exam  Vitals and nursing note reviewed.   Constitutional:       General: She is not in acute distress.     Appearance: She is not ill-appearing, toxic-appearing or diaphoretic.   HENT:      Mouth/Throat:      Mouth: Mucous membranes are moist.   Eyes:      General: No scleral icterus.  Cardiovascular:      Rate and Rhythm: Normal rate.   Pulmonary:      Effort: Pulmonary effort is normal. No respiratory distress.   Abdominal:      Palpations: Abdomen is soft.      Tenderness: There is no abdominal tenderness. There is no guarding.   Skin:     General: Skin is warm and dry.      Comments: No signs of soft tissue infection   Neurological:      General: No focal deficit present.      Mental Status: She is alert.   Psychiatric:         Mood and Affect: Mood normal.           ED Course, Procedures, & Data      Procedures           IV Antibiotics given and/or elevated Lactate of 4.6 and no sepsis note found - Delete this reminder and enter the sepsis note or '.edcms' before signing chart.>>>     Results for orders placed or performed during the hospital encounter of 02/20/25   XR Chest Port 1 View     Status: None    Narrative    EXAM: XR CHEST PORT 1 VIEW  LOCATION: [facility]" CENTER  DATE: 2/20/2025    INDICATION: Altered mental status, tachypnea.  COMPARISON: 1/29/2018      Impression    IMPRESSION: Are size magnified in AP projection with normal vascularity. Bilateral bronchial wall thickening with parahilar reticulonodular opacities suggesting bronchitis. No focal consolidation, pneumothorax nor pleural effusion. 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   Lactic Acid Whole Blood w/ 1x repeat in 2 hrs when >2     Status: Abnormal   Result Value Ref Range    Lactic Acid, Initial 4.6 (HH) 0.7 - 2.0 mmol/L   CBC with platelets     Status: Abnormal   Result Value Ref Range    WBC Count 3.8 (L) 4.0 - 11.0 10e3/uL    RBC Count 4.46 3.80 - 5.20 10e6/uL    Hemoglobin 14.2 11.7 - 15.7 g/dL    Hematocrit 43.2 35.0 - 47.0 %    MCV 97 78 - 100 fL    MCH 31.8 26.5 - 33.0 pg    MCHC 32.9 31.5 - 36.5 g/dL    RDW 14.1 10.0 - 15.0 %    Platelet Count 224 150 - 450 10e3/uL   Comprehensive metabolic panel     Status: Abnormal   Result Value Ref Range    Sodium 140 135 - 145 mmol/L    Potassium 3.6 3.4 - 5.3 mmol/L    Carbon Dioxide (CO2) 19 (L) 22 - 29 mmol/L    Anion Gap 18 (H) 7 - 15 mmol/L    Urea Nitrogen 23.0 8.0 - 23.0 mg/dL    Creatinine 0.95 0.51 - 0.95 mg/dL    GFR Estimate 54 (L) >60 mL/min/1.73m2    Calcium 10.4 8.8 - 10.4 mg/dL    Chloride 103 98 - 107 mmol/L    Glucose 98 70 - 99 mg/dL    Alkaline Phosphatase 121 40 - 150 U/L    AST 45 0 - 45 U/L    ALT 22 0 - 50 U/L    Protein Total 6.6 6.4 - 8.3 g/dL    Albumin 4.1 3.5 - 5.2 g/dL    Bilirubin Total 1.9 (H) <=1.2 mg/dL   [place] Draw     Status: None    Narrative    The following orders were created for panel order [place] Draw.  Procedure                               Abnormality         Status                     ---------                               -----------         ------                     Extra Blue Top Tube[[id number]]                              Final result               Extra Red  Top Tube[[id number]]                               Final result                 Please view results for these tests on the individual orders.   UA with Microscopic reflex to Culture     Status: Abnormal    Specimen: Urine, [place]anson Catheter   Result Value Ref Range    Color Urine Yellow Colorless, Straw, Light Yellow, Yellow    Appearance Urine Slightly Cloudy (A) Clear    Glucose Urine Negative Negative mg/dL    Bilirubin Urine Negative Negative    Ketones Urine Negative Negative mg/dL    Specific Gravity Urine 1.017 1.003 - 1.035    Blood Urine Moderate (A) Negative    pH Urine 6.0 5.0 - 7.0    Protein Albumin Urine 200 (A) Negative mg/dL    Urobilinogen Urine Normal Normal, 2.0 mg/dL    Nitrite Urine Negative Negative    Leukocyte Esterase Urine Trace (A) Negative    Bacteria Urine Few (A) None Seen /HPF    Mucus Urine Present (A) None Seen /LPF    RBC Urine 67 (H) <=2 /HPF    WBC Urine 24 (H) <=5 /HPF    Squamous Epithelials Urine <1 <=1 /HPF    Hyaline Casts Urine 2 <=2 /LPF    Narrative    Urine Culture ordered based on laboratory criteria   Glucose by meter     Status: Abnormal   Result Value Ref Range    GLUCOSE BY METER POCT 108 (H) 70 - 99 mg/dL   Extra Blue Top Tube     Status: None   Result Value Ref Range    Hold Specimen JIC    Extra Red Top Tube     Status: None   Result Value Ref Range    Hold Specimen JIC    Lactic acid whole blood     Status: Abnormal   Result Value Ref Range    Lactic Acid 4.4 (HH) 0.7 - 2.0 mmol/L   Blood gas venous     Status: Abnormal   Result Value Ref Range    pH Venous 7.42 7.32 - 7.43    pCO2 Venous 36 (L) 40 - 50 mm Hg    pO2 Venous 13 (L) 25 - 47 mm Hg    Bicarbonate Venous 23 21 - 28 mmol/L    Base Excess/Deficit Venous -1.0 -3.0 - 3.0 mmol/L    FIO2 21     Oxyhemoglobin Venous 14 (L) 70 - 75 %    O2 Sat, Venous 14.2 (L) 70.0 - 75.0 %    Narrative    In healthy individuals, oxyhemoglobin (O2Hb) and oxygen saturation (SO2) are approximately equal. In the presence of  dyshemoglobins, oxyhemoglobin can be considerably lower than oxygen saturation.   CRP inflammation     Status: Abnormal   Result Value Ref Range    CRP Inflammation 27.50 (H) <5.00 mg/L   Procalcitonin     Status: Abnormal   Result Value Ref Range    Procalcitonin 3.67 (H) <0.50 ng/mL   COVID-19 Virus (Coronavirus) by PCR Nasopharyngeal     Status: Normal    Specimen: Nasopharyngeal; Swab   Result Value Ref Range    SARS CoV2 PCR Negative Negative    Narrative    Testing was performed using the [employer] Xpress SARS-CoV-2 Assay on the [employer] Gene-Xpert Instrument Systems. Additional information about this assay can be found via the Test Directory. This [place] FDA cleared test should be ordered for the detection of SARS-CoV-2 in individuals with signs and symptoms of respiratory tract infection. This test is for in vitro diagnostic use under the [place] FDA for laboratories certified under CLIA to perform high complexity testing. A negative result does not rule out the presence of PCR inhibitors in the specimen or target RNA concentration below the limit of detection for the assay. The possibility of a false negative should be considered if the patient's recent exposure or clinical presentation suggests COVID-19. This test was validated by [facility]. These Laboratories are certified under the  Clinical Laboratory Improvement Amendments (CLIA) as qualified to perform high complexity testing.   Nt probnp inpatient     Status: Abnormal   Result Value Ref Range    N terminal Pro BNP Inpatient 4,758 (H) 0 - 1,800 pg/mL   Troponin T, High Sensitivity     Status: Abnormal   Result Value Ref Range    Troponin T, High Sensitivity 33 (H) <=14 ng/L   D dimer quantitative     Status: Abnormal   Result Value Ref Range    D-Dimer Quantitative 7.16 (H) 0.00 - 0.50 ug/mL FEU    Narrative    This D-dimer assay is intended for use in conjunction with a clinical pretest probability assessment model to exclude pulmonary  embolism (PE) and deep venous thrombosis (DVT) in outpatients suspected of PE or DVT. The cut-off value is 0.50 ug/mL FEU.    For patients 50 years of age or older, the application of age-adjusted cut-off values for D-Dimer may increase the specificity without significant effect on sensitivity. The literature suggested calculation age adjusted cut-off in ug/L = age in years x 10 ug/L. The results in this laboratory are reported as ug/mL rather than ug/L. The calculation for age adjusted cut off in ug/mL= age in years x 0.01 ug/mL. For example, the cut off for a 76 year old male is 76 x 0.01 ug/mL = 0.76 ug/mL (760 ug/L).    M Jacobo et al. Age adjusted D-dimer cut-off levels to rule out pulmonary embolism: The ADJUST-PE Study. RICARDO 2014;311:6645-2016.; HJ Carson et al. Diagnostic accuracy of conventional or age adjusted D-dimer cutoff values in older patients with suspected venous thromboembolism. Systemic review and meta-analysis. BMJ 2013:346:f2492.   MRSA MSSA PCR, Nasal Swab     Status: None    Specimen: Nose; Swab   Result Value Ref Range    MRSA Target DNA Negative Negative    SA Target DNA Positive     Narrative    The [employer]  Xpert SA Nasal Complete assay performed in the [employer]  Dx System is a qualitative in vitro diagnostic test designed for rapid detection of Staphylococcus aureus (SA) and methicillin-resistant Staphylococcus aureus (MRSA) from nasal swabs in patients at risk for nasal colonization. The test utilizes automated real-time polymerase chain reaction (PCR) to detect MRSA/SA DNA. The Xpert SA Nasal Complete assay is intended to aid in the prevention and control of MRSA/SA infections in healthcare settings. The assay is not intended to diagnose, guide or monitor treatment for MRSA/SA infections, or provide results of susceptibility to methicillin. A negative result does not preclude MRSA/SA nasal colonization.    Respiratory Panel PCR     Status: Normal    Specimen: Nasopharyngeal; Swab    Result Value Ref Range    Adenovirus Not Detected Not Detected    Coronavirus Not Detected Not Detected    Human Metapneumovirus Not Detected Not Detected    Human Rhin/Enterovirus Not Detected Not Detected    Influenza A Not Detected Not Detected    Influenza A, H1 Not Detected Not Detected    Influenza A 2009 H1N1 Not Detected Not Detected    Influenza A, H3 Not Detected Not Detected    Influenza B Not Detected Not Detected    Parainfluenza Virus 1 Not Detected Not Detected    Parainfluenza Virus 2 Not Detected Not Detected    Parainfluenza Virus 3 Not Detected Not Detected    Parainfluenza Virus 4 Not Detected Not Detected    Respiratory Syncytial Virus A Not Detected Not Detected    Respiratory Syncytial Virus B Not Detected Not Detected    Chlamydia Pneumoniae Not Detected Not Detected    Mycoplasma Pneumoniae Not Detected Not Detected    Narrative    The [employer] Respiratory Panel is a qualitative nucleic acid, multiplex, in vitro diagnostic test for the simultaneous detection and identification of multiple respiratory viral and bacterial nucleic acids in nasopharyngeal swabs collected in viral transport media from individual exhibiting signs and symptoms of respiratory infection. The assay has received FDA approval for the testing of nasopharyngeal (NP) swabs only. This test is used for clinical purposes and should not be regarded as investigational or for research. This laboratory is certified under the Clinical Laboratory Improvement Amendments of 1988 (CLIA-88) as qualified to perform high complexity clinical laboratory testing.     Medications   piperacillin-tazobactam (ZOSYN) intermittent infusion 3.375 g (3.375 g Intravenous $New Bag 2/20/25 2134)   lidocaine 1 % 0.1-1 mL (has no administration in time range)   lidocaine (LMX4) cream (has no administration in time range)   sodium chloride (PF) 0.9% PF flush 3 mL (3 mLs Intracatheter $Given 2/20/25 2132)   sodium chloride (PF) 0.9% PF flush 3 mL (has no  administration in time range)   senna-docusate (SENOKOT-S/PERICOLACE) 8.6-50 MG per tablet 1 tablet (has no administration in time range)     Or   senna-docusate (SENOKOT-S/PERICOLACE) 8.6-50 MG per tablet 2 tablet (has no administration in time range)   polyethylene glycol (MIRALAX) Packet 17 g (has no administration in time range)   amLODIPine (NORVASC) tablet 5 mg ( Oral Automatically Held 2/24/25 0800)   atorvastatin (LIPITOR) tablet 10 mg ( Oral Automatically Held 2/24/25 0800)   Vitamin D3 (CHOLECALCIFEROL) tablet 25 mcg ( Oral Automatically Held 2/24/25 0800)   clopidogrel (PLAVIX) tablet 75 mg ( Oral Automatically Held 2/24/25 0800)   levothyroxine (SYNTHROID/LEVOTHROID) tablet 25 mcg ( Oral Automatically Held 2/24/25 0800)   latanoprost (XALATAN) 0.005 % ophthalmic solution 1 drop (has no administration in time range)   iopamidol (ISOVUE-370) solution 117 mL (has no administration in time range)   Saline Flush (has no administration in time range)   iopamidol (ISOVUE-370) solution 117 mL (has no administration in time range)   sodium chloride 0.9 % bag 500mL for CT scan flush use (has no administration in time range)   lactated ringers BOLUS 1,000 mL (0 mLs Intravenous Stopped 2/20/25 1945)   lactated ringers BOLUS 1,000 mL (0 mLs Intravenous Stopped 2/20/25 2121)     Labs Ordered and Resulted from Time of ED Arrival to Time of ED Departure   LACTIC ACID WHOLE BLOOD WITH 1X REPEAT IN 2 HR WHEN >2 - Abnormal       Result Value    Lactic Acid, Initial 4.6 (*)    CBC WITH PLATELETS - Abnormal    WBC Count 3.8 (*)     RBC Count 4.46      Hemoglobin 14.2      Hematocrit 43.2      MCV 97      MCH 31.8      MCHC 32.9      RDW 14.1      Platelet Count 224     COMPREHENSIVE METABOLIC PANEL - Abnormal    Sodium 140      Potassium 3.6      Carbon Dioxide (CO2) 19 (*)     Anion Gap 18 (*)     Urea Nitrogen 23.0      Creatinine 0.95      GFR Estimate 54 (*)     Calcium 10.4      Chloride 103      Glucose 98       Alkaline Phosphatase 121      AST 45      ALT 22      Protein Total 6.6      Albumin 4.1      Bilirubin Total 1.9 (*)    ROUTINE UA WITH MICROSCOPIC REFLEX TO CULTURE - Abnormal    Color Urine Yellow      Appearance Urine Slightly Cloudy (*)     Glucose Urine Negative      Bilirubin Urine Negative      Ketones Urine Negative      Specific Gravity Urine 1.017      Blood Urine Moderate (*)     pH Urine 6.0      Protein Albumin Urine 200 (*)     Urobilinogen Urine Normal      Nitrite Urine Negative      Leukocyte Esterase Urine Trace (*)     Bacteria Urine Few (*)     Mucus Urine Present (*)     RBC Urine 67 (*)     WBC Urine 24 (*)     Squamous Epithelials Urine <1      Hyaline Casts Urine 2     GLUCOSE BY METER - Abnormal    GLUCOSE BY METER POCT 108 (*)    LACTIC ACID WHOLE BLOOD - Abnormal    Lactic Acid 4.4 (*)    BLOOD GAS VENOUS - Abnormal    pH Venous 7.42      pCO2 Venous 36 (*)     pO2 Venous 13 (*)     Bicarbonate Venous 23      Base Excess/Deficit Venous -1.0      FIO2 21      Oxyhemoglobin Venous 14 (*)     O2 Sat, Venous 14.2 (*)    CRP INFLAMMATION - Abnormal    CRP Inflammation 27.50 (*)    PROCALCITONIN - Abnormal    Procalcitonin 3.67 (*)    NT PROBNP INPATIENT - Abnormal    N terminal Pro BNP Inpatient 4,758 (*)    TROPONIN T, HIGH SENSITIVITY - Abnormal    Troponin T, High Sensitivity 33 (*)    D DIMER QUANTITATIVE - Abnormal    D-Dimer Quantitative 7.16 (*)    COVID-19 VIRUS (CORONAVIRUS) BY PCR - Normal    SARS CoV2 PCR Negative     RESPIRATORY PANEL PCR - Normal    Adenovirus Not Detected      Coronavirus Not Detected      Human Metapneumovirus Not Detected      Human Rhin/Enterovirus Not Detected      Influenza A Not Detected      Influenza A, H1 Not Detected      Influenza A 2009 H1N1 Not Detected      Influenza A, H3 Not Detected      Influenza B Not Detected      Parainfluenza Virus 1 Not Detected      Parainfluenza Virus 2 Not Detected      Parainfluenza Virus 3 Not Detected       Parainfluenza Virus 4 Not Detected      Respiratory Syncytial Virus A Not Detected      Respiratory Syncytial Virus B Not Detected      Chlamydia Pneumoniae Not Detected      Mycoplasma Pneumoniae Not Detected     TROPONIN T, HIGH SENSITIVITY   LACTIC ACID WHOLE BLOOD   MRSA MSSA PCR, NASAL SWAB    MRSA Target DNA Negative      SA Target DNA Positive     BLOOD CULTURE   BLOOD CULTURE   URINE CULTURE     XR Chest Port 1 View   Final Result   IMPRESSION: Are size magnified in AP projection with normal vascularity. Bilateral bronchial wall thickening with parahilar reticulonodular opacities suggesting bronchitis. No focal consolidation, pneumothorax nor pleural effusion. Moderate bilateral    shoulder degenerative osteoarthritis has progressed since 2018.      CTA Head Neck with Contrast    (Results Pending)   CT Head Perfusion w Contrast    (Results Pending)   CT Head w/o Contrast    (Results Pending)          Critical care was not performed.     Medical Decision Making  The patient's presentation was of moderate complexity (an acute illness with systemic symptoms).    The patient's evaluation involved:  an assessment requiring an independent historian (see separate area of note for details)  review of external note(s) from 1 sources (see separate area of note for details)  review of 1 test result(s) ordered prior to this encounter (see separate area of note for details)  ordering and/or review of 1 test(s) in this encounter (see separate area of note for details)  independent interpretation of testing performed by another health professional (see separate area of note for details)    The patient's management necessitated high risk (a decision regarding hospitalization).    Assessment & Plan    Elderly woman, DNR and DNI, presents with altered mental status and lactic acidosis. No clear etiology on exam. She has remained stable throughout ED stay. She has perked up to some degree with fluid resuscitation which her two  daughters also note. Empiric abx started to cover pyelonephritis.    She was signed out to the hospitalist for further management.    I have reviewed the nursing notes. I have reviewed the findings, diagnosis, plan and need for follow up with the patient.    New Prescriptions    No medications on file       Final diagnoses:   Pyelonephritis, acute   I, Michael Matos, am serving as a trained medical scribe to document services personally performed by Neli Pantoja MD, based on the provider's statements to me.     I, Neli Pantoja MD, was physically present and have reviewed and verified the accuracy of this note documented by Michael Matos.      Neli Pantoja MD  [facility] EMERGENCY DEPARTMENT  2/20/2025     Neli Pantoja MD  02/20/25 [id number]

## 2025-02-20 NOTE — ED TRIAGE NOTES
BIBA from home w AMS for several days. Per Ems family believes she has a UTI. Afib w EMS. 6L O2. BG stable w EMS. Minimally responsive. VSS w EMS and upon arrival.  Tachypneic w RR 44. Denies CP and SOB. GCS 12.     Temp: 98.4  F (36.9  C) Temp src: Oral BP: 115/77 Pulse: 95   Resp: (!) 44 SpO2: 98 % O2 Device: Nasal cannula Oxygen Delivery: 6 LPM

## 2025-02-21 ENCOUNTER — APPOINTMENT (OUTPATIENT)
Dept: CT IMAGING | Facility: CLINIC | Age: OVER 89
DRG: 871 | End: 2025-02-21
Attending: PHYSICIAN ASSISTANT
Payer: MEDICARE

## 2025-02-21 LAB
ACINETOBACTER SPECIES: NOT DETECTED
ALBUMIN SERPL BCG-MCNC: 3.2 G/DL (ref 3.5–5.2)
ALP SERPL-CCNC: 96 U/L (ref 40–150)
ALT SERPL W P-5'-P-CCNC: 14 U/L (ref 0–50)
ANION GAP SERPL CALCULATED.3IONS-SCNC: 18 MMOL/L (ref 7–15)
AST SERPL W P-5'-P-CCNC: 38 U/L (ref 0–45)
BILIRUB SERPL-MCNC: 1.4 MG/DL
BUN SERPL-MCNC: 23 MG/DL (ref 8–23)
CALCIUM SERPL-MCNC: 7.9 MG/DL (ref 8.8–10.4)
CHLORIDE SERPL-SCNC: 102 MMOL/L (ref 98–107)
CITROBACTER SPECIES: NOT DETECTED
CREAT SERPL-MCNC: 0.89 MG/DL (ref 0.51–0.95)
CTX-M: NOT DETECTED
EGFRCR SERPLBLD CKD-EPI 2021: 58 ML/MIN/1.73M2
ENTEROBACTER SPECIES: NOT DETECTED
ERYTHROCYTE [DISTWIDTH] IN BLOOD BY AUTOMATED COUNT: 14.3 % (ref 10–15)
ESCHERICHIA COLI: NOT DETECTED
GLUCOSE SERPL-MCNC: 78 MG/DL (ref 70–99)
HCO3 SERPL-SCNC: 18 MMOL/L (ref 22–29)
HCT VFR BLD AUTO: 37.1 % (ref 35–47)
HGB BLD-MCNC: 11.8 G/DL (ref 11.7–15.7)
HOLD SPECIMEN: NORMAL
IMP: NOT DETECTED
KLEBSIELLA OXYTOCA: NOT DETECTED
KLEBSIELLA PNEUMONIAE: DETECTED
KPC: NOT DETECTED
LACTATE SERPL-SCNC: 2.9 MMOL/L (ref 0.7–2)
LACTATE SERPL-SCNC: 3.7 MMOL/L (ref 0.7–2)
LACTATE SERPL-SCNC: 4.8 MMOL/L (ref 0.7–2)
MCH RBC QN AUTO: 31.7 PG (ref 26.5–33)
MCHC RBC AUTO-ENTMCNC: 31.8 G/DL (ref 31.5–36.5)
MCV RBC AUTO: 100 FL (ref 78–100)
NDM: NOT DETECTED
OXA (DETECTED/NOT DETECTED): NOT DETECTED
PLATELET # BLD AUTO: 144 10E3/UL (ref 150–450)
POTASSIUM SERPL-SCNC: 4.6 MMOL/L (ref 3.4–5.3)
PROT SERPL-MCNC: 5.6 G/DL (ref 6.4–8.3)
PROTEUS SPECIES: NOT DETECTED
PSEUDOMONAS AERUGINOSA: NOT DETECTED
RBC # BLD AUTO: 3.72 10E6/UL (ref 3.8–5.2)
SODIUM SERPL-SCNC: 138 MMOL/L (ref 135–145)
TROPONIN T SERPL HS-MCNC: 41 NG/L
VIM: NOT DETECTED
WBC # BLD AUTO: 20.8 10E3/UL (ref 4–11)

## 2025-02-21 PROCEDURE — 250N000009 HC RX 250: Performed by: PHYSICIAN ASSISTANT

## 2025-02-21 PROCEDURE — 84484 ASSAY OF TROPONIN QUANT: CPT | Performed by: PHYSICIAN ASSISTANT

## 2025-02-21 PROCEDURE — 250N000011 HC RX IP 250 OP 636: Performed by: PHYSICIAN ASSISTANT

## 2025-02-21 PROCEDURE — 80053 COMPREHEN METABOLIC PANEL: CPT | Performed by: PHYSICIAN ASSISTANT

## 2025-02-21 PROCEDURE — 70498 CT ANGIOGRAPHY NECK: CPT | Mod: 26 | Performed by: RADIOLOGY

## 2025-02-21 PROCEDURE — 36415 COLL VENOUS BLD VENIPUNCTURE: CPT | Performed by: PHYSICIAN ASSISTANT

## 2025-02-21 PROCEDURE — 83605 ASSAY OF LACTIC ACID: CPT | Performed by: PHYSICIAN ASSISTANT

## 2025-02-21 PROCEDURE — 250N000013 HC RX MED GY IP 250 OP 250 PS 637: Performed by: INTERNAL MEDICINE

## 2025-02-21 PROCEDURE — 70450 CT HEAD/BRAIN W/O DYE: CPT

## 2025-02-21 PROCEDURE — 0042T CT HEAD PERFUSION W CONTRAST: CPT

## 2025-02-21 PROCEDURE — 85014 HEMATOCRIT: CPT | Performed by: PHYSICIAN ASSISTANT

## 2025-02-21 PROCEDURE — 99222 1ST HOSP IP/OBS MODERATE 55: CPT | Mod: GC | Performed by: PSYCHIATRY & NEUROLOGY

## 2025-02-21 PROCEDURE — 83605 ASSAY OF LACTIC ACID: CPT | Performed by: INTERNAL MEDICINE

## 2025-02-21 PROCEDURE — 120N000002 HC R&B MED SURG/OB UMMC

## 2025-02-21 PROCEDURE — 70496 CT ANGIOGRAPHY HEAD: CPT | Mod: 26 | Performed by: RADIOLOGY

## 2025-02-21 PROCEDURE — 99233 SBSQ HOSP IP/OBS HIGH 50: CPT | Performed by: HOSPITALIST

## 2025-02-21 PROCEDURE — 36415 COLL VENOUS BLD VENIPUNCTURE: CPT | Performed by: INTERNAL MEDICINE

## 2025-02-21 PROCEDURE — 258N000003 HC RX IP 258 OP 636: Performed by: INTERNAL MEDICINE

## 2025-02-21 PROCEDURE — 70496 CT ANGIOGRAPHY HEAD: CPT

## 2025-02-21 PROCEDURE — 85048 AUTOMATED LEUKOCYTE COUNT: CPT | Performed by: PHYSICIAN ASSISTANT

## 2025-02-21 PROCEDURE — 0042T CT HEAD PERFUSION W CONTRAST: CPT | Mod: GC | Performed by: RADIOLOGY

## 2025-02-21 PROCEDURE — 250N000013 HC RX MED GY IP 250 OP 250 PS 637: Performed by: PHYSICIAN ASSISTANT

## 2025-02-21 RX ORDER — IOPAMIDOL 755 MG/ML
117 INJECTION, SOLUTION INTRAVASCULAR ONCE
Status: COMPLETED | OUTPATIENT
Start: 2025-02-21 | End: 2025-02-21

## 2025-02-21 RX ADMIN — SODIUM CHLORIDE 500 ML: 9 INJECTION, SOLUTION INTRAVENOUS at 06:14

## 2025-02-21 RX ADMIN — PIPERACILLIN SODIUM AND TAZOBACTAM SODIUM 3.38 G: 3; .375 INJECTION, SOLUTION INTRAVENOUS at 20:33

## 2025-02-21 RX ADMIN — SODIUM CHLORIDE 500 ML: 9 INJECTION, SOLUTION INTRAVENOUS at 01:19

## 2025-02-21 RX ADMIN — PIPERACILLIN SODIUM AND TAZOBACTAM SODIUM 3.38 G: 3; .375 INJECTION, SOLUTION INTRAVENOUS at 14:10

## 2025-02-21 RX ADMIN — LATANOPROST 1 DROP: 50 SOLUTION OPHTHALMIC at 00:21

## 2025-02-21 RX ADMIN — IOPAMIDOL 117 ML: 755 INJECTION, SOLUTION INTRAVENOUS at 12:57

## 2025-02-21 RX ADMIN — MICONAZOLE NITRATE: 20 POWDER TOPICAL at 04:43

## 2025-02-21 RX ADMIN — MICONAZOLE NITRATE: 20 POWDER TOPICAL at 20:33

## 2025-02-21 RX ADMIN — SODIUM CHLORIDE, PRESERVATIVE FREE 100 ML: 5 INJECTION INTRAVENOUS at 12:57

## 2025-02-21 RX ADMIN — PIPERACILLIN SODIUM AND TAZOBACTAM SODIUM 3.38 G: 3; .375 INJECTION, SOLUTION INTRAVENOUS at 02:26

## 2025-02-21 RX ADMIN — PIPERACILLIN SODIUM AND TAZOBACTAM SODIUM 3.38 G: 3; .375 INJECTION, SOLUTION INTRAVENOUS at 08:09

## 2025-02-21 ASSESSMENT — ACTIVITIES OF DAILY LIVING (ADL)
ADLS_ACUITY_SCORE: 46
ADLS_ACUITY_SCORE: 44
ADLS_ACUITY_SCORE: 46
ADLS_ACUITY_SCORE: 44
ADLS_ACUITY_SCORE: 46
ADLS_ACUITY_SCORE: 44
ADLS_ACUITY_SCORE: 61
ADLS_ACUITY_SCORE: 61
ADLS_ACUITY_SCORE: 44
ADLS_ACUITY_SCORE: 61
ADLS_ACUITY_SCORE: 46
ADLS_ACUITY_SCORE: 61
ADLS_ACUITY_SCORE: 66
ADLS_ACUITY_SCORE: 46
ADLS_ACUITY_SCORE: 46
ADLS_ACUITY_SCORE: 44

## 2025-02-21 NOTE — PROGRESS NOTES
[facility]    Vascular Neurology Progress Note    Interval History      Blood cultures positive for gram neg rods/bacilli- speciation shows Klebsiella, on zosyn. Lactic acid improving with NS fluid bolus. Leukocytosis 20.8 this morning .    Met with patient and two daughters at bedside. Patient states she feels good, denies any pain. She does not know where she is or why she is here. Two daughters tell me that her speech is now improved but still not back to baseline.       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  Further history gathering by primary team revealed possible new onset slurred speech.  She was also noted to have unequal pupils, however she has a history of bilateral cataract surgery.  Vascular neurology was consulted for anisocoria and slurred speech. CT stroke series did not show any new acute intracranial abnormalities. Other workup concerning for gram negative bacteremia and Klebsiella UTI. Patient's speech and mentation has been improving since starting on empiric antibiotics, per family.    Assessment and Plan     1. #Acute encephalopathy, likely toxic/metabolic etiologies... ruling out ischemic versus hemorrhagic stroke  #hx of left PCA infarct with encephalomalacia  #bacteremia (4/4 gram negative- Klebsiella speciation)   #lactic acidosis, improving  #malignancy  #HLD  #HTN  #hypothyroidism  Patient is 89yo female with hx of Hyperlipidemia, essential hypertension, hypothyroidsim due to acquired atrophy of thyroid,  hx of L PCA stroke periprocedural 2003 with right leg residual weakness, breast cancer s/p lumpectomy, adjuvant radiation and hormone therapy, low grade endometrial stromal sarcoma who initially presented with new onset dysarthria concerning for acute stroke. Unfortunately CTA stroke series not completed earlier due to patient declined/agitation. We discussed with patient and family the possible risks of not pursuing these imaging as  if we found intracranial hemorrhage on CT, we would potentially recommend holding anti thrombotic therapy. This morning, patient is agreeable to pursuing CT scans. Fortunately CT stroke series was negative for acute intracranial abnormalities- no LVO or hemorrhage. Given patient's altered mental status, I discussed with next of kin patient's daughter, Lake, option of getting brain Mri to definitively rule out new stroke. Daughter would like to hold off on MRI, stating that patient did not tolerate procedure last time and we would like to avoid having to sedate or cause additional discomfort. That is a reasonable option as I have high clinical suspicion her presentation may all be attributed to acute infectious process rather than new primary neurological cause. Patient should continue on monotherapy Plavix for secondary stroke prevention and follow up with her PCP post-hospitallization.     Patient Follow-up    No further stroke workup recommended at this time.   Neurology will sign off at this time. Please call/message with questions.     I discussed this patient with the primary team and stroke attending, Dr. Morales.    Adela Engle MD  Neurology PGY-2    Physical Examination     Temp: 98.3  F (36.8  C) Temp src: Oral BP: 116/69 Pulse: 81   Resp: 24 SpO2: 98 % O2 Device: None (Room air) Oxygen Delivery: 1 LPM    Neurologic  Mental Status:  alert, oriented to self, daughters, pets that family member had, follows some simple commands less readily on left side. Limited speech, mildly  dysarthric but comprehensible  Cranial Nerves: blinks to threat bilaterally, EOMI (did not track finger),facial movements symmetric, extremely hard of hearing, mild dysarthria  Motor:  limited exam due to altered mental status. decreased muscle bulk overall, increased tone in RUE, no abnormal movements at rest, able to move all limbs spontaneously antigravity but not to command, no obvious pronator  Reflexes:  toes down-going no clonus  Sensory:  withdrawal to noxious stimuli x4 extremities  Coordination:  FNF on right without ataxia, reached for my hand with left without ataxia.  Station/Gait:  deferred    Stroke Scales       NIHSS  1a. Level of Consciousness 0-->Alert, keenly responsive   1b. LOC Questions 1-->Answers one question correctly   1c. LOC Commands 1-->Performs one task correctly   2.   Best Gaze 0-->Normal   3.   Visual 0-->No visual loss   4.   Facial Palsy 0-->Normal symmetrical movements   5a. Motor Arm, Left 0-->No drift, limb holds 90 (or 45) degrees for full 10 secs   5b. Motor Arm, Right 0-->No drift, limb holds 90 (or 45) degrees for full 10 secs   6a. Motor Leg, Left 2-->Some effort against gravity, leg falls to bed by 5 secs, but has some effort against gravity   6b. Motor Leg, right 2-->Some effort against gravity, leg falls to bed by 5 secs, but has some effort against gravity   7.   Limb Ataxia 0-->Absent   8.   Sensory 0-->Normal, no sensory loss   9.   Best Language 1-->Mild-to-moderate aphasia, some obvious loss of fluency or facility of comprehension, without significant limitation on ideas expressed or form of expression. Reduction of speech and/or comprehension, however, makes conversation. . . (see row details)   10. Dysarthria 1-->Mild-to-moderate dysarthria, patient slurs at least some words and, at worst, can be understood with some difficulty   11. Extinction and Inattention  0-->No abnormality   Total 8 (02/21/25 1314)       Imaging/Labs       MRI/Head CT [facility]- no hemorrhage or  new hypodensity. Chronic left PCA infarct appreciated consistent with prior   Intracranial Vasculature CTA head-No lvo, severe focal stenosis, or aneurysm appreciated   Cervical Vasculature CTA neck-No dissection, severe stenosis. Mild atherosclerotic disease of bilateral carotid bulbs.     Echocardiogram None recent   EKG/Telemetry SR on bedside monitor     LDL  No lab value available in past 30 days   A1C  No lab value available in past 90 days   Troponin 2/21/2025: 41 ng/L     Other labs reviewed by me today:  CMP,  CBC, lactic acid, micro studies

## 2025-02-21 NOTE — CONSULTS
[facility]    Stroke Consult Note    Reason for Consult:      Chief Complaint: Altered Mental Status       HPI  Azeb Torres is a 89 year old female     {TIA, Stroke Summaries:254487}    Impression  {Stroke Diagnosis:025188}  ***    Recommendations   {consult recommendations:078555}    Patient Follow-up    {neurology follow-up recommendation:967978}    Thank you for this consult. {message to primary service:897817}    {Signature with [email or web address] link:786807}  _____________________________________________________    Clinically Significant Risk Factors Present on Admission   { TIP  This section helps capture the illness of the patient on admission.     - Review diagnoses highlighted in blue; right click, edit & delete if not appropriate   - If blank, no additional diagnoses identified   :12202}              # Drug Induced Platelet Defect: home medication list includes an antiplatelet medication   # Hypertension: Noted on problem list                   # CKD, {stage :736328}: Will monitor and treat as appropriate  - last Cr =  0.95 mg/dL (Ref range: 0.51 - 0.95 mg/dL)  - last GFR = 54 mL/min/1.73m2 (Ref range: >60 mL/min/1.73m2)  {SEPTIC SHOCK or other SHOCK may be present based on 2+ SIRS & SBP < 90 OR lactic acid >= 4 (Optional) :628206}  {Severe Brain Conditions (Optional):893336}      Past Medical History    Past Medical History:   Diagnosis Date    Arthritis     Breast cancer (H)     ER positive    Chronic angle-closure glaucoma     CVA (cerebral vascular accident) (H) 2003    R cerebral artery, embolic after hip replacement    Degenerative joint disease     Endometrial cancer (H)     Hypertension     Left homonymous hemianopsia     Pulmonary artery hypertension (H) 4/24/2013    Noted on echo. Mild-moderate. Moderate TR    Tricuspid regurgitation 4/24/2013     Medications   Home Meds  Prior to Admission medications    Medication Sig Start Date End Date Taking?  Authorizing Provider   acetaminophen (TYLENOL) 500 MG tablet Take 2 tablets by mouth 2 times daily.    Reported, Patient   amLODIPine (NORVASC) 5 MG tablet Take 1 tablet (5 mg) by mouth daily. 12/2/24   Evelina Can MD   atorvastatin (LIPITOR) 10 MG tablet Take 1 tablet (10 mg) by mouth daily. 12/2/24   Evelina Can MD   cholecalciferol (VITAMIN D) 1000 UNIT tablet Take 1 tablet by mouth every morning     Reported, Patient   clopidogrel (PLAVIX) 75 MG tablet Take 1 tablet (75 mg) by mouth daily. 12/2/24   Evelina Can MD   clotrimazole (LOTRIMIN) 1 % external cream Apply topically 2 times daily. To rash in skin folds 12/2/24   Evelina Can MD   desonide (DESOWEN) 0.05 % external ointment APPLY TOPICALLY TWO TIMES A DAY 3/22/21   Francisca Morales MD   dorzolamide-timolol (COSOPT) 2-0.5 % ophthalmic solution Place 1 drop into both eyes 2 times daily Please call [phone] for appointment with Dr. Mendez or Dr. Garcia for further refills 9/4/20   Alejandrina Faria MD   fluocinonide (LIDEX) 0.05 % external ointment Apply topically 2 times daily 3/22/21   Francisca Morales MD   latanoprost (XALATAN) 0.005 % ophthalmic solution Place 1 drop into both eyes At Bedtime 7/15/20   Alejandrina Faria MD   levothyroxine (SYNTHROID/LEVOTHROID) 25 MCG tablet TAKE 1 TABLET (25 MCG) BY MOUTH DAILY 12/2/24   Evelina Can MD   Multiple Vitamins-Minerals (PRESERVISION AREDS 2) CAPS 2 capsules. 12/1/24   Reported, Patient   nystatin (MYCOSTATIN) 690418 UNIT/GM external powder Apply to rash on breasts as needed for rash three times daily 11/30/23   Evelina Can MD       Scheduled Meds  Current Facility-Administered Medications   Medication Dose Route Frequency Provider Last Rate Last Admin    piperacillin-tazobactam (ZOSYN) intermittent infusion 3.375 g  3.375 g Intravenous Q6H Shelli Wilson PA-C           Infusion Meds  Current Facility-Administered Medications   Medication Dose Route Frequency  Provider Last Rate Last Admin       Allergies   No Known Allergies       PHYSICAL EXAMINATION   Temp:  [97.4  F (36.3  C)-98.4  F (36.9  C)] 97.4  F (36.3  C)  Pulse:  [] 91  Resp:  [40-44] 40  BP: ()/(58-77) 149/67  SpO2:  [96 %-98 %] 96 %    {Choice of physical and neuro exams:921425}    Stroke Scales  {NIHSS, Hunt&Zarco, ICH:536481}    Imaging  I personally reviewed all imaging; relevant findings per HPI.    Labs Data   CBC  Recent Labs   Lab 02/20/25  1726   WBC 3.8*   RBC 4.46   HGB 14.2   HCT 43.2        Basic Metabolic Panel   Recent Labs   Lab 02/20/25  1726 02/20/25  1709     --    POTASSIUM 3.6  --    CHLORIDE 103  --    CO2 19*  --    BUN 23.0  --    CR 0.95  --    GLC 98 108*   JEAN-PIERRE 10.4  --      Liver Panel  Recent Labs   Lab 02/20/25  1726   PROTTOTAL 6.6   ALBUMIN 4.1   BILITOTAL 1.9*   ALKPHOS 121   AST 45   ALT 22     INR    Recent Labs   Lab Test 04/24/18  0835   INR 1.1           Stroke Consult Data Data   {Non-emergent Stroke Code Data:920666}  {Billing - Only Attendings and APPs to complete:929075}     tablet TAKE 1 TABLET (25 MCG) BY MOUTH DAILY 12/2/24   Evelina Can MD   Multiple Vitamins-Minerals (PRESERVISION AREDS 2) CAPS 2 capsules. 12/1/24   Reported, Patient   nystatin (MYCOSTATIN) 339447 UNIT/GM external powder Apply to rash on breasts as needed for rash three times daily 11/30/23   Evelina Can MD       Scheduled Meds  Current Facility-Administered Medications   Medication Dose Route Frequency Provider Last Rate Last Admin    [Held by provider] amLODIPine (NORVASC) tablet 5 mg  5 mg Oral Daily Shelli Wilson PA-C        [Held by provider] atorvastatin (LIPITOR) tablet 10 mg  10 mg Oral Daily Shelli Wilson PA-C        [Held by provider] clopidogrel (PLAVIX) tablet 75 mg  75 mg Oral Daily Shelli Wilson PA-C        iopamidol (ISOVUE-370) solution 117 mL  117 mL Intravenous Once Shelli Wilson PA-C        iopamidol (ISOVUE-370) solution 117 mL  117 mL Intravenous Once Shelli Wilson PA-C        latanoprost (XALATAN) 0.005 % ophthalmic solution 1 drop  1 drop Both Eyes At Bedtime Shelli Wilson PA-C   1 drop at 02/21/25 0021    [Held by provider] levothyroxine (SYNTHROID/LEVOTHROID) tablet 25 mcg  25 mcg Oral Daily Shelli Wilson PA-C        miconazole (MICATIN) 2 % powder   Topical BID Jaziel Sands MD   Given at 02/21/25 0443    piperacillin-tazobactam (ZOSYN) intermittent infusion 3.375 g  3.375 g Intravenous Q6H Shelli Wilson PA-C 100 mL/hr at 02/21/25 0809 3.375 g at 02/21/25 0809    Saline Flush  90 mL Intravenous Once Shelli Wilson PA-C        sodium chloride (PF) 0.9% PF flush 3 mL  3 mL Intracatheter Q8H Shelli Wilson PA-C   3 mL at 02/20/25 2135    sodium chloride 0.9 % bag 500mL for CT scan flush use  100 mL Intravenous Once Shelli Wilson PA-C        [Held by provider] Vitamin D3 (CHOLECALCIFEROL) tablet 25 mcg  25 mcg Oral QAM Shelli Wilson PA-C           Infusion Meds  Current Facility-Administered Medications   Medication Dose Route  Frequency Provider Last Rate Last Admin       Allergies   No Known Allergies       PHYSICAL EXAMINATION   Temp:  [97.4  F (36.3  C)-98.4  F (36.9  C)] 98.3  F (36.8  C)  Pulse:  [] 81  Resp:  [24-44] 24  BP: ()/(47-94) 116/69  SpO2:  [94 %-98 %] 98 %    Neuro Exam  Mental Status: Not alert and not oriented.  Follows occasional simple commands.  Produces very little speech and when she does use a single word answers.    Cranial Nerves: Visual fields appear intact to threat, unequal surgical pupils, EOMI, face appears symmetric, no dysarthria with minimal speech   Motor:  normal muscle tone and bulk, no abnormal movements, able to move all limbs spontaneously  Reflexes:  2+ and symmetric throughout  Sensory: Withdraws to noxious in all 4 extremities  Coordination: DANELLE  Station/Gait: DANELLE    Stroke Scales    NIHSS  1a. Level of Consciousness 2-->Not alert, requires repeated stimulation to attend, or is obtunded and requires strong or painful stimulation to make movements (not stereotyped)   1b. LOC Questions 2-->Answers neither question correctly   1c. LOC Commands 2-->Performs neither task correctly   2.   Best Gaze 0-->Normal   3.   Visual 0-->No visual loss   4.   Facial Palsy 0-->Normal symmetrical movements   5a. Motor Arm, Left 0-->No drift, limb holds 90 (or 45) degrees for full 10 secs   5b. Motor Arm, Right 0-->No drift, limb holds 90 (or 45) degrees for full 10 secs   6a. Motor Leg, Left 0-->No drift, leg holds 30 degree position for full 5 secs   6b. Motor Leg, right 0-->No drift, leg holds 30 degree position for full 5 secs   7.   Limb Ataxia 0-->Absent   8.   Sensory 0-->Normal, no sensory loss   9.   Best Language 2-->Severe aphasia, all communication is through fragmentary expression, great need for inference, questioning, and guessing by the listener. Range of information that can be exchanged is limited, listener carries burden of. . . (see row details)   10. Dysarthria 0-->Normal   11.  Extinction and Inattention  0-->No abnormality   Total 8 (02/20/25 0921)       Imaging  I personally reviewed all imaging; relevant findings per HPI.    Labs Data   CBC  Recent Labs   Lab 02/21/25  0522 02/20/25  1726   WBC 20.8* 3.8*   RBC 3.72* 4.46   HGB 11.8 14.2   HCT 37.1 43.2   * 224     Basic Metabolic Panel   Recent Labs   Lab 02/21/25  0522 02/20/25  1726 02/20/25  1709    140  --    POTASSIUM 4.6 3.6  --    CHLORIDE 102 103  --    CO2 18* 19*  --    BUN 23.0 23.0  --    CR 0.89 0.95  --    GLC 78 98 108*   JEAN-PIERRE 7.9* 10.4  --      Liver Panel  Recent Labs   Lab 02/21/25  0522 02/20/25  1726   PROTTOTAL 5.6* 6.6   ALBUMIN 3.2* 4.1   BILITOTAL 1.4* 1.9*   ALKPHOS 96 121   AST 38 45   ALT 14 22     INR    Recent Labs   Lab Test 04/24/18  0835   INR 1.1           Stroke Consult Data Data   This was a non-emergent, non-telestroke consult.

## 2025-02-21 NOTE — PROGRESS NOTES
"Hospital Medicine  /47   Pulse 82   Temp 97.8  F (36.6  C) (Oral)   Resp (!) 36   Ht 1.626 m (5' 4.02\")   Wt 61.7 kg (136 lb 0.4 oz)   SpO2 96%   BMI 23.34 kg/m     Lactic acid still rising - now 4.8 after 2 liters NS (normal saline).  Has reddened area on abdomen.    Assessment:  ? Candidiasis on skin  ? Septic vs. Cardiogenic shock as cause for elevated lactic acid.    Plan:  NS (normal saline) 500cc IV over 1 hour - should do ok  Nystatin powder BID.  Continue close observation and monitoring - patient has high likelihood for poor outcome this admission.    Jd Sands MD    2/21/2025 - 6:06 AM.  Update:  4/4 bottles positive for gram-negative siena(s) / bacilli (GNR) - ID / sensitivities pending.  Remains on piperacillin / tazobactam (Zosyn ).  Lactic acid has improved - will give another 500cc bolus and recheck lactic acid @ 9am.  Jd Sands MD    ID: [email or web address] pneumoniae  Jd Sands MD        "

## 2025-02-21 NOTE — PHARMACY-VANCOMYCIN DOSING SERVICE
"Pharmacy Vancomycin Initial Note  Date of Service 2025  Patient's  4/3/1925  89 year old, female    Indication: Sepsis    Current estimated CrCl = Estimated Creatinine Clearance: 31.4 mL/min (based on SCr of 0.95 mg/dL).    Creatinine for last 3 days  2025:  5:26 PM Creatinine 0.95 mg/dL    Recent Vancomycin Level(s) for last 3 days  No results found for requested labs within last 3 days.      Vancomycin IV Administrations (past 72 hours)        No vancomycin orders with administrations in past 72 hours.                    Nephrotoxins and other renal medications (From now, onward)      Start     Dose/Rate Route Frequency Ordered Stop    25  vancomycin (VANCOCIN) 750 mg in sodium chloride 0.9 % 282.5 mL intermittent infusion         750 mg  over 90 Minutes Intravenous EVERY 24 HOURS 25  piperacillin-tazobactam (ZOSYN) intermittent infusion 3.375 g        Note to Pharmacy: For [facility], [facility] and [facility]: For Zosyn-naive patients, use the \"Zosyn initial dose + extended infusion\" order panel.    3.375 g  100 mL/hr over 30 Minutes Intravenous EVERY 6 HOURS 25              Contrast Orders - past 72 hours (72h ago, onward)      None            [employer] Prediction of Planned Initial Vancomycin Regimen  Regimen: 750 mg IV every 24 hours.  Start time: 20:28 on 2025  Exposure target: AUC24 (range)400-600 mg/L.hr   AUC24,ss: 449 mg/L.hr  Probability of AUC24 > 400: 64 %  Ctrough,ss: 14.7 mg/L  Probability of Ctrough,ss > 20: 18 %  Probability of nephrotoxicity (Lodise MERLENE ): 10 %          Plan:  Start vancomycin  750 mg IV q24h.   Vancomycin monitoring method: AUC  Vancomycin therapeutic monitoring goal: 400-600 mg*h/L  Pharmacy will check vancomycin levels as appropriate in 1-3 Days.    Serum creatinine levels will be ordered daily for the first week of therapy and at least twice weekly for subsequent weeks.      FRANCHESCA HARRISON [facility]    "

## 2025-02-21 NOTE — PROGRESS NOTES
[facility]    Medicine Progress Note - Hospitalist Service, GOLD TEAM 7    Date of Admission:  2/20/2025    Assessment & Plan      Azeb Torres is a 89 year old female admitted on 2/20/2025. 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 Presented to the ED via EMS due to generalized weakness, altered mental status. Overall, high concern for sepsis on admission with lactic >4 though also with new slurred speech concerning for possible CVA.     Sepsis - with encephalopathy and without shock  Klebsiella bacteremia  UTI  Developed new onset weakness and altered mental status on day of admission. No localizing symptoms aside from 2 episodes of small emesis and changes in urine color per family. Vitals stable. Labs notable for lactic 4.6, procal 3.67, WBC 3.8. UA with moderate blood, trace LE, negative nitrite and 24 WBCs concerning for possible UTI but not overly impressive. COVID, RVP negative. CXR with bilateral bronchial wall thickening and parahilar reticulonodular opacities suggesting bronchitis. Exam unremarkable with clear lungs, benign abdomen. S/p 2L IVF bolus and IV ceftriaxone 1 g in the ED without notable improvement in lactic. MRSA nares negative. Will plan to broaden antibiotics overnight while awaiting cultures. Highest suspicion for either UTI or possible bronchitis though seems less likely in setting of absent respiratory symptoms. Less concerned for intra-abdominal etiology at this time with reassuring LFTs and benign exam.   - Zosyn 3.375g q6h (renally dosed)  - blood cultures on 2/20/25 positive for Klebsiella and likely from UTI. Follow up sensitivity panel  - Trend lactic to ensure improving   - Would avoid  additional aggressive fluids in setting of elevated BNP, family would not want pressors. Will need to call if decompensating.  - Will remove Swanson and check bladder scans  - if fails to improve, will pursue abdominal imaging. No current indication.     Acute likely toxic-metabolic encephalopathy  Cognitive changes  Hx of prior CVA   Prior PCP notes from 11/2023 noting declining cognition. Had new slurred speech in the ED. Most suspicious for toxic-metabolic etiology in setting of infection however stroke not ruled out.   - CT head w/o, CTA head/neck, perfusion negative for acute CVA. Possible recrudescence   - restarting Plavix, statins  - Neuro checks  - Fall precautions  - Sepsis treatment as above  - Neurology consulted and appreciate recommendations    Lactic acidosis  Anion gap metabolic acidosis  Lactic 4.6 with anion gap 18, low CO2 19. VBG with normal pH 7.42, low pCO2 36. Her RR was elevated on arrival but has since settled so question if she had some respiratory compensation. Renal function at her baseline.  - Trend CMP in AM  - Lactic acid as above    CKD stage 2  Cr 0.95 on admission. Baseline appears 0.7-0.8.   - I/Os, daily weights  - Trend Cr  - Renally dose medications, avoid nephrotoxins    Elevated bilirubin  Tbili 1.9. Remainder of LFTs wnl. Likely related to sepsis. Had 2 episodes of vomiting prior to arrival without recurrence. No RUQ pain or tenderness.  - Trend in AM       Elevated BNP  Elevated Ddimer  Elevated troponin  Noted to be tachypneic on arrival with RR in the 40s. No oxygen requirements and no tachycardia. BNP 4,758, Ddimer 7.16. Trop 33 but no chest pain. She is certainly not presenting with classic symptoms of large pulmonary embolism or heart failure.   - Would recommend CT PE and echo to further evaluate. Awaiting family determination in setting of how much work-up they would like to pursue.  - Trend trop to peak    Goals of care  Her two daughters are present at bedside and  report recently completing POLST with DNR/DNI. They are having trouble delineating how aggressive of cares, work-up they would like to pursue here in the hospital. Some decisions such as no escalation to vasopressors or ICU are very clear while others such as pursuing additional imaging studies, broadening antibiotics, etc have come with some hesitancy. We discussed appropriateness of antibiotics given suspicion of infection as this would be a reversible problem with less risk of causing harm. We also discussed pursuing imaging tests that may result with more chronic problems such as possible stroke which would then be a diagnosis they would consider hospice for her.   - Continue discussions surrounding work-up as above  - Consider if Palliative Care could be beneficial while inpatient to provide support       Chronic/Stable Medical Problems:  HTN - Hold PTA amlodipine due to sepsis  HLD - Holding PTA statin, plavix due to AMS  Hypothyroidism - Holding PTA levothyroxine 25 mcg due to AMS  Glaucoma - Latanoprost eye drops     PTHrp realted hypercalcemia  Increased calcium noted in setting of elevated PTHrp and positive UPEP/urine IFX. Seen by Heme/Onc. Most often seen in solid tumors. In setting of advanced age, limited performance status and no concerning sign/symptoms, recommended against further investigation and symptomatic treatment with  steroids, fluids, bisphosphonates if needed.             Diet: Combination Diet Regular Diet Adult    DVT Prophylaxis: Pneumatic Compression Devices  Swanson Catheter: Not present  Lines: None     Cardiac Monitoring: ACTIVE order. Indication: Tachyarrhythmias, acute (48 hours)  Code Status: No CPR- Do NOT Intubate      Clinically Significant Risk Factors Present on Admission           # Hypocalcemia: Lowest Ca = 7.9 mg/dL in last 2 days, will monitor and replace as appropriate  # Hypercalcemia: corrected calcium is >10.1, will monitor as appropriate    # Hypoalbuminemia: Lowest  albumin = 3.2 g/dL at 2/21/2025  5:22 AM, will monitor as appropriate   # Drug Induced Platelet Defect: home medication list includes an antiplatelet medication   # Hypertension: Noted on problem list                      Social Drivers of Health    Physical Activity: Inactive (12/2/2024)    Exercise Vital Sign     Days of Exercise per Week: 0 days     Minutes of Exercise per Session: 0 min   Social Connections: Unknown (12/2/2024)    Social Connection and Isolation Panel [NHANES]     Frequency of Social Gatherings with Friends and Family: Once a week          Disposition Plan     Medically Ready for Discharge: Anticipated in 2-4 Days             Que Etienne MD  Hospitalist Service, [address]  Securely message with [employer] (more info)  Text page via [employer] Paging/Directory   See signed in provider for up to date coverage information  ______________________________________________________________________    Interval History   Modest improvement in dysarthria per family. No new complatins    Physical Exam   Vital Signs: Temp: 98.3  F (36.8  C) Temp src: Oral BP: 102/48 Pulse: 80   Resp: 24 SpO2: 97 % O2 Device: None (Room air) Oxygen Delivery: 1 LPM  Weight: 136 lbs .38 oz    Physical Exam  Constitutional:       Appearance: Normal appearance.   HENT:      Head: Normocephalic.      Mouth/Throat:      Mouth: Mucous membranes are moist.   Eyes:      Extraocular Movements: Extraocular movements intact.      Pupils: Pupils are equal, round, and reactive to light.   Cardiovascular:      Rate and Rhythm: Normal rate and regular rhythm.      Heart sounds: No murmur heard.  Pulmonary:      Effort: Pulmonary effort is normal. No respiratory distress.      Breath sounds: Normal breath sounds. No wheezing.   Abdominal:      General: Abdomen is flat. Bowel sounds are normal.      Tenderness: There is no abdominal tenderness.   Musculoskeletal:      Cervical back:  Normal range of motion.   Neurological:      General: No focal deficit present.      Mental Status: She is alert.      Comments: Oriented x 2(person, place)  Unable to clearly cooperate  Power - 4/5 bilateral distal/proximal, UE, LE         Medical Decision Making       60 MINUTES SPENT BY ME on the date of service doing chart review, history, exam, documentation & further activities per the note.      Data     I have personally reviewed the following data over the past 24 hrs:    20.8 (H)  \   11.8   / 144 (L)     138 102 23.0 /  78   4.6 18 (L) 0.89 \     ALT: 14 AST: 38 AP: 96 TBILI: 1.4 (H)   ALB: 3.2 (L) TOT PROTEIN: 5.6 (L) LIPASE: N/A     Trop: 41 (H) BNP: 4,758 (H)     Procal: 3.67 (H) CRP: 27.50 (H) Lactic Acid: 2.9 (H)       INR:  N/A PTT:  N/A   D-dimer:  7.16 (H) Fibrinogen:  N/A       Imaging results reviewed over the past 24 hrs:   Recent Results (from the past 24 hours)   XR Chest Port 1 View    Narrative    EXAM: XR CHEST PORT 1 VIEW  LOCATION: [facility]  DATE: 2/20/2025    INDICATION: Altered mental status, tachypnea.  COMPARISON: 1/29/2018      Impression    IMPRESSION: Are size magnified in AP projection with normal vascularity. Bilateral bronchial wall thickening with parahilar reticulonodular opacities suggesting bronchitis. No focal consolidation, pneumothorax nor pleural effusion. 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   CT Head w/o Contrast    Narrative    CT HEAD W/O CONTRAST, CT HEAD PERFUSION W CONTRAST, CTA HEAD NECK W  CONTRAST 2/21/2025 1:27 PM    CT Head without contrast  CT Perfusion Study of the Brain with Contrast  CT Angiogram of the Neck with contrast   CT Angiogram of the Head with contrast    History:  Dysarthria - r/o stroke    Comparison: CT head 6/17/2018, MR brain 4/24/2018. CTA head and neck  11/5/2016    Technique:   HEAD CT: Using multidetector thin collimation helical  acquisition  technique, axial, coronal and sagittal CT images were obtained from  the base of the skull to the vertex without intravenous contrast and  reviewed in brain, bone, and subdural windows.     CT BRAIN PERFUSION: Dynamic perfusion CT of the brain was performed at  multiple levels with 10 mm slice thickness; levels were imaged during  2 separate rapid bolus intravenous injections of nonionic iodinated  contrast medium, 1) centered at the level of  the basal ganglia, and  2) centered at the level of the top of the lateral ventricles. Each  injection required approximately 40 cc of intravenous nonionic  contrast.  Images were reconstructed and reviewed on the [employer] 3D  workstation.    HEAD and NECK CTA: Thereafter, postcontrast images were obtained from  the aortic arch through the [place] of [place].  Axial images were  obtained using thin collimation multidetector helical technique. This  CT angiogram data was reconstructed at thin intervals with mild  overlap. Images were sent to the [employer] workstation, and 3D  reconstructions and multiplanar reformations were obtained with  reconstructions performed by the technologist and the radiologist. The  source images, multiplanar reformations, 3D reconstructions in both  maximum intensity projection display and volume rendered models were  reviewed,     Contrast: iopamidol (ISOVUE-370) solution 50 mL (accession  [id number]), iopamidol (ISOVUE-370) solution 117 mL (accession  [id number])    Findings:   Head CT: There is no evidence of intracranial hemorrhage, mass effect,  or midline shift. Encephalomalacia of the left occipital lobe  posterior and posterior medial to the ventricle is unchanged from  2018. No acute loss of gray-white differentiation. There is mild  patchy low attenuation within the periventricular and supraventricular  white matter of both cerebral hemispheres, nonspecific but most likely  representing chronic small vessel ischemic disease, given  the  patient's age. The ventricles and sulci are enlarged, but are not out  of proportion to the sulci. Moderate cerebral atrophy. Punctate  calcification in the left basal ganglia.     Circumferential mucosal thickening of the right maxillary sinus,  otherwise the paranasal sinuses are clear. Mastoid air cells are  clear. Hyperostosis frontalis interna. Degenerative changes of the  atlantoaxial joint, multilevel cervical spondylosis.    CT Perfusion: Regions of Tmax greater than 6 seconds in the left  occipital lobe, with corresponding area of decreased cerebral blood  volume though mathematically appears to be artifact from  encephalomalacia. No evidence of acute ischemia.    Head CTA demonstrates no aneurysm or stenosis of the major  intracranial arteries. Atherosclerotic calcifications of bilateral  carotid siphons. The anterior communicating artery is patent. Fetal  origin of the right posterior cerebral artery. The left posterior  communicating artery is not visualized.     Neck CTA demonstrates no stenosis of the major cervical arteries on  either side. The origins of the great vessels from the aortic arch are  patent. Mixed calcified and noncalcified atherosclerotic plaque of  bilateral carotid bulbs. The distal right internal carotid artery  measures 5 mm. The distal left internal carotid artery measures 5 mm.     No mass is noted within the visualized portions of the cervical soft  tissues or lung apices.       Impression    Impression:   1. No evidence of ischemia or acute infarction on the CT Perfusion  examination.  2. No evidence of intracranial hemorrhage. ASPECT score 10/10.  3. Head CTA demonstrates no aneurysm or stenosis of the major  intracranial arteries.   4. Neck CTA demonstrates no stenosis of the major cervical arteries.     I have personally reviewed the examination and initial interpretation  and I agree with the findings.    GEORGIE HARRISON MD         SYSTEM ID:  [id number]   CTA Head Neck  with Contrast    Narrative    CT HEAD W/O CONTRAST, CT HEAD PERFUSION W CONTRAST, CTA HEAD NECK W  CONTRAST 2/21/2025 1:27 PM    CT Head without contrast  CT Perfusion Study of the Brain with Contrast  CT Angiogram of the Neck with contrast   CT Angiogram of the Head with contrast    History:  Dysarthria - r/o stroke    Comparison: CT head 6/17/2018, MR brain 4/24/2018. CTA head and neck  11/5/2016    Technique:   HEAD CT: Using multidetector thin collimation helical acquisition  technique, axial, coronal and sagittal CT images were obtained from  the base of the skull to the vertex without intravenous contrast and  reviewed in brain, bone, and subdural windows.     CT BRAIN PERFUSION: Dynamic perfusion CT of the brain was performed at  multiple levels with 10 mm slice thickness; levels were imaged during  2 separate rapid bolus intravenous injections of nonionic iodinated  contrast medium, 1) centered at the level of  the basal ganglia, and  2) centered at the level of the top of the lateral ventricles. Each  injection required approximately 40 cc of intravenous nonionic  contrast.  Images were reconstructed and reviewed on the [employer]  workstation.    HEAD and NECK CTA: Thereafter, postcontrast images were obtained from  the aortic arch through the [place] of [place].  Axial images were  obtained using thin collimation multidetector helical technique. This  CT angiogram data was reconstructed at thin intervals with mild  overlap. Images were sent to the [employer] workstation, and 3D  reconstructions and multiplanar reformations were obtained with  reconstructions performed by the technologist and the radiologist. The  source images, multiplanar reformations, 3D reconstructions in both  maximum intensity projection display and volume rendered models were  reviewed,     Contrast: iopamidol (ISOVUE-370) solution 50 mL (accession  [id number]), iopamidol (ISOVUE-370) solution 117 mL (accession  [id number])    Findings:    Head CT: There is no evidence of intracranial hemorrhage, mass effect,  or midline shift. Encephalomalacia of the left occipital lobe  posterior and posterior medial to the ventricle is unchanged from  2018. No acute loss of gray-white differentiation. There is mild  patchy low attenuation within the periventricular and supraventricular  white matter of both cerebral hemispheres, nonspecific but most likely  representing chronic small vessel ischemic disease, given the  patient's age. The ventricles and sulci are enlarged, but are not out  of proportion to the sulci. Moderate cerebral atrophy. Punctate  calcification in the left basal ganglia.     Circumferential mucosal thickening of the right maxillary sinus,  otherwise the paranasal sinuses are clear. Mastoid air cells are  clear. Hyperostosis frontalis interna. Degenerative changes of the  atlantoaxial joint, multilevel cervical spondylosis.    CT Perfusion: Regions of Tmax greater than 6 seconds in the left  occipital lobe, with corresponding area of decreased cerebral blood  volume though mathematically appears to be artifact from  encephalomalacia. No evidence of acute ischemia.    Head CTA demonstrates no aneurysm or stenosis of the major  intracranial arteries. Atherosclerotic calcifications of bilateral  carotid siphons. The anterior communicating artery is patent. Fetal  origin of the right posterior cerebral artery. The left posterior  communicating artery is not visualized.     Neck CTA demonstrates no stenosis of the major cervical arteries on  either side. The origins of the great vessels from the aortic arch are  patent. Mixed calcified and noncalcified atherosclerotic plaque of  bilateral carotid bulbs. The distal right internal carotid artery  measures 5 mm. The distal left internal carotid artery measures 5 mm.     No mass is noted within the visualized portions of the cervical soft  tissues or lung apices.       Impression    Impression:   1.  No evidence of ischemia or acute infarction on the CT Perfusion  examination.  2. No evidence of intracranial hemorrhage. ASPECT score 10/10.  3. Head CTA demonstrates no aneurysm or stenosis of the major  intracranial arteries.   4. Neck CTA demonstrates no stenosis of the major cervical arteries.     I have personally reviewed the examination and initial interpretation  and I agree with the findings.    GEORGIE HARRISON MD         SYSTEM ID:  [id number]   CT Head Perfusion w Contrast    Narrative    CT HEAD W/O CONTRAST, CT HEAD PERFUSION W CONTRAST, CTA HEAD NECK W  CONTRAST 2/21/2025 1:27 PM    CT Head without contrast  CT Perfusion Study of the Brain with Contrast  CT Angiogram of the Neck with contrast   CT Angiogram of the Head with contrast    History:  Dysarthria - r/o stroke    Comparison: CT head 6/17/2018, MR brain 4/24/2018. CTA head and neck  11/5/2016    Technique:   HEAD CT: Using multidetector thin collimation helical acquisition  technique, axial, coronal and sagittal CT images were obtained from  the base of the skull to the vertex without intravenous contrast and  reviewed in brain, bone, and subdural windows.     CT BRAIN PERFUSION: Dynamic perfusion CT of the brain was performed at  multiple levels with 10 mm slice thickness; levels were imaged during  2 separate rapid bolus intravenous injections of nonionic iodinated  contrast medium, 1) centered at the level of  the basal ganglia, and  2) centered at the level of the top of the lateral ventricles. Each  injection required approximately 40 cc of intravenous nonionic  contrast.  Images were reconstructed and reviewed on the [employer]D  workstation.    HEAD and NECK CTA: Thereafter, postcontrast images were obtained from  the aortic arch through the [place].  Axial images were  obtained using thin collimation multidetector helical technique. This  CT angiogram data was reconstructed at thin intervals with mild  overlap. Images were sent to  the [employer] workstation, and 3D  reconstructions and multiplanar reformations were obtained with  reconstructions performed by the technologist and the radiologist. The  source images, multiplanar reformations, 3D reconstructions in both  maximum intensity projection display and volume rendered models were  reviewed,     Contrast: iopamidol (ISOVUE-370) solution 50 mL (accession  [id number]), iopamidol (ISOVUE-370) solution 117 mL (accession  [id number])    Findings:   Head CT: There is no evidence of intracranial hemorrhage, mass effect,  or midline shift. Encephalomalacia of the left occipital lobe  posterior and posterior medial to the ventricle is unchanged from  2018. No acute loss of gray-white differentiation. There is mild  patchy low attenuation within the periventricular and supraventricular  white matter of both cerebral hemispheres, nonspecific but most likely  representing chronic small vessel ischemic disease, given the  patient's age. The ventricles and sulci are enlarged, but are not out  of proportion to the sulci. Moderate cerebral atrophy. Punctate  calcification in the left basal ganglia.     Circumferential mucosal thickening of the right maxillary sinus,  otherwise the paranasal sinuses are clear. Mastoid air cells are  clear. Hyperostosis frontalis interna. Degenerative changes of the  atlantoaxial joint, multilevel cervical spondylosis.    CT Perfusion: Regions of Tmax greater than 6 seconds in the left  occipital lobe, with corresponding area of decreased cerebral blood  volume though mathematically appears to be artifact from  encephalomalacia. No evidence of acute ischemia.    Head CTA demonstrates no aneurysm or stenosis of the major  intracranial arteries. Atherosclerotic calcifications of bilateral  carotid siphons. The anterior communicating artery is patent. Fetal  origin of the right posterior cerebral artery. The left posterior  communicating artery is not visualized.     Neck CTA  demonstrates no stenosis of the major cervical arteries on  either side. The origins of the great vessels from the aortic arch are  patent. Mixed calcified and noncalcified atherosclerotic plaque of  bilateral carotid bulbs. The distal right internal carotid artery  measures 5 mm. The distal left internal carotid artery measures 5 mm.     No mass is noted within the visualized portions of the cervical soft  tissues or lung apices.       Impression    Impression:   1. No evidence of ischemia or acute infarction on the CT Perfusion  examination.  2. No evidence of intracranial hemorrhage. ASPECT score 10/10.  3. Head CTA demonstrates no aneurysm or stenosis of the major  intracranial arteries.   4. Neck CTA demonstrates no stenosis of the major cervical arteries.     I have personally reviewed the examination and initial interpretation  and I agree with the findings.    GEROGIE HARRISON MD         SYSTEM ID:  [id number]

## 2025-02-21 NOTE — PROGRESS NOTES
"/48   Pulse 80   Temp 98.3  F (36.8  C) (Oral)   Resp 24   Ht 1.626 m (5' 4.02\")   Wt 61.7 kg (136 lb 0.4 oz)   SpO2 97%   BMI 23.34 kg/m        Neuro: Disorient to time and situation. Very difficult to assess orientation due to severe [place], even with hearing aids in. Patient also appears restless.   Cardiac: SR. VSS.   Respiratory: Sating 97% on RA.  GI/: BM 2/21 - incontinence of both.  Diet/appetite: Tolerating reg diet. Needs assistance with eating.   Activity:  Appears max assist/bed rest. Patient confused and very sleepy through the day and never able to get her up and out of bed. Per her two daughters at bedside, patient uses walker at home needing assistance from sit to stand but able to ambulate once up. Plan discussed for potential need for bedside commode if patient able to verbalize and is coherent with her continence.  Pain: At acceptable level on current regimen.   Skin: No new deficits noted.  LDA's: 2 PIVs - saline locked - AC locations and very restless, sometimes requiring staff at beside to keep arm straight for IV fluids/medications    Plan: Continue with POC. Notify primary team with changes.             "

## 2025-02-21 NOTE — H&P
[facility]    History and Physical - Hospitalist Service, GOLD TEAM        Date of Admission:  2/20/2025    Assessment & Plan      Azeb Torres is a 89 year old female admitted on 2/20/2025. 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 Presented to the ED via EMS due to generalized weakness, altered mental status. Overall, high concern for sepsis on admission with lactic >4 though also with new slurred speech concerning for possible CVA.     Sepsis of unclear etiology  Developed new onset weakness and altered mental status on day of admission. No localizing symptoms aside from 2 episodes of small emesis and changes in urine color per family. Vitals stable. Labs notable for lactic 4.6, procal 3.67, WBC 3.8. UA with moderate blood, trace LE, negative nitrite and 24 WBCs concerning for possible UTI but not overly impressive. COVID, RVP negative. CXR with bilateral bronchial wall thickening and parahilar reticulonodular opacities suggesting bronchitis. Exam unremarkable with clear lungs, benign abdomen. S/p 2L IVF bolus and IV ceftriaxone 1 g in the ED without notable improvement in lactic. MRSA nares negative. Will plan to broaden antibiotics overnight while awaiting cultures. Highest suspicion for either UTI or possible bronchitis though seems less likely in setting of absent respiratory symptoms. Less concerned for intra-abdominal etiology at this time with reassuring LFTs and benign exam.   - Zosyn 3.375g q6h (renally dosed)  - Pending blood cultures x 2   - Pending urine culture  - Trend lactic to ensure improving   - Would avoid additional aggressive fluids in setting of elevated BNP, family would not want pressors. Will need  to call if decompensating.  - Swanson placed by ED, no clear indication -- would remove in AM if able     Lactic acidosis  Anion gap metabolic acidosis  Lactic 4.6 with anion gap 18, low CO2 19. VBG with normal pH 7.42, low pCO2 36. Her RR was elevated on arrival but has since settled so question if she had some respiratory compensation. Renal function at her baseline.  - Trend CMP in AM  - Lactic acid as above    CKD stage 2  Cr 0.95 on admission. Baseline appears 0.7-0.8.   - I/Os, daily weights  - Trend Cr  - Renally dose medications, avoid nephrotoxins    Elevated bilirubin  Tbili 1.9. Remainder of LFTs wnl. Likely related to sepsis. Had 2 episodes of vomiting prior to arrival without recurrence. No RUQ pain or tenderness.  - Trend in AM     Acute likely toxic-metabolic encephalopathy  Cognitive changes  Hx of prior CVA   Prior PCP notes from 11/2023 noting declining cognition. Had new slurred speech in the ED. Most suspicious for toxic-metabolic etiology in setting of infection however stroke not ruled out.   - Pending Neurology evaluation  - CT head w/o, CTA head/neck, perfusion  - Holding PO meds until more alert  - Neuro checks  - Fall precautions  - Sepsis treatment as above    Elevated BNP  Elevated Ddimer  Elevated troponin  Noted to be tachypneic on arrival with RR in the 40s. No oxygen requirements and no tachycardia. BNP 4,758, Ddimer 7.16. Trop 33 but no chest pain. She is certainly not presenting with classic symptoms of large pulmonary embolism or heart failure.   - Would recommend CT PE and echo to further evaluate. Awaiting family determination in setting of how much work-up they would like to pursue.  - Trend trop to peak    Goals of care  Her two daughters are present at bedside and report recently completing POLST with DNR/DNI. They are having trouble delineating how aggressive of cares, work-up they would like to pursue here in the hospital. Some decisions such as no escalation to vasopressors  or ICU are very clear while others such as pursuing additional imaging studies, broadening antibiotics, etc have come with some hesitancy. We discussed appropriateness of antibiotics given suspicion of infection as this would be a reversible problem with less risk of causing harm. We also discussed pursuing imaging tests that may result with more chronic problems such as possible stroke which would then be a diagnosis they would consider hospice for her.   - Continue discussions surrounding work-up as above  - Consider if Palliative Care could be beneficial while inpatient to provide support       Chronic/Stable Medical Problems:  HTN - Hold PTA amlodipine due to sepsis  HLD - Holding PTA statin, plavix due to AMS  Hypothyroidism - Holding PTA levothyroxine 25 mcg due to AMS  Glaucoma - Latanoprost eye drops     PTHrp realted hypercalcemia  Increased calcium noted in setting of elevated PTHrp and positive UPEP/urine IFX. Seen by Heme/Onc. Most often seen in solid tumors. In setting of advanced age, limited performance status and no concerning sign/symptoms, recommended against further investigation and symptomatic treatment with  steroids, fluids, bisphosphonates if needed.             Diet: Combination Diet Regular Diet Adult  DVT Prophylaxis: Pneumatic Compression Devices  Swanson Catheter: Not present  Lines: None     Cardiac Monitoring: ACTIVE order. Indication: Tachyarrhythmias, acute (48 hours)  Code Status: No CPR- Do NOT Intubate    Clinically Significant Risk Factors Present on Admission                 # Drug Induced Platelet Defect: home medication list includes an antiplatelet medication   # Hypertension: Noted on problem list                      Disposition Plan     Medically Ready for Discharge: Anticipated in 2-4 Days         The patient's care was discussed with the Attending Physician, Dr. Rogel, Bedside Nurse, and Patient's Family.    Shelli Wilson PA-C  Hospitalist Service, GOLD TEAM   [facility]  Securely message with LookFlow (more info)  Text page via [employer] Paging/Directory   See signed in provider for up to date coverage information    ______________________________________________________________________    Chief Complaint   Altered mental status     History is obtained from the electronic health record and patient's children    History of Present Illness   Azeb oTrres is a 89 year old female who presented to the ED via EMS with her daughters today due to weakness and altered mental status. She was reportedly at her baseline yesterday, walking with her walker. She ate breakfast this morning and apparently vomited \"rust colored\" emesis of small amount. Had another episode of vomiting later in the day. They then noticed that she was too weak to get out of her chair. Has not had any known fevers but her neck felt very warm. No increased urinary frequency but urine was a darker color. No new pain. No new respiratory symptoms.    During prior conversation with Dr. Rogel -- family had noted some slurred speech which is not at her baseline.       Past Medical History    Past Medical History:   Diagnosis Date    Arthritis     Breast cancer (H)     ER positive    Chronic angle-closure glaucoma     CVA (cerebral vascular accident) (H) 2003    R cerebral artery, embolic after hip replacement    Degenerative joint disease     Endometrial cancer (H)     Hypertension     Left homonymous hemianopsia     Pulmonary artery hypertension (H) 4/24/2013    Noted on echo. Mild-moderate. Moderate TR    Tricuspid regurgitation 4/24/2013       Past Surgical History   Past Surgical History:   Procedure Laterality Date    CATARACT IOL, RT/LT      glaucoma laser[      HIP SURGERY      s/p bilateral hip replacements    HYSTERECTOMY  1985    low grade endometrial cancer    LUMPECTOMY BREAST Left     RELEASE CARPAL TUNNEL Right 11/14/2017    Procedure: RELEASE CARPAL TUNNEL;  Right " Open Carpal Tunnel Release;  Surgeon: Patrick Rivera MD;  Location:  OR    [facility] PELVIS/HIP JOINT SURGERY UNLISTED      [facility] STOMACH SURGERY PROCEDURE UNLISTED         Prior to Admission Medications   Prior to Admission Medications   Prescriptions Last Dose Informant Patient Reported? Taking?   Multiple Vitamins-Minerals (PRESERVISION AREDS 2) CAPS   Yes No   Si capsules.   acetaminophen (TYLENOL) 500 MG tablet   Yes No   Sig: Take 2 tablets by mouth 2 times daily.   amLODIPine (NORVASC) 5 MG tablet   No No   Sig: Take 1 tablet (5 mg) by mouth daily.   atorvastatin (LIPITOR) 10 MG tablet   No No   Sig: Take 1 tablet (10 mg) by mouth daily.   cholecalciferol (VITAMIN D) 1000 UNIT tablet   Yes No   Sig: Take 1 tablet by mouth every morning    clopidogrel (PLAVIX) 75 MG tablet   No No   Sig: Take 1 tablet (75 mg) by mouth daily.   clotrimazole (LOTRIMIN) 1 % external cream   No No   Sig: Apply topically 2 times daily. To rash in skin folds   desonide (DESOWEN) 0.05 % external ointment   No No   Sig: APPLY TOPICALLY TWO TIMES A DAY   dorzolamide-timolol (COSOPT) 2-0.5 % ophthalmic solution   No No   Sig: Place 1 drop into both eyes 2 times daily Please call [phone] for appointment with Dr. Mendez or Dr. Garcia for further refills   fluocinonide (LIDEX) 0.05 % external ointment   No No   Sig: Apply topically 2 times daily   latanoprost (XALATAN) 0.005 % ophthalmic solution   No No   Sig: Place 1 drop into both eyes At Bedtime   levothyroxine (SYNTHROID/LEVOTHROID) 25 MCG tablet   No No   Sig: TAKE 1 TABLET (25 MCG) BY MOUTH DAILY   nystatin (MYCOSTATIN) 398978 UNIT/GM external powder   No No   Sig: Apply to rash on breasts as needed for rash three times daily      Facility-Administered Medications: None        Review of Systems    Review of systems not obtained due to patient factors - confusion     Physical Exam   Vital Signs: Temp: 97.4  F (36.3  C) Temp src: Oral BP: (!) 149/67 Pulse: 91   Resp: (!)  40 SpO2: 96 % O2 Device: None (Room air) Oxygen Delivery: 1 LPM  Weight: 136 lbs .38 oz    General Appearance: Sleeping. Somewhat wakes to voice but sleeping soundly. No acute distress. Occasionally appears to be restless.   Eyes: Normal lids.   HEENT: Normocephalic, atraumatic. Nares patent.  Respiratory: Normal work of breathing on room air. Lungs CTAB. No wheezes.   Cardiovascular: Irregular with frequent PVCs. No murmur. No significant lower extremity edema.   GI: Abdomen non-distended. Normoactive. Soft, non-tender. No guarding.   Lymph/Hematologic: No abnormal or excessive bruising on exposed skin.   Skin: Warm, dry.     Medical Decision Making       90 MINUTES SPENT BY ME on the date of service doing chart review, history, exam, documentation & further activities per the note.      Data     I have personally reviewed the following data over the past 24 hrs:    3.8 (L)  \   14.2   / 224     140 103 23.0 /  98   3.6 19 (L) 0.95 \     ALT: 22 AST: 45 AP: 121 TBILI: 1.9 (H)   ALB: 4.1 TOT PROTEIN: 6.6 LIPASE: N/A     Trop: 33 (H) BNP: 4,758 (H)     Procal: 3.67 (H) CRP: 27.50 (H) Lactic Acid: 4.4 (HH)       INR:  N/A PTT:  N/A   D-dimer:  7.16 (H) Fibrinogen:  N/A       Imaging results reviewed over the past 24 hrs:   Recent Results (from the past 24 hours)   XR Chest Port 1 View    Narrative    EXAM: XR CHEST PORT 1 VIEW  LOCATION: [facility]  DATE: 2/20/2025    INDICATION: Altered mental status, tachypnea.  COMPARISON: 1/29/2018      Impression    IMPRESSION: Are size magnified in AP projection with normal vascularity. Bilateral bronchial wall thickening with parahilar reticulonodular opacities suggesting bronchitis. No focal consolidation, pneumothorax nor pleural effusion. 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

## 2025-02-22 LAB
ALBUMIN SERPL BCG-MCNC: 3.1 G/DL (ref 3.5–5.2)
ALP SERPL-CCNC: 112 U/L (ref 40–150)
ALT SERPL W P-5'-P-CCNC: 11 U/L (ref 0–50)
ANION GAP SERPL CALCULATED.3IONS-SCNC: 16 MMOL/L (ref 7–15)
AST SERPL W P-5'-P-CCNC: 31 U/L (ref 0–45)
ATRIAL RATE - MUSE: NORMAL BPM
BACTERIA UR CULT: ABNORMAL
BILIRUB SERPL-MCNC: 1.1 MG/DL
BUN SERPL-MCNC: 28.7 MG/DL (ref 8–23)
CALCIUM SERPL-MCNC: 9.5 MG/DL (ref 8.8–10.4)
CHLORIDE SERPL-SCNC: 105 MMOL/L (ref 98–107)
CREAT SERPL-MCNC: 1.06 MG/DL (ref 0.51–0.95)
DIASTOLIC BLOOD PRESSURE - MUSE: NORMAL MMHG
EGFRCR SERPLBLD CKD-EPI 2021: 47 ML/MIN/1.73M2
ERYTHROCYTE [DISTWIDTH] IN BLOOD BY AUTOMATED COUNT: 14.6 % (ref 10–15)
GLUCOSE SERPL-MCNC: 91 MG/DL (ref 70–99)
HCO3 SERPL-SCNC: 20 MMOL/L (ref 22–29)
HCT VFR BLD AUTO: 35.8 % (ref 35–47)
HGB BLD-MCNC: 11.9 G/DL (ref 11.7–15.7)
INTERPRETATION ECG - MUSE: NORMAL
MCH RBC QN AUTO: 31.9 PG (ref 26.5–33)
MCHC RBC AUTO-ENTMCNC: 33.2 G/DL (ref 31.5–36.5)
MCV RBC AUTO: 96 FL (ref 78–100)
P AXIS - MUSE: NORMAL DEGREES
PLATELET # BLD AUTO: 147 10E3/UL (ref 150–450)
POTASSIUM SERPL-SCNC: 3.3 MMOL/L (ref 3.4–5.3)
PR INTERVAL - MUSE: NORMAL MS
PROT SERPL-MCNC: 5.6 G/DL (ref 6.4–8.3)
QRS DURATION - MUSE: 88 MS
QT - MUSE: 384 MS
QTC - MUSE: 469 MS
R AXIS - MUSE: -14 DEGREES
RBC # BLD AUTO: 3.73 10E6/UL (ref 3.8–5.2)
SODIUM SERPL-SCNC: 141 MMOL/L (ref 135–145)
SYSTOLIC BLOOD PRESSURE - MUSE: NORMAL MMHG
T AXIS - MUSE: 57 DEGREES
VENTRICULAR RATE- MUSE: 90 BPM
WBC # BLD AUTO: 19.5 10E3/UL (ref 4–11)

## 2025-02-22 PROCEDURE — 99233 SBSQ HOSP IP/OBS HIGH 50: CPT | Performed by: HOSPITALIST

## 2025-02-22 PROCEDURE — 85014 HEMATOCRIT: CPT | Performed by: PHYSICIAN ASSISTANT

## 2025-02-22 PROCEDURE — 250N000011 HC RX IP 250 OP 636: Performed by: PHYSICIAN ASSISTANT

## 2025-02-22 PROCEDURE — 250N000013 HC RX MED GY IP 250 OP 250 PS 637: Performed by: HOSPITALIST

## 2025-02-22 PROCEDURE — 84295 ASSAY OF SERUM SODIUM: CPT | Performed by: PHYSICIAN ASSISTANT

## 2025-02-22 PROCEDURE — 250N000011 HC RX IP 250 OP 636: Performed by: HOSPITALIST

## 2025-02-22 PROCEDURE — 250N000013 HC RX MED GY IP 250 OP 250 PS 637: Performed by: PHYSICIAN ASSISTANT

## 2025-02-22 PROCEDURE — 82565 ASSAY OF CREATININE: CPT | Performed by: PHYSICIAN ASSISTANT

## 2025-02-22 PROCEDURE — 36415 COLL VENOUS BLD VENIPUNCTURE: CPT | Performed by: PHYSICIAN ASSISTANT

## 2025-02-22 PROCEDURE — 85048 AUTOMATED LEUKOCYTE COUNT: CPT | Performed by: PHYSICIAN ASSISTANT

## 2025-02-22 PROCEDURE — 120N000002 HC R&B MED SURG/OB UMMC

## 2025-02-22 RX ORDER — CEFTRIAXONE 2 G/1
2 INJECTION, POWDER, FOR SOLUTION INTRAMUSCULAR; INTRAVENOUS EVERY 24 HOURS
Status: DISCONTINUED | OUTPATIENT
Start: 2025-02-22 | End: 2025-02-24 | Stop reason: HOSPADM

## 2025-02-22 RX ADMIN — PIPERACILLIN SODIUM AND TAZOBACTAM SODIUM 3.38 G: 3; .375 INJECTION, SOLUTION INTRAVENOUS at 02:37

## 2025-02-22 RX ADMIN — PIPERACILLIN SODIUM AND TAZOBACTAM SODIUM 3.38 G: 3; .375 INJECTION, SOLUTION INTRAVENOUS at 08:23

## 2025-02-22 RX ADMIN — LEVOTHYROXINE SODIUM 25 MCG: 0.03 TABLET ORAL at 08:27

## 2025-02-22 RX ADMIN — CLOPIDOGREL BISULFATE 75 MG: 75 TABLET ORAL at 08:27

## 2025-02-22 RX ADMIN — LATANOPROST 1 DROP: 50 SOLUTION OPHTHALMIC at 21:05

## 2025-02-22 RX ADMIN — CEFTRIAXONE SODIUM 2 G: 2 INJECTION, POWDER, FOR SOLUTION INTRAMUSCULAR; INTRAVENOUS at 10:26

## 2025-02-22 RX ADMIN — ATORVASTATIN CALCIUM 10 MG: 10 TABLET, FILM COATED ORAL at 08:28

## 2025-02-22 RX ADMIN — LATANOPROST 1 DROP: 50 SOLUTION OPHTHALMIC at 01:10

## 2025-02-22 RX ADMIN — MICONAZOLE NITRATE: 20 POWDER TOPICAL at 08:28

## 2025-02-22 RX ADMIN — MICONAZOLE NITRATE: 20 POWDER TOPICAL at 20:55

## 2025-02-22 ASSESSMENT — ACTIVITIES OF DAILY LIVING (ADL)
ADLS_ACUITY_SCORE: 77
ADLS_ACUITY_SCORE: 76
ADLS_ACUITY_SCORE: 66
ADLS_ACUITY_SCORE: 77
ADLS_ACUITY_SCORE: 76
ADLS_ACUITY_SCORE: 76
ADLS_ACUITY_SCORE: 77
ADLS_ACUITY_SCORE: 76
ADLS_ACUITY_SCORE: 76
ADLS_ACUITY_SCORE: 66
ADLS_ACUITY_SCORE: 77
ADLS_ACUITY_SCORE: 76
ADLS_ACUITY_SCORE: 77
ADLS_ACUITY_SCORE: 76

## 2025-02-22 NOTE — PROGRESS NOTES
[facility]    Medicine Progress Note - Hospitalist Service, GOLD TEAM 7    Date of Admission:  2/20/2025    Assessment & Plan      Azeb Torres is a 89 year old female admitted on 2/20/2025. 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 Presented to the ED via EMS due to generalized weakness, altered mental status. Overall, high concern for sepsis on admission with lactic >4 though also with new slurred speech concerning for possible CVA.     Sepsis - with encephalopathy and without shock  Klebsiella bacteremia  UTI  Developed new onset weakness and altered mental status on day of admission. No localizing symptoms aside from 2 episodes of small emesis and changes in urine color per family. Vitals stable. Labs notable for lactic 4.6, procal 3.67, WBC 3.8. UA with moderate blood, trace LE, negative nitrite and 24 WBCs concerning for possible UTI but not overly impressive. COVID, RVP negative. CXR with bilateral bronchial wall thickening and parahilar reticulonodular opacities suggesting bronchitis. Exam unremarkable with clear lungs, benign abdomen. S/p 2L IVF bolus and IV ceftriaxone 1 g in the ED without notable improvement in lactic. MRSA nares negative. Will plan to broaden antibiotics overnight while awaiting cultures. Highest suspicion for either UTI or possible bronchitis though seems less likely in setting of absent respiratory symptoms. Less concerned for intra-abdominal etiology at this time with reassuring LFTs and benign exam.   - blood cultures on 2/20/25 positive for Klebsiella and likely from UTI. Follow up sensitivity panel. Zosyn 3.375g q6h (2/19) (renally dosed) -> ceftriaxone (2/22) since pan-sensitive   - Trend  lactic to ensure improving   - Would avoid additional aggressive fluids in setting of elevated BNP, family would not want pressors. Will need to call if decompensating.  - remove Swanson and doing well   - if fails to improve, will pursue abdominal imaging. No current indication.     Acute likely toxic-metabolic encephalopathy  Cognitive changes  Hx of prior CVA   Prior PCP notes from 11/2023 noting declining cognition. Had new slurred speech in the ED. Most suspicious for toxic-metabolic etiology in setting of infection however stroke not ruled out.   - CT head w/o, CTA head/neck, perfusion negative for acute CVA. Possible recrudescence   - restarting Plavix, statins  - Neuro checks  - Fall precautions  - Sepsis treatment as above  - Neurology consulted and appreciate recommendations    Lactic acidosis  Anion gap metabolic acidosis  Lactic 4.6 with anion gap 18, low CO2 19. VBG with normal pH 7.42, low pCO2 36. Her RR was elevated on arrival but has since settled so question if she had some respiratory compensation. Renal function at her baseline.  - Trend CMP in AM  - Lactic acid as above    CKD stage 2  Cr 0.95 on admission. Baseline appears 0.7-0.8.   - I/Os, daily weights  - Trend Cr  - Renally dose medications, avoid nephrotoxins    Elevated bilirubin  Tbili 1.9. Remainder of LFTs wnl. Likely related to sepsis. Had 2 episodes of vomiting prior to arrival without recurrence. No RUQ pain or tenderness.  - Trend in AM       Elevated BNP  Elevated Ddimer  Elevated troponin  Noted to be tachypneic on arrival with RR in the 40s. No oxygen requirements and no tachycardia. BNP 4,758, Ddimer 7.16. Trop 33 but no chest pain. She is certainly not presenting with classic symptoms of large pulmonary embolism or heart failure.   - Would recommend CT PE and echo to further evaluate. Awaiting family determination in setting of how much work-up they would like to pursue.  - Trend trop to peak    Goals of care  Her two daughters  are present at bedside and report recently completing POLST with DNR/DNI. They are having trouble delineating how aggressive of cares, work-up they would like to pursue here in the hospital. Some decisions such as no escalation to vasopressors or ICU are very clear while others such as pursuing additional imaging studies, broadening antibiotics, etc have come with some hesitancy. We discussed appropriateness of antibiotics given suspicion of infection as this would be a reversible problem with less risk of causing harm. We also discussed pursuing imaging tests that may result with more chronic problems such as possible stroke which would then be a diagnosis they would consider hospice for her.   - Continue discussions surrounding work-up as above  - Consider if Palliative Care could be beneficial while inpatient to provide support       Chronic/Stable Medical Problems:  HTN - Hold PTA amlodipine due to sepsis  HLD - Holding PTA statin, plavix due to AMS  Hypothyroidism - Holding PTA levothyroxine 25 mcg due to AMS  Glaucoma - Latanoprost eye drops     PTHrp realted hypercalcemia  Increased calcium noted in setting of elevated PTHrp and positive UPEP/urine IFX. Seen by Heme/Onc. Most often seen in solid tumors. In setting of advanced age, limited performance status and no concerning sign/symptoms, recommended against further investigation and symptomatic treatment with  steroids, fluids, bisphosphonates if needed.             Diet: Combination Diet Regular Diet Adult    DVT Prophylaxis: Pneumatic Compression Devices  Swanson Catheter: Not present  Lines: None     Cardiac Monitoring: ACTIVE order. Indication: Tachyarrhythmias, acute (48 hours)  Code Status: No CPR- Do NOT Intubate      Clinically Significant Risk Factors        # Hypokalemia: Lowest K = 3.3 mmol/L in last 2 days, will replace as needed     # Hypercalcemia: corrected calcium is >10.1, will monitor as appropriate    # Hypoalbuminemia: Lowest albumin = 3.1  g/dL at 2/22/2025  7:53 AM, will monitor as appropriate     # Hypertension: Noted on problem list                       Social Drivers of Health    Physical Activity: Inactive (12/2/2024)    Exercise Vital Sign     Days of Exercise per Week: 0 days     Minutes of Exercise per Session: 0 min   Social Connections: Unknown (12/2/2024)    Social Connection and Isolation Panel [NHANES]     Frequency of Social Gatherings with Friends and Family: Once a week          Disposition Plan     Medically Ready for Discharge: Anticipated Tomorrow             Que Etienne MD  Hospitalist Service, [facility] TEAM [address] [place] [address]  Securely message with Slide (more info)  Text page via [employer] Paging/Directory   See signed in provider for up to date coverage information  ______________________________________________________________________    Interval History     Improving energy levels and improving appetite    Physical Exam   Vital Signs: Temp: 97.7  F (36.5  C) Temp src: Oral BP: (!) 150/61 Pulse: 86   Resp: 22 SpO2: 97 % O2 Device: None (Room air)    Weight: 136 lbs .38 oz    Physical Exam  Constitutional:       Appearance: Normal appearance.   HENT:      Head: Normocephalic.      Mouth/Throat:      Mouth: Mucous membranes are moist.   Eyes:      Extraocular Movements: Extraocular movements intact.      Pupils: Pupils are equal, round, and reactive to light.   Cardiovascular:      Rate and Rhythm: Normal rate and regular rhythm.      Heart sounds: No murmur heard.  Pulmonary:      Effort: Pulmonary effort is normal. No respiratory distress.      Breath sounds: Normal breath sounds. No wheezing.   Abdominal:      General: Abdomen is flat. Bowel sounds are normal.      Tenderness: There is no abdominal tenderness.   Musculoskeletal:      Cervical back: Normal range of motion.   Neurological:      General: No focal deficit present.      Mental Status: She is alert.      Comments: Oriented x  2(person, place)  Unable to clearly cooperate  Power - 4/5 bilateral distal/proximal, UE, LE         Medical Decision Making       55 MINUTES SPENT BY ME on the date of service doing chart review, history, exam, documentation & further activities per the note.      Data     I have personally reviewed the following data over the past 24 hrs:    19.5 (H)  \   11.9   / 147 (L)     141 105 28.7 (H) /  91   3.3 (L) 20 (L) 1.06 (H) \     ALT: 11 AST: 31 AP: 112 TBILI: 1.1   ALB: 3.1 (L) TOT PROTEIN: 5.6 (L) LIPASE: N/A       Imaging results reviewed over the past 24 hrs:   No results found for this or any previous visit (from the past 24 hours).

## 2025-02-22 NOTE — PLAN OF CARE
Goal Outcome Evaluation:      Plan of Care Reviewed With: patient    Overall Patient Progress: improvingOverall Patient Progress: improving    Outcome Evaluation: Disoriented, pleasantly confused. -150s, Afib rhythm - no AC at this time. Sating >95% RA. Lactic trending down. Receiving IV abx.    Disoriented to place, time, situation. Very hard of hearing and poor sight at baseline. Intermitt pulls at lines, O2 - keep out of sight. Afib rhythm - provider aware, 12-lead done. No AC/intervention at this time. Sating >95% RA. Denies SOB. Incontinent of urine/stool. Reg diet. Pt weight shifting. Assistx2 with walker OOB. Denies pain. X2 PIV, saline locked.

## 2025-02-23 ENCOUNTER — APPOINTMENT (OUTPATIENT)
Dept: PHYSICAL THERAPY | Facility: CLINIC | Age: OVER 89
DRG: 871 | End: 2025-02-23
Attending: HOSPITALIST
Payer: MEDICARE

## 2025-02-23 PROBLEM — E87.20 LACTIC ACID INCREASED: Status: RESOLVED | Noted: 2025-02-23 | Resolved: 2025-02-23

## 2025-02-23 PROBLEM — R78.81 BACTEREMIA DUE TO KLEBSIELLA PNEUMONIAE: Status: ACTIVE | Noted: 2025-02-23

## 2025-02-23 PROBLEM — R78.81 BACTEREMIA DUE TO KLEBSIELLA PNEUMONIAE: Status: RESOLVED | Noted: 2025-02-23 | Resolved: 2025-02-23

## 2025-02-23 PROBLEM — N10 PYELONEPHRITIS, ACUTE: Status: RESOLVED | Noted: 2025-02-20 | Resolved: 2025-02-23

## 2025-02-23 PROBLEM — B96.1 BACTEREMIA DUE TO KLEBSIELLA PNEUMONIAE: Status: ACTIVE | Noted: 2025-02-23

## 2025-02-23 PROBLEM — B96.1 BACTEREMIA DUE TO KLEBSIELLA PNEUMONIAE: Status: RESOLVED | Noted: 2025-02-23 | Resolved: 2025-02-23

## 2025-02-23 PROBLEM — E87.20 LACTIC ACID INCREASED: Status: ACTIVE | Noted: 2025-02-23

## 2025-02-23 LAB
ALBUMIN SERPL BCG-MCNC: 3 G/DL (ref 3.5–5.2)
ALP SERPL-CCNC: 109 U/L (ref 40–150)
ALT SERPL W P-5'-P-CCNC: 12 U/L (ref 0–50)
ANION GAP SERPL CALCULATED.3IONS-SCNC: 12 MMOL/L (ref 7–15)
AST SERPL W P-5'-P-CCNC: 27 U/L (ref 0–45)
B-OH-BUTYR SERPL-SCNC: 0.3 MMOL/L
BACTERIA BLD CULT: ABNORMAL
BILIRUB SERPL-MCNC: 0.7 MG/DL
BUN SERPL-MCNC: 25.1 MG/DL (ref 8–23)
CALCIUM SERPL-MCNC: 9.4 MG/DL (ref 8.8–10.4)
CHLORIDE SERPL-SCNC: 108 MMOL/L (ref 98–107)
CREAT SERPL-MCNC: 0.97 MG/DL (ref 0.51–0.95)
EGFRCR SERPLBLD CKD-EPI 2021: 52 ML/MIN/1.73M2
ERYTHROCYTE [DISTWIDTH] IN BLOOD BY AUTOMATED COUNT: 14.4 % (ref 10–15)
GLUCOSE SERPL-MCNC: 96 MG/DL (ref 70–99)
HCO3 SERPL-SCNC: 23 MMOL/L (ref 22–29)
HCT VFR BLD AUTO: 34.8 % (ref 35–47)
HGB BLD-MCNC: 11.3 G/DL (ref 11.7–15.7)
LACTATE SERPL-SCNC: 1.1 MMOL/L (ref 0.7–2)
MCH RBC QN AUTO: 31.9 PG (ref 26.5–33)
MCHC RBC AUTO-ENTMCNC: 32.5 G/DL (ref 31.5–36.5)
MCV RBC AUTO: 98 FL (ref 78–100)
PLATELET # BLD AUTO: 160 10E3/UL (ref 150–450)
POTASSIUM SERPL-SCNC: 3.2 MMOL/L (ref 3.4–5.3)
PROCALCITONIN SERPL IA-MCNC: 11.4 NG/ML
PROT SERPL-MCNC: 5.5 G/DL (ref 6.4–8.3)
RBC # BLD AUTO: 3.54 10E6/UL (ref 3.8–5.2)
SODIUM SERPL-SCNC: 143 MMOL/L (ref 135–145)
WBC # BLD AUTO: 14.6 10E3/UL (ref 4–11)

## 2025-02-23 PROCEDURE — 120N000002 HC R&B MED SURG/OB UMMC

## 2025-02-23 PROCEDURE — 85014 HEMATOCRIT: CPT | Performed by: PHYSICIAN ASSISTANT

## 2025-02-23 PROCEDURE — 97161 PT EVAL LOW COMPLEX 20 MIN: CPT | Mod: GP

## 2025-02-23 PROCEDURE — 250N000013 HC RX MED GY IP 250 OP 250 PS 637: Performed by: HOSPITALIST

## 2025-02-23 PROCEDURE — 99233 SBSQ HOSP IP/OBS HIGH 50: CPT | Performed by: HOSPITALIST

## 2025-02-23 PROCEDURE — 250N000011 HC RX IP 250 OP 636: Performed by: HOSPITALIST

## 2025-02-23 PROCEDURE — 36415 COLL VENOUS BLD VENIPUNCTURE: CPT | Performed by: PHYSICIAN ASSISTANT

## 2025-02-23 PROCEDURE — 97116 GAIT TRAINING THERAPY: CPT | Mod: GP

## 2025-02-23 PROCEDURE — 250N000013 HC RX MED GY IP 250 OP 250 PS 637: Performed by: PHYSICIAN ASSISTANT

## 2025-02-23 PROCEDURE — 97530 THERAPEUTIC ACTIVITIES: CPT | Mod: GP

## 2025-02-23 PROCEDURE — 999N000111 HC STATISTIC OT IP EVAL DEFER

## 2025-02-23 PROCEDURE — 82565 ASSAY OF CREATININE: CPT | Performed by: PHYSICIAN ASSISTANT

## 2025-02-23 PROCEDURE — 82010 KETONE BODYS QUAN: CPT | Performed by: HOSPITALIST

## 2025-02-23 PROCEDURE — 84155 ASSAY OF PROTEIN SERUM: CPT | Performed by: PHYSICIAN ASSISTANT

## 2025-02-23 PROCEDURE — 83605 ASSAY OF LACTIC ACID: CPT | Performed by: HOSPITALIST

## 2025-02-23 PROCEDURE — 250N000013 HC RX MED GY IP 250 OP 250 PS 637: Performed by: INTERNAL MEDICINE

## 2025-02-23 PROCEDURE — 258N000003 HC RX IP 258 OP 636: Performed by: HOSPITALIST

## 2025-02-23 PROCEDURE — 84145 PROCALCITONIN (PCT): CPT | Performed by: HOSPITALIST

## 2025-02-23 PROCEDURE — 999N000128 HC STATISTIC PERIPHERAL IV START W/O US GUIDANCE

## 2025-02-23 RX ORDER — LACTOBACILLUS RHAMNOSUS GG 10B CELL
1 CAPSULE ORAL 2 TIMES DAILY
Status: DISCONTINUED | OUTPATIENT
Start: 2025-02-23 | End: 2025-02-24 | Stop reason: HOSPADM

## 2025-02-23 RX ORDER — POTASSIUM CHLORIDE 1.5 G/1.58G
40 POWDER, FOR SOLUTION ORAL ONCE
Status: COMPLETED | OUTPATIENT
Start: 2025-02-23 | End: 2025-02-23

## 2025-02-23 RX ORDER — SODIUM CHLORIDE 9 MG/ML
INJECTION, SOLUTION INTRAVENOUS CONTINUOUS
Status: ACTIVE | OUTPATIENT
Start: 2025-02-23 | End: 2025-02-24

## 2025-02-23 RX ADMIN — ATORVASTATIN CALCIUM 10 MG: 10 TABLET, FILM COATED ORAL at 08:46

## 2025-02-23 RX ADMIN — SODIUM CHLORIDE: 9 INJECTION, SOLUTION INTRAVENOUS at 14:29

## 2025-02-23 RX ADMIN — POTASSIUM CHLORIDE 40 MEQ: 1.5 POWDER, FOR SOLUTION ORAL at 10:22

## 2025-02-23 RX ADMIN — CLOPIDOGREL BISULFATE 75 MG: 75 TABLET ORAL at 08:46

## 2025-02-23 RX ADMIN — CEFTRIAXONE SODIUM 2 G: 2 INJECTION, POWDER, FOR SOLUTION INTRAMUSCULAR; INTRAVENOUS at 08:46

## 2025-02-23 RX ADMIN — MICONAZOLE NITRATE: 20 POWDER TOPICAL at 21:30

## 2025-02-23 RX ADMIN — LATANOPROST 1 DROP: 50 SOLUTION OPHTHALMIC at 21:28

## 2025-02-23 RX ADMIN — MICONAZOLE NITRATE: 20 POWDER TOPICAL at 08:47

## 2025-02-23 RX ADMIN — SODIUM CHLORIDE: 9 INJECTION, SOLUTION INTRAVENOUS at 18:54

## 2025-02-23 RX ADMIN — Medication 1 CAPSULE: at 21:28

## 2025-02-23 RX ADMIN — LEVOTHYROXINE SODIUM 25 MCG: 0.03 TABLET ORAL at 08:47

## 2025-02-23 ASSESSMENT — ACTIVITIES OF DAILY LIVING (ADL)
ADLS_ACUITY_SCORE: 74
ADLS_ACUITY_SCORE: 76
ADLS_ACUITY_SCORE: 83
ADLS_ACUITY_SCORE: 74
ADLS_ACUITY_SCORE: 76
ADLS_ACUITY_SCORE: 74
ADLS_ACUITY_SCORE: 77
DEPENDENT_IADLS:: CLEANING;COOKING;LAUNDRY;SHOPPING;MEAL PREPARATION;MEDICATION MANAGEMENT;MONEY MANAGEMENT;TRANSPORTATION;INCONTINENCE
ADLS_ACUITY_SCORE: 74
ADLS_ACUITY_SCORE: 83
ADLS_ACUITY_SCORE: 74
ADLS_ACUITY_SCORE: 74
ADLS_ACUITY_SCORE: 83
ADLS_ACUITY_SCORE: 74
ADLS_ACUITY_SCORE: 74

## 2025-02-23 NOTE — PLAN OF CARE
"Goal Outcome Evaluation:      Plan of Care Reviewed With: patient, child    Overall Patient Progress: improvingOverall Patient Progress: improving    BP (!) 186/85 (BP Location: Right arm, Cuff Size: Adult Regular)   Pulse 92   Temp 98.3  F (36.8  C) (Axillary)   Resp 18   Ht 1.626 m (5' 4.02\")   Wt 61.7 kg (136 lb 0.4 oz)   SpO2 100%   BMI 23.34 kg/m      Neuro: Only oriented to self. [place] - have to yell. Pleasantly confused  Cardiac: AFIB - frequent PVC/PAC.    Respiratory: Sating upper 90's on RA.  GI/: Adequate urine output. BM X3. Incontinent of both. Brief on.   Diet/appetite: Tolerating regular diet. Eating ok - needs assistance eating.   Activity:  Assist of 2 with GB/walker, Pt unable to pivot to commode due to weakness. Bed alarm on.   Pain: Pt denies pain during shift  Skin: Excoriated in groin and abd folds  LDA's: LPIV SL    Plan: PT/OT consult. IV antibiotics. Continue with POC. Notify primary team with changes.          "

## 2025-02-23 NOTE — DISCHARGE SUMMARY
[facility]  Hospitalist Discharge Summary      Date of Admission:  2/20/2025  Date of Discharge:  2/24/2025  Discharging Provider: Que Etienne MD  Discharge Service: Hospitalist Service, GOLD TEAM 7    Chief Complaint/Reason for admission:  Bacteremia due to Klebsiella pneumoniae       Present on Admission:   (Resolved) Pyelonephritis, acute   (Resolved) Bacteremia due to Klebsiella pneumoniae   Essential hypertension, benign   PSVT (paroxysmal supraventricular tachycardia)   (Resolved) Lactic acid increased   Nausea      Discharge Diagnoses  Active Problems:    Essential hypertension, benign    PSVT (paroxysmal supraventricular tachycardia)    Nausea        Clinically Significant Risk Factors          Follow-ups Needed After Discharge   Follow-up Appointments       Hospital Follow-up with Existing Primary Care Provider (PCP)      Please see details below         Schedule Primary Care visit within: 14 Days   Recommended labs and Imaging (to be ordered by Primary Care Provider): Repeat CBC               Unresulted Labs Ordered in the Past 30 Days of this Admission       No orders found from 1/21/2025 to 2/21/2025.        These results will be followed up by     Discharge Disposition   Discharged to home  Condition at discharge: Stable    Hospital Course   Azeb Torres is a 89 year old female admitted on 2/20/2025. 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 Presented to the ED via EMS due to generalized weakness, altered mental status. Overall, high concern for sepsis on admission with lactic >4 though also with new slurred speech concerning for possible CVA.     Sepsis - with encephalopathy and without shock  Klebsiella  bacteremia  UTI  Developed new onset weakness and altered mental status on day of admission. No localizing symptoms aside from 2 episodes of small emesis and changes in urine color per family. Vitals stable. Labs notable for lactic 4.6, procal 3.67, WBC 3.8. UA with moderate blood, trace LE, negative nitrite and 24 WBCs concerning for possible UTI but not overly impressive. COVID, RVP negative. CXR with bilateral bronchial wall thickening and parahilar reticulonodular opacities suggesting bronchitis. Exam unremarkable with clear lungs, benign abdomen. S/p 2L IVF bolus and IV ceftriaxone 1 g in the ED without notable improvement in lactic. MRSA nares negative. Will plan to broaden antibiotics overnight while awaiting cultures. Highest suspicion for either UTI or possible bronchitis though seems less likely in setting of absent respiratory symptoms. Less concerned for intra-abdominal etiology at this time with reassuring LFTs and benign exam.   - blood cultures on 2/20/25 positive for Klebsiella and secondary to UTI. Zosyn 3.375g q6h (2/19) (renally dosed) -> ceftriaxone (2/22) since pan-sensitive   - Lactate and Procal were intially elevated and resolved at discharge  - remove Swanson and doing well   - sepsis resolved at discharge  - progressively improved and stable to be discharged home with a course of PO abx.     Acute likely toxic-metabolic encephalopathy secondary to sepsis  Cognitive changes  Hx of prior CVA   Prior PCP notes from 11/2023 noting declining cognition. Had new slurred speech in the ED. Most suspicious for toxic-metabolic etiology in setting of infection however stroke not ruled out. CT head w/o, CTA head/neck, perfusion negative for acute CVA. Possible recrudescence   - restarted Plavix, statins  - Neuro checks monitored; Fall precautions  - Sepsis treatment as above  - Neurology consulted and appreciate recommendations  - resolved at discharge    Lactic acidosis secondary to sepsis  Anion gap  metabolic acidosis  Lactic 4.6 with anion gap 18, low CO2 19. VBG with normal pH 7.42, low pCO2 36. Her RR was elevated on arrival but has since settled so question if she had some respiratory compensation. Renal function at her baseline.  - Lactic acid as above  - resolved    CKD stage 2  Cr 0.95 on admission. Baseline appears 0.7-0.8.   - I/Os, daily weights  - Trend Cr  - Renally dose medications, avoided nephrotoxins  - stable at discharge    Elevated bilirubin  Tbili 1.9. Remainder of LFTs wnl. Likely related to sepsis. Had 2 episodes of vomiting prior to arrival without recurrence. No RUQ pain or tenderness.  - spontaneously improved    Elevated BNP  Elevated Ddimer  Elevated troponin  Noted to be tachypneic on arrival with RR in the 40s. No oxygen requirements and no tachycardia. BNP 4,758, Ddimer 7.16. Trop 33 but no chest pain. She is certainly not presenting with classic symptoms of large pulmonary embolism or heart failure.   - Would recommend CT PE and echo to further evaluate. Awaiting family determination in setting of how much work-up they would like to pursue.  -  no clear signs of ACS, HF exacerbation    Goals of care  - remained DNR/DNI    Chronic/Stable Medical Problems:  HTN - Hold PTA amlodipine due to sepsis  HLD - Holding PTA statin, plavix due to AMS  Hypothyroidism - Holding PTA levothyroxine 25 mcg due to AMS  Glaucoma - Latanoprost eye drops     PTHrp realted hypercalcemia  Increased calcium noted in setting of elevated PTHrp and positive UPEP/urine IFX. Seen by Heme/Onc. Most often seen in solid tumors. In setting of advanced age, limited performance status and no concerning sign/symptoms, recommended against further investigation and symptomatic treatment with  steroids, fluids, bisphosphonates if needed.       Consultations This Hospital Stay   NEUROLOGY STROKE ADULT IP CONSULT  PHARMACY TO DOSE [facility]  NURSING TO CONSULT FOR VASCULAR ACCESS CARE IP CONSULT  PHYSICAL THERAPY ADULT IP  CONSULT  OCCUPATIONAL THERAPY ADULT IP CONSULT  CARE MANAGEMENT / SOCIAL WORK IP CONSULT  NURSING TO CONSULT FOR VASCULAR ACCESS CARE IP CONSULT  WOUND OSTOMY CONTINENCE NURSE  IP CONSULT  NURSING TO CONSULT FOR VIRTUAL CARE IP    Code Status   No CPR- Do NOT Intubate    Time Spent on this Encounter   Que CHAKRABORTY MD, personally saw the patient today and spent greater than 30 minutes discharging this patient.       Que Etienne MD  [facility] MED SURG  [address]  [place] [address]  Phone: [phone]  ______________________________________________________________________    Physical Exam   Vital Signs: Temp: 97.8  F (36.6  C) Temp src: Oral BP: (!) 165/62 Pulse: 83   Resp: 16 SpO2: 95 % O2 Device: None (Room air)    Weight: 136 lbs .38 oz    Constitutional:       Appearance: Normal appearance.   HENT:      Head: Normocephalic.      Mouth/Throat:      Mouth: Mucous membranes are moist.   Eyes:      Extraocular Movements: Extraocular movements intact.      Pupils: Pupils are equal, round, and reactive to light.   Cardiovascular:      Rate and Rhythm: Normal rate and regular rhythm.      Heart sounds: No murmur heard.  Pulmonary:      Effort: Pulmonary effort is normal. No respiratory distress.      Breath sounds: Normal breath sounds. No wheezing.   Abdominal:      General: Abdomen is flat. Bowel sounds are normal.      Tenderness: There is no abdominal tenderness.   Musculoskeletal:      Cervical back: Normal range of motion.   Neurological:      General: No focal deficit present.      Mental Status: She is alert.      Comments: Oriented x 2(person, place)  Unable to clearly cooperate  Power - 4/5 bilateral distal/proximal, UE, LE          Primary Care Physician   Evelina Can    Discharge Orders      Primary Care - Care Coordination Referral      Home Care Referral      Reason for your hospital stay    You were admitted for Klebsiella bacteremia, UTI     Activity    Your activity  upon discharge: activity as tolerated     Diet    Follow this diet upon discharge: Current Diet:Orders Placed This Encounter      Combination Diet Regular Diet Adult     Hospital Follow-up with Existing Primary Care Provider (PCP)    Please see details below            Significant Results and Procedures   Most Recent 3 CBC's:  Recent Labs   Lab Test 02/24/25  0556 02/23/25  0541 02/22/25  0753   WBC 9.6 14.6* 19.5*   HGB 11.6* 11.3* 11.9   MCV 96 98 96    160 147*     Most Recent 3 BMP's:  Recent Labs   Lab Test 02/24/25  0556 02/23/25  0541 02/22/25  0753    143 141   POTASSIUM 3.7 3.2* 3.3*   CHLORIDE 112* 108* 105   CO2 20* 23 20*   BUN 19.6 25.1* 28.7*   CR 1.05* 0.97* 1.06*   ANIONGAP 13 12 16*   JEAN-PIERRE 9.2 9.4 9.5   * 96 91     Most Recent 2 LFT's:  Recent Labs   Lab Test 02/24/25  0556 02/23/25  0541   AST 22 27   ALT 11 12   ALKPHOS 109 109   BILITOTAL 0.8 0.7     Most Recent 3 INR's:  Recent Labs   Lab Test 04/24/18  0835   INR 1.1     Most Recent 3 Hemoglobins:  Recent Labs   Lab Test 02/24/25  0556 02/23/25  0541 02/22/25  0753   HGB 11.6* 11.3* 11.9     Most Recent 3 Troponin's:No lab results found.  Most Recent 3 BNP's:  Recent Labs   Lab Test 02/20/25  1726   NTBNPI 4,758*     Most Recent D-dimer:  Recent Labs   Lab Test 02/20/25  1726   DD 7.16*     Most Recent TSH and T4:  Recent Labs   Lab Test 12/02/24  1336 11/30/23  1211 08/02/23  0948   TSH 2.89   < > 4.22*   T4  --   --  1.46    < > = values in this interval not displayed.     Most Recent Hemoglobin A1c:  Recent Labs   Lab Test 11/30/23  1211   A1C 5.2   ,   Results for orders placed or performed during the hospital encounter of 02/20/25   XR Chest Port 1 View    Narrative    EXAM: XR CHEST PORT 1 VIEW  LOCATION: [facility]  DATE: 2/20/2025    INDICATION: Altered mental status, tachypnea.  COMPARISON: 1/29/2018      Impression    IMPRESSION: Are size magnified in AP projection with  normal vascularity. Bilateral bronchial wall thickening with parahilar reticulonodular opacities suggesting bronchitis. No focal consolidation, pneumothorax nor pleural effusion. 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   CTA Head Neck with Contrast    Narrative    CT HEAD W/O CONTRAST, CT HEAD PERFUSION W CONTRAST, CTA HEAD NECK W  CONTRAST 2/21/2025 1:27 PM    CT Head without contrast  CT Perfusion Study of the Brain with Contrast  CT Angiogram of the Neck with contrast   CT Angiogram of the Head with contrast    History:  Dysarthria - r/o stroke    Comparison: CT head 6/17/2018, MR brain 4/24/2018. CTA head and neck  11/5/2016    Technique:   HEAD CT: Using multidetector thin collimation helical acquisition  technique, axial, coronal and sagittal CT images were obtained from  the base of the skull to the vertex without intravenous contrast and  reviewed in brain, bone, and subdural windows.     CT BRAIN PERFUSION: Dynamic perfusion CT of the brain was performed at  multiple levels with 10 mm slice thickness; levels were imaged during  2 separate rapid bolus intravenous injections of nonionic iodinated  contrast medium, 1) centered at the level of  the basal ganglia, and  2) centered at the level of the top of the lateral ventricles. Each  injection required approximately 40 cc of intravenous nonionic  contrast.  Images were reconstructed and reviewed on the [employer] 3D  workstation.    HEAD and NECK CTA: Thereafter, postcontrast images were obtained from  the aortic arch through the [place] of [place].  Axial images were  obtained using thin collimation multidetector helical technique. This  CT angiogram data was reconstructed at thin intervals with mild  overlap. Images were sent to the [employer] workstation, and 3D  reconstructions and multiplanar reformations were obtained with  reconstructions performed by the technologist and the radiologist. The  source images,  multiplanar reformations, 3D reconstructions in both  maximum intensity projection display and volume rendered models were  reviewed,     Contrast: iopamidol (ISOVUE-370) solution 50 mL (accession  [id number]), iopamidol (ISOVUE-370) solution 117 mL (accession  [id number])    Findings:   Head CT: There is no evidence of intracranial hemorrhage, mass effect,  or midline shift. Encephalomalacia of the left occipital lobe  posterior and posterior medial to the ventricle is unchanged from  2018. No acute loss of gray-white differentiation. There is mild  patchy low attenuation within the periventricular and supraventricular  white matter of both cerebral hemispheres, nonspecific but most likely  representing chronic small vessel ischemic disease, given the  patient's age. The ventricles and sulci are enlarged, but are not out  of proportion to the sulci. Moderate cerebral atrophy. Punctate  calcification in the left basal ganglia.     Circumferential mucosal thickening of the right maxillary sinus,  otherwise the paranasal sinuses are clear. Mastoid air cells are  clear. Hyperostosis frontalis interna. Degenerative changes of the  atlantoaxial joint, multilevel cervical spondylosis.    CT Perfusion: Regions of Tmax greater than 6 seconds in the left  occipital lobe, with corresponding area of decreased cerebral blood  volume though mathematically appears to be artifact from  encephalomalacia. No evidence of acute ischemia.    Head CTA demonstrates no aneurysm or stenosis of the major  intracranial arteries. Atherosclerotic calcifications of bilateral  carotid siphons. The anterior communicating artery is patent. Fetal  origin of the right posterior cerebral artery. The left posterior  communicating artery is not visualized.     Neck CTA demonstrates no stenosis of the major cervical arteries on  either side. The origins of the great vessels from the aortic arch are  patent. Mixed calcified and noncalcified  atherosclerotic plaque of  bilateral carotid bulbs. The distal right internal carotid artery  measures 5 mm. The distal left internal carotid artery measures 5 mm.     No mass is noted within the visualized portions of the cervical soft  tissues or lung apices.       Impression    Impression:   1. No evidence of ischemia or acute infarction on the CT Perfusion  examination.  2. No evidence of intracranial hemorrhage. ASPECT score 10/10.  3. Head CTA demonstrates no aneurysm or stenosis of the major  intracranial arteries.   4. Neck CTA demonstrates no stenosis of the major cervical arteries.     I have personally reviewed the examination and initial interpretation  and I agree with the findings.    GEORGIE HARRISON MD         SYSTEM ID:  [id number]   CT Head Perfusion w Contrast    Narrative    CT HEAD W/O CONTRAST, CT HEAD PERFUSION W CONTRAST, CTA HEAD NECK W  CONTRAST 2/21/2025 1:27 PM    CT Head without contrast  CT Perfusion Study of the Brain with Contrast  CT Angiogram of the Neck with contrast   CT Angiogram of the Head with contrast    History:  Dysarthria - r/o stroke    Comparison: CT head 6/17/2018, MR brain 4/24/2018. CTA head and neck  11/5/2016    Technique:   HEAD CT: Using multidetector thin collimation helical acquisition  technique, axial, coronal and sagittal CT images were obtained from  the base of the skull to the vertex without intravenous contrast and  reviewed in brain, bone, and subdural windows.     CT BRAIN PERFUSION: Dynamic perfusion CT of the brain was performed at  multiple levels with 10 mm slice thickness; levels were imaged during  2 separate rapid bolus intravenous injections of nonionic iodinated  contrast medium, 1) centered at the level of  the basal ganglia, and  2) centered at the level of the top of the lateral ventricles. Each  injection required approximately 40 cc of intravenous nonionic  contrast.  Images were reconstructed and reviewed on the [employer]  3D  workstation.    HEAD and NECK CTA: Thereafter, postcontrast images were obtained from  the aortic arch through the [place].  Axial images were  obtained using thin collimation multidetector helical technique. This  CT angiogram data was reconstructed at thin intervals with mild  overlap. Images were sent to the [employer] workstation, and 3D  reconstructions and multiplanar reformations were obtained with  reconstructions performed by the technologist and the radiologist. The  source images, multiplanar reformations, 3D reconstructions in both  maximum intensity projection display and volume rendered models were  reviewed,     Contrast: iopamidol (ISOVUE-370) solution 50 mL (accession  [id number]), iopamidol (ISOVUE-370) solution 117 mL (accession  [id number])    Findings:   Head CT: There is no evidence of intracranial hemorrhage, mass effect,  or midline shift. Encephalomalacia of the left occipital lobe  posterior and posterior medial to the ventricle is unchanged from  2018. No acute loss of gray-white differentiation. There is mild  patchy low attenuation within the periventricular and supraventricular  white matter of both cerebral hemispheres, nonspecific but most likely  representing chronic small vessel ischemic disease, given the  patient's age. The ventricles and sulci are enlarged, but are not out  of proportion to the sulci. Moderate cerebral atrophy. Punctate  calcification in the left basal ganglia.     Circumferential mucosal thickening of the right maxillary sinus,  otherwise the paranasal sinuses are clear. Mastoid air cells are  clear. Hyperostosis frontalis interna. Degenerative changes of the  atlantoaxial joint, multilevel cervical spondylosis.    CT Perfusion: Regions of Tmax greater than 6 seconds in the left  occipital lobe, with corresponding area of decreased cerebral blood  volume though mathematically appears to be artifact from  encephalomalacia. No evidence of acute  ischemia.    Head CTA demonstrates no aneurysm or stenosis of the major  intracranial arteries. Atherosclerotic calcifications of bilateral  carotid siphons. The anterior communicating artery is patent. Fetal  origin of the right posterior cerebral artery. The left posterior  communicating artery is not visualized.     Neck CTA demonstrates no stenosis of the major cervical arteries on  either side. The origins of the great vessels from the aortic arch are  patent. Mixed calcified and noncalcified atherosclerotic plaque of  bilateral carotid bulbs. The distal right internal carotid artery  measures 5 mm. The distal left internal carotid artery measures 5 mm.     No mass is noted within the visualized portions of the cervical soft  tissues or lung apices.       Impression    Impression:   1. No evidence of ischemia or acute infarction on the CT Perfusion  examination.  2. No evidence of intracranial hemorrhage. ASPECT score 10/10.  3. Head CTA demonstrates no aneurysm or stenosis of the major  intracranial arteries.   4. Neck CTA demonstrates no stenosis of the major cervical arteries.     I have personally reviewed the examination and initial interpretation  and I agree with the findings.    GEORGIE HARRISON MD         SYSTEM ID:  [id number]   CT Head w/o Contrast    Narrative    CT HEAD W/O CONTRAST, CT HEAD PERFUSION W CONTRAST, CTA HEAD NECK W  CONTRAST 2/21/2025 1:27 PM    CT Head without contrast  CT Perfusion Study of the Brain with Contrast  CT Angiogram of the Neck with contrast   CT Angiogram of the Head with contrast    History:  Dysarthria - r/o stroke    Comparison: CT head 6/17/2018, MR brain 4/24/2018. CTA head and neck  11/5/2016    Technique:   HEAD CT: Using multidetector thin collimation helical acquisition  technique, axial, coronal and sagittal CT images were obtained from  the base of the skull to the vertex without intravenous contrast and  reviewed in brain, bone, and subdural windows.     CT  BRAIN PERFUSION: Dynamic perfusion CT of the brain was performed at  multiple levels with 10 mm slice thickness; levels were imaged during  2 separate rapid bolus intravenous injections of nonionic iodinated  contrast medium, 1) centered at the level of  the basal ganglia, and  2) centered at the level of the top of the lateral ventricles. Each  injection required approximately 40 cc of intravenous nonionic  contrast.  Images were reconstructed and reviewed on the [employer] 3D  workstation.    HEAD and NECK CTA: Thereafter, postcontrast images were obtained from  the aortic arch through the [place].  Axial images were  obtained using thin collimation multidetector helical technique. This  CT angiogram data was reconstructed at thin intervals with mild  overlap. Images were sent to the [employer] workstation, and 3D  reconstructions and multiplanar reformations were obtained with  reconstructions performed by the technologist and the radiologist. The  source images, multiplanar reformations, 3D reconstructions in both  maximum intensity projection display and volume rendered models were  reviewed,     Contrast: iopamidol (ISOVUE-370) solution 50 mL (accession  [id number]), iopamidol (ISOVUE-370) solution 117 mL (accession  [id number])    Findings:   Head CT: There is no evidence of intracranial hemorrhage, mass effect,  or midline shift. Encephalomalacia of the left occipital lobe  posterior and posterior medial to the ventricle is unchanged from  2018. No acute loss of gray-white differentiation. There is mild  patchy low attenuation within the periventricular and supraventricular  white matter of both cerebral hemispheres, nonspecific but most likely  representing chronic small vessel ischemic disease, given the  patient's age. The ventricles and sulci are enlarged, but are not out  of proportion to the sulci. Moderate cerebral atrophy. Punctate  calcification in the left basal ganglia.     Circumferential  mucosal thickening of the right maxillary sinus,  otherwise the paranasal sinuses are clear. Mastoid air cells are  clear. Hyperostosis frontalis interna. Degenerative changes of the  atlantoaxial joint, multilevel cervical spondylosis.    CT Perfusion: Regions of Tmax greater than 6 seconds in the left  occipital lobe, with corresponding area of decreased cerebral blood  volume though mathematically appears to be artifact from  encephalomalacia. No evidence of acute ischemia.    Head CTA demonstrates no aneurysm or stenosis of the major  intracranial arteries. Atherosclerotic calcifications of bilateral  carotid siphons. The anterior communicating artery is patent. Fetal  origin of the right posterior cerebral artery. The left posterior  communicating artery is not visualized.     Neck CTA demonstrates no stenosis of the major cervical arteries on  either side. The origins of the great vessels from the aortic arch are  patent. Mixed calcified and noncalcified atherosclerotic plaque of  bilateral carotid bulbs. The distal right internal carotid artery  measures 5 mm. The distal left internal carotid artery measures 5 mm.     No mass is noted within the visualized portions of the cervical soft  tissues or lung apices.       Impression    Impression:   1. No evidence of ischemia or acute infarction on the CT Perfusion  examination.  2. No evidence of intracranial hemorrhage. ASPECT score 10/10.  3. Head CTA demonstrates no aneurysm or stenosis of the major  intracranial arteries.   4. Neck CTA demonstrates no stenosis of the major cervical arteries.     I have personally reviewed the examination and initial interpretation  and I agree with the findings.    GEORGIE HARRISON MD         SYSTEM ID:  [id number]       Discharge Medications   Current Discharge Medication List        START taking these medications    Details   amoxicillin-clavulanate (AUGMENTIN) 875-125 MG tablet Take 1 tablet by mouth 2 times daily for 7  days.  Qty: 14 tablet, Refills: 0    Associated Diagnoses: Pyelonephritis, acute; Bacteremia due to Klebsiella pneumoniae; Lactic acid increased      ondansetron (ZOFRAN ODT) 4 MG ODT tab Take 1 tablet (4 mg) by mouth every 6 hours as needed for nausea or vomiting.  Qty: 20 tablet, Refills: 0    Associated Diagnoses: Bacteremia due to Klebsiella pneumoniae      Probiotic Product (PROBIOTIC DAILY) CAPS Take 1 capsule by mouth 2 times daily.    Associated Diagnoses: Bacteremia due to Klebsiella pneumoniae           CONTINUE these medications which have NOT CHANGED    Details   acetaminophen (TYLENOL) 500 MG tablet Take 2 tablets by mouth 2 times daily.      amLODIPine (NORVASC) 5 MG tablet Take 1 tablet (5 mg) by mouth daily.  Qty: 90 tablet, Refills: 4    Associated Diagnoses: Essential hypertension, benign      atorvastatin (LIPITOR) 10 MG tablet Take 1 tablet (10 mg) by mouth daily.  Qty: 90 tablet, Refills: 4    Associated Diagnoses: Hyperlipidemia, unspecified hyperlipidemia type      cholecalciferol (VITAMIN D) 1000 UNIT tablet Take 1 tablet by mouth every morning       clopidogrel (PLAVIX) 75 MG tablet Take 1 tablet (75 mg) by mouth daily.  Qty: 90 tablet, Refills: 4    Associated Diagnoses: History of CVA (cerebrovascular accident)      clotrimazole (LOTRIMIN) 1 % external cream Apply topically 2 times daily. To rash in skin folds  Qty: 60 g, Refills: 1    Associated Diagnoses: Fungal infection      desonide (DESOWEN) 0.05 % external ointment APPLY TOPICALLY TWO TIMES A DAY  Qty: 60 g, Refills: 3    Associated Diagnoses: Chronic dermatitis of hands      dorzolamide-timolol (COSOPT) 2-0.5 % ophthalmic solution Place 1 drop into both eyes 2 times daily Please call [phone] for appointment with Dr. Mendez or Dr. Garcia for further refills  Qty: 1 Bottle, Refills: 5    Associated Diagnoses: Chronic angle-closure glaucoma of both eyes, severe stage      fluocinonide (LIDEX) 0.05 % external ointment Apply  topically 2 times daily  Qty: 30 g, Refills: 3    Associated Diagnoses: Chronic dermatitis of hands      latanoprost (XALATAN) 0.005 % ophthalmic solution Place 1 drop into both eyes At Bedtime  Qty: 2.5 mL, Refills: 5    Associated Diagnoses: Chronic angle-closure glaucoma of both eyes, severe stage      levothyroxine (SYNTHROID/LEVOTHROID) 25 MCG tablet TAKE 1 TABLET (25 MCG) BY MOUTH DAILY  Qty: 90 tablet, Refills: 3    Associated Diagnoses: Hypothyroidism due to acquired atrophy of thyroid      Multiple Vitamins-Minerals (PRESERVISION AREDS 2) CAPS 2 capsules.      nystatin (MYCOSTATIN) 388880 UNIT/GM external powder Apply to rash on breasts as needed for rash three times daily  Qty: 60 g, Refills: 1    Associated Diagnoses: Intertrigo           Allergies   No Known Allergies

## 2025-02-23 NOTE — PLAN OF CARE
"Shift:   VS: BP (!) 142/70 (BP Location: Right arm, Patient Position: Semi-Calderon's, Cuff Size: Adult Small)   Pulse 72   Temp 97.8  F (36.6  C)   Resp 16   Ht 1.626 m (5' 4.02\")   Wt 61.7 kg (136 lb 0.4 oz)   SpO2 95%   BMI 23.34 kg/m     Pain: Denies   Neuro: A&Ox self, confused, hearing aids at bedside, neuro checks q4  Cardiac: On telemetry- SR  Respiratory: WDL, On RA, On pulse ox  GI/: Urine and fecal incontinence, Brief on  Diet/Appetite: Regular. Needs assistance eating   LDA's: L. PIV SL  Skin: Redness at abdominal folds/groin  Activity: 2 assist  Tests/Procedures:   Pertinent Labs/Lab Collection: CMP, CBC, Ketones, Lactic Acid     Plan: Goal Outcome Evaluation:                      "

## 2025-02-23 NOTE — PLAN OF CARE
Documentation of Face to Face and Certification for Home Health Services    I certify that patient: Azeb Torres is under my care and that I, or a nurse practitioner or physician's assistant working with me, had a face-to-face encounter that meets the physician face-to-face encounter requirements with this patient on: 2/23/2025.    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)    PSVT (paroxysmal supraventricular tachycardia)    Cerebral infarction (H)    Homonymous hemianopia    Pain in joint, shoulder region    Pain in joint, lower leg    Hyperlipidemia    Hypothyroidism    Tricuspid regurgitation    Pulmonary artery hypertension (H)    Rosacea    Inflamed seborrheic keratosis    Dermatitis, seborrheic    Pseudophakia of both eyes    Nonexudative senile macular degeneration of retina    Asteroid hyalosis of right eye    Chronic angle-closure glaucoma    Dizziness of unknown cause    Vertigo    Hypothyroidism due to acquired atrophy of thyroid         I certify that, based on my findings, the following services are medically necessary home health services: Occupational Therapy and Physical Therapy. RN and health aide    My clinical findings support the need for the above services because: Nurse is needed: To provide assessment and oversight required in the home to assure adherence to the medical plan due to: cognitive impairment, klebsiella bacteremia.., Occupational Therapy Services are needed to assess and treat cognitive ability and address ADL safety due to impairment in impaired mobility., and Physical Therapy Services are needed to assess and treat the following functional impairments: impaired  mobility.    Further, I certify that my clinical findings support that this patient is homebound (i.e. absences from home require considerable and taxing effort and are for medical reasons or Yazidi services or infrequently or of short duration when for other reasons) because: Requires assistance of another person or specialized equipment to access medical services because patient: Requires supervision of another for safe transfer...    Based on the above findings. I certify that this patient is confined to the home and needs intermittent skilled nursing care, physical therapy and/or speech therapy.  The patient is under my care, and I have initiated the establishment of the plan of care.  This patient will be followed by a physician who will periodically review the plan of care.  Physician/Provider to provide follow up care: Evelina Can    Attending [facility] physician (the Medicare certified [facility] provider): Que Etienne MD  Physician Signature: See electronic signature associated with these discharge orders.  Date: 2/23/2025

## 2025-02-23 NOTE — PROGRESS NOTES
"ED PT Eval     02/23/25 1100   Appointment Info   Signing Clinician's Name / Credentials (PT) Koko Portillo, PT, DPT   Living Environment   People in Home child(yoshi), adult   Current Living Arrangements house   Home Accessibility no concerns;stairs to enter home  (secc comment)   Transportation Anticipated family or friend will provide   Living Environment Comments Patient lives with daughter in home with all needs met on main level. Daughter endorses \"some stairs\" to enter but states that their strong neighbor always helps with stairs.   Self-Care   Usual Activity Tolerance good   Current Activity Tolerance moderate   Regular Exercise No   Equipment Currently Used at Home walker, rolling   Activity/Exercise/Self-Care Comment Patient mod I for short distace ambulation with FWW. Has support from daughter and modified home set-up with grab bars in restroom. Patient is primarily homebound.   General Information   Onset of Illness/Injury or Date of Surgery 02/23/25   Referring Physician Que Etienne MD   Patient/Family Therapy Goals Statement (PT) To return home safely   Pertinent History of Current Problem (include personal factors and/or comorbidities that impact the POC) Per EMR \"Azeb Torres is a 89 year old female admitted on 2/20/2025. 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 Presented to the ED via EMS due to generalized weakness, altered mental status. Overall, high concern for sepsis on admission with lactic >4 though also with new slurred speech concerning for possible CVA. \"   Existing Precautions/Restrictions fall   Cognition   Cognitive Status Comments very [place], confused but conversational   Range of Motion (ROM)   Range of Motion ROM is WFL   Strength (Manual " Muscle Testing)   Strength (Manual Muscle Testing) Deficits observed during functional mobility   Strength Comments grossly deconditioned   Bed Mobility   Bed Mobility sit-supine;supine-sit   Supine-Sit [place] (Bed Mobility) minimum assist (75% patient effort)   Sit-Supine [place] (Bed Mobility) minimum assist (75% patient effort)   Comment, (Bed Mobility) family present and endorses near baseline   Transfers   Transfers sit-stand transfer   Sit-Stand Transfer   Sit-Stand [place] (Transfers) modified independence   Assistive Device (Sit-Stand Transfers) walker, front-wheeled   Comment, (Sit-Stand Transfer) slightly elevated EOB   Gait/Stairs (Locomotion)   [place] Level (Gait) contact guard   Assistive Device (Gait) walker, front-wheeled   Comment, (Gait/Stairs) short step length, difficulty navigating obstacles   Balance   Balance Comments IND sitting, mod I stance   Clinical Impression   Criteria for Skilled Therapeutic Intervention Yes, treatment indicated   PT Diagnosis (PT) impaired functional mobility   Influenced by the following impairments decreased activity tolerance, functional weakness   Functional limitations due to impairments transfers, gait, stairs   Clinical Presentation (PT Evaluation Complexity) stable   Clinical Presentation Rationale clinical judgement   Clinical Decision Making (Complexity) low complexity   Planned Therapy Interventions (PT) balance training;bed mobility training;gait training;ROM (range of motion);strengthening;stair training;stretching;transfer training   Risk & Benefits of therapy have been explained evaluation/treatment results reviewed;care plan/treatment goals reviewed;risks/benefits reviewed;current/potential barriers reviewed;participants voiced agreement with care plan;participants included;patient   PT Total Evaluation Time   PT Eval, Low Complexity Minutes (94175) 8   Physical Therapy Goals   PT Frequency 5x/week   PT Predicted Duration/Target  Date for Goal Attainment 03/09/25   PT Goals Bed Mobility;Transfers;Gait;Stairs   PT: Bed Mobility Modified independent;Supine to/from sit   PT: Transfers Modified independent;Sit to/from stand;Assistive device   PT: Gait Modified independent;Assistive device;Greater than 200 feet   PT: Stairs 3 stairs;Minimal assist   Interventions   Interventions Quick Adds Gait Training;Therapeutic Activity   PT Discharge Planning   PT Plan gait with FWW, stairs? caregiver training   PT Discharge Recommendation (DC Rec) home with assist;home with home care physical therapy   PT Rationale for DC Rec Patient with noted weakness but per patient and confirmed by caregivers (daughters) she is not far from her baseline mobility and has sufficient supports for safe return home. Recommend HH PT to further progress IND.   PT Brief overview of current status Ax1 and FWW for short distance gait, encourage ambualtion to/from restroom in room.   PT Total Distance Amb During Session (feet) 50   Physical Therapy Time and Intention   Total Session Time (sum of timed and untimed services) 8         Koko Portillo, PT, DPT

## 2025-02-23 NOTE — PLAN OF CARE
Occupational Therapy: Orders received. Chart reviewed and discussed with care team.? Occupational Therapy not indicated due to per discussion with PT, pt mobilizing well near baseline and with good family assist available at discharge. PT recommending HHPT at this time and anticipate pt would benefit from HHOT as well.? Defer discharge recommendations to PT.? Will complete orders.

## 2025-02-23 NOTE — PLAN OF CARE
Transfer  Transferred to:ED 31  Via:bed  Reason for transfer:Quiet room  Belongings: Packed and sent with pt  Chart: Delivered with pt to next unit  Medications: Meds sent to new unit with pt  Report given to: AHMET Thorpe  Pt status: Stable    Shift: [id number]    Neuro: Disoriented to situation, place, and time. [place] De[place] with hearing aid.   Cardiac: Afib. VSS.   Respiratory: Sating >92% on RA.  GI/: No urine. No BM.  Diet/appetite: Tolerating regular diet.  Activity:  Not oob.  Pain: Denies  Skin: No new deficits noted.  LDA's:  L PIV  Plan: Continue with POC. Notify primary team with changes.      Goal Outcome Evaluation:      Plan of Care Reviewed With: patient    Overall Patient Progress: improvingOverall Patient Progress: improving    Outcome Evaluation: VSS. Afebrile.

## 2025-02-23 NOTE — PROGRESS NOTES
[facility]    Medicine Progress Note - Hospitalist Service, GOLD TEAM 7    Date of Admission:  2/20/2025    Assessment & Plan      Azeb Torres is a 89 year old female admitted on 2/20/2025. 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 Presented to the ED via EMS due to generalized weakness, altered mental status. Overall, high concern for sepsis on admission with lactic >4 though also with new slurred speech concerning for possible CVA.     Changes today:  Improving symptoms but episode of diarrhea with dizziness  Markedly elevated procalcitonin  Physical therapy/OT     Sepsis - with encephalopathy and without shock  Klebsiella bacteremia  UTI  Elevated procalcitonin  Developed new onset weakness and altered mental status on day of admission. No localizing symptoms aside from 2 episodes of small emesis and changes in urine color per family. Vitals stable. Labs notable for lactic 4.6, procal 3.67, WBC 3.8. UA with moderate blood, trace LE, negative nitrite and 24 WBCs concerning for possible UTI but not overly impressive. COVID, RVP negative. CXR with bilateral bronchial wall thickening and parahilar reticulonodular opacities suggesting bronchitis. Exam unremarkable with clear lungs, benign abdomen. S/p 2L IVF bolus and IV ceftriaxone 1 g in the ED without notable improvement in lactic. MRSA nares negative. Will plan to broaden antibiotics overnight while awaiting cultures. Highest suspicion for either UTI or possible bronchitis though seems less likely in setting of absent respiratory symptoms. Less concerned for intra-abdominal etiology at this time with reassuring LFTs and benign exam.   - blood cultures on 2/20/25 positive  for Klebsiella and likely from UTI. Follow up sensitivity panel. Zosyn 3.375g q6h (2/19) (renally dosed) -> ceftriaxone (2/22) since pan-sensitive   - Trend lactic and normalized  - removed Swanson and doing well   - if fails to improve, will pursue abdominal imaging. No current indication.   - inpatient day #3, procalcitonin has markedly worsened. No other infectious source evident    Dizziness  Diarrhea  - inpatient day #3, developed acute diarrhea and unsteadiness  - likely secondary to antibiotics use, less likely infectious  - will check orthostatics, IV fluids - NS at 75 ml/hr for 12h  - prior to episode physical therapy/OT recommended  physical therapy/OT but may need reassessment     Acute likely toxic-metabolic encephalopathy  Cognitive changes  Hx of prior CVA   Prior PCP notes from 11/2023 noting declining cognition. Had new slurred speech in the ED. Most suspicious for toxic-metabolic etiology in setting of infection however stroke not ruled out.   - CT head w/o, CTA head/neck, perfusion negative for acute CVA. Possible recrudescence   - restarting Plavix, statins  - Neuro checks  - Fall precautions  - Sepsis treatment as above  - Neurology consulted and appreciate recommendations    Lactic acidosis  Anion gap metabolic acidosis  Lactic 4.6 with anion gap 18, low CO2 19. VBG with normal pH 7.42, low pCO2 36. Her RR was elevated on arrival but has since settled so question if she had some respiratory compensation. Renal function at her baseline.  - Trend CMP in AM  - Lactic acid as above    CKD stage 2  Cr 0.95 on admission. Baseline appears 0.7-0.8.   - I/Os, daily weights  - Trend Cr  - Renally dose medications, avoid nephrotoxins    Elevated bilirubin  Tbili 1.9. Remainder of LFTs wnl. Likely related to sepsis. Had 2 episodes of vomiting prior to arrival without recurrence. No RUQ pain or tenderness.  - Trend in AM     Elevated BNP  Elevated Ddimer  Elevated troponin  Noted to be tachypneic on arrival  with RR in the 40s. No oxygen requirements and no tachycardia. BNP 4,758, Ddimer 7.16. Trop 33 but no chest pain. She is certainly not presenting with classic symptoms of large pulmonary embolism or heart failure.   - Would recommend CT PE and echo to further evaluate. Awaiting family determination in setting of how much work-up they would like to pursue.  - Trend trop to peak    Goals of care  Her two daughters are present at bedside and report recently completing POLST with DNR/DNI. They are having trouble delineating how aggressive of cares, work-up they would like to pursue here in the hospital. Some decisions such as no escalation to vasopressors or ICU are very clear while others such as pursuing additional imaging studies, broadening antibiotics, etc have come with some hesitancy. We discussed appropriateness of antibiotics given suspicion of infection as this would be a reversible problem with less risk of causing harm. We also discussed pursuing imaging tests that may result with more chronic problems such as possible stroke which would then be a diagnosis they would consider hospice for her.   - Continue discussions surrounding work-up as above  - Consider if Palliative Care could be beneficial while inpatient to provide support     Chronic/Stable Medical Problems:  HTN - Hold PTA amlodipine due to sepsis  HLD - Holding PTA statin, plavix due to AMS  Hypothyroidism - Holding PTA levothyroxine 25 mcg due to AMS  Glaucoma - Latanoprost eye drops     PTHrp realted hypercalcemia  Increased calcium noted in setting of elevated PTHrp and positive UPEP/urine IFX. Seen by Heme/Onc. Most often seen in solid tumors. In setting of advanced age, limited performance status and no concerning sign/symptoms, recommended against further investigation and symptomatic treatment with  steroids, fluids, bisphosphonates if needed.             Diet: Combination Diet Regular Diet Adult  Diet    DVT Prophylaxis: Pneumatic  Compression Devices  Swanson Catheter: Not present  Lines: None     Cardiac Monitoring: ACTIVE order. Indication: Tachyarrhythmias, acute (48 hours)  Code Status: No CPR- Do NOT Intubate      Clinically Significant Risk Factors        # Hypokalemia: Lowest K = 3.2 mmol/L in last 2 days, will replace as needed   # Hyperchloremia: Highest Cl = 108 mmol/L in last 2 days, will monitor as appropriate          # Hypoalbuminemia: Lowest albumin = 3 g/dL at 2/23/2025  5:41 AM, will monitor as appropriate     # Hypertension: Noted on problem list                       Social Drivers of Health    Physical Activity: Inactive (12/2/2024)    Exercise Vital Sign     Days of Exercise per Week: 0 days     Minutes of Exercise per Session: 0 min   Social Connections: Unknown (12/2/2024)    Social Connection and Isolation Panel [NHANES]     Frequency of Social Gatherings with Friends and Family: Once a week          Disposition Plan     Medically Ready for Discharge: Anticipated Tomorrow             Que Etienne MD  Hospitalist Service, [address]  Securely message with [employer] (more info)  Text page via [facility] Paging/Directory   See signed in provider for up to date coverage information  ______________________________________________________________________    Interval History     Was doing okay early in the day with improving appetite. No recurrence of neurodeficits  However, developed acute diarrhea and dizziness.     Physical Exam   Vital Signs: Temp: 97.6  F (36.4  C) Temp src: Oral BP: (!) 164/79 Pulse: 73   Resp: 18 SpO2: 97 % O2 Device: None (Room air)    Weight: 136 lbs .38 oz    Physical Exam  Constitutional:       Appearance: Normal appearance.   HENT:      Head: Normocephalic.      Mouth/Throat:      Mouth: Mucous membranes are moist.   Eyes:      Extraocular Movements: Extraocular movements intact.      Pupils: Pupils are equal, round, and reactive to light.    Cardiovascular:      Rate and Rhythm: Normal rate and regular rhythm.      Heart sounds: No murmur heard.  Pulmonary:      Effort: Pulmonary effort is normal. No respiratory distress.      Breath sounds: Normal breath sounds. No wheezing.   Abdominal:      General: Abdomen is flat. Bowel sounds are normal.      Tenderness: There is no abdominal tenderness.   Musculoskeletal:      Cervical back: Normal range of motion.   Neurological:      General: No focal deficit present.      Mental Status: She is alert.      Comments: Oriented x 2(person, place)  Unable to clearly cooperate  Power - 4/5 bilateral distal/proximal, UE, LE       Medical Decision Making       50 MINUTES SPENT BY ME on the date of service doing chart review, history, exam, documentation & further activities per the note.      Data     I have personally reviewed the following data over the past 24 hrs:    14.6 (H)  \   11.3 (L)   / 160     143 108 (H) 25.1 (H) /  96   3.2 (L) 23 0.97 (H) \     ALT: 12 AST: 27 AP: 109 TBILI: 0.7   ALB: 3.0 (L) TOT PROTEIN: 5.5 (L) LIPASE: N/A     Procal: 11.40 (HH) CRP: N/A Lactic Acid: 1.1         Imaging results reviewed over the past 24 hrs:   No results found for this or any previous visit (from the past 24 hours).

## 2025-02-24 ENCOUNTER — APPOINTMENT (OUTPATIENT)
Dept: PHYSICAL THERAPY | Facility: CLINIC | Age: OVER 89
DRG: 871 | End: 2025-02-24
Payer: MEDICARE

## 2025-02-24 VITALS
HEIGHT: 64 IN | HEART RATE: 83 BPM | BODY MASS INDEX: 23.22 KG/M2 | DIASTOLIC BLOOD PRESSURE: 62 MMHG | TEMPERATURE: 97.8 F | OXYGEN SATURATION: 95 % | RESPIRATION RATE: 16 BRPM | SYSTOLIC BLOOD PRESSURE: 165 MMHG | WEIGHT: 136.02 LBS

## 2025-02-24 PROBLEM — R11.0 NAUSEA: Status: ACTIVE | Noted: 2025-02-24

## 2025-02-24 LAB
ALBUMIN SERPL BCG-MCNC: 3.2 G/DL (ref 3.5–5.2)
ALP SERPL-CCNC: 109 U/L (ref 40–150)
ALT SERPL W P-5'-P-CCNC: 11 U/L (ref 0–50)
ANION GAP SERPL CALCULATED.3IONS-SCNC: 13 MMOL/L (ref 7–15)
AST SERPL W P-5'-P-CCNC: 22 U/L (ref 0–45)
BILIRUB SERPL-MCNC: 0.8 MG/DL
BUN SERPL-MCNC: 19.6 MG/DL (ref 8–23)
CALCIUM SERPL-MCNC: 9.2 MG/DL (ref 8.8–10.4)
CHLORIDE SERPL-SCNC: 112 MMOL/L (ref 98–107)
CREAT SERPL-MCNC: 1.05 MG/DL (ref 0.51–0.95)
EGFRCR SERPLBLD CKD-EPI 2021: 47 ML/MIN/1.73M2
ERYTHROCYTE [DISTWIDTH] IN BLOOD BY AUTOMATED COUNT: 14.3 % (ref 10–15)
GLUCOSE SERPL-MCNC: 102 MG/DL (ref 70–99)
HCO3 SERPL-SCNC: 20 MMOL/L (ref 22–29)
HCT VFR BLD AUTO: 35 % (ref 35–47)
HGB BLD-MCNC: 11.6 G/DL (ref 11.7–15.7)
MCH RBC QN AUTO: 31.7 PG (ref 26.5–33)
MCHC RBC AUTO-ENTMCNC: 33.1 G/DL (ref 31.5–36.5)
MCV RBC AUTO: 96 FL (ref 78–100)
PLATELET # BLD AUTO: 167 10E3/UL (ref 150–450)
POTASSIUM SERPL-SCNC: 3.7 MMOL/L (ref 3.4–5.3)
PROCALCITONIN SERPL IA-MCNC: 4.8 NG/ML
PROT SERPL-MCNC: 5.8 G/DL (ref 6.4–8.3)
RBC # BLD AUTO: 3.66 10E6/UL (ref 3.8–5.2)
SODIUM SERPL-SCNC: 145 MMOL/L (ref 135–145)
WBC # BLD AUTO: 9.6 10E3/UL (ref 4–11)

## 2025-02-24 PROCEDURE — 82374 ASSAY BLOOD CARBON DIOXIDE: CPT | Performed by: PHYSICIAN ASSISTANT

## 2025-02-24 PROCEDURE — 99239 HOSP IP/OBS DSCHRG MGMT >30: CPT | Performed by: HOSPITALIST

## 2025-02-24 PROCEDURE — 85018 HEMOGLOBIN: CPT | Performed by: PHYSICIAN ASSISTANT

## 2025-02-24 PROCEDURE — 36415 COLL VENOUS BLD VENIPUNCTURE: CPT | Performed by: PHYSICIAN ASSISTANT

## 2025-02-24 PROCEDURE — 84155 ASSAY OF PROTEIN SERUM: CPT | Performed by: PHYSICIAN ASSISTANT

## 2025-02-24 PROCEDURE — 84145 PROCALCITONIN (PCT): CPT | Performed by: HOSPITALIST

## 2025-02-24 PROCEDURE — 250N000011 HC RX IP 250 OP 636: Performed by: HOSPITALIST

## 2025-02-24 PROCEDURE — 250N000013 HC RX MED GY IP 250 OP 250 PS 637: Performed by: HOSPITALIST

## 2025-02-24 PROCEDURE — 999N000197 HC STATISTIC WOC PT EDUCATION, 0-15 MIN

## 2025-02-24 PROCEDURE — 97530 THERAPEUTIC ACTIVITIES: CPT | Mod: GP

## 2025-02-24 RX ORDER — ZINC OXIDE 13 %
1 CREAM (GRAM) TOPICAL 2 TIMES DAILY
COMMUNITY
Start: 2025-02-24

## 2025-02-24 RX ORDER — ONDANSETRON 4 MG/1
4 TABLET, ORALLY DISINTEGRATING ORAL EVERY 6 HOURS PRN
Status: DISCONTINUED | OUTPATIENT
Start: 2025-02-24 | End: 2025-02-24 | Stop reason: HOSPADM

## 2025-02-24 RX ORDER — ONDANSETRON 4 MG/1
4 TABLET, ORALLY DISINTEGRATING ORAL EVERY 8 HOURS PRN
Qty: 10 TABLET | Refills: 0 | Status: SHIPPED | OUTPATIENT
Start: 2025-02-24 | End: 2025-02-24

## 2025-02-24 RX ORDER — ONDANSETRON 4 MG/1
4 TABLET, ORALLY DISINTEGRATING ORAL EVERY 6 HOURS PRN
Qty: 20 TABLET | Refills: 0 | Status: SHIPPED | OUTPATIENT
Start: 2025-02-24

## 2025-02-24 RX ADMIN — ATORVASTATIN CALCIUM 10 MG: 10 TABLET, FILM COATED ORAL at 10:19

## 2025-02-24 RX ADMIN — CEFTRIAXONE SODIUM 2 G: 2 INJECTION, POWDER, FOR SOLUTION INTRAMUSCULAR; INTRAVENOUS at 09:08

## 2025-02-24 RX ADMIN — ONDANSETRON 4 MG: 4 TABLET, ORALLY DISINTEGRATING ORAL at 10:19

## 2025-02-24 RX ADMIN — LEVOTHYROXINE SODIUM 25 MCG: 0.03 TABLET ORAL at 09:08

## 2025-02-24 RX ADMIN — AMLODIPINE BESYLATE 5 MG: 5 TABLET ORAL at 06:17

## 2025-02-24 RX ADMIN — CLOPIDOGREL BISULFATE 75 MG: 75 TABLET ORAL at 09:08

## 2025-02-24 RX ADMIN — Medication 25 MCG: at 09:08

## 2025-02-24 RX ADMIN — Medication 1 CAPSULE: at 09:08

## 2025-02-24 RX ADMIN — MICONAZOLE NITRATE: 20 POWDER TOPICAL at 09:09

## 2025-02-24 ASSESSMENT — ACTIVITIES OF DAILY LIVING (ADL)
ADLS_ACUITY_SCORE: 83
ADLS_ACUITY_SCORE: 81
ADLS_ACUITY_SCORE: 83
ADLS_ACUITY_SCORE: 81
ADLS_ACUITY_SCORE: 83

## 2025-02-24 NOTE — PLAN OF CARE
Discharge to: home  Transportation: family  Time: 1430  Prescriptions: picking up at discharge pharmacy.   Belongings: remain with patient.    -if applicable return home meds from inpatient pharmacy:  Access: PIV removed  Care plan and education discontinued: done  Paperwork:  discussed with virtual RN.

## 2025-02-24 NOTE — PROGRESS NOTES
"Transfer in    Transfer to  room 7515 from ED  Report received from unit charge RN.     Brief note about patient status upon transfer. Pt transferred to unit via inpatient bed with transport staff. Appears to be awake and alert.           Code status: DNR / DNI.   Fall Risk: Yes- Department fall risk interventions implemented.  Isolation: None  Patient belongings: W patient Bilateral hearing aides.   BP (!) 172/80   Pulse 84   Temp 98  F (36.7  C) (Oral)   Resp (!) 32   Ht 1.626 m (5' 4.02\")   Wt 61.7 kg (136 lb 0.4 oz)   SpO2 96%   BMI 23.34 kg/m  Remains on RA.     Medication drips upon transfer:  N/A   Sign and held orders released? Yes  "

## 2025-02-24 NOTE — CONSULTS
WOC consulted for coccyx wound.  Pt in wheelchair, ready to leave.   Per bedside RN, skin is intact with blanchable erythema on the buttocks.  Pt is incontinent of urine.      Plan:  gave pt's dtr criticaid paste with instructions on use

## 2025-02-24 NOTE — SUMMARY OF CARE
(Change note type to summary of care)  Reason for admission:generalized weakness , altered mental status , Sepsis  Admitted from:  ED  Report received from:       Racheal Rao 2 RN skin assessment completed by: Lala Santacruz and mesfin Fontana      - Findings (add LDA if needed): Moisture red skinfold breast, abdominal fold , and groin area. R nose scab, skin tear in sacrum area .  Bed algorithm reevaluated:   Was airflow pump ordered?:no  Suction set up in room?yes  Care plan (primary problem) and education initiated:yes   MDRO education done if applicable:yes  Pt informed about policy regarding no IV pumps off unit:yes  Detailed Belongings: shoes,hearing aid and hearing aid .

## 2025-02-24 NOTE — PLAN OF CARE
Physical Therapy Discharge Summary    Reason for therapy discharge:    Discharged to home with home therapy.    Progress towards therapy goal(s). See goals on Care Plan in [facility] electronic health record for goal details.  Goals partially met.  Barriers to achieving goals:   limited tolerance for therapy.    Therapy recommendation(s):    Continued therapy is recommended.  Rationale/Recommendations:  to improve functional strength and activity tolerance.

## 2025-02-24 NOTE — PROGRESS NOTES
Care Management Discharge Note    Discharge Date: 02/24/2025       Discharge Disposition:  Home with home care    Discharge Services: Home Care, RN/PT/OT/HHA    Home Medical Care    Coordination complete.  Service Provider Request Status Services Address Phone Fax Patient Preferred   [facility] [address] [facility] [facility] [address] [phone] --   [facility] - Intake/Referral Accepted -- [address] [facility] [address] [phone] --       Discharge DME:  N/A    Discharge Transportation: family or friend will provide    Private pay costs discussed: Not applicable    Does the patient's insurance plan have a 3 day qualifying hospital stay waiver?  No    PAS Confirmation Code:    Patient/family educated on Medicare website which has current facility and service quality ratings:      Education Provided on the Discharge Plan:    Persons Notified of Discharge Plans: patients family, patient  Patient/Family in Agreement with the Plan:  Yes    Handoff Referral Completed: No, handoff not indicated or clinically appropriate    Additional Information:  RNCC was notified that patient is medically ready to discharge.  RNCC notified Sameera, from [facility] that patient is going to discharge today.  RNCC talked to patient and family about home care needs. Family states that they would like a HHA to come out once a week for showers.  RNCC talked to Sameera about this and she states that orders will need RN, and HHA added.  RNCC notified provider to add RN, and HHA to home care orders.    Family will be transporting patient at discharge.      Claudia Gandhi, AHMET    Nurse Coordinator     Covering for: Guido CASTLE    Phone: *[phone]    Social Work and Care Management Department    SEARCHABLE in [facility] - search CARE COORDINATOR    [place] ([phone]) Saturday & Sunday; ([phone]) [facility] Recognized  Holidays    Units: 5A, 5B & 5C  Pager: [phone]    Units: 6B, 6C & 6D    Pager: [phone]    Units: 7A, 7B, 7C & 7D    Pager: [phone]    Units: 6A & ICU   Pager: [phone]    Units: 5 Ortho, 5MS & WB ED Pager: [phone]    Units: 6MS, 8A & 10 ICU  Pager [phone]

## 2025-02-24 NOTE — PLAN OF CARE
Goal Outcome Evaluation:      Plan of Care Reviewed With: patient    Overall Patient Progress: no changeOverall Patient Progress: no change      Outcome Evaluation: Pt alert and oriented ,forgetful, pleasantly confused , hard of hearing baseline  , poor vision , bp 168/67 gave amlodipine early.    no complains of pain , no SOB noted , on tele with  sinus rhythm frequent PAC / PVC , New PIV at right arm , NS @75ml/hr .  WOC consult order for coccyx. Doesnt use call light for needs , Incontinent with cares. Admission question not done since pt wasn't able to answer the question.Continue plan of care.

## 2025-02-24 NOTE — PROGRESS NOTES
Shift:  7464-4231     VS: Temp: 98  F (36.7  C) Temp src: Oral BP: (!) 131/93 Pulse: 84   Resp: (!) 32 SpO2: 98 % O2 Device: None (Room air)    BP elevated, continued to monitor  Pain: denies pain  Neuro: Oriented to self only. [place] even with Hearing Aids in. Daughters reported poor sight. Hearing aids and  with patient.  Cardiac: AFIB at baseline.  Respiratory: Stating high 90's, shallow breathing for a short period this afternoon w/ no complaint of SOB or decreased O2.   Diet/Appetite: Regular diet; little appetite. Requires assistance eating.  /GI: incontinent bowel and bladder. 2 episodes of loose stool this shift. Brief worn in bed; unable to report if needing changed.  LDAs: LPIV: dried blood; elected to not change dressing due to skin integrity   Skin: No new deficits  Activity: Assist of 2, gait belt: family stated baseline at home     Pt was more alert and aware in AM hours and planned to discharge to home today. Around 12 patient while using bathroom (with strong assist of 2) became very lethargic, confused and was having ongoing bowl incontinence while standing. Team determined to extend stay one night. Family (2 daughters) met with social work to discuss more options for help at home as they have been providing  her care with little to no medical assistance.     Plan: Continue POC. Notify primary team of changes in status. Family to come tomorrow. Potential discharge to home tomorrow.    Pt transferred to .

## 2025-02-24 NOTE — CONSULTS
Care Management Initial Consult    General Information  Assessment completed with: Family, VM-chart review, daughters (Lake Torres and Evelina Wilson)  Type of CM/SW Visit: Offer D/C Planning    Primary Care Provider verified and updated as needed: Yes   Readmission within the last 30 days: no previous admission in last 30 days      Reason for Consult: community resources, discharge planning  Advance Care Planning: Advance Care Planning Reviewed: concerns discussed, other (see comments) (Would like to review POLST and HCD with PCP)          Communication Assessment  Patient's communication style: spoken language (English or Bilingual)             Cognitive  Cognitive/Neuro/Behavioral: .WDL except  Level of Consciousness: confused  Arousal Level: opens eyes spontaneously  Orientation: disoriented to, situation, time, place  Mood/Behavior: calm  Best Language: 0 - No aphasia  Speech: clear, spontaneous    Living Environment:   People in home: child(yoshi), adult     Current living Arrangements: house      Able to return to prior arrangements: yes       Family/Social Support:  Care provided by: child(yoshi), self  Provides care for: no one  Marital Status:   Support system: Children          Description of Support System: Supportive, Involved    Support Assessment: Adequate family and caregiver support, Adequate social supports    Current Resources:   Patient receiving home care services: No        Community Resources: None  Equipment currently used at home: walker, rolling  Supplies currently used at home: Incontinence Supplies, Gloves, Wipes, Chux    Employment/Financial:  Employment Status: retired        Financial Concerns: none           Does the patient's insurance plan have a 3 day qualifying hospital stay waiver?  No    Lifestyle & Psychosocial Needs:  Social Drivers of Health     Food Insecurity: Low Risk  (12/2/2024)    Food Insecurity     Within the past 12 months, did you worry that your food would run  out before you got money to buy more?: No     Within the past 12 months, did the food you bought just not last and you didn t have money to get more?: No   Depression: Not at risk (12/2/2024)    PHQ-2     PHQ-2 Score: 0   Housing Stability: Low Risk  (12/2/2024)    Housing Stability     Do you have housing? : Yes     Are you worried about losing your housing?: No   Tobacco Use: Low Risk  (12/2/2024)    Patient History     Smoking Tobacco Use: Never     Smokeless Tobacco Use: Never     Passive Exposure: Not on file   Financial Resource Strain: Low Risk  (12/2/2024)    Financial Resource Strain     Within the past 12 months, have you or your family members you live with been unable to get utilities (heat, electricity) when it was really needed?: No   Alcohol Use: Not on file   Transportation Needs: Low Risk  (12/2/2024)    Transportation Needs     Within the past 12 months, has lack of transportation kept you from medical appointments, getting your medicines, non-medical meetings or appointments, work, or from getting things that you need?: No   Physical Activity: Inactive (12/2/2024)    Exercise Vital Sign     Days of Exercise per Week: 0 days     Minutes of Exercise per Session: 0 min   Interpersonal Safety: Not on file   Stress: No Stress Concern Present (12/2/2024)    [employer] - Occupational Stress Questionnaire     Feeling of Stress : Not at all   Social Connections: Unknown (12/2/2024)    Social Connection and Isolation Panel [NHANES]     Frequency of Communication with Friends and Family: Not on file     Frequency of Social Gatherings with Friends and Family: Once a week     Attends Christianity Services: Not on file     Active Member of Clubs or Organizations: Not on file     Attends Club or Organization Meetings: Not on file     Marital Status: Not on file   Health Literacy: Not on file       Functional Status:  Prior to admission patient needed assistance:   Dependent ADLs::  "Ambulation-walker, Bathing, Dressing, Incontinence  Dependent IADLs:: Cleaning, Cooking, Laundry, Shopping, Meal Preparation, Medication Management, Money Management, Transportation, Incontinence       Mental Health Status:          Chemical Dependency Status:                Values/Beliefs:  Spiritual, Cultural Beliefs, Worship Practices, Values that affect care: no               Discussed  Partnership in Safe Discharge Planning  document with patient/family: No    Additional Information:  From chart:  \"Azeb Torres is a 89 year old female admitted on 2/20/2025. 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 Presented to the ED via EMS due to generalized weakness, altered mental status. Overall, high concern for sepsis on admission with lactic >4 though also with new slurred speech concerning for possible CVA.\"    SW consulted to discuss resources for discharge.  Azeb lives in a house with her daughter, Lake.  She was living upstairs and over the past few months, her daughters, Lake and Evelina Resendez helped move their mom onto the floor level of the house.  Azeb's daughters are her sole caregivers.  Her daughter, Evelina Resendez lives in [place], [place], though spends 3 weeks/month staying with Lake and Azeb.  Lake and Evelina Resendez manage all of Azeb's medications, they help her dress, they take care of all of her ADLs and IADLs.  Azeb had [facility] home care for 3 months in 2024 and her daughters reported that home care could be helpful for them.  They stated they would like some help with a weekly shower and PT and OT.  [facility].  Lake and Evelina Resendez also noted that their finances are tight as they purchase briefs and wipes for " "their mother.  Their mom gets around $700/mo in Social Security and they are able to provide all her basic needs, though they asked for resources to find bulk incontinence supplies.  RADHA told them about [employer], a delivery service for incontinence products.  RADHA also mentioned the Senior Linkage Line as a resource.    RADHA printed out \"Resources for Seniors in [facility]\" and will ask the Monday ED  to share the resource with Lake and Evelina Resendez on Monday.  RADHA also provided the contact for [employer].    Azeb plans to discharge home and her daughters will provide transportation.    Next Steps: Share print resources with daughters prior to discharge       CHUCKIE Colon, [facility]  ED   [phone]  Care Management Department  [facility]    Securely message me on Vocera               "

## 2025-02-26 ENCOUNTER — TELEPHONE (OUTPATIENT)
Dept: INTERNAL MEDICINE | Facility: CLINIC | Age: OVER 89
End: 2025-02-26
Payer: MEDICARE

## 2025-02-26 NOTE — TELEPHONE ENCOUNTER
[facility] Call Center    Phone Message    May a detailed message be left on voicemail: yes     Reason for Call: Other: Patient has a hospital follow up on 3/3/2025 with Dr. Evelina Can in-person but the patient is requesting a telephone visit due to mobility issues and difficulty getting to the clinic. Thank you.     Action Taken: Other: [facility] Internal [facility]    Travel Screening: Not Applicable     Date of Service:

## 2025-02-27 ENCOUNTER — TELEPHONE (OUTPATIENT)
Dept: INTERNAL MEDICINE | Facility: CLINIC | Age: OVER 89
End: 2025-02-27
Payer: MEDICARE

## 2025-02-27 ENCOUNTER — MEDICAL CORRESPONDENCE (OUTPATIENT)
Dept: HEALTH INFORMATION MANAGEMENT | Facility: CLINIC | Age: OVER 89
End: 2025-02-27

## 2025-02-27 NOTE — TELEPHONE ENCOUNTER
[facility] Call [facility]    Phone Message    May a detailed message be left on voicemail: yes     Reason for Call: Other: Pt home care nurse is requesting a call back for verbal orders for PT and OT Eval to treat. For home health aid 1 time a week for 3 weeks. Skilled nursing 1 time a week for 3 weeks and then once every other week for 6 weeks. Deborah wanted to let the Pt provider know that she did have a fall last night in the bathroom but did not have any injuries and paramedics were called. Please advise.       Action Taken: Other: PCC    Travel Screening: Not Applicable     Date of Service: 2/27/25

## 2025-02-27 NOTE — TELEPHONE ENCOUNTER
Called Deborah, home care nurse and gave verbal order per Dr. Can for PT and OT Eval to treat. For home health aid 1 time a week for 3 weeks. Skilled nursing 1 time a week for 3 weeks and then once every other week for 6 weeks.     Patient had a fall last night. No injuries. Called paramedics to check on patient. First time Deborah saw the patient today, so not sure of the patient normal condition baseline. The daughter feel patient is baseline.  Patient is weak, which they need PT.        Khris Engle CMA ([facility]) at 4:08 PM on 2/27/2025

## 2025-03-03 ENCOUNTER — VIRTUAL VISIT (OUTPATIENT)
Dept: INTERNAL MEDICINE | Facility: CLINIC | Age: OVER 89
End: 2025-03-03
Attending: HOSPITALIST
Payer: MEDICARE

## 2025-03-03 DIAGNOSIS — Z71.89 GOALS OF CARE, COUNSELING/DISCUSSION: ICD-10-CM

## 2025-03-03 DIAGNOSIS — N39.0 BACTERIAL UTI: Primary | ICD-10-CM

## 2025-03-03 DIAGNOSIS — E03.4 HYPOTHYROIDISM DUE TO ACQUIRED ATROPHY OF THYROID: ICD-10-CM

## 2025-03-03 DIAGNOSIS — A49.9 BACTERIAL UTI: Primary | ICD-10-CM

## 2025-03-03 DIAGNOSIS — F03.90 SENILE DEMENTIA (H): ICD-10-CM

## 2025-03-03 RX ORDER — LEVOTHYROXINE SODIUM 25 UG/1
25 TABLET ORAL DAILY
Qty: 90 TABLET | Refills: 3 | Status: SHIPPED | OUTPATIENT
Start: 2025-03-03

## 2025-03-03 NOTE — PROGRESS NOTES
Azeb is a 89 year old who is being evaluated via a billable telephone visit.    What phone number would you like to be contacted at? [phone]   How would you like to obtain your AVS? MyChart      Assessment & Plan     Hypothyroidism due to acquired atrophy of thyroid  - levothyroxine (SYNTHROID/LEVOTHROID) 25 MCG tablet; Take 1 tablet (25 mcg) by mouth daily.    Bacterial UTI  Goals of care, counseling/discussion  Daughters previously filled out POLST indicated DNR/I, no artificial feeding or antibiotics. There was some confusion about this as it pertains to recent hospital visit. They express that patient has limited awareness given dementia and they prefer not to have her taken to the hospital again given medical futility and quality of life issues.  Azeb is minimally ambulatory and has limited awareness of her surroundings. They agree hospice would be a reasonable next step for Azeb.  - Hospice Referral; Future  - No CPR- Do NOT Intubate    Senile dementia (H)  - Hospice Referral; Future  - No CPR- Do NOT Intubate        MED REC REQUIRED  Post Medication Reconciliation Status:           No follow-ups on file.    Subjective   Azeb is a 89 year old, presenting for the following health issues:  RECHECK and Hospital F/U      Video Start Time: 10:04 AM    HPI              2/27/2025   Post Discharge Outreach   How are you doing now that you are home? fine. Homecare came out for the first time today. Hasn't gained strength back.   How are your symptoms? (Red Flag symptoms escalate to triage hotline per guidelines) Improved   Does the patient have their discharge instructions?  Yes   Does the patient have questions regarding their discharge instructions?  No   Were you started on any new medications or were there changes to any of your previous medications?  Yes   Does the patient have all of their medications? Yes   Do you have questions regarding any of your medications?  No   Do you have all of your needed medical  supplies or equipment (DME)?  (i.e. oxygen tank, CPAP, cane, etc.) Yes   Discharge Follow Up Appointment Date 3/3/2025   Discharge Follow Up Appointment Scheduled with? Primary Care Provider       Hospital Follow-up Visit:      Summary of hospitalization:    Patient hospitalized 2/20 - 2/24 for pyelonephritis, klebsiella bacteremia, SVT. Was given antibiotics. Discharged on augmentin.    Today, I am speaking on phone with daughters and they say she was slow to recover, was weak. Uses walker in home. Awaiting PT/OT for home care. She is eating and drinking. She has demented, wakes up frequently at night, hard to keep safe per daughter.  They have questions about goals of care today.  They submitted a POLST form but are not sure they would want their mom to be taken back to hospital in the event of future illness. They prefer to have comfort based cares at this stage, given advanced dementia and frailty.                     Objective    Vitals - Patient Reported  Pain Score: No Pain (0)    Are refills needed on medications prescribed by this physician? YES     Vitals:  No vitals were obtained today due to virtual visit.    Physical Exam   Physical Exam   Reported vitals:  There were no vitals taken for this visit.   healthy, alert, no distress and cooperative  Psych: Azeb is heard in the background, she is oriented to self and place.  Respiratory: Speaking in full sentences, unlabored, no audible wheezes or cough.           Video-Visit Details    Type of service:  phone visit  Video End Time:10:39 AM  Originating Location (pt. Location): Home    Distant Location (provider location):  On-site  Platform used for Video Visit: phone  Signed Electronically by: Evelina Can MD

## 2025-03-03 NOTE — PATIENT INSTRUCTIONS
Thank you for visiting the Primary Care Center today at the [facility]! The following is some information about our clinic:     Primary Care Center Frequently-Asked Questions    (1) How do I schedule appointments at the [facility]?     Primary Care--to schedule or make changes to an existing appointment, please call our primary care line at [phone].    Labs--to schedule a lab appointment at the [facility] you can use [employer] or call [phone]. If you have a [place] location that is closer to home, you can reach out to that location for scheduling options.     Imaging--if you need to schedule a CT, X-ray, MRI, ultrasound, or other imaging study you can call [phone] to schedule at the [facility] or any other [facility] imaging location.     Referrals--if a referral to another specialty was ordered you can expect a phone call from their scheduling team. If you have not heard from them in a week, please call us or send us a [employer] message to check the status or get a scheduling number. Please note that this only applies to internal [facility] referrals. If the referral is external you would need to contact their office for scheduling.     (2) I have a question about my visit, who do I contact?     You can call us at the primary care line at [phone] to ask questions about your visit. You can also send a secure message through [employer], which is reviewed by clinic staff. Please note that [employer] messages have a twenty-four to forty-eight business hour turnaround time and should not be used for urgent concerns.    (3) How will I get the results of my tests?    If you are signed up for [employer] all tests will be released to you within twenty-four hours of resulting. Please allow three to five days for your doctor to review your results and place a note interpreting the results. If you do not have [employer] you will receive your  results through mail seven to ten business days following the return of the tests. Please note that if there should be any urgent or concerning results that your doctor or their registered nurse will reach out to you the same day as the tests come back. If you have follow up questions about your results or would like to discuss the results in detail please schedule a follow up with your provider either in person or virtually.     (4) How do I get refills of my prescriptions?     You should always first contact your pharmacy for refills of your medications. If submitting a refill request on [employer], please be sure to submit the request only once--repeat requests can cause delays in refill. If you are requesting a NEW medication or a medication related to new symptoms you will need to schedule an appointment with a provider prior to approval. Please note: Routine medication refills have up to one to three business day turnaround whereas controlled substances refills have up to five to seven business day turnaround.    (5) I have new symptoms, what do I do?     If you are having an immediate medical emergency, you should dial 911 for assistance.   For anything urgent that needs to be seen within a few hours to one day you should visit a local urgent care for assistance.  For non-urgent symptoms that need to be seen within a few days to a week you can schedule with an available provider in primary care by going to [employer] or calling [phone].   If you are not sure how serious your symptoms are or you would like to receive medical advice you can always call [phone] to speak with a triage nurse.

## 2025-03-12 ENCOUNTER — DOCUMENTATION ONLY (OUTPATIENT)
Dept: INTERNAL MEDICINE | Facility: CLINIC | Age: OVER 89
End: 2025-03-12
Payer: MEDICARE

## 2025-03-12 NOTE — PROGRESS NOTES
Type of Form Received: [facility] [id number]    Form Received (Date) 3/12/25   Form Filled out No   Placed in provider folder Yes

## 2025-03-14 DIAGNOSIS — Z53.9 DIAGNOSIS NOT YET DEFINED: Primary | ICD-10-CM

## 2025-03-14 PROCEDURE — G0180 MD CERTIFICATION HHA PATIENT: HCPCS | Performed by: INTERNAL MEDICINE

## 2025-03-17 ENCOUNTER — TELEPHONE (OUTPATIENT)
Dept: INTERNAL MEDICINE | Facility: CLINIC | Age: OVER 89
End: 2025-03-17
Payer: MEDICARE

## 2025-03-17 NOTE — TELEPHONE ENCOUNTER
[facility] Call [facility]    Phone Message    May a detailed message be left on voicemail: yes     Reason for Call: Other: Chevy from [facility] is calling to get verbal orders to delay OT evaluation to next week Friday, per daughter's request. Please advise.     Action Taken: Other: PCC    Travel Screening: Not Applicable     Date of Service:

## 2025-03-18 NOTE — TELEPHONE ENCOUNTER
LOV 12/02/2024      Called Chevy and gave verbal orders per Dr. Can for delay OT evaluation to next week Friday, per daughter's request.         Khris Engle CMA ([facility]) at 4:14 PM on 3/18/2025

## 2025-03-19 ENCOUNTER — DOCUMENTATION ONLY (OUTPATIENT)
Dept: INTERNAL MEDICINE | Facility: CLINIC | Age: OVER 89
End: 2025-03-19
Payer: MEDICARE

## 2025-03-19 NOTE — PROGRESS NOTES
Type of Form Received: [facility] [id number]    Form Received (Date) 3/19/25   Form Filled out No   Placed in provider folder Yes

## 2025-03-21 ENCOUNTER — MEDICAL CORRESPONDENCE (OUTPATIENT)
Dept: HEALTH INFORMATION MANAGEMENT | Facility: CLINIC | Age: OVER 89
End: 2025-03-21
Payer: MEDICARE

## 2025-04-01 ENCOUNTER — DOCUMENTATION ONLY (OUTPATIENT)
Dept: INTERNAL MEDICINE | Facility: CLINIC | Age: OVER 89
End: 2025-04-01
Payer: MEDICARE

## 2025-04-01 NOTE — PROGRESS NOTES
Type of Form Received: [facility] [id number]    Form Received (Date) 3/30/25   Form Filled out No   Placed in provider folder Yes

## 2025-04-24 ENCOUNTER — TELEPHONE (OUTPATIENT)
Dept: INTERNAL MEDICINE | Facility: CLINIC | Age: OVER 89
End: 2025-04-24
Payer: MEDICARE

## 2025-04-24 NOTE — TELEPHONE ENCOUNTER
[facility] Call [facility]    Phone Message    May a detailed message be left on voicemail: yes     Reason for Call: Other: Deborah with accent care is reporting that the Pt met all of her goals and that she was dishcharged from home care today on 4/24/25. Please advise.      Action Taken: Other: PCC    Travel Screening: Not Applicable     Date of Service: 4/24/25

## 2025-05-07 ENCOUNTER — HOSPITAL ENCOUNTER (EMERGENCY)
Facility: CLINIC | Age: OVER 89
Discharge: HOME | End: 2025-05-07
Attending: EMERGENCY MEDICINE | Admitting: EMERGENCY MEDICINE
Payer: MEDICARE

## 2025-05-07 ENCOUNTER — APPOINTMENT (OUTPATIENT)
Dept: CT IMAGING | Facility: CLINIC | Age: OVER 89
End: 2025-05-07
Attending: EMERGENCY MEDICINE
Payer: MEDICARE

## 2025-05-07 VITALS
RESPIRATION RATE: 16 BRPM | TEMPERATURE: 97.8 F | OXYGEN SATURATION: 96 % | HEART RATE: 77 BPM | DIASTOLIC BLOOD PRESSURE: 86 MMHG | HEIGHT: 64 IN | BODY MASS INDEX: 22.2 KG/M2 | WEIGHT: 130 LBS | SYSTOLIC BLOOD PRESSURE: 174 MMHG

## 2025-05-07 DIAGNOSIS — S09.90XA MINOR HEAD TRAUMA: ICD-10-CM

## 2025-05-07 DIAGNOSIS — R21 RASH: ICD-10-CM

## 2025-05-07 DIAGNOSIS — S00.81XA FACIAL ABRASION, INITIAL ENCOUNTER: ICD-10-CM
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
## 2025-05-07 PROCEDURE — 70450 CT HEAD/BRAIN W/O DYE: CPT

## 2025-05-07 PROCEDURE — 70450 CT HEAD/BRAIN W/O DYE: CPT | Mod: 26 | Performed by: RADIOLOGY

## 2025-05-07 PROCEDURE — 99284 EMERGENCY DEPT VISIT MOD MDM: CPT | Mod: 25 | Performed by: EMERGENCY MEDICINE

## 2025-05-07 PROCEDURE — 99284 EMERGENCY DEPT VISIT MOD MDM: CPT | Performed by: EMERGENCY MEDICINE

## 2025-05-07 RX ORDER — MUPIROCIN 20 MG/G
OINTMENT TOPICAL 3 TIMES DAILY
Qty: 1 G | Refills: 0 | Status: SHIPPED | OUTPATIENT
Start: 2025-05-07

## 2025-05-07 ASSESSMENT — ACTIVITIES OF DAILY LIVING (ADL)
ADLS_ACUITY_SCORE: 59
ADLS_ACUITY_SCORE: 59
ADLS_ACUITY_SCORE: 57
ADLS_ACUITY_SCORE: 59

## 2025-05-07 NOTE — ED NOTES
Patient discharge home after head CT resulted and no evidence of bleeding. Vitally stable, [place]. And somewhat confused.

## 2025-05-07 NOTE — ED PROVIDER NOTES
"ED Provider Note  [facility]      History     Chief Complaint   Patient presents with    Fall     HPI  Azeb Torres is a 89 year old female who presents after a fall.  The patient is very hard of hearing, difficult to get a history from.  It is reported she lives with family.  The patient states that they just moved to a new home tonight, and she states, \"I do not think anyone knew where anything was, where we were supposed to be going.\"  She states she was walking with a cane and fell down.  She has difficulty telling me exactly what happened or what caused the fall.  She struck her head.  She is reportedly on Plavix.  She states she has been having pain in her nose but denies any other pain or complaints.  No neck pain or back pain.  No chest pain or shortness of breath.  No abdominal pain.  No extremity pain.    Her daughters arrived, they note that they did not just move their home, the patient is confused at times.  They states she walks with a walker, gets herself up often several times a night to use the bathroom.  They do monitor her to some degree, realized that she had not been able to get herself into bed and had fallen.  They do not believe that she had any loss of consciousness, states she is unsteady at times.  They report that they initially had not plan on sending her to the ER, but after it was noted that she is on Plavix, they decided to do that.  They do show me a POLST which indicates that she is DNR, DNI, comfort measures only, and no antibiotics are desired.    Per chart review, tetanus is up-to-date.    Past Medical History  Past Medical History:   Diagnosis Date    Arthritis     Breast cancer (H)     ER positive    Chronic angle-closure glaucoma     CVA (cerebral vascular accident) (H) 2003    R cerebral artery, embolic after hip replacement    Degenerative joint disease     Endometrial cancer (H)     Hypertension     Left homonymous hemianopsia     Pulmonary " artery hypertension (H) 4/24/2013    Noted on echo. Mild-moderate. Moderate TR    Tricuspid regurgitation 4/24/2013     Past Surgical History:   Procedure Laterality Date    CATARACT IOL, RT/LT      glaucoma laser[      HIP SURGERY      s/p bilateral hip replacements    HYSTERECTOMY  1985    low grade endometrial cancer    LUMPECTOMY BREAST Left     RELEASE CARPAL TUNNEL Right 11/14/2017    Procedure: RELEASE CARPAL TUNNEL;  Right Open Carpal Tunnel Release;  Surgeon: Patrick Rivera MD;  Location:  OR    [facility] PELVIS/HIP JOINT SURGERY UNLISTED      [facility] STOMACH SURGERY PROCEDURE UNLISTED       acetaminophen (TYLENOL) 500 MG tablet  amLODIPine (NORVASC) 5 MG tablet  atorvastatin (LIPITOR) 10 MG tablet  cholecalciferol (VITAMIN D) 1000 UNIT tablet  clopidogrel (PLAVIX) 75 MG tablet  clotrimazole (LOTRIMIN) 1 % external cream  desonide (DESOWEN) 0.05 % external ointment  dorzolamide-timolol (COSOPT) 2-0.5 % ophthalmic solution  fluocinonide (LIDEX) 0.05 % external ointment  latanoprost (XALATAN) 0.005 % ophthalmic solution  levothyroxine (SYNTHROID/LEVOTHROID) 25 MCG tablet  Multiple Vitamins-Minerals (PRESERVISION AREDS 2) CAPS  mupirocin (BACTROBAN) 2 % external ointment  nystatin (MYCOSTATIN) 639068 UNIT/GM external powder  ondansetron (ZOFRAN ODT) 4 MG ODT tab  Probiotic Product (PROBIOTIC DAILY) CAPS      No Known Allergies  Family History  Family History   Problem Relation Age of Onset    Coronary Artery Disease Father     Glaucoma No family hx of     Macular Degeneration No family hx of      Social History   Social History     Tobacco Use    Smoking status: Never    Smokeless tobacco: Never   Vaping Use    Vaping status: Never Used   Substance Use Topics    Alcohol use: No     Comment: 0    Drug use: No      A medically appropriate review of systems was performed with pertinent positives and negatives noted in the HPI, and all other systems negative.    Physical Exam   BP: (!) 166/80  Pulse: 77  Temp: 97.7  " F (36.5  C)  Resp: 18  Height: 162.6 cm (5' 4\")  Weight: 59 kg (130 lb)  SpO2: 97 %  Physical Exam  Constitutional:       General: She is not in acute distress.     Appearance: Normal appearance. She is not toxic-appearing.   HENT:      Head:      Comments: Superficial abrasions to the forehead and bridge of the nose. Mild tenderness to the nose. Multiple missing teeth at baseline. No obvious dental injury or tenderness. Slightly erythematous crusted patch on left pinna     Mouth/Throat:      Mouth: Mucous membranes are moist.   Eyes:      General: No scleral icterus.     Conjunctiva/sclera: Conjunctivae normal.   Neck:      Comments: No midline c/t/l spine tenderness  Cardiovascular:      Rate and Rhythm: Normal rate.      Heart sounds: Normal heart sounds.   Pulmonary:      Effort: No respiratory distress.      Breath sounds: Normal breath sounds.   Abdominal:      Palpations: Abdomen is soft.      Tenderness: There is no abdominal tenderness.   Musculoskeletal:         General: No tenderness (Moves all 4 extremities, no deformity, denies tenderness.) or deformity.   Skin:     General: Skin is warm.      Findings: No rash.   Neurological:      Mental Status: She is alert.      Sensory: No sensory deficit.      Motor: No weakness.      Comments: Oriented to person and place           ED Course, Procedures, & Data      Procedures              Results for orders placed or performed during the hospital encounter of 05/07/25   Head CT w/o contrast     Status: None    Narrative    EXAM: CT HEAD W/O CONTRAST, CT CERVICAL SPINE W/O CONTRAST  LOCATION: [facility]  DATE: 5/7/2025    INDICATION: trauma  COMPARISON: 2/21/2025.  TECHNIQUE:   1) Routine CT Head without IV contrast. Multiplanar reformats. Dose reduction techniques were used.  2) Routine CT Cervical Spine without IV contrast. Multiplanar reformats. Dose reduction techniques were used.    FINDINGS:   HEAD CT: "   INTRACRANIAL CONTENTS: No intracranial hemorrhage, extraaxial collection, or mass effect.  No CT evidence of acute infarct. There is scattered diffuse low attenuation within the periventricular and subcortical white matter consistent with diffuse small   vessel ischemic disease. There is encephalomalacia involving the medial posterior left temporal to left occipital lobes. The ventricular system, basal cisterns and the cortical sulci are consistent with diffuse volume loss.     VISUALIZED ORBITS/SINUSES/MASTOIDS: No intraorbital abnormality. There is mild mucosal thickening involving the right maxillary sinus. No middle ear or mastoid effusion.    BONES/SOFT TISSUES: No acute abnormality.    CERVICAL SPINE CT:   VERTEBRA: There is a slight anterior listhesis of C6 in relation to C6 and C7 in relation to T1. The vertebral body heights are well-maintained throughout. No fracture or posttraumatic subluxation.     CANAL/FORAMINA: There is diffuse degenerative disc disease throughout the cervical spine with a moderate loss of disc space height, endplate changes and tiny anterior osteophyte formations. There is diffuse facet arthropathy visualized.    At the C4-5 disc space level there is mild bilateral neural foraminal narrowing.    There is no significant canal compromise throughout the cervical spine. There is calcification involving the right thyroid lobe. The lung apices are clear.      Impression    IMPRESSION:  HEAD CT:  1.  No CT finding of a mass, hemorrhage or focal area suggestive of acute infarct.  2.  Diffuse age related changes along with encephalomalacia medial posterior left temporal to left occipital lobes.    CERVICAL SPINE CT:  1.  No CT evidence for acute fracture or post traumatic subluxation.  2.  No significant canal compromise or neural foraminal narrowing throughout cervical spine.     CT Cervical Spine w/o Contrast     Status: None    Narrative    EXAM: CT HEAD W/O CONTRAST, CT CERVICAL SPINE  W/O CONTRAST  LOCATION: [facility]  DATE: 5/7/2025    INDICATION: trauma  COMPARISON: 2/21/2025.  TECHNIQUE:   1) Routine CT Head without IV contrast. Multiplanar reformats. Dose reduction techniques were used.  2) Routine CT Cervical Spine without IV contrast. Multiplanar reformats. Dose reduction techniques were used.    FINDINGS:   HEAD CT:   INTRACRANIAL CONTENTS: No intracranial hemorrhage, extraaxial collection, or mass effect.  No CT evidence of acute infarct. There is scattered diffuse low attenuation within the periventricular and subcortical white matter consistent with diffuse small   vessel ischemic disease. There is encephalomalacia involving the medial posterior left temporal to left occipital lobes. The ventricular system, basal cisterns and the cortical sulci are consistent with diffuse volume loss.     VISUALIZED ORBITS/SINUSES/MASTOIDS: No intraorbital abnormality. There is mild mucosal thickening involving the right maxillary sinus. No middle ear or mastoid effusion.    BONES/SOFT TISSUES: No acute abnormality.    CERVICAL SPINE CT:   VERTEBRA: There is a slight anterior listhesis of C6 in relation to C6 and C7 in relation to T1. The vertebral body heights are well-maintained throughout. No fracture or posttraumatic subluxation.     CANAL/FORAMINA: There is diffuse degenerative disc disease throughout the cervical spine with a moderate loss of disc space height, endplate changes and tiny anterior osteophyte formations. There is diffuse facet arthropathy visualized.    At the C4-5 disc space level there is mild bilateral neural foraminal narrowing.    There is no significant canal compromise throughout the cervical spine. There is calcification involving the right thyroid lobe. The lung apices are clear.      Impression    IMPRESSION:  HEAD CT:  1.  No CT finding of a mass, hemorrhage or focal area suggestive of acute infarct.  2.  Diffuse age related  changes along with encephalomalacia medial posterior left temporal to left occipital lobes.    CERVICAL SPINE CT:  1.  No CT evidence for acute fracture or post traumatic subluxation.  2.  No significant canal compromise or neural foraminal narrowing throughout cervical spine.     [facility] Draw     Status: None (In process)    Narrative    The following orders were created for panel order [facility] Draw.  Procedure                               Abnormality         Status                     ---------                               -----------         ------                     Extra Blue Top Tube[[id number]]                             In process                 Extra Red Top Tube[[id number]]                              In process                 Extra Green Top (Lithiu...[[id number]]                      In process                 Extra Purple Top Tube[[id number]]                           In process                   Please view results for these tests on the individual orders.     Medications - No data to display  Labs Ordered and Resulted from Time of ED Arrival to Time of ED Departure - No data to display  CT Cervical Spine w/o Contrast   Final Result   IMPRESSION:   HEAD CT:   1.  No CT finding of a mass, hemorrhage or focal area suggestive of acute infarct.   2.  Diffuse age related changes along with encephalomalacia medial posterior left temporal to left occipital lobes.      CERVICAL SPINE CT:   1.  No CT evidence for acute fracture or post traumatic subluxation.   2.  No significant canal compromise or neural foraminal narrowing throughout cervical spine.         Head CT w/o contrast   Final Result   IMPRESSION:   HEAD CT:   1.  No CT finding of a mass, hemorrhage or focal area suggestive of acute infarct.   2.  Diffuse age related changes along with encephalomalacia medial posterior left temporal to left occipital lobes.      CERVICAL SPINE CT:   1.  No CT evidence for acute fracture or post traumatic  subluxation.   2.  No significant canal compromise or neural foraminal narrowing throughout cervical spine.                Critical care was not performed.     Medical Decision Making  The patient's presentation was of moderate complexity (an acute complicated injury).    The patient's evaluation involved:  an assessment requiring an independent historian (daughters)  ordering and/or review of 2 test(s) in this encounter (see separate area of note for details)    The patient's management necessitated moderate risk (prescription drug management including medications given in the ED).    Assessment & Plan    I did have discussion with the patient's daughters, care for the patient, regarding goals of care.  They state that the patient would not want to have any surgery or any significant invasive treatments.  She does not want antibiotics.  And following such goals, they said that they do not think that they would like for her to have any labs or urine tested.  They states she has not had signs of infection, they are particularly worried about that, and would want to pursue any abnormalities that will be found.  After discussing CT imaging and reasons for this, they state that they would like to see a CT of her head and neck, but not do anything else beyond that.  CT of head and neck done without acute findings.  Collar was removed.  Daughters do not want to pursue additional evaluation at this time.  I did discuss very small possibility for delayed bleeding in her head, and they understand that it is possible or something else going on that we have not yet evaluated for.  They are encouraged to bring her back with any worsening or concerns.  They also did point out an erythematous patch that is on her left ear that they state just will not go away.  It is a little bit crusted, and I will prescribe some Bactroban ointment to see if that helps.  They are encouraged to follow-up with her outpatient provider, return with  any worsening or concerns.  Daughters verbalized understanding and are agreeable to the plan.  The patient is smiling, says she feels well and is ready to go home.    Dictation Disclaimer: Some of this Note has been completed with voice-recognition dictation software. Although errors are generally corrected real-time, there is the potential for a rare error to be present in the completed chart.      I have reviewed the nursing notes. I have reviewed the findings, diagnosis, plan and need for follow up with the patient.    New Prescriptions    MUPIROCIN (BACTROBAN) 2 % EXTERNAL OINTMENT    Apply topically 3 times daily.       Final diagnoses:   Fall, initial encounter   Minor head trauma   Facial abrasion, initial encounter   Rash       Miriam Nassar  [facility] EMERGENCY DEPARTMENT  5/7/2025     Miriam Nassar MD  05/07/25 [id number]

## 2025-05-07 NOTE — DISCHARGE INSTRUCTIONS
Use the ointment that I prescribed on the spot on her ear. She can use tylenol as needed. Return with any worsening or concerns. Follow up with your clinic doctor within the next week.

## 2025-08-08 NOTE — TELEPHONE ENCOUNTER
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  The pre-cancerous nature of these lesions and treatment options were discussed with the patient today.  At this time, I recommend treatment with liquid nitrogen cryotherapy.  The patient expressed understanding, is in agreement with this plan, and wishes to proceed with cryotherapy today.   [facility] Call Center    Phone Message    May a detailed message be left on voicemail: yes    Reason for Call: Other: Needs a callback regarding a Non formaliary exception for the pt, Rep stated that they will also reach out threw fax.      Action Taken: Message routed to:  [facility] ([facility] med

## (undated) DEVICE — Device

## (undated) DEVICE — DRAPE STOCKINETTE 4" 8544

## (undated) DEVICE — GLOVE PROTEXIS POWDER FREE SMT 6.5  2D72PT65X

## (undated) DEVICE — PACK HAND CUSTOM ASC

## (undated) DEVICE — SOL NACL 0.9% IRRIG 500ML BOTTLE 2F7123

## (undated) DEVICE — PREP CHLORAPREP 26ML TINTED ORANGE  260815

## (undated) DEVICE — DRSG KERLIX 4 1/2"X4YDS ROLL 6715

## (undated) DEVICE — GOWN LG DISP 9515

## (undated) DEVICE — NDL 27GA 1.25" 305136

## (undated) DEVICE — SOL WATER IRRIG 500ML BOTTLE 2F7113

## (undated) DEVICE — SU ETHILON 4-0 PS-2 18" BLACK 1667H

## (undated) DEVICE — TOURNIQUET SGL BLADDER 18"X4" RED 5921-218-135

## (undated) DEVICE — GLOVE PROTEXIS BLUE W/NEU-THERA 6.5  2D73EB65

## (undated) DEVICE — BRUSH SURGICAL SCRUB W/4% CHG SOL 25ML 371073

## (undated) DEVICE — LINEN ORTHO PACK 5446

## (undated) RX ORDER — EPINEPHRINE 1 MG/ML
INJECTION, SOLUTION, CONCENTRATE INTRAVENOUS
Status: DISPENSED
Start: 2017-11-14

## (undated) RX ORDER — LIDOCAINE HYDROCHLORIDE 10 MG/ML
INJECTION, SOLUTION EPIDURAL; INFILTRATION; INTRACAUDAL; PERINEURAL
Status: DISPENSED
Start: 2017-11-14